# Patient Record
Sex: FEMALE | Race: BLACK OR AFRICAN AMERICAN | NOT HISPANIC OR LATINO | Employment: PART TIME | URBAN - METROPOLITAN AREA
[De-identification: names, ages, dates, MRNs, and addresses within clinical notes are randomized per-mention and may not be internally consistent; named-entity substitution may affect disease eponyms.]

---

## 2018-10-27 PROBLEM — Z01.419 WELL WOMAN EXAM: Status: ACTIVE | Noted: 2018-10-27

## 2018-10-27 PROBLEM — Z34.00 SUPERVISION OF NORMAL FIRST PREGNANCY: Status: ACTIVE | Noted: 2018-10-27

## 2018-10-31 ENCOUNTER — APPOINTMENT (OUTPATIENT)
Dept: LAB | Facility: HOSPITAL | Age: 23
End: 2018-10-31

## 2018-10-31 ENCOUNTER — INITIAL PRENATAL (OUTPATIENT)
Dept: OBSTETRICS AND GYNECOLOGY | Facility: CLINIC | Age: 23
End: 2018-10-31

## 2018-10-31 VITALS
WEIGHT: 270 LBS | HEIGHT: 67 IN | BODY MASS INDEX: 42.38 KG/M2 | DIASTOLIC BLOOD PRESSURE: 72 MMHG | SYSTOLIC BLOOD PRESSURE: 112 MMHG

## 2018-10-31 DIAGNOSIS — O09.91 HIGH-RISK PREGNANCY IN FIRST TRIMESTER: Primary | ICD-10-CM

## 2018-10-31 PROBLEM — O34.219 PREVIOUS CESAREAN DELIVERY AFFECTING PREGNANCY: Status: ACTIVE | Noted: 2018-10-31

## 2018-10-31 PROBLEM — O09.90 HIGH-RISK PREGNANCY: Status: ACTIVE | Noted: 2018-10-27

## 2018-10-31 PROBLEM — O99.210 OBESITY AFFECTING PREGNANCY: Status: ACTIVE | Noted: 2018-10-31

## 2018-10-31 LAB
ABO GROUP BLD: NORMAL
AMPHET+METHAMPHET UR QL: NEGATIVE
AMPHETAMINES UR QL: NEGATIVE
BARBITURATES UR QL SCN: NEGATIVE
BASOPHILS # BLD AUTO: 0.04 10*3/MM3 (ref 0–0.2)
BASOPHILS NFR BLD AUTO: 0.4 % (ref 0–1)
BENZODIAZ UR QL SCN: NEGATIVE
BLD GP AB SCN SERPL QL: NEGATIVE
BUPRENORPHINE SERPL-MCNC: NEGATIVE NG/ML
CANNABINOIDS SERPL QL: NEGATIVE
COCAINE UR QL: NEGATIVE
DEPRECATED RDW RBC AUTO: 44.5 FL (ref 37–54)
EOSINOPHIL # BLD AUTO: 0.11 10*3/MM3 (ref 0–0.3)
EOSINOPHIL NFR BLD AUTO: 1.2 % (ref 0–3)
ERYTHROCYTE [DISTWIDTH] IN BLOOD BY AUTOMATED COUNT: 14.2 % (ref 11.3–14.5)
HBV SURFACE AG SERPL QL IA: NORMAL
HCT VFR BLD AUTO: 39.2 % (ref 34.5–44)
HCV AB SER DONR QL: NORMAL
HGB BLD-MCNC: 12.4 G/DL (ref 11.5–15.5)
HIV1+2 AB SER QL: NORMAL
IMM GRANULOCYTES # BLD: 0.02 10*3/MM3 (ref 0–0.03)
IMM GRANULOCYTES NFR BLD: 0.2 % (ref 0–0.6)
LYMPHOCYTES # BLD AUTO: 2.26 10*3/MM3 (ref 0.6–4.8)
LYMPHOCYTES NFR BLD AUTO: 23.9 % (ref 24–44)
MCH RBC QN AUTO: 27.4 PG (ref 27–31)
MCHC RBC AUTO-ENTMCNC: 31.6 G/DL (ref 32–36)
MCV RBC AUTO: 86.5 FL (ref 80–99)
METHADONE UR QL SCN: NEGATIVE
MONOCYTES # BLD AUTO: 0.59 10*3/MM3 (ref 0–1)
MONOCYTES NFR BLD AUTO: 6.2 % (ref 0–12)
NEUTROPHILS # BLD AUTO: 6.43 10*3/MM3 (ref 1.5–8.3)
NEUTROPHILS NFR BLD AUTO: 68.1 % (ref 41–71)
OPIATES UR QL: NEGATIVE
OXYCODONE UR QL SCN: NEGATIVE
PCP UR QL SCN: NEGATIVE
PLATELET # BLD AUTO: 271 10*3/MM3 (ref 150–450)
PMV BLD AUTO: 10.5 FL (ref 6–12)
PROPOXYPH UR QL: NEGATIVE
RBC # BLD AUTO: 4.53 10*6/MM3 (ref 3.89–5.14)
RH BLD: POSITIVE
RUBV IGG SER QL: NORMAL
RUBV IGG SER-ACNC: 34.5 IU/ML
TRICYCLICS UR QL SCN: NEGATIVE
WBC NRBC COR # BLD: 9.45 10*3/MM3 (ref 3.5–10.8)

## 2018-10-31 PROCEDURE — 86762 RUBELLA ANTIBODY: CPT | Performed by: OBSTETRICS & GYNECOLOGY

## 2018-10-31 PROCEDURE — 87186 SC STD MICRODIL/AGAR DIL: CPT | Performed by: OBSTETRICS & GYNECOLOGY

## 2018-10-31 PROCEDURE — 99204 OFFICE O/P NEW MOD 45 MIN: CPT | Performed by: OBSTETRICS & GYNECOLOGY

## 2018-10-31 PROCEDURE — 86901 BLOOD TYPING SEROLOGIC RH(D): CPT | Performed by: OBSTETRICS & GYNECOLOGY

## 2018-10-31 PROCEDURE — 36415 COLL VENOUS BLD VENIPUNCTURE: CPT | Performed by: OBSTETRICS & GYNECOLOGY

## 2018-10-31 PROCEDURE — 87340 HEPATITIS B SURFACE AG IA: CPT | Performed by: OBSTETRICS & GYNECOLOGY

## 2018-10-31 PROCEDURE — 80306 DRUG TEST PRSMV INSTRMNT: CPT | Performed by: OBSTETRICS & GYNECOLOGY

## 2018-10-31 PROCEDURE — 85025 COMPLETE CBC W/AUTO DIFF WBC: CPT | Performed by: OBSTETRICS & GYNECOLOGY

## 2018-10-31 PROCEDURE — 87077 CULTURE AEROBIC IDENTIFY: CPT | Performed by: OBSTETRICS & GYNECOLOGY

## 2018-10-31 PROCEDURE — G0432 EIA HIV-1/HIV-2 SCREEN: HCPCS | Performed by: OBSTETRICS & GYNECOLOGY

## 2018-10-31 PROCEDURE — 86803 HEPATITIS C AB TEST: CPT | Performed by: OBSTETRICS & GYNECOLOGY

## 2018-10-31 PROCEDURE — 87086 URINE CULTURE/COLONY COUNT: CPT | Performed by: OBSTETRICS & GYNECOLOGY

## 2018-10-31 PROCEDURE — 86900 BLOOD TYPING SEROLOGIC ABO: CPT | Performed by: OBSTETRICS & GYNECOLOGY

## 2018-10-31 PROCEDURE — 80081 OBSTETRIC PANEL INC HIV TSTG: CPT | Performed by: OBSTETRICS & GYNECOLOGY

## 2018-10-31 PROCEDURE — 86850 RBC ANTIBODY SCREEN: CPT | Performed by: OBSTETRICS & GYNECOLOGY

## 2018-10-31 RX ORDER — PRENATAL VIT NO.126/IRON/FOLIC 28MG-0.8MG
1 TABLET ORAL DAILY
COMMUNITY
Start: 2018-10-26 | End: 2019-07-26

## 2018-10-31 NOTE — PROGRESS NOTES
Subjective   Chief Complaint   Patient presents with   • Initial Prenatal Visit       Lady Diamond is a 23 y.o. year old .  Patient's last menstrual period was 2018.  She presents to be seen to initiate prenatal care.     Smoking status: Former Smoker                                                              Packs/day: 0.00      Years: 0.00         Types: Cigarettes     Start date:      Quit date: 2018     Smokeless tobacco: Not on file                         The following portions of the patient's history were reviewed and updated as appropriate:vital signs, allergies, current medications, past medical history, past social history, past surgical history and problem list.    Objective   Lab Review   No data reviewed    Imaging   No data reviewed    Assessment/Plan   ASSESSMENT  1. 23 y.o. year old  at 8w0d  2. Supervision of high risk pregnancy  3. Previous C/S with route of delivery to be determined    PLAN  1. The problem list for pregnancy was initiated today  2. Tests ordered today:  Orders Placed This Encounter   Procedures   • Urine Culture - Urine, Urine, Clean Catch   • HIV-1 / O / 2 Ag / Antibody 4th Generation   • Obstetric Panel   • Urine Drug Screen - Urine, Clean Catch     Please confirm all positive UDS     3. Testing for GC / Chlamydia / trichomonas will need to be done at her next prenatal visit  4. Genetic testing reviewed: MSAFP-4  5. Information reviewed: exercise in pregnancy, nutrition in pregnancy, weight gain in pregnancy, work and travel restrictions during pregnancy, list of OTC medications acceptable in pregnancy and call coverage groups   6. The following data needs to be obtained to update her medical records: operative report from her C/S.    Total time spent today with Lady  was 50 minutes (level 4).  Off this time, 90% was spent face-to-face time coordinating care, answering her questions and counseling regarding pathophysiology of her presenting problem  along with plans for any diagnositc work-up and treatment.    Follow up: 1 month(s)       This note was electronically signed.    Camden Thomas MD  October 31, 2018

## 2018-11-02 PROBLEM — O23.41 UTI (URINARY TRACT INFECTION) DURING PREGNANCY, FIRST TRIMESTER: Status: ACTIVE | Noted: 2018-11-02

## 2018-11-02 LAB
BACTERIA SPEC AEROBE CULT: ABNORMAL
RPR SER QL: NORMAL

## 2018-11-29 ENCOUNTER — ROUTINE PRENATAL (OUTPATIENT)
Dept: OBSTETRICS AND GYNECOLOGY | Facility: CLINIC | Age: 23
End: 2018-11-29

## 2018-11-29 VITALS — DIASTOLIC BLOOD PRESSURE: 70 MMHG | SYSTOLIC BLOOD PRESSURE: 108 MMHG | BODY MASS INDEX: 42.29 KG/M2 | WEIGHT: 270 LBS

## 2018-11-29 DIAGNOSIS — O09.91 HIGH-RISK PREGNANCY IN FIRST TRIMESTER: Primary | ICD-10-CM

## 2018-11-29 DIAGNOSIS — O23.41 UTI (URINARY TRACT INFECTION) DURING PREGNANCY, FIRST TRIMESTER: ICD-10-CM

## 2018-11-29 DIAGNOSIS — O34.219 PREVIOUS CESAREAN DELIVERY AFFECTING PREGNANCY: ICD-10-CM

## 2018-11-29 LAB
2ND TRIMESTER 4 SCREEN SERPL-IMP: NORMAL
C TRACH RRNA SPEC DONR QL NAA+PROBE: NEGATIVE
N GONORRHOEA DNA SPEC QL NAA+PROBE: NEGATIVE

## 2018-11-29 PROCEDURE — 99213 OFFICE O/P EST LOW 20 MIN: CPT | Performed by: OBSTETRICS & GYNECOLOGY

## 2018-11-29 RX ORDER — NITROFURANTOIN 25; 75 MG/1; MG/1
100 CAPSULE ORAL 2 TIMES DAILY
Qty: 14 CAPSULE | Refills: 0 | Status: SHIPPED | OUTPATIENT
Start: 2018-11-29 | End: 2019-03-01 | Stop reason: SDUPTHER

## 2018-11-29 NOTE — PROGRESS NOTES
Chief Complaint   Patient presents with   • Routine Prenatal Visit       HPI: Lady is a  currently at 12w1d who today reports the following:  Nausea - No; Vaginal bleeding -  No; Heartburn - No.    ROS:  GI: Constipation - No; Diarrhea - No    Neuro: Headache - No; Visual change - No      EXAM:  Vitals: See prenatal flowsheet   Abdomen: See prenatal flowsheet   Urine glucose/protein: See prenatal flowsheet   Pelvic: See prenatal flowsheet     Prenatal Labs  Lab Results   Component Value Date    HGB 12.4 10/31/2018    RUBELLAABIGG 34.5 10/31/2018    RUBELLAABIGG Immune 10/31/2018    HEPBSAG Non-Reactive 10/31/2018    ABSCRN Negative 10/31/2018    TNP5BAQ2 Non-Reactive 10/31/2018    HEPCVIRUSABY Non-Reactive 10/31/2018    URINECX >100,000 CFU/mL Escherichia coli (A) 10/31/2018       MDM:  Impression: 1. Supervision of high risk pregnancy  2. Previous C/S with route of delivery to be determined  3. ASx UTI   Tests done today: 1. PAP  2. GC/Chlamydia testing   Topics discussed: 1. Continue with PNV's  2. Prenatal labs reviewed  3. Today we discussed genetic testing.  She is aware that the MSAFP-4 is a screening test.  A screening test is not a diagnostic test.  This means that a negative test does not guarantee an unaffected fetus and a positive test does not mean the fetus has the condition for which the test is being performed.  If the test returns positive, a diagnostic test should be consider to determine if the fetus in fact has the condition.  After considering the options previously presented, she is not interested in having genetic testing performed.   Tests scheduled today for her next visit:   none

## 2018-12-03 ENCOUNTER — DOCUMENTATION (OUTPATIENT)
Dept: OBSTETRICS AND GYNECOLOGY | Facility: CLINIC | Age: 23
End: 2018-12-03

## 2018-12-27 ENCOUNTER — ROUTINE PRENATAL (OUTPATIENT)
Dept: OBSTETRICS AND GYNECOLOGY | Facility: CLINIC | Age: 23
End: 2018-12-27

## 2018-12-27 VITALS — SYSTOLIC BLOOD PRESSURE: 110 MMHG | BODY MASS INDEX: 42.13 KG/M2 | DIASTOLIC BLOOD PRESSURE: 68 MMHG | WEIGHT: 269 LBS

## 2018-12-27 DIAGNOSIS — O34.219 PREVIOUS CESAREAN DELIVERY AFFECTING PREGNANCY: ICD-10-CM

## 2018-12-27 DIAGNOSIS — O09.92 HIGH-RISK PREGNANCY IN SECOND TRIMESTER: Primary | ICD-10-CM

## 2018-12-27 PROCEDURE — 99213 OFFICE O/P EST LOW 20 MIN: CPT | Performed by: OBSTETRICS & GYNECOLOGY

## 2018-12-27 NOTE — PROGRESS NOTES
Chief Complaint   Patient presents with   • Routine Prenatal Visit       HPI: Lady is a  currently at 16w1d who today reports the following:  Contractions - No; Leaking - No; Vaginal bleeding -  No; Heartburn - No.    ROS:  GI: Nausea - No ; Constipation - No; Diarrhea - No    Neuro: Headache - No ; Visual change - No      EXAM:  Vitals: See prenatal flowsheet   Abdomen: See prenatal flowsheet   Urine glucose/protein: See prenatal flowsheet   Pelvic: See prenatal flowsheet     Lab Results   Component Value Date    ABO AB 10/31/2018    RH Positive 10/31/2018    ABSCRN Negative 10/31/2018       MDM:  Impression: 1. Supervision of high risk pregnancy  2. Previous C/S with route of delivery to be determined   3. Obesity in pregnancy   Tests done today: 1. none   Topics discussed: 1. Continue with PNV's  2. Prenatal labs reviewed  3. Still need C/S op note for review   Tests scheduled today for her next visit:   U/S for anatomic screening @ [facility]

## 2019-01-28 ENCOUNTER — OFFICE VISIT (OUTPATIENT)
Dept: OBSTETRICS AND GYNECOLOGY | Facility: HOSPITAL | Age: 24
End: 2019-01-28

## 2019-01-28 ENCOUNTER — ROUTINE PRENATAL (OUTPATIENT)
Dept: OBSTETRICS AND GYNECOLOGY | Facility: CLINIC | Age: 24
End: 2019-01-28

## 2019-01-28 ENCOUNTER — HOSPITAL ENCOUNTER (OUTPATIENT)
Dept: WOMENS IMAGING | Facility: HOSPITAL | Age: 24
Discharge: HOME | End: 2019-01-28
Attending: OBSTETRICS & GYNECOLOGY | Admitting: OBSTETRICS & GYNECOLOGY

## 2019-01-28 VITALS — DIASTOLIC BLOOD PRESSURE: 87 MMHG | SYSTOLIC BLOOD PRESSURE: 137 MMHG | BODY MASS INDEX: 42.91 KG/M2 | WEIGHT: 274 LBS

## 2019-01-28 DIAGNOSIS — O34.219 PREVIOUS CESAREAN DELIVERY AFFECTING PREGNANCY: ICD-10-CM

## 2019-01-28 DIAGNOSIS — O99.210 MATERNAL MORBID OBESITY, ANTEPARTUM (HCC): Primary | ICD-10-CM

## 2019-01-28 DIAGNOSIS — E66.01 MATERNAL MORBID OBESITY, ANTEPARTUM (HCC): Primary | ICD-10-CM

## 2019-01-28 DIAGNOSIS — O23.41 UTI (URINARY TRACT INFECTION) DURING PREGNANCY, FIRST TRIMESTER: ICD-10-CM

## 2019-01-28 DIAGNOSIS — O09.92 HIGH-RISK PREGNANCY IN SECOND TRIMESTER: ICD-10-CM

## 2019-01-28 DIAGNOSIS — O09.92 HIGH-RISK PREGNANCY IN SECOND TRIMESTER: Primary | ICD-10-CM

## 2019-01-28 PROCEDURE — 76811 OB US DETAILED SNGL FETUS: CPT | Performed by: OBSTETRICS & GYNECOLOGY

## 2019-01-28 PROCEDURE — 76811 OB US DETAILED SNGL FETUS: CPT

## 2019-01-28 PROCEDURE — 99213 OFFICE O/P EST LOW 20 MIN: CPT | Performed by: OBSTETRICS & GYNECOLOGY

## 2019-01-28 NOTE — PROGRESS NOTES
Chief Complaint   Patient presents with   • Routine Prenatal Visit       HPI: Lady is a  currently at 20w5d who today reports the following:  Contractions - No; Leaking - No; Vaginal bleeding -  No; Heartburn - No.  Question of paternity    ROS:  GI: Nausea - No ; Constipation - No; Diarrhea - No    Neuro: Headache - No ; Visual change - No      EXAM:  Vitals: See prenatal flowsheet   Abdomen: See prenatal flowsheet   Urine glucose/protein: See prenatal flowsheet   Pelvic: See prenatal flowsheet     Lab Results   Component Value Date    ABO AB 10/31/2018    RH Positive 10/31/2018    ABSCRN Negative 10/31/2018       MDM:  Impression: 1. Supervision of high risk pregnancy  2. Previous C/S with route of delivery to be determined  3. Abnormal placenta - report n/a   Tests done today: 1. U/S for anatomic screening - U/S report is not available for review at this time   Topics discussed: 1. Continue with PNV's  2. Prenatal labs reviewed  3. Paternity testing explained   Tests scheduled today for her next visit:   none

## 2019-01-28 NOTE — PROGRESS NOTES
Documentation of the ultasound findings, images, and interpretations with be available in the patient's Viewpoint report located in the Chart Review Imaging tab in [employer].

## 2019-02-28 ENCOUNTER — ROUTINE PRENATAL (OUTPATIENT)
Dept: OBSTETRICS AND GYNECOLOGY | Facility: CLINIC | Age: 24
End: 2019-02-28

## 2019-02-28 ENCOUNTER — APPOINTMENT (OUTPATIENT)
Dept: LAB | Facility: HOSPITAL | Age: 24
End: 2019-02-28

## 2019-02-28 VITALS — WEIGHT: 280 LBS | DIASTOLIC BLOOD PRESSURE: 74 MMHG | SYSTOLIC BLOOD PRESSURE: 120 MMHG | BODY MASS INDEX: 43.85 KG/M2

## 2019-02-28 DIAGNOSIS — O09.92 HIGH-RISK PREGNANCY IN SECOND TRIMESTER: Primary | ICD-10-CM

## 2019-02-28 DIAGNOSIS — O34.219 PREVIOUS CESAREAN DELIVERY AFFECTING PREGNANCY: ICD-10-CM

## 2019-02-28 DIAGNOSIS — O23.41 UTI (URINARY TRACT INFECTION) DURING PREGNANCY, FIRST TRIMESTER: ICD-10-CM

## 2019-02-28 PROCEDURE — 87077 CULTURE AEROBIC IDENTIFY: CPT | Performed by: OBSTETRICS & GYNECOLOGY

## 2019-02-28 PROCEDURE — 87086 URINE CULTURE/COLONY COUNT: CPT | Performed by: OBSTETRICS & GYNECOLOGY

## 2019-02-28 PROCEDURE — 87186 SC STD MICRODIL/AGAR DIL: CPT | Performed by: OBSTETRICS & GYNECOLOGY

## 2019-02-28 PROCEDURE — 99213 OFFICE O/P EST LOW 20 MIN: CPT | Performed by: OBSTETRICS & GYNECOLOGY

## 2019-02-28 NOTE — PROGRESS NOTES
Chief Complaint   Patient presents with   • Routine Prenatal Visit       HPI: Lady is a  currently at 25w1d who today reports the following:  Contractions - No; Leaking - No; Vaginal bleeding -  No; Heartburn - No.    ROS:  GI: Nausea - No ; Constipation - No; Diarrhea - No    Neuro: Headache - No ; Visual change - No      EXAM:  Vitals: See prenatal flowsheet   Abdomen: See prenatal flowsheet   Urine glucose/protein: See prenatal flowsheet   Pelvic: See prenatal flowsheet     Lab Results   Component Value Date    ABO AB 10/31/2018    RH Positive 10/31/2018    ABSCRN Negative 10/31/2018       MDM:  Impression: 1. Supervision of high risk pregnancy  2. Previous C/S with route of delivery to be determined  3. ASx UTI   Tests done today: 1. UCx for WILLIAM   Topics discussed: 1. Continue with PNV's  2. Prenatal labs reviewed  3. breast feeding   Tests scheduled today for her next visit:   GCT  HgB

## 2019-03-01 ENCOUNTER — TELEPHONE (OUTPATIENT)
Dept: OBSTETRICS AND GYNECOLOGY | Facility: CLINIC | Age: 24
End: 2019-03-01

## 2019-03-01 RX ORDER — NITROFURANTOIN 25; 75 MG/1; MG/1
100 CAPSULE ORAL 2 TIMES DAILY
Qty: 14 CAPSULE | Refills: 0 | Status: SHIPPED | OUTPATIENT
Start: 2019-03-01 | End: 2019-03-08

## 2019-03-01 RX ORDER — NITROFURANTOIN MACROCRYSTALS 50 MG/1
50 CAPSULE ORAL NIGHTLY
Qty: 30 CAPSULE | Refills: 4 | Status: SHIPPED | OUTPATIENT
Start: 2019-03-01 | End: 2019-06-03

## 2019-03-01 NOTE — TELEPHONE ENCOUNTER
Call her with results of culture.  Looks like the asymptomatic UTI has not cleared.  We will treat her twice daily for 7 days followed by nightly suppression thereafter.    New Medications Ordered This Visit   Medications   • nitrofurantoin, macrocrystal-monohydrate, (MACROBID) 100 MG capsule     Sig: Take 1 capsule by mouth 2 (Two) Times a Day for 7 days.     Dispense:  14 capsule     Refill:  0   • nitrofurantoin (MACRODANTIN) 50 MG capsule     Sig: Take 1 capsule by mouth Every Night.     Dispense:  30 capsule     Refill:  4

## 2019-03-02 LAB — BACTERIA SPEC AEROBE CULT: ABNORMAL

## 2019-03-21 ENCOUNTER — LAB (OUTPATIENT)
Dept: LAB | Facility: HOSPITAL | Age: 24
End: 2019-03-21

## 2019-03-21 ENCOUNTER — ROUTINE PRENATAL (OUTPATIENT)
Dept: OBSTETRICS AND GYNECOLOGY | Facility: CLINIC | Age: 24
End: 2019-03-21

## 2019-03-21 VITALS — SYSTOLIC BLOOD PRESSURE: 124 MMHG | BODY MASS INDEX: 44.48 KG/M2 | DIASTOLIC BLOOD PRESSURE: 76 MMHG | WEIGHT: 284 LBS

## 2019-03-21 DIAGNOSIS — O23.41 UTI (URINARY TRACT INFECTION) DURING PREGNANCY, FIRST TRIMESTER: ICD-10-CM

## 2019-03-21 DIAGNOSIS — O34.219 PREVIOUS CESAREAN DELIVERY AFFECTING PREGNANCY: ICD-10-CM

## 2019-03-21 DIAGNOSIS — O09.92 HIGH-RISK PREGNANCY IN SECOND TRIMESTER: Primary | ICD-10-CM

## 2019-03-21 DIAGNOSIS — O09.92 HIGH-RISK PREGNANCY IN SECOND TRIMESTER: ICD-10-CM

## 2019-03-21 DIAGNOSIS — Z30.2 REQUEST FOR STERILIZATION: ICD-10-CM

## 2019-03-21 PROBLEM — O99.013 ANEMIA IN PREGNANCY, THIRD TRIMESTER: Status: ACTIVE | Noted: 2019-03-21

## 2019-03-21 LAB
GLUCOSE 1H P 100 G GLC PO SERPL-MCNC: 100 MG/DL (ref 65–140)
HCT VFR BLD AUTO: 33.4 % (ref 34.5–44)
HGB BLD-MCNC: 10.4 G/DL (ref 11.5–15.5)

## 2019-03-21 PROCEDURE — 36415 COLL VENOUS BLD VENIPUNCTURE: CPT

## 2019-03-21 PROCEDURE — 85014 HEMATOCRIT: CPT

## 2019-03-21 PROCEDURE — 99213 OFFICE O/P EST LOW 20 MIN: CPT | Performed by: OBSTETRICS & GYNECOLOGY

## 2019-03-21 PROCEDURE — 82950 GLUCOSE TEST: CPT

## 2019-03-21 PROCEDURE — 85018 HEMOGLOBIN: CPT

## 2019-03-21 RX ORDER — OXYTOCIN-SODIUM CHLORIDE 0.9% IV SOLN 30 UNIT/500ML 30-0.9/5 UT/ML-%
85 SOLUTION INTRAVENOUS ONCE
Status: CANCELLED | OUTPATIENT
Start: 2019-03-21

## 2019-03-21 RX ORDER — PROMETHAZINE HYDROCHLORIDE 25 MG/1
12.5 TABLET ORAL EVERY 6 HOURS PRN
Status: CANCELLED | OUTPATIENT
Start: 2019-03-21 | End: 2019-03-23

## 2019-03-21 RX ORDER — FERROUS SULFATE 325(65) MG
325 TABLET ORAL
Qty: 60 TABLET | Refills: 10 | Status: SHIPPED | OUTPATIENT
Start: 2019-03-21 | End: 2019-07-26

## 2019-03-21 RX ORDER — PROMETHAZINE HYDROCHLORIDE 25 MG/ML
12.5 INJECTION, SOLUTION INTRAMUSCULAR; INTRAVENOUS EVERY 6 HOURS PRN
Status: CANCELLED | OUTPATIENT
Start: 2019-03-21 | End: 2019-03-23

## 2019-03-21 RX ORDER — SODIUM CHLORIDE 0.9 % (FLUSH) 0.9 %
3 SYRINGE (ML) INJECTION EVERY 12 HOURS SCHEDULED
Status: CANCELLED | OUTPATIENT
Start: 2019-03-21

## 2019-03-21 RX ORDER — CARBOPROST TROMETHAMINE 250 UG/ML
250 INJECTION, SOLUTION INTRAMUSCULAR AS NEEDED
Status: CANCELLED | OUTPATIENT
Start: 2019-03-21

## 2019-03-21 RX ORDER — OXYTOCIN-SODIUM CHLORIDE 0.9% IV SOLN 30 UNIT/500ML 30-0.9/5 UT/ML-%
650 SOLUTION INTRAVENOUS ONCE
Status: CANCELLED | OUTPATIENT
Start: 2019-03-21

## 2019-03-21 RX ORDER — MISOPROSTOL 100 UG/1
800 TABLET ORAL AS NEEDED
Status: CANCELLED | OUTPATIENT
Start: 2019-03-21

## 2019-03-21 RX ORDER — TRISODIUM CITRATE DIHYDRATE AND CITRIC ACID MONOHYDRATE 500; 334 MG/5ML; MG/5ML
30 SOLUTION ORAL ONCE
Status: CANCELLED | OUTPATIENT
Start: 2019-03-21 | End: 2019-03-21

## 2019-03-21 RX ORDER — METHYLERGONOVINE MALEATE 0.2 MG/ML
200 INJECTION INTRAVENOUS ONCE AS NEEDED
Status: CANCELLED | OUTPATIENT
Start: 2019-03-21 | End: 2019-03-22

## 2019-03-21 RX ORDER — LIDOCAINE HYDROCHLORIDE 10 MG/ML
5 INJECTION, SOLUTION EPIDURAL; INFILTRATION; INTRACAUDAL; PERINEURAL AS NEEDED
Status: CANCELLED | OUTPATIENT
Start: 2019-03-21

## 2019-03-21 RX ORDER — PROMETHAZINE HYDROCHLORIDE 25 MG/1
12.5 SUPPOSITORY RECTAL EVERY 6 HOURS PRN
Status: CANCELLED | OUTPATIENT
Start: 2019-03-21 | End: 2019-03-23

## 2019-03-21 RX ORDER — SODIUM CHLORIDE, SODIUM LACTATE, POTASSIUM CHLORIDE, CALCIUM CHLORIDE 600; 310; 30; 20 MG/100ML; MG/100ML; MG/100ML; MG/100ML
125 INJECTION, SOLUTION INTRAVENOUS CONTINUOUS
Status: CANCELLED | OUTPATIENT
Start: 2019-03-21

## 2019-03-21 RX ORDER — SODIUM CHLORIDE 0.9 % (FLUSH) 0.9 %
1-10 SYRINGE (ML) INJECTION AS NEEDED
Status: CANCELLED | OUTPATIENT
Start: 2019-03-21

## 2019-03-21 NOTE — PROGRESS NOTES
Chief Complaint   Patient presents with   • Routine Prenatal Visit       HPI: Lady is a  currently at 28w1d who today reports the following:  Contractions - YES - but less than 4/hour AND no associated change in vaginal discharge; Leaking - No; Vaginal bleeding -  No; Heartburn - No.    ROS:  GI: Nausea - No ; Constipation - No; Diarrhea - No    Neuro: Headache - No ; Visual change - No      EXAM:  Vitals: See prenatal flowsheet   Abdomen: See prenatal flowsheet   Urine glucose/protein: See prenatal flowsheet   Pelvic: See prenatal flowsheet     Lab Results   Component Value Date    ABO AB 10/31/2018    RH Positive 10/31/2018    ABSCRN Negative 10/31/2018       MDM:  Impression: 1. Supervision of high risk pregnancy  2. Previous C/S with route of delivery to be determined  3. Possible succenturiate lobe   Tests done today: 1. GCT  2. HgB   Topics discussed: 1. Continue with PNV's  2. Prenatal labs reviewed  3. Vaginal birth () was discussed today with Lady.  Success for  is dependant upon the reason for the first .  If the first  was done for a non-repeating cause (breech, fetal intolerance to labor, etc) the success rate is ~ 80%. If the first  was done for a repeating cause (failure to progress in labor), the risk of success is much lower (~ 50%).   carries risks that cannot be eliminated.  The greatest risk is for uterine rupture.  With a prior low low transverse uterine incision, this likelihood of rupture is ~ 1/100.  Should a rupture occur, risk to both mother and fetus exist.  The greatest risks are those of the fetus.  Should rupture occur with fetal expulsion outside of the uterus, unless delivery can be accomplished in < 10 minutes, the risk of fetal death and anoxic brain injury occur.  This can result in long term  injury including cerebral palsy.  Maternal risk include uterine rupture with possible need for hysterectomy, and extension of the rupture  into the bladder or ureter.  Additionally, the risk of infection and need for blood transfusion may be increased with a failed .  The risk of rupture may be higher with factors such as short interval between deliveries, prior one layer closure and induced labor.  As compared to repeat , the benefits of a successful  include lower rates of infection, less blood loss and quicker return to function.  Additionally, with successful , often the  transitions more quickly to extra-uterine life.  I explained to Lady that  is elective choice.  Should she choose , she can change her mind and opt for a repeat  at any point.  Should she choose an elective repeat , it can be scheduled no sooner than 39 weeks gestation.  4. The methods of female sterilization were discussed.  I discussed partial salpingectomy done at  and total salpingectomy done at  with Lady.  I explained that the historical failure rate for tubal ligation has been quoted as ~ 1/300.  Data from the CREST study suggests that for certain techniques, the failure rates could be as high as ~ 4%.  Both Essure and salpingectomy may reduce the failure rates historically seen with segmental salpingectomy.   Furthermore, total salpingectomy may be associated with a reduction in the lifetime incidence of ovarian cancer.  As compared to partial salpingectomy, total salpingectomy and Essure are permanent method of female sterilization that cannot be reversed.  I explained to Lady that with failures, there is an increased risk of ectopic pregnancy.  Ruptured ectopic gestations can be life threatening if not treated.  Procedural risks include but not limited to the risk of bleeding, infection, and damage to internal organs were discussed.   Additionally I discussed with Lady regret rate and post-tubal ligation syndrome.  I discussed with Lady that for all practical purposes the procedure should be  considered permanent and non-reversable.  If there is not complete certainty regarding sterilization, long acting reversible contraception (LARC's) should be considered.  Other forms of LARC's that were reviewed include IUD - Mirena.  Additionally, non-contraceptive benefits of these methods should be considered in making her final decision.    After considering these options she wishes to have repeat C/S   Tests scheduled today for her next visit:   U/S for f/u of placental location

## 2019-04-04 ENCOUNTER — ROUTINE PRENATAL (OUTPATIENT)
Dept: OBSTETRICS AND GYNECOLOGY | Facility: CLINIC | Age: 24
End: 2019-04-04

## 2019-04-04 ENCOUNTER — HOSPITAL ENCOUNTER (OUTPATIENT)
Dept: WOMENS IMAGING | Facility: HOSPITAL | Age: 24
Discharge: HOME | End: 2019-04-04
Attending: OBSTETRICS & GYNECOLOGY | Admitting: OBSTETRICS & GYNECOLOGY

## 2019-04-04 ENCOUNTER — OFFICE VISIT (OUTPATIENT)
Dept: OBSTETRICS AND GYNECOLOGY | Facility: HOSPITAL | Age: 24
End: 2019-04-04

## 2019-04-04 VITALS — SYSTOLIC BLOOD PRESSURE: 130 MMHG | BODY MASS INDEX: 44.32 KG/M2 | WEIGHT: 283 LBS | DIASTOLIC BLOOD PRESSURE: 82 MMHG

## 2019-04-04 DIAGNOSIS — Z30.2 REQUEST FOR STERILIZATION: ICD-10-CM

## 2019-04-04 DIAGNOSIS — O99.013 ANEMIA IN PREGNANCY, THIRD TRIMESTER: ICD-10-CM

## 2019-04-04 DIAGNOSIS — O34.219 PREVIOUS CESAREAN DELIVERY AFFECTING PREGNANCY: ICD-10-CM

## 2019-04-04 DIAGNOSIS — Z82.79 FAMILY HISTORY OF CONGENITAL ANOMALY: ICD-10-CM

## 2019-04-04 DIAGNOSIS — O23.41 UTI (URINARY TRACT INFECTION) DURING PREGNANCY, FIRST TRIMESTER: ICD-10-CM

## 2019-04-04 DIAGNOSIS — O99.210 MATERNAL MORBID OBESITY, ANTEPARTUM (HCC): ICD-10-CM

## 2019-04-04 DIAGNOSIS — O09.93 HIGH-RISK PREGNANCY IN THIRD TRIMESTER: Primary | ICD-10-CM

## 2019-04-04 DIAGNOSIS — E66.01 MORBID OBESITY WITH BMI OF 40.0-44.9, ADULT (HCC): Primary | ICD-10-CM

## 2019-04-04 DIAGNOSIS — E66.01 MATERNAL MORBID OBESITY, ANTEPARTUM (HCC): ICD-10-CM

## 2019-04-04 PROBLEM — O09.90 HIGH-RISK PREGNANCY: Status: RESOLVED | Noted: 2018-10-27 | Resolved: 2019-04-04

## 2019-04-04 PROBLEM — Z01.419 WELL WOMAN EXAM: Status: RESOLVED | Noted: 2018-10-27 | Resolved: 2019-04-04

## 2019-04-04 PROBLEM — O09.92 HIGH-RISK PREGNANCY IN SECOND TRIMESTER: Status: RESOLVED | Noted: 2019-03-21 | Resolved: 2019-04-04

## 2019-04-04 PROCEDURE — 76816 OB US FOLLOW-UP PER FETUS: CPT

## 2019-04-04 PROCEDURE — 76816 OB US FOLLOW-UP PER FETUS: CPT | Performed by: OBSTETRICS & GYNECOLOGY

## 2019-04-04 PROCEDURE — 99213 OFFICE O/P EST LOW 20 MIN: CPT | Performed by: OBSTETRICS & GYNECOLOGY

## 2019-04-04 RX ORDER — ACETAMINOPHEN 500 MG
500 TABLET ORAL EVERY 6 HOURS PRN
COMMUNITY
End: 2019-07-26

## 2019-04-04 NOTE — PROGRESS NOTES
Pt states her orthodontist told her that she has an enlarged pituitary gland found on xray before pregnancy. Questions if this could be cause of headaches. Pt doesn't think she has told her OB yet. Pt denies lof, vag bleeding. Reports occasional contractions. Declined genetic testing.

## 2019-04-04 NOTE — PROGRESS NOTES
Chief Complaint   Patient presents with   • Routine Prenatal Visit       HPI: Lady is a  currently at 30w1d who today reports the following:  Contractions - No; Leaking - No; Vaginal bleeding -  No; Swelling of extremities - No.    ROS:  GI: Nausea - No; Constipation - No; Diarrhea - No    Neuro: Headache - No; Visual change - No      EXAM:  Vitals: See prenatal flowsheet   Abdomen: See prenatal flowsheet   Urine glucose/protein: See prenatal flowsheet   Pelvic: See prenatal flowsheet   MDM:   Impression: 1. Supervision of high risk pregnancy  2. Previous C/S with planned repeat C/S   Tests done today: 1. U/S for f/u at [facility]   Topics discussed: 1. Continue with PNV's  2. Prenatal labs reviewed  3. none - she had no major complaints,questions or concerns   Tests scheduled today for her next visit:   none

## 2019-04-18 ENCOUNTER — ROUTINE PRENATAL (OUTPATIENT)
Dept: OBSTETRICS AND GYNECOLOGY | Facility: CLINIC | Age: 24
End: 2019-04-18

## 2019-04-18 VITALS — SYSTOLIC BLOOD PRESSURE: 128 MMHG | DIASTOLIC BLOOD PRESSURE: 80 MMHG | BODY MASS INDEX: 44.32 KG/M2 | WEIGHT: 283 LBS

## 2019-04-18 DIAGNOSIS — Z30.2 REQUEST FOR STERILIZATION: ICD-10-CM

## 2019-04-18 DIAGNOSIS — O34.219 PREVIOUS CESAREAN DELIVERY AFFECTING PREGNANCY: ICD-10-CM

## 2019-04-18 DIAGNOSIS — O09.93 HIGH-RISK PREGNANCY IN THIRD TRIMESTER: ICD-10-CM

## 2019-04-18 DIAGNOSIS — O99.013 ANEMIA IN PREGNANCY, THIRD TRIMESTER: ICD-10-CM

## 2019-04-18 DIAGNOSIS — O23.41 UTI (URINARY TRACT INFECTION) DURING PREGNANCY, FIRST TRIMESTER: ICD-10-CM

## 2019-04-18 PROCEDURE — 99213 OFFICE O/P EST LOW 20 MIN: CPT | Performed by: OBSTETRICS & GYNECOLOGY

## 2019-04-18 NOTE — PROGRESS NOTES
Chief Complaint   Patient presents with   • Routine Prenatal Visit       HPI: Lady is a  currently at 32w1d who today reports the following:  Contractions - No; Leaking - No; Vaginal bleeding -  No; Swelling of extremities - No.  Is currently on clindamycin orally after having been [facility] with [facility].  Still having some external vaginal irritation    ROS:  GI: Nausea - No; Constipation - No; Diarrhea - No    Neuro: Headache - No; Visual change - No      EXAM:  Vitals: See prenatal flowsheet   Abdomen: See prenatal flowsheet   Urine glucose/protein: See prenatal flowsheet   Pelvic: See prenatal flowsheet   MDM:   Impression: 1. Supervision of high risk pregnancy  2. Previous C/S with planned repeat C/S  3. Vulvar irritation with current diagnosis of BV  4. Anemia in pregnancy   Tests done today: 1. none   Topics discussed: 1. Continue with PNV's  2. Prenatal labs reviewed  3. If bacterial vaginosis symptoms do not get better after completion of therapy she will let me know and will examine her and try to figure out what is going on   Tests scheduled today for her next visit:   none

## 2019-04-22 ENCOUNTER — APPOINTMENT (OUTPATIENT)
Dept: WOMENS IMAGING | Facility: HOSPITAL | Age: 24
End: 2019-04-22
Attending: OBSTETRICS & GYNECOLOGY

## 2019-04-30 ENCOUNTER — ROUTINE PRENATAL (OUTPATIENT)
Dept: OBSTETRICS AND GYNECOLOGY | Facility: CLINIC | Age: 24
End: 2019-04-30

## 2019-04-30 VITALS — DIASTOLIC BLOOD PRESSURE: 76 MMHG | SYSTOLIC BLOOD PRESSURE: 122 MMHG | WEIGHT: 284 LBS | BODY MASS INDEX: 44.48 KG/M2

## 2019-04-30 DIAGNOSIS — O23.41 UTI (URINARY TRACT INFECTION) DURING PREGNANCY, FIRST TRIMESTER: ICD-10-CM

## 2019-04-30 DIAGNOSIS — O99.013 ANEMIA IN PREGNANCY, THIRD TRIMESTER: ICD-10-CM

## 2019-04-30 DIAGNOSIS — O34.219 PREVIOUS CESAREAN DELIVERY AFFECTING PREGNANCY: ICD-10-CM

## 2019-04-30 DIAGNOSIS — O09.93 HIGH-RISK PREGNANCY IN THIRD TRIMESTER: ICD-10-CM

## 2019-04-30 DIAGNOSIS — Z30.2 REQUEST FOR STERILIZATION: ICD-10-CM

## 2019-04-30 PROCEDURE — 99213 OFFICE O/P EST LOW 20 MIN: CPT | Performed by: OBSTETRICS & GYNECOLOGY

## 2019-04-30 NOTE — PROGRESS NOTES
Chief Complaint   Patient presents with   • Routine Prenatal Visit       HPI: Lady is a  currently at 33w6d who today reports the following:  Contractions - YES - but less than 4/hour AND no associated change in vaginal discharge; Leaking - No; Vaginal bleeding -  No; Swelling of extremities - No.    ROS:  GI: Nausea - No; Constipation - No; Diarrhea - No    Neuro: Headache - No; Visual change - No      EXAM:  Vitals: See prenatal flowsheet   Abdomen: See prenatal flowsheet   Urine glucose/protein: See prenatal flowsheet   Pelvic: See prenatal flowsheet   MDM:   Impression: 1. Supervision of high risk pregnancy  2. Previous C/S with planned repeat C/S  3. S>D but recent U/S showing AGA  4. Anemia in pregnancy   Tests done today: 1. none   Topics discussed: 1. Continue with PNV's  2. Prenatal labs reviewed  3. TDAP vaccination   Tests scheduled today for her next visit:   none

## 2019-05-13 ENCOUNTER — ROUTINE PRENATAL (OUTPATIENT)
Dept: OBSTETRICS AND GYNECOLOGY | Facility: CLINIC | Age: 24
End: 2019-05-13

## 2019-05-13 ENCOUNTER — APPOINTMENT (OUTPATIENT)
Dept: LAB | Facility: HOSPITAL | Age: 24
End: 2019-05-13

## 2019-05-13 VITALS — SYSTOLIC BLOOD PRESSURE: 118 MMHG | WEIGHT: 283 LBS | BODY MASS INDEX: 44.32 KG/M2 | DIASTOLIC BLOOD PRESSURE: 74 MMHG

## 2019-05-13 DIAGNOSIS — Z30.2 REQUEST FOR STERILIZATION: ICD-10-CM

## 2019-05-13 DIAGNOSIS — O34.219 PREVIOUS CESAREAN DELIVERY AFFECTING PREGNANCY: ICD-10-CM

## 2019-05-13 DIAGNOSIS — O23.41 UTI (URINARY TRACT INFECTION) DURING PREGNANCY, FIRST TRIMESTER: ICD-10-CM

## 2019-05-13 DIAGNOSIS — O99.013 ANEMIA IN PREGNANCY, THIRD TRIMESTER: ICD-10-CM

## 2019-05-13 DIAGNOSIS — O09.93 HIGH-RISK PREGNANCY IN THIRD TRIMESTER: Primary | ICD-10-CM

## 2019-05-13 LAB
GP B STREP RRNA SPEC QL PROBE: NEGATIVE
HGB BLD-MCNC: 10.8 G/DL (ref 12–15.9)

## 2019-05-13 PROCEDURE — 99213 OFFICE O/P EST LOW 20 MIN: CPT | Performed by: OBSTETRICS & GYNECOLOGY

## 2019-05-13 PROCEDURE — 36415 COLL VENOUS BLD VENIPUNCTURE: CPT | Performed by: OBSTETRICS & GYNECOLOGY

## 2019-05-13 PROCEDURE — 85018 HEMOGLOBIN: CPT | Performed by: OBSTETRICS & GYNECOLOGY

## 2019-05-13 NOTE — PROGRESS NOTES
Chief Complaint   Patient presents with   • Routine Prenatal Visit       HPI: Lady is a  currently at 35w5d who today reports the following:  Contractions - No; Leaking - No; Vaginal bleeding -  No; Swelling of extremities - No.    ROS:  GI: Nausea - No; Constipation - No; Diarrhea - No    Neuro: Headache - No; Visual change - No      EXAM:  Vitals: See prenatal flowsheet   Abdomen: See prenatal flowsheet   Urine glucose/protein: See prenatal flowsheet   Pelvic: See prenatal flowsheet   MDM:   Impression: 1. Supervision of high risk pregnancy  2. Previous C/S with planned repeat C/S  3. Anemia in pregnancy   Tests done today: 1. GBS testing  2. HgB   Topics discussed: 1. Continue with PNV's  2. Prenatal labs reviewed  3. no shaving in advanced of scheduled    Tests scheduled today for her next visit:   none

## 2019-05-21 PROBLEM — O09.92 HIGH-RISK PREGNANCY IN SECOND TRIMESTER: Status: ACTIVE | Noted: 2019-03-21

## 2019-05-28 ENCOUNTER — ROUTINE PRENATAL (OUTPATIENT)
Dept: OBSTETRICS AND GYNECOLOGY | Facility: CLINIC | Age: 24
End: 2019-05-28

## 2019-05-28 VITALS — DIASTOLIC BLOOD PRESSURE: 74 MMHG | BODY MASS INDEX: 44.17 KG/M2 | WEIGHT: 282 LBS | SYSTOLIC BLOOD PRESSURE: 122 MMHG

## 2019-05-28 DIAGNOSIS — O99.013 ANEMIA IN PREGNANCY, THIRD TRIMESTER: ICD-10-CM

## 2019-05-28 DIAGNOSIS — Z30.2 REQUEST FOR STERILIZATION: ICD-10-CM

## 2019-05-28 DIAGNOSIS — O34.219 PREVIOUS CESAREAN DELIVERY AFFECTING PREGNANCY: ICD-10-CM

## 2019-05-28 DIAGNOSIS — O23.41 UTI (URINARY TRACT INFECTION) DURING PREGNANCY, FIRST TRIMESTER: ICD-10-CM

## 2019-05-28 DIAGNOSIS — O09.93 HIGH-RISK PREGNANCY IN THIRD TRIMESTER: ICD-10-CM

## 2019-05-28 PROCEDURE — 99213 OFFICE O/P EST LOW 20 MIN: CPT | Performed by: OBSTETRICS & GYNECOLOGY

## 2019-05-31 ENCOUNTER — PREP FOR SURGERY (OUTPATIENT)
Dept: OTHER | Facility: HOSPITAL | Age: 24
End: 2019-05-31

## 2019-05-31 NOTE — H&P
Lady Diamond  : 1995  MRN: [id number]  [facility]: [id number]    History and Physical    Subjective   Lady Diamond is a 24 y.o. year old  with an Estimated Date of Delivery: 19 scheduled for  delivery due to previous C/S - declines .  Her placental location is posterior left lateral.  She is planning for sterilization at the time of the .  Consents are signed available.    Prenatal care has been with Dr. Thomas.  It has been noted for recurrent asymptomatic UTIs for which she is on suppressive therapy.  Also she is anemic but to be iron related.    Obstetric History       T2      L2     SAB0   TAB0   Ectopic0   Molar0   Multiple0   Live Births2       # Outcome Date GA Lbr Rush/2nd Weight Sex Delivery Anes PTL Lv   3 Current            2 Term 11/15/14 38w0d  3685 g (8 lb 2 oz) F CS-LTranv   SHELBY      Name: Sara      Complications: Failure to Progress in Second Stage,Umbilical cord around body   1 Term 08/16/10 40w0d  3572 g (7 lb 14 oz) M Vag-Spont   SHELBY      Name: Emerson      Obstetric Comments    - Emerson    - Sara     Past Medical History:   Diagnosis Date   • Anxiety     and depression, no meds   • H/O gonorrhea    • H/O chlamydia infection    • H/O trichomoniasis      Past Surgical History:   Procedure Laterality Date   •  SECTION  11/15/2014    LTCS (1 layer closure)   • LAPAROSCOPIC CHOLECYSTECTOMY  2015       Current Outpatient Medications:   •  acetaminophen (TYLENOL) 500 MG tablet, Take 500 mg by mouth Every 6 (Six) Hours As Needed for Mild Pain ., Disp: , Rfl:   •  ferrous sulfate 325 (65 FE) MG tablet, Take 1 tablet by mouth 2 (Two) Times a Day Before Meals., Disp: 60 tablet, Rfl: 10  •  nitrofurantoin (MACRODANTIN) 50 MG capsule, Take 1 capsule by mouth Every Night., Disp: 30 capsule, Rfl: 4  •  Prenatal Vit-Fe Fumarate-FA (PRENATAL, CLASSIC, VITAMIN) 28-0.8 MG tablet tablet, , Disp: , Rfl:     No Known  Allergies    Social History    Tobacco Use      Smoking status: Current Every Day Smoker        Types: Cigarettes        Start date:       Smokeless tobacco: Never Used      Tobacco comment: smokes 1-2 cigs/day    Review of Systems      Objective   LMP 2018   General: well developed; well nourished  no acute distress   Heart: Not performed.   Lungs: breathing is unlabored   Abdomen: soft, non-tender; no masses  no umbilical or inguinal hernias are present  no hepato-splenomegaly     Prenatal Labs  Lab Results   Component Value Date    HGB 10.8 (L) 2019    RUBELLAABIGG 34.5 10/31/2018    RUBELLAABIGG Immune 10/31/2018    HEPBSAG Non-Reactive 10/31/2018    ABSCRN Negative 10/31/2018    QOX4OOQ5 Non-Reactive 10/31/2018    HEPCVIRUSABY Non-Reactive 10/31/2018     2019    STREPGPB negative 2019    URINECX >100,000 CFU/mL Escherichia coli (A) 2019    CHLAMNAA negative 2018    NGONORRHON negative 2018       Recent Labs  Lab Results   Component Value Date    HGB 10.8 (L) 2019    HCT 33.4 (L) 2019    WBC 9.45 10/31/2018     10/31/2018           Assessment   1. IUP with an Estimated Date of Delivery: 19  2. Planned  section for previous C/S - declines    3. Desires sterilization  4. History of anemia on iron therapy     Plan   1. Repeat  with sterilization  2. ABx and DVT prophylaxis    Camden Thomas MD  2019

## 2019-06-03 ENCOUNTER — APPOINTMENT (OUTPATIENT)
Dept: PREADMISSION TESTING | Facility: HOSPITAL | Age: 24
End: 2019-06-03

## 2019-06-03 ENCOUNTER — ROUTINE PRENATAL (OUTPATIENT)
Dept: OBSTETRICS AND GYNECOLOGY | Facility: CLINIC | Age: 24
End: 2019-06-03

## 2019-06-03 VITALS — SYSTOLIC BLOOD PRESSURE: 118 MMHG | WEIGHT: 283 LBS | BODY MASS INDEX: 44.32 KG/M2 | DIASTOLIC BLOOD PRESSURE: 74 MMHG

## 2019-06-03 VITALS — WEIGHT: 280.2 LBS | BODY MASS INDEX: 43.98 KG/M2 | HEIGHT: 67 IN

## 2019-06-03 DIAGNOSIS — O34.219 PREVIOUS CESAREAN DELIVERY AFFECTING PREGNANCY: ICD-10-CM

## 2019-06-03 DIAGNOSIS — Z30.2 REQUEST FOR STERILIZATION: ICD-10-CM

## 2019-06-03 DIAGNOSIS — O99.013 ANEMIA IN PREGNANCY, THIRD TRIMESTER: ICD-10-CM

## 2019-06-03 DIAGNOSIS — O09.92 HIGH-RISK PREGNANCY IN SECOND TRIMESTER: ICD-10-CM

## 2019-06-03 DIAGNOSIS — O09.93 HIGH-RISK PREGNANCY IN THIRD TRIMESTER: ICD-10-CM

## 2019-06-03 DIAGNOSIS — O23.41 UTI (URINARY TRACT INFECTION) DURING PREGNANCY, FIRST TRIMESTER: ICD-10-CM

## 2019-06-03 LAB
ABO GROUP BLD: NORMAL
BLD GP AB SCN SERPL QL: NEGATIVE
DEPRECATED RDW RBC AUTO: 44.2 FL (ref 37–54)
ERYTHROCYTE [DISTWIDTH] IN BLOOD BY AUTOMATED COUNT: 14.7 % (ref 12.3–15.4)
HCT VFR BLD AUTO: 37.2 % (ref 34–46.6)
HGB BLD-MCNC: 11.5 G/DL (ref 12–15.9)
MCH RBC QN AUTO: 25.6 PG (ref 26.6–33)
MCHC RBC AUTO-ENTMCNC: 30.9 G/DL (ref 31.5–35.7)
MCV RBC AUTO: 82.9 FL (ref 79–97)
PLATELET # BLD AUTO: 313 10*3/MM3 (ref 140–450)
PMV BLD AUTO: 10.2 FL (ref 6–12)
RBC # BLD AUTO: 4.49 10*6/MM3 (ref 3.77–5.28)
RH BLD: POSITIVE
T&S EXPIRATION DATE: NORMAL
WBC NRBC COR # BLD: 10.15 10*3/MM3 (ref 3.4–10.8)

## 2019-06-03 PROCEDURE — 86901 BLOOD TYPING SEROLOGIC RH(D): CPT | Performed by: OBSTETRICS & GYNECOLOGY

## 2019-06-03 PROCEDURE — 86850 RBC ANTIBODY SCREEN: CPT | Performed by: OBSTETRICS & GYNECOLOGY

## 2019-06-03 PROCEDURE — 86900 BLOOD TYPING SEROLOGIC ABO: CPT | Performed by: OBSTETRICS & GYNECOLOGY

## 2019-06-03 PROCEDURE — 99213 OFFICE O/P EST LOW 20 MIN: CPT | Performed by: OBSTETRICS & GYNECOLOGY

## 2019-06-03 PROCEDURE — 36415 COLL VENOUS BLD VENIPUNCTURE: CPT

## 2019-06-03 PROCEDURE — 85027 COMPLETE CBC AUTOMATED: CPT | Performed by: OBSTETRICS & GYNECOLOGY

## 2019-06-03 NOTE — PROGRESS NOTES
Chief Complaint   Patient presents with   • Routine Prenatal Visit       HPI: Lady is a  currently at 38w5d who today reports the following:  Contractions - No; Leaking - No; Vaginal bleeding -  No; Swelling of extremities - No.    ROS:  GI: Nausea - No; Constipation - No; Diarrhea - No    Neuro: Headache - No; Visual change - No      EXAM:  Vitals: See prenatal flowsheet   Abdomen: See prenatal flowsheet   Urine glucose/protein: See prenatal flowsheet   Pelvic: See prenatal flowsheet   MDM:   Impression: 1. Supervision of high risk pregnancy  2. Previous C/S with planned repeat C/S  3. Anemia in pregnancy   Tests done today: 1. PAT   Topics discussed: 1. Continue with PNV's  2. Prenatal labs reviewed  3. none - she had no major complaints,questions or concerns   Tests scheduled today for her next visit:   none

## 2019-06-03 NOTE — DISCHARGE INSTRUCTIONS
What to know before your arrive:     -Do not eat, drink or chew gum after midnight the day before your procedure.    This also includes mints.   -Do not shave any part of your body including abdomen or pelvic are for two    days before your procedure.   -If you are taking a scheduled medication (insulin, blood pressure medicine,   antibiotics) please consult with your physician whether to take on the day of   surgery.   -Remove all jewelry including rings, wedding bands, and piercing before coming   to the hospital.   -Leave important valuables at home.   -Do not wear dark fingernail polish.   -Bring the following with you to the hospital:    -Picture ID and insurance, Medicare or Medicaid cards    -Co-pay/deductible required by insurance (Cash, Check, Credit Card)    -Copy of living will or power  document (if applicable)    -CPAP mask and tubing, not machine (if applicable)    -Skin prep instructions sheet    What to know the day of procedure:     -Park in the [facility], take elevator for first floor, exit to the right and  proceed through the doors to outside, follow the covered sidewalk to the  entrance of the [facility], follow the hallway and signs to the [facility],  enter the [facility] to your right BEFORE entering the [address] lobby.  Take the  elevators to the 3rd floor ([address] [facility]).   -Leave unnecessary items in your vehicle, including your suitcase.  Your support  person or a family member can get it for you after your procedure.   -Check in at the reception desk in the lobby of the 3rd floor ([address] [facility]).   -One person may accompany you to the pre-op/recovery area.  Please have  other family members wait in the waiting room.   -An anesthesiologist will meet with your prior to your procedure.   -After anesthesia has been initiated, one person may accompany you in the  operating room.   -No video cameras are permitted in the operating room; only still cameras,  Please.      What to  expect while you are in recovery:     -One person may stay with you while you are in recovery.   -If the baby is stable, he/she may visit to initiate breastfeeding & [facility].    THE FOLLOWING INFORMATION WAS PROVIDED TO PATIENT:     SECTION BOOKLET BY JOHN  PAIN MANAGEMENT   RESPIREX    CHLORHEXIDINE GLUCONATE WIPES AND INSTRUCTIONS GIVEN TO PATIENT

## 2019-06-04 ENCOUNTER — APPOINTMENT (OUTPATIENT)
Dept: PREADMISSION TESTING | Facility: HOSPITAL | Age: 24
End: 2019-06-04

## 2019-06-05 ENCOUNTER — HOSPITAL ENCOUNTER (INPATIENT)
Facility: HOSPITAL | Age: 24
LOS: 2 days | Discharge: HOME | End: 2019-06-07
Attending: OBSTETRICS & GYNECOLOGY | Admitting: OBSTETRICS & GYNECOLOGY

## 2019-06-05 ENCOUNTER — ANESTHESIA EVENT (OUTPATIENT)
Dept: LABOR AND DELIVERY | Facility: HOSPITAL | Age: 24
End: 2019-06-05

## 2019-06-05 ENCOUNTER — ANESTHESIA (OUTPATIENT)
Dept: LABOR AND DELIVERY | Facility: HOSPITAL | Age: 24
End: 2019-06-05

## 2019-06-05 DIAGNOSIS — O34.219 PREVIOUS CESAREAN DELIVERY AFFECTING PREGNANCY: ICD-10-CM

## 2019-06-05 DIAGNOSIS — Z30.2 REQUEST FOR STERILIZATION: ICD-10-CM

## 2019-06-05 DIAGNOSIS — O09.92 HIGH-RISK PREGNANCY IN SECOND TRIMESTER: ICD-10-CM

## 2019-06-05 PROBLEM — O99.013 ANEMIA IN PREGNANCY, THIRD TRIMESTER: Status: RESOLVED | Noted: 2019-03-21 | Resolved: 2019-06-05

## 2019-06-05 PROBLEM — O23.41 UTI (URINARY TRACT INFECTION) DURING PREGNANCY, FIRST TRIMESTER: Status: RESOLVED | Noted: 2018-11-02 | Resolved: 2019-06-05

## 2019-06-05 PROBLEM — O09.93 HIGH-RISK PREGNANCY IN THIRD TRIMESTER: Status: RESOLVED | Noted: 2018-10-27 | Resolved: 2019-06-05

## 2019-06-05 PROCEDURE — 88305 TISSUE EXAM BY PATHOLOGIST: CPT | Performed by: OBSTETRICS & GYNECOLOGY

## 2019-06-05 PROCEDURE — 0UB70ZZ EXCISION OF BILATERAL FALLOPIAN TUBES, OPEN APPROACH: ICD-10-PCS | Performed by: OBSTETRICS & GYNECOLOGY

## 2019-06-05 PROCEDURE — 25010000002 KETOROLAC TROMETHAMINE PER 15 MG: Performed by: OBSTETRICS & GYNECOLOGY

## 2019-06-05 PROCEDURE — 25010000002 FENTANYL CITRATE (PF) 100 MCG/2ML SOLUTION: Performed by: ANESTHESIOLOGY

## 2019-06-05 PROCEDURE — 0DBU0ZX EXCISION OF OMENTUM, OPEN APPROACH, DIAGNOSTIC: ICD-10-PCS | Performed by: OBSTETRICS & GYNECOLOGY

## 2019-06-05 PROCEDURE — 59514 CESAREAN DELIVERY ONLY: CPT | Performed by: OBSTETRICS & GYNECOLOGY

## 2019-06-05 PROCEDURE — 88302 TISSUE EXAM BY PATHOLOGIST: CPT | Performed by: OBSTETRICS & GYNECOLOGY

## 2019-06-05 PROCEDURE — 59025 FETAL NON-STRESS TEST: CPT

## 2019-06-05 PROCEDURE — 25010000002 METOCLOPRAMIDE PER 10 MG: Performed by: ANESTHESIOLOGY

## 2019-06-05 PROCEDURE — 59515 CESAREAN DELIVERY: CPT | Performed by: OBSTETRICS & GYNECOLOGY

## 2019-06-05 PROCEDURE — 25010000003 MORPHINE PER 10 MG: Performed by: ANESTHESIOLOGY

## 2019-06-05 PROCEDURE — 25010000002 TERBUTALINE PER 1 MG: Performed by: OBSTETRICS & GYNECOLOGY

## 2019-06-05 PROCEDURE — 58611 LIGATE OVIDUCT(S) ADD-ON: CPT | Performed by: OBSTETRICS & GYNECOLOGY

## 2019-06-05 PROCEDURE — 25010000002 PROMETHAZINE PER 50 MG: Performed by: OBSTETRICS & GYNECOLOGY

## 2019-06-05 PROCEDURE — 25010000002 CEFAZOLIN PER 500 MG: Performed by: OBSTETRICS & GYNECOLOGY

## 2019-06-05 PROCEDURE — 63710000001 DIPHENHYDRAMINE PER 50 MG: Performed by: OBSTETRICS & GYNECOLOGY

## 2019-06-05 PROCEDURE — 25010000002 ONDANSETRON PER 1 MG: Performed by: ANESTHESIOLOGY

## 2019-06-05 RX ORDER — PROMETHAZINE HYDROCHLORIDE 25 MG/ML
12.5 INJECTION, SOLUTION INTRAMUSCULAR; INTRAVENOUS EVERY 6 HOURS PRN
Status: DISCONTINUED | OUTPATIENT
Start: 2019-06-05 | End: 2019-06-05

## 2019-06-05 RX ORDER — CARBOPROST TROMETHAMINE 250 UG/ML
250 INJECTION, SOLUTION INTRAMUSCULAR AS NEEDED
Status: DISCONTINUED | OUTPATIENT
Start: 2019-06-05 | End: 2019-06-05

## 2019-06-05 RX ORDER — MORPHINE SULFATE 0.5 MG/ML
INJECTION, SOLUTION EPIDURAL; INTRATHECAL; INTRAVENOUS
Status: COMPLETED | OUTPATIENT
Start: 2019-06-05 | End: 2019-06-05

## 2019-06-05 RX ORDER — SODIUM CHLORIDE 0.9 % (FLUSH) 0.9 %
3 SYRINGE (ML) INJECTION EVERY 12 HOURS SCHEDULED
Status: DISCONTINUED | OUTPATIENT
Start: 2019-06-05 | End: 2019-06-05

## 2019-06-05 RX ORDER — KETOROLAC TROMETHAMINE 30 MG/ML
30 INJECTION, SOLUTION INTRAMUSCULAR; INTRAVENOUS EVERY 6 HOURS
Status: DISCONTINUED | OUTPATIENT
Start: 2019-06-05 | End: 2019-06-06

## 2019-06-05 RX ORDER — ONDANSETRON 2 MG/ML
INJECTION INTRAMUSCULAR; INTRAVENOUS AS NEEDED
Status: DISCONTINUED | OUTPATIENT
Start: 2019-06-05 | End: 2019-06-05 | Stop reason: SURG

## 2019-06-05 RX ORDER — METOCLOPRAMIDE HYDROCHLORIDE 5 MG/ML
10 INJECTION INTRAMUSCULAR; INTRAVENOUS ONCE
Status: COMPLETED | OUTPATIENT
Start: 2019-06-05 | End: 2019-06-05

## 2019-06-05 RX ORDER — SODIUM CHLORIDE 0.9 % (FLUSH) 0.9 %
1-10 SYRINGE (ML) INJECTION AS NEEDED
Status: DISCONTINUED | OUTPATIENT
Start: 2019-06-05 | End: 2019-06-05

## 2019-06-05 RX ORDER — METHYLERGONOVINE MALEATE 0.2 MG/ML
200 INJECTION INTRAVENOUS ONCE AS NEEDED
Status: DISCONTINUED | OUTPATIENT
Start: 2019-06-05 | End: 2019-06-05

## 2019-06-05 RX ORDER — LIDOCAINE HYDROCHLORIDE 10 MG/ML
5 INJECTION, SOLUTION EPIDURAL; INFILTRATION; INTRACAUDAL; PERINEURAL AS NEEDED
Status: DISCONTINUED | OUTPATIENT
Start: 2019-06-05 | End: 2019-06-05

## 2019-06-05 RX ORDER — OXYTOCIN 10 [USP'U]/ML
INJECTION, SOLUTION INTRAMUSCULAR; INTRAVENOUS AS NEEDED
Status: DISCONTINUED | OUTPATIENT
Start: 2019-06-05 | End: 2019-06-05 | Stop reason: SURG

## 2019-06-05 RX ORDER — ACETAMINOPHEN 325 MG/1
650 TABLET ORAL ONCE AS NEEDED
Status: DISCONTINUED | OUTPATIENT
Start: 2019-06-05 | End: 2019-06-05

## 2019-06-05 RX ORDER — HYDROMORPHONE HYDROCHLORIDE 1 MG/ML
0.5 INJECTION, SOLUTION INTRAMUSCULAR; INTRAVENOUS; SUBCUTANEOUS
Status: DISCONTINUED | OUTPATIENT
Start: 2019-06-05 | End: 2019-06-05

## 2019-06-05 RX ORDER — FAMOTIDINE 10 MG/ML
20 INJECTION, SOLUTION INTRAVENOUS ONCE
Status: COMPLETED | OUTPATIENT
Start: 2019-06-05 | End: 2019-06-05

## 2019-06-05 RX ORDER — DOCUSATE SODIUM 100 MG/1
100 CAPSULE, LIQUID FILLED ORAL 2 TIMES DAILY PRN
Status: DISCONTINUED | OUTPATIENT
Start: 2019-06-05 | End: 2019-06-07 | Stop reason: HOSPADM

## 2019-06-05 RX ORDER — PROMETHAZINE HYDROCHLORIDE 12.5 MG/1
12.5 SUPPOSITORY RECTAL EVERY 6 HOURS PRN
Status: DISCONTINUED | OUTPATIENT
Start: 2019-06-05 | End: 2019-06-05

## 2019-06-05 RX ORDER — IBUPROFEN 600 MG/1
600 TABLET ORAL ONCE AS NEEDED
Status: COMPLETED | OUTPATIENT
Start: 2019-06-05 | End: 2019-06-05

## 2019-06-05 RX ORDER — MISOPROSTOL 200 UG/1
800 TABLET ORAL AS NEEDED
Status: DISCONTINUED | OUTPATIENT
Start: 2019-06-05 | End: 2019-06-05

## 2019-06-05 RX ORDER — LANOLIN 100 %
OINTMENT (GRAM) TOPICAL
Status: DISCONTINUED | OUTPATIENT
Start: 2019-06-05 | End: 2019-06-07 | Stop reason: HOSPADM

## 2019-06-05 RX ORDER — IBUPROFEN 600 MG/1
600 TABLET ORAL EVERY 6 HOURS PRN
Status: DISCONTINUED | OUTPATIENT
Start: 2019-06-05 | End: 2019-06-07 | Stop reason: HOSPADM

## 2019-06-05 RX ORDER — METOCLOPRAMIDE HYDROCHLORIDE 5 MG/ML
10 INJECTION INTRAMUSCULAR; INTRAVENOUS ONCE AS NEEDED
Status: DISCONTINUED | OUTPATIENT
Start: 2019-06-05 | End: 2019-06-05

## 2019-06-05 RX ORDER — SODIUM CHLORIDE, SODIUM LACTATE, POTASSIUM CHLORIDE, CALCIUM CHLORIDE 600; 310; 30; 20 MG/100ML; MG/100ML; MG/100ML; MG/100ML
125 INJECTION, SOLUTION INTRAVENOUS CONTINUOUS
Status: DISCONTINUED | OUTPATIENT
Start: 2019-06-05 | End: 2019-06-06

## 2019-06-05 RX ORDER — OXYCODONE HYDROCHLORIDE AND ACETAMINOPHEN 5; 325 MG/1; MG/1
1 TABLET ORAL EVERY 4 HOURS PRN
Status: DISCONTINUED | OUTPATIENT
Start: 2019-06-05 | End: 2019-06-07 | Stop reason: HOSPADM

## 2019-06-05 RX ORDER — DIPHENHYDRAMINE HCL 25 MG
25 CAPSULE ORAL EVERY 6 HOURS PRN
Status: DISCONTINUED | OUTPATIENT
Start: 2019-06-05 | End: 2019-06-07 | Stop reason: HOSPADM

## 2019-06-05 RX ORDER — NALOXONE HCL 0.4 MG/ML
0.4 VIAL (ML) INJECTION
Status: DISCONTINUED | OUTPATIENT
Start: 2019-06-05 | End: 2019-06-06

## 2019-06-05 RX ORDER — OXYTOCIN-SODIUM CHLORIDE 0.9% IV SOLN 30 UNIT/500ML 30-0.9/5 UT/ML-%
85 SOLUTION INTRAVENOUS ONCE
Status: DISCONTINUED | OUTPATIENT
Start: 2019-06-05 | End: 2019-06-05

## 2019-06-05 RX ORDER — PROMETHAZINE HYDROCHLORIDE 12.5 MG/1
12.5 TABLET ORAL EVERY 6 HOURS PRN
Status: DISCONTINUED | OUTPATIENT
Start: 2019-06-05 | End: 2019-06-05

## 2019-06-05 RX ORDER — CEFAZOLIN SODIUM IN 0.9 % NACL 3 G/100 ML
3 INTRAVENOUS SOLUTION, PIGGYBACK (ML) INTRAVENOUS ONCE
Status: DISCONTINUED | OUTPATIENT
Start: 2019-06-05 | End: 2019-06-05 | Stop reason: SDUPTHER

## 2019-06-05 RX ORDER — TERBUTALINE SULFATE 1 MG/ML
0.25 INJECTION, SOLUTION SUBCUTANEOUS ONCE
Status: COMPLETED | OUTPATIENT
Start: 2019-06-05 | End: 2019-06-05

## 2019-06-05 RX ORDER — TRISODIUM CITRATE DIHYDRATE AND CITRIC ACID MONOHYDRATE 500; 334 MG/5ML; MG/5ML
30 SOLUTION ORAL ONCE
Status: DISCONTINUED | OUTPATIENT
Start: 2019-06-05 | End: 2019-06-05

## 2019-06-05 RX ORDER — ONDANSETRON 4 MG/1
4 TABLET, FILM COATED ORAL EVERY 8 HOURS PRN
Status: DISCONTINUED | OUTPATIENT
Start: 2019-06-05 | End: 2019-06-07 | Stop reason: HOSPADM

## 2019-06-05 RX ORDER — SODIUM CHLORIDE, SODIUM LACTATE, POTASSIUM CHLORIDE, CALCIUM CHLORIDE 600; 310; 30; 20 MG/100ML; MG/100ML; MG/100ML; MG/100ML
125 INJECTION, SOLUTION INTRAVENOUS CONTINUOUS
Status: DISCONTINUED | OUTPATIENT
Start: 2019-06-05 | End: 2019-06-05

## 2019-06-05 RX ORDER — BUPIVACAINE HYDROCHLORIDE 7.5 MG/ML
INJECTION, SOLUTION INTRASPINAL
Status: COMPLETED | OUTPATIENT
Start: 2019-06-05 | End: 2019-06-05

## 2019-06-05 RX ORDER — ONDANSETRON 2 MG/ML
4 INJECTION INTRAMUSCULAR; INTRAVENOUS ONCE
Status: DISCONTINUED | OUTPATIENT
Start: 2019-06-05 | End: 2019-06-05

## 2019-06-05 RX ORDER — CEFAZOLIN SODIUM IN 0.9 % NACL 3 G/100 ML
3 INTRAVENOUS SOLUTION, PIGGYBACK (ML) INTRAVENOUS ONCE
Status: COMPLETED | OUTPATIENT
Start: 2019-06-05 | End: 2019-06-05

## 2019-06-05 RX ORDER — FENTANYL CITRATE 50 UG/ML
INJECTION, SOLUTION INTRAMUSCULAR; INTRAVENOUS
Status: COMPLETED | OUTPATIENT
Start: 2019-06-05 | End: 2019-06-05

## 2019-06-05 RX ORDER — OXYTOCIN-SODIUM CHLORIDE 0.9% IV SOLN 30 UNIT/500ML 30-0.9/5 UT/ML-%
650 SOLUTION INTRAVENOUS ONCE
Status: DISCONTINUED | OUTPATIENT
Start: 2019-06-05 | End: 2019-06-05

## 2019-06-05 RX ADMIN — BUPIVACAINE HYDROCHLORIDE IN DEXTROSE 1.6 ML: 7.5 INJECTION, SOLUTION SUBARACHNOID at 04:26

## 2019-06-05 RX ADMIN — ONDANSETRON 4 MG: 2 INJECTION INTRAMUSCULAR; INTRAVENOUS at 04:32

## 2019-06-05 RX ADMIN — SODIUM CHLORIDE, POTASSIUM CHLORIDE, SODIUM LACTATE AND CALCIUM CHLORIDE 125 ML/HR: 600; 310; 30; 20 INJECTION, SOLUTION INTRAVENOUS at 22:02

## 2019-06-05 RX ADMIN — FENTANYL CITRATE 25 MCG: 50 INJECTION, SOLUTION INTRAMUSCULAR; INTRAVENOUS at 04:26

## 2019-06-05 RX ADMIN — MORPHINE SULFATE 0.3 MG: 0.5 INJECTION, SOLUTION EPIDURAL; INTRATHECAL; INTRAVENOUS at 04:26

## 2019-06-05 RX ADMIN — OXYTOCIN 20 UNITS: 10 INJECTION, SOLUTION INTRAMUSCULAR; INTRAVENOUS at 04:45

## 2019-06-05 RX ADMIN — DIPHENHYDRAMINE HYDROCHLORIDE 25 MG: 25 CAPSULE ORAL at 18:10

## 2019-06-05 RX ADMIN — SODIUM CHLORIDE, POTASSIUM CHLORIDE, SODIUM LACTATE AND CALCIUM CHLORIDE 125 ML/HR: 600; 310; 30; 20 INJECTION, SOLUTION INTRAVENOUS at 03:23

## 2019-06-05 RX ADMIN — SODIUM CHLORIDE 3 G: 9 INJECTION, SOLUTION INTRAVENOUS at 04:00

## 2019-06-05 RX ADMIN — FAMOTIDINE 20 MG: 10 INJECTION, SOLUTION INTRAVENOUS at 03:31

## 2019-06-05 RX ADMIN — IBUPROFEN 600 MG: 600 TABLET, FILM COATED ORAL at 05:48

## 2019-06-05 RX ADMIN — KETOROLAC TROMETHAMINE 30 MG: 30 INJECTION, SOLUTION INTRAMUSCULAR; INTRAVENOUS at 20:54

## 2019-06-05 RX ADMIN — METOCLOPRAMIDE 10 MG: 5 INJECTION, SOLUTION INTRAMUSCULAR; INTRAVENOUS at 03:31

## 2019-06-05 RX ADMIN — TERBUTALINE SULFATE 0.25 MG: 1 INJECTION SUBCUTANEOUS at 02:53

## 2019-06-05 RX ADMIN — SODIUM CHLORIDE, POTASSIUM CHLORIDE, SODIUM LACTATE AND CALCIUM CHLORIDE 125 ML/HR: 600; 310; 30; 20 INJECTION, SOLUTION INTRAVENOUS at 07:30

## 2019-06-05 RX ADMIN — OXYTOCIN 20 UNITS: 10 INJECTION, SOLUTION INTRAMUSCULAR; INTRAVENOUS at 04:50

## 2019-06-05 RX ADMIN — PROMETHAZINE HYDROCHLORIDE 12.5 MG: 25 INJECTION INTRAMUSCULAR; INTRAVENOUS at 06:54

## 2019-06-05 RX ADMIN — SODIUM CHLORIDE, POTASSIUM CHLORIDE, SODIUM LACTATE AND CALCIUM CHLORIDE: 600; 310; 30; 20 INJECTION, SOLUTION INTRAVENOUS at 04:30

## 2019-06-05 RX ADMIN — SODIUM CHLORIDE, POTASSIUM CHLORIDE, SODIUM LACTATE AND CALCIUM CHLORIDE 125 ML/HR: 600; 310; 30; 20 INJECTION, SOLUTION INTRAVENOUS at 04:00

## 2019-06-05 NOTE — OP NOTE
Nadja Diamond  : 1995  MRN: [id number]  CSN: [id number]     Section Operative Note    Pre-Operative Dx:   1. Intrauterine pregnancy at 39w0d weeks   2. Previous  section - declines    3. Desires sterilization       Postoperative dx:    1. Intrauterine pregnancy at 39w0d weeks 2.   Previous  section - declines    3. Desires sterilization         Procedure: Repeat  (LTCS)  - 1 layer closure with bilateral partial salpingectomy and lysis of omental adhesions with small omental resection        Surgeon: Regine José MD   Assistant: Kota Gibbs MD       Anesthesia: Spinal        EBL: 700 mls.   IV Fluids: 3000 mls.   UOP: 200 mls.     Antibiotics: cefazolin 3 gms     Infant:      Name:  Rossi      Gender: female  infant    Weight: 3600 g (7 lb 15 oz)     Apgars: 8   @ 1 minute / 9   @ 5 minutes     Pathology specimens: bilateral fallopian tubal segments, small omental resection     Procedure Details:   After the patient was adequately anesthetized, she was sterilely prepped and draped in the dorsal supine left lateral tilt position. A Pfannenstiel incision was created sharply with the knife. It was carried down to the fascia with the Bovie.  The fascia was cut transversely with the knife and extended in a curvilinear fashion with scissors. The fascia was freed from its midline insertion superiorly and inferiorly. Rectus muscles were  in the midline. The peritoneum was sharply entered and a bladder flap was not sharply created. There were omental adhesion noted which were taken down with the bovi.  The lower uterine segment was scored transversely with the knife. Clear amniotic fluid was seen. The infant's was in vertex presentation.  The head was delivered atraumatically. The mouth and nose were bulb suctioned.  The cord was clamped and then cut after milking x4 and the infant was handed to the delivery team which was in attendance. The  placenta was spontaneously extracted. The uterus was exteriorized and wiped free of debris and clot. The uterine incision was closed with #1 chromic in a continuous running locking fashion. The bladder flap was not reapproximated. Additional interrupted sutures were placed on the right aspect of the hysterotomy to achieve hemostasis.     At this time attention was turned to the fallopian tubes. The right tube was grasped and elevated and a clear plane was developed in the mesosalpinx. 2-0 plain gut ties were then then used to ligate on both sides and a parkland tubal ligation was performed and the tube was transected in between the sutures and sent to pathology. The same was done for the left side.     There were noted to be small ~1-3mm omental green appearing implants on an aspect of the omentum. This area of the omentum was elevated and hoa clamp and 0 vicryl tie was used to send this to pathology.     The uterus was returned to the abdomen. The paracolic gutters were cleared of debris and clot. Peritoneum was closed with 3-0 Vicryl.  The fascia was closed with 0 PDS in a running unlocked fashion. The subcutaneous tissue was copiously irrigated. Jacqueline's fascia was closed with 3-0 monocryl and the skin was closed with 4-0 monocryl subcuticularly.  Steri-Strips without Mastisol were applied.  All counts were correct.         Complications:   None      Disposition:   Mother to Mother Baby/Postpartum  in stable condition currently.   Baby to NBN  in stable condition currently.       Regine José MD  6/5/2019  5:37 AM

## 2019-06-05 NOTE — ANESTHESIA POSTPROCEDURE EVALUATION
Patient: Lady Diamond    Procedure Summary     Date:  19 Room / Location:   [facility] /  [facility]    Anesthesia Start:  418 Anesthesia Stop:  535    Procedure:   SECTION REPEAT WITH TUBAL (Bilateral Abdomen) Diagnosis:      Surgeon:  Regine José MD Provider:  Conrad Hammer MD    Anesthesia Type:  spinal ASA Status:  2 - Emergent          Anesthesia Type: spinal  Last vitals  BP   119/64 (19)   Temp   97.8 °F (36.6 °C) (19)   Pulse   76 (19)   Resp   16 (19)     SpO2   99 % (19)     Post Anesthesia Care and Evaluation    Patient location during evaluation: bedside  Patient participation: complete - patient participated  Level of consciousness: awake and alert  Pain score: 0  Pain management: adequate  Airway patency: patent  Anesthetic complications: No anesthetic complications    Cardiovascular status: acceptable  Respiratory status: acceptable  Hydration status: acceptable

## 2019-06-05 NOTE — ANESTHESIA PROCEDURE NOTES
Spinal Block      Patient location during procedure: OR  Start Time: 6/5/2019 4:25 AM  Stop Time: 6/5/2019 4:26 AM  Indication:at surgeon's request  Performed By  Anesthesiologist: Conrad Hammer MD  Preanesthetic Checklist  Completed: patient identified, site marked, surgical consent, pre-op evaluation, timeout performed, IV checked, risks and benefits discussed and monitors and equipment checked  Spinal Block Prep:  Patient Position:sitting  Sterile Tech:cap, gloves, sterile barriers and mask  Prep:Betadine  Patient Monitoring:EKG, continuous pulse oximetry and blood pressure monitoring  Spinal Block Procedure  Approach:midline  Guidance:palpation technique  Location:L2-L3  Needle Type:Simona  Needle Gauge:25 G  Placement of Spinal needle event:cerebrospinal fluid aspirated  Paresthesia: no  Fluid Appearance:clear  Medications: bupivacaine in dextrose (MARCAINE SPINAL / Hyberbaric) 0.75-8.25 % injection, 1.6 mL  morphine PF (ASTRAMORPH) injection, 0.3 mg  fentaNYL citrate (PF) (SUBLIMAZE) injection, 25 mcg  Med Administered at 6/5/2019 4:26 AM   Post Assessment  Patient Tolerance:patient tolerated the procedure well with no apparent complications  Complications no

## 2019-06-05 NOTE — ANESTHESIA PREPROCEDURE EVALUATION
Anesthesia Evaluation     Patient summary reviewed and Nursing notes reviewed                Airway   Mallampati: I  TM distance: >3 FB  Neck ROM: full  No difficulty expected  Dental - normal exam     Pulmonary - negative pulmonary ROS and normal exam   Cardiovascular - negative cardio ROS and normal exam        Neuro/Psych- negative ROS  GI/Hepatic/Renal/Endo    (+) obesity, morbid obesity,      Musculoskeletal (-) negative ROS    Abdominal  - normal exam    Bowel sounds: normal.   Substance History - negative use     OB/GYN    (+) Pregnant,         Other                        Anesthesia Plan    ASA 2 - emergent     spinal     Anesthetic plan, all risks, benefits, and alternatives have been provided, discussed and informed consent has been obtained with: patient.  Use of blood products discussed with patient .   Plan discussed with attending.

## 2019-06-05 NOTE — H&P
Nadja  Lady Diamond  : 1995  MRN: [id number]  CSN: [id number]    History and Physical    Subjective   Lady Diamond is a 24 y.o. year old  with an Estimated Date of Delivery: 19 currently at 39w0d presenting with regular contractions and leaking fluid.  She also reports fetal movement is present. .    Prenatal care has been with Dr. Thomas.  It has been complicated by asymptomatic UTIs on suppressive therapy and anemia. .    Obstetric History       T2      L2     SAB0   TAB0   Ectopic0   Molar0   Multiple0   Live Births2       # Outcome Date GA Lbr Rush/2nd Weight Sex Delivery Anes PTL Lv   3 Current            2 Term 11/15/14 38w0d  3685 g (8 lb 2 oz) F CS-LTranv   SHELBY      Name: Sara      Complications: Failure to Progress in Second Stage,Umbilical cord around body   1 Term 08/16/10 40w0d  3572 g (7 lb 14 oz) M Vag-Spont   SHELBY      Name: Emerson      Obstetric Comments    - Emerson    - Sara     Past Medical History:   Diagnosis Date   • Anemia    • Anxiety     and depression, no meds   • Chlamydia     prev pregnancy   • Depression    • H/O chlamydia infection    • H/O gonorrhea    • H/O trichomoniasis    • Urinary tract infection      Past Surgical History:   Procedure Laterality Date   •  SECTION  11/15/2014    LTCS (1 layer closure)   • LAPAROSCOPIC CHOLECYSTECTOMY  2015   • WISDOM TOOTH EXTRACTION         No Known Allergies  Social History    Tobacco Use      Smoking status: Former Smoker        Types: Cigarettes        Start date:         Quit date: 3/1/2019        Years since quittin.2      Smokeless tobacco: Never Used      Tobacco comment: smokes 1-2 cigs/day    Review of Systems      Objective   /70 (BP Location: Right arm, Patient Position: Lying)   Pulse 86   Temp 98 °F (36.7 °C) (Oral)   Resp 16   LMP 2018   Breastfeeding? Yes   General: well developed; well nourished  no acute distress    Heart: Not performed.   Lungs: breathing is unlabored   Abdomen: soft, non-tender; no masses  no umbilical or inguinal hernias are present  no hepato-splenomegaly   FHT's: reassuring and category 1   Cervix: was checked (by me): 4 cm / 90 % / 0   Presentation: cephalic   Contractions: regular every 3 minutes     Prenatal Labs  Lab Results   Component Value Date    HGB 11.5 (L) 2019    RUBELLAABIGG 34.5 10/31/2018    RUBELLAABIGG Immune 10/31/2018    HEPBSAG Non-Reactive 10/31/2018    ABSCRN Negative 2019    MUR2WTC6 Non-Reactive 10/31/2018    HEPCVIRUSABY Non-Reactive 10/31/2018     2019    STREPGPB negative 2019    URINECX >100,000 CFU/mL Escherichia coli (A) 2019    CHLAMNAA negative 2018    NGONORRHON negative 2018       Current Labs Reviewed   No data reviewed       Assessment   1. IUP at 39w0d  2. Spontaneous rupture of membranes with term labor   3. Group B strep status: negative  4. History of previous  section - declines   5. Desires sterilization   6. History of anemia on iron therapy      Plan   1. Repeat  section with bilateral tubal ligation    2. Abx and DVT ppx.     Regine José MD  2019  4:01 AM

## 2019-06-06 LAB
BASOPHILS # BLD AUTO: 0.03 10*3/MM3 (ref 0–0.2)
BASOPHILS NFR BLD AUTO: 0.3 % (ref 0–1.5)
CYTO UR: NORMAL
CYTO UR: NORMAL
DEPRECATED RDW RBC AUTO: 43.9 FL (ref 37–54)
EOSINOPHIL # BLD AUTO: 0.11 10*3/MM3 (ref 0–0.4)
EOSINOPHIL NFR BLD AUTO: 0.9 % (ref 0.3–6.2)
ERYTHROCYTE [DISTWIDTH] IN BLOOD BY AUTOMATED COUNT: 14.7 % (ref 12.3–15.4)
HCT VFR BLD AUTO: 30.8 % (ref 34–46.6)
HGB BLD-MCNC: 9.9 G/DL (ref 12–15.9)
IMM GRANULOCYTES # BLD AUTO: 0.04 10*3/MM3 (ref 0–0.05)
IMM GRANULOCYTES NFR BLD AUTO: 0.3 % (ref 0–0.5)
LAB AP CASE REPORT: NORMAL
LAB AP CASE REPORT: NORMAL
LAB AP CLINICAL INFORMATION: NORMAL
LAB AP CLINICAL INFORMATION: NORMAL
LYMPHOCYTES # BLD AUTO: 3.15 10*3/MM3 (ref 0.7–3.1)
LYMPHOCYTES NFR BLD AUTO: 26.4 % (ref 19.6–45.3)
MCH RBC QN AUTO: 26.3 PG (ref 26.6–33)
MCHC RBC AUTO-ENTMCNC: 32.1 G/DL (ref 31.5–35.7)
MCV RBC AUTO: 81.7 FL (ref 79–97)
MONOCYTES # BLD AUTO: 0.76 10*3/MM3 (ref 0.1–0.9)
MONOCYTES NFR BLD AUTO: 6.4 % (ref 5–12)
NEUTROPHILS # BLD AUTO: 7.82 10*3/MM3 (ref 1.7–7)
NEUTROPHILS NFR BLD AUTO: 65.7 % (ref 42.7–76)
PATH REPORT.FINAL DX SPEC: NORMAL
PATH REPORT.FINAL DX SPEC: NORMAL
PATH REPORT.GROSS SPEC: NORMAL
PATH REPORT.GROSS SPEC: NORMAL
PLATELET # BLD AUTO: 256 10*3/MM3 (ref 140–450)
PMV BLD AUTO: 9.9 FL (ref 6–12)
RBC # BLD AUTO: 3.77 10*6/MM3 (ref 3.77–5.28)
WBC NRBC COR # BLD: 11.91 10*3/MM3 (ref 3.4–10.8)

## 2019-06-06 PROCEDURE — 25010000002 KETOROLAC TROMETHAMINE PER 15 MG: Performed by: OBSTETRICS & GYNECOLOGY

## 2019-06-06 PROCEDURE — 85025 COMPLETE CBC W/AUTO DIFF WBC: CPT | Performed by: OBSTETRICS & GYNECOLOGY

## 2019-06-06 RX ADMIN — OXYCODONE HYDROCHLORIDE AND ACETAMINOPHEN 1 TABLET: 5; 325 TABLET ORAL at 19:41

## 2019-06-06 RX ADMIN — IBUPROFEN 600 MG: 600 TABLET, FILM COATED ORAL at 14:39

## 2019-06-06 RX ADMIN — DOCUSATE SODIUM 100 MG: 100 CAPSULE, LIQUID FILLED ORAL at 08:53

## 2019-06-06 RX ADMIN — DOCUSATE SODIUM 100 MG: 100 CAPSULE, LIQUID FILLED ORAL at 21:40

## 2019-06-06 RX ADMIN — OXYCODONE HYDROCHLORIDE AND ACETAMINOPHEN 1 TABLET: 5; 325 TABLET ORAL at 23:42

## 2019-06-06 RX ADMIN — IBUPROFEN 600 MG: 600 TABLET, FILM COATED ORAL at 23:42

## 2019-06-06 RX ADMIN — ONDANSETRON HYDROCHLORIDE 4 MG: 4 TABLET, FILM COATED ORAL at 21:40

## 2019-06-06 RX ADMIN — IBUPROFEN 600 MG: 600 TABLET, FILM COATED ORAL at 08:53

## 2019-06-06 RX ADMIN — KETOROLAC TROMETHAMINE 30 MG: 30 INJECTION, SOLUTION INTRAMUSCULAR; INTRAVENOUS at 02:32

## 2019-06-06 NOTE — PROGRESS NOTES
MAUREEN [place]  Lady Diamond  : 1995  MRN: [id number]  CSN: [id number]    Hospital Day: 2    Post-operative Day #1  Subjective   Her pain is well controlled. Vaginal bleeding is normal in amount. She is ambulating without difficulty. Her baby is doing well.     Objective     Min/max vitals past 24 hours:   Temp  Min: 98.3 °F (36.8 °C)  Max: 98.8 °F (37.1 °C)  BP  Min: 117/72  Max: 137/67  Pulse  Min: 63  Max: 77  Resp  Min: 16  Max: 18        General: well developed; well nourished  no acute distress   Abdomen: soft, non-tender; no masses  no umbilical or inguinal hernias are present  no hepato-splenomegaly  incision is clean, dry, intact and without drainage   Pelvic: Not performed   Ext: Calves NT     Last 3 values   Results from last 7 days   Lab Units /  1209   WBC 10*3/mm3 10.15   HEMOGLOBIN g/dL 11.5*   HEMATOCRIT % 37.2   PLATELETS 10*3/mm3 313     Lab Results   Component Value Date    RH Positive 2019    HEPBSAG Non-Reactive 10/31/2018          Assessment   1. POD #1 S/P Repeat  (LTCS) with BTL  2. Doing well     Plan   1. Continue routine post-operative care    Camden Thomas MD  2019  7:22 AM

## 2019-06-06 NOTE — PLAN OF CARE
Problem: Patient Care Overview  Goal: Plan of Care Review  Outcome: Ongoing (interventions implemented as appropriate)   19   Coping/Psychosocial   Plan of Care Reviewed With patient   Plan of Care Review   Progress improving   OTHER   Outcome Summary vss,fundus and lochia wdl, dressing intact with small dry drainage,breastfeeding ok      Goal: Individualization and Mutuality  Outcome: Ongoing (interventions implemented as appropriate)   19   Individualization   Patient Specific Preferences breastfeeding   Patient Specific Goals (Include Timeframe) none verbalized        Problem: Postpartum ( Delivery) (Adult,Obstetrics,Pediatric)  Goal: Signs and Symptoms of Listed Potential Problems Will be Absent, Minimized or Managed (Postpartum)  Outcome: Ongoing (interventions implemented as appropriate)   19   Goal/Outcome Evaluation   Problems Assessed (Postpartum ) all   Problems Present (Postpartum ) none       Problem: Breastfeeding (Pediatric,[place],NICU)  Goal: Identify Related Risk Factors and Signs and Symptoms  Outcome: Ongoing (interventions implemented as appropriate)   19   Breastfeeding (Pediatric,[place],NICU)   Related Risk Factors (Breastfeeding) desire for optimal nutrition   Signs and Symptoms (Breastfeeding) nutrition received via breastfeeding     Goal: Effective Breastfeeding  Outcome: Ongoing (interventions implemented as appropriate)   19   Breastfeeding (Pediatric,[place],NICU)   Effective Breastfeeding making progress toward outcome

## 2019-06-06 NOTE — ANESTHESIA POSTPROCEDURE EVALUATION
Patient: Lady Diamond    Procedure Summary     Date:  19 Room / Location:  [facility] 2 /  [facility]    Anesthesia Start:  418 Anesthesia Stop:  535    Procedure:  Repeat  (LTCS)  - 1 layer closure with bilateral partial salpingectomy and lysis of omental adhesions with small omental resection (Bilateral Abdomen) Diagnosis:      Surgeon:  Regine José MD Provider:  Conrad Hammer MD    Anesthesia Type:  spinal ASA Status:  2 - Emergent          Anesthesia Type: spinal  Last vitals  BP   137/67 (19)   Temp   98.3 °F (36.8 °C) (19)   Pulse   71 (19)   Resp   18 (19)     SpO2   100 % (19 0730)     Post Anesthesia Care and Evaluation    Patient location during evaluation: bedside  Patient participation: complete - patient participated  Level of consciousness: awake and alert  Pain management: adequate  Airway patency: patent  Anesthetic complications: No anesthetic complications    Cardiovascular status: acceptable  Respiratory status: acceptable  Hydration status: acceptable  Post Neuraxial Block status: Motor and sensory function returned to baseline and No signs or symptoms of PDPH

## 2019-06-07 ENCOUNTER — TELEPHONE (OUTPATIENT)
Dept: OBSTETRICS AND GYNECOLOGY | Facility: CLINIC | Age: 24
End: 2019-06-07

## 2019-06-07 VITALS
DIASTOLIC BLOOD PRESSURE: 70 MMHG | SYSTOLIC BLOOD PRESSURE: 133 MMHG | BODY MASS INDEX: 43.94 KG/M2 | OXYGEN SATURATION: 100 % | WEIGHT: 279.98 LBS | TEMPERATURE: 98.7 F | RESPIRATION RATE: 18 BRPM | HEIGHT: 67 IN | HEART RATE: 84 BPM

## 2019-06-07 RX ORDER — IBUPROFEN 600 MG/1
600 TABLET ORAL EVERY 6 HOURS PRN
Qty: 30 TABLET | Refills: 0 | Status: SHIPPED | OUTPATIENT
Start: 2019-06-07 | End: 2019-07-26

## 2019-06-07 RX ORDER — OXYCODONE HYDROCHLORIDE AND ACETAMINOPHEN 5; 325 MG/1; MG/1
1-2 TABLET ORAL EVERY 4 HOURS PRN
Qty: 18 TABLET | Refills: 0 | Status: SHIPPED | OUTPATIENT
Start: 2019-06-07 | End: 2019-06-15

## 2019-06-07 RX ADMIN — OXYCODONE HYDROCHLORIDE AND ACETAMINOPHEN 1 TABLET: 5; 325 TABLET ORAL at 13:05

## 2019-06-07 RX ADMIN — IBUPROFEN 600 MG: 600 TABLET, FILM COATED ORAL at 13:05

## 2019-06-07 RX ADMIN — OXYCODONE HYDROCHLORIDE AND ACETAMINOPHEN 1 TABLET: 5; 325 TABLET ORAL at 06:56

## 2019-06-07 RX ADMIN — DOCUSATE SODIUM 100 MG: 100 CAPSULE, LIQUID FILLED ORAL at 09:14

## 2019-06-07 RX ADMIN — IBUPROFEN 600 MG: 600 TABLET, FILM COATED ORAL at 06:56

## 2019-06-07 NOTE — LACTATION NOTE
Mother reports nursing poor. Continues to offer bottles and pacifier. Little skin to skin. Encourage skin to skin and to call for assistance each feeding if truly wanting to breastfeed. VU

## 2019-06-07 NOTE — DISCHARGE SUMMARY
Discharge Summary     Nadja Diamond  : 1995  MRN: [id number]  CSN: [id number]    Date of Admission: 2019   Date of Discharge:  2019   Delivering Physician: Regine José MD       Admission Diagnosis: 1. Pregnancy at 39w0d  2. Previous  section - not a  candidate   3. Labor  4. Desires sterilization     Discharge Diagnosis: 1. Same as above plus  2. Pregnancy at 39w0d - delivered  3. Pelvic adhesions  4. Postpartum anemia       Procedures: 1. Repeat  (LTCS) with BTL and DONNA with omental biopsies     Hospital Course  See the completed operative report for details regarding her delivery.    Her post-operative course was unremarkable.  On POD # 2 she felt like she was ready for D/C.  She was evaluated by Dr. Thomas who agreed she was able to be discharged to home.  She had no febrile morbidity. She had normal bowel and bladder function and was hemodynamically stable.  Her wound was healing well without obvious signs of infections.    Pathology from the omental biopsies did come back benign.    Infant  female  fetus weighing 3600 g (7 lb 15 oz)   Apgars -  8  @ 1 minute /  9  @ 5 minutes.    Discharge labs  Lab Results   Component Value Date    WBC 11.91 (H) 2019    HGB 9.9 (L) 2019    HCT 30.8 (L) 2019     2019       Discharge Medications     Discharge Medications      New Medications      Instructions Start Date   ibuprofen 600 MG tablet  Commonly known as:  ADVIL,MOTRIN   600 mg, Oral, Every 6 Hours PRN      oxyCODONE-acetaminophen 5-325 MG per tablet  Commonly known as:  PERCOCET   1-2 tablets, Oral, Every 4 Hours PRN         Continue These Medications      Instructions Start Date   acetaminophen 500 MG tablet  Commonly known as:  TYLENOL   500 mg, Oral, Every 6 Hours PRN      ferrous sulfate 325 (65 FE) MG tablet   325 mg, Oral, 2 Times Daily Before Meals      prenatal (CLASSIC) vitamin 28-0.8 MG tablet tablet   1 tablet,  Daily             Discharge Disposition Home or Self Care   Condition on Discharge: good   Follow-up: 2 weeks with Dr. Martha Thomas MD  6/7/2019

## 2019-06-07 NOTE — LACTATION NOTE
This note was copied from a baby's chart.     06/06/19 1500   Feeding Information   Feeding Type Breast milk   Breastfeeding Time, Left (min) 10   Feeding Route Breast   LATCH Score   Latch 1-->repeated attempts, holds nipple in mouth, stimulate to suck   Audible Swallowing 2-->spontaneous and intermittent (24 hrs old)   Type of Nipple 2-->everted (after stimulation)   Comfort (Breast/Nipple) 1-->filling, red/small blisters/bruises, mild/mod discomfort   Hold (Positioning) 1-->minimal assist, teach one side, mother does other, staff holds   Latch Score 7   Assisted with latch without shield per mothers request. Assisted with positioning and l/o without shield. Achieved after few attempts. Mother encouraged to nurse without shield if able. Deeper latch obtained.

## 2019-06-07 NOTE — PLAN OF CARE
Problem: Patient Care Overview  Goal: Plan of Care Review  Outcome: Ongoing (interventions implemented as appropriate)   19   Coping/Psychosocial   Plan of Care Reviewed With patient   Plan of Care Review   Progress improving   OTHER   Outcome Summary VSS. Fundus, lochia, and incision WDL. Pain controlled with motrin and percocet together. Breastfeeding well with shield     Goal: Individualization and Mutuality  Outcome: Ongoing (interventions implemented as appropriate)    Goal: Discharge Needs Assessment  Outcome: Ongoing (interventions implemented as appropriate)   19   Discharge Needs Assessment   Concerns to be Addressed no discharge needs identified       Problem: Postpartum ( Delivery) (Adult,Obstetrics,Pediatric)  Goal: Signs and Symptoms of Listed Potential Problems Will be Absent, Minimized or Managed (Postpartum)  Outcome: Ongoing (interventions implemented as appropriate)   19   Goal/Outcome Evaluation   Problems Assessed (Postpartum ) all   Problems Present (Postpartum ) pain     Goal: Anesthesia/Sedation Recovery  Outcome: Ongoing (interventions implemented as appropriate)   19   Goal/Outcome Evaluation   Anesthesia/Sedation Recovery progressing toward baseline       Problem: Breastfeeding (Pediatric,[place],NICU)  Goal: Identify Related Risk Factors and Signs and Symptoms  Outcome: Ongoing (interventions implemented as appropriate)   19   Breastfeeding (Pediatric,[place],NICU)   Related Risk Factors (Breastfeeding) desire for optimal nutrition   Signs and Symptoms (Breastfeeding) nutrition received via breastfeeding     Goal: Effective Breastfeeding  Outcome: Ongoing (interventions implemented as appropriate)   19   Breastfeeding (Pediatric,[place],NICU)   Effective Breastfeeding making progress toward outcome

## 2019-06-07 NOTE — LACTATION NOTE
Mothers 3rd child, did not nurse other children. Infant l/o with 24Mm shield. Mother has been offering bottles. Bottles and pacifiers discouraged. Skin to skin and nursing on cue recommended. Instructed importance of BF frequency. Instructed in importance of skin to skin. Encouraged and instructed importance of waking infant and attempting to nurse every 2-3hrs. Instructed waking techniques. Instructed in ss adq latch and suck including ss complications to report. Instructed in ss adq infant intake. To call if need or concern. VU

## 2019-06-07 NOTE — PROGRESS NOTES
MAUREEN Nadja Diamond  : 1995  MRN: [id number]  CSN: [id number]    Hospital Day: 3    Post-operative Day #2  Subjective   Her pain is well controlled. Vaginal bleeding is normal in amount. She is ambulating without difficulty. She is passing gas. She is voiding without difficulty. Her baby is doing well. She wants to go home today.     Objective     Min/max vitals past 24 hours:   Temp  Min: 98.5 °F (36.9 °C)  Max: 98.9 °F (37.2 °C)  BP  Min: 128/65  Max: 141/68  Pulse  Min: 72  Max: 84  Resp  Min: 16  Max: 18        General: well developed; well nourished  no acute distress   Abdomen: soft, non-tender; no masses  no umbilical or inguinal hernias are present  no hepato-splenomegaly  incision is clean, dry, intact and without drainage   Pelvic: Not performed   Ext: Calves NT     Results from last 7 days   Lab Units 19  0807 19  1209   WBC 10*3/mm3 11.91* 10.15   HEMOGLOBIN g/dL 9.9* 11.5*   HEMATOCRIT % 30.8* 37.2   PLATELETS 10*3/mm3 256 313     Lab Results   Component Value Date    RH Positive 2019    HEPBSAG Non-Reactive 10/31/2018        Assessment   1. POD #2 S/P Repeat  (LTCS) and BTL with DONNA and partial omentectomy - pathology benign  2. Postpartum anemia     Plan   1. Continue routine post-operative care  2. Discharge to home  3. Advised no tampons or intercourse for 6 weeks.  4. D/C questions all answered  5. Follow-up appointment in 2 week(s)  6. Explained need for oral iron for 3 months   7. Will need Fe at D/C    Camden Thomas MD  2019  12:55 PM

## 2019-06-26 ENCOUNTER — OFFICE VISIT (OUTPATIENT)
Dept: OBSTETRICS AND GYNECOLOGY | Facility: CLINIC | Age: 24
End: 2019-06-26

## 2019-06-26 VITALS
RESPIRATION RATE: 14 BRPM | WEIGHT: 252 LBS | BODY MASS INDEX: 39.47 KG/M2 | DIASTOLIC BLOOD PRESSURE: 72 MMHG | SYSTOLIC BLOOD PRESSURE: 116 MMHG

## 2019-06-26 DIAGNOSIS — Z98.890 POST-OPERATIVE STATE: Primary | ICD-10-CM

## 2019-06-26 PROCEDURE — 0503F POSTPARTUM CARE VISIT: CPT | Performed by: OBSTETRICS & GYNECOLOGY

## 2019-06-26 NOTE — PROGRESS NOTES
Subjective   Chief Complaint   Patient presents with   • Post-op     Lady Diamond is a 24 y.o. year old  presenting to be seen for her post-operative visit.  Currently she reports no problems with eating, bowel movements, voiding, or wound drainage and pain is well controlled.    OTHER THINGS SHE WANTS TO DISCUSS TODAY:  Nothing else    The following portions of the patient's history were reviewed and updated as appropriate:current medications and allergies       Objective   /72   Resp 14   Wt 114 kg (252 lb)   LMP 2018   Breastfeeding? Yes   BMI 39.47 kg/m²     General:  well developed; well nourished  no acute distress   Abdomen: soft, non-tender; no masses  no umbilical or inguinal hernias are present  no hepato-splenomegaly  incision is clean, dry, intact and without drainage   Pelvis: Not performed.          Assessment   1. S/P  and BTL     Plan   1. OK to drive  2. OK to lift  3. May return to full activity with no restrictions  4. The importance of keeping all planned follow-up and taking all medications as prescribed was emphasized.  5. Follow up for postpartum visit 1 month.           This note was electronically signed.    Camden Thomas M.D.  2019    Note: Speech recognition transcription software may have been used to create portions of this document.  An attempt at proofreading has been made but errors in transcription could still be present.

## 2019-07-26 ENCOUNTER — POSTPARTUM VISIT (OUTPATIENT)
Dept: OBSTETRICS AND GYNECOLOGY | Facility: CLINIC | Age: 24
End: 2019-07-26

## 2019-07-26 VITALS
RESPIRATION RATE: 14 BRPM | BODY MASS INDEX: 39 KG/M2 | DIASTOLIC BLOOD PRESSURE: 62 MMHG | WEIGHT: 249 LBS | SYSTOLIC BLOOD PRESSURE: 110 MMHG

## 2019-07-26 PROCEDURE — 99024 POSTOP FOLLOW-UP VISIT: CPT | Performed by: OBSTETRICS & GYNECOLOGY

## 2019-07-26 NOTE — PROGRESS NOTES
Subjective   Chief Complaint   Patient presents with   • Postpartum Care     Lady Diamond is a 24 y.o. year old  presenting to be seen for her postpartum visit.  She had a Repeat  (LTCS) with BTL.  Her daughter is doing well.    Since delivery she has been sexually active.  She does have concerns about post-partum blues/depression.   Floyd Score = 8  She is breast feeding and plans to continues for 6 month(s).    The following portions of the patient's history were reviewed and updated as appropriate:current medications and allergies    Social History    Tobacco Use      Smoking status: Former Smoker        Types: Cigarettes        Start date:         Quit date: 3/1/2019        Years since quittin.4      Smokeless tobacco: Never Used      Tobacco comment: smokes 1-2 cigs/day      Review of Systems  Constitutional POS: nothing reported    NEG: anorexia or night sweats   Genitourinary POS: nothing reported    NEG: dysuria or hematuria      Gastointestinal POS: nothing reported    NEG: bloating, change in bowel habits, melena or reflux symptoms   Breast POS: nothing reported    NEG: persistent breast lump, skin dimpling or nipple discharge        Objective   /62   Resp 14   Wt 113 kg (249 lb)   LMP 2018   Breastfeeding? Yes   BMI 39.00 kg/m²     General:  well developed; well nourished  no acute distress   Abdomen: soft, non-tender; no masses  no umbilical or inguinal hernias are present  no hepato-splenomegaly  incision is healed   Pelvis: Clinical staff was present for exam  External genitalia:  normal appearance of the external genitalia including Bartholin's and [place]'s glands.  :  urethral meatus normal;  Vaginal:  normal pink mucosa without prolapse or lesions.  Cervix:  normal appearance.  Uterus:  normal size, shape and consistency. fully involuted  Adnexa:  normal bimanual exam of the adnexa.          Assessment   1. Normal 6 week postpartum exam S/P   (LTCS) with BTL     Plan   1. The importance of keeping all planned follow-up and taking all medications as prescribed was emphasized.  2. Follow up for annual exam 2019          This note was electronically signed.    Camden Thomas M.D.  2019    Note: Speech recognition transcription software may have been used to create portions of this document.  An attempt at proofreading has been made but errors in transcription could still be present.

## 2019-12-16 PROCEDURE — 99283 EMERGENCY DEPT VISIT LOW MDM: CPT

## 2019-12-16 PROCEDURE — 87880 STREP A ASSAY W/OPTIC: CPT | Performed by: EMERGENCY MEDICINE

## 2019-12-16 PROCEDURE — 87804 INFLUENZA ASSAY W/OPTIC: CPT | Performed by: EMERGENCY MEDICINE

## 2019-12-17 ENCOUNTER — HOSPITAL ENCOUNTER (EMERGENCY)
Facility: HOSPITAL | Age: 24
Discharge: HOME | End: 2019-12-17
Attending: EMERGENCY MEDICINE | Admitting: EMERGENCY MEDICINE

## 2019-12-17 VITALS
BODY MASS INDEX: 36.41 KG/M2 | SYSTOLIC BLOOD PRESSURE: 122 MMHG | WEIGHT: 232 LBS | RESPIRATION RATE: 18 BRPM | OXYGEN SATURATION: 94 % | TEMPERATURE: 102.8 F | DIASTOLIC BLOOD PRESSURE: 64 MMHG | HEIGHT: 67 IN | HEART RATE: 112 BPM

## 2019-12-17 DIAGNOSIS — J02.0 STREP THROAT: Primary | ICD-10-CM

## 2019-12-17 LAB
FLUAV AG NPH QL: NEGATIVE
FLUBV AG NPH QL IA: NEGATIVE
S PYO AG THROAT QL: POSITIVE

## 2019-12-17 PROCEDURE — 96372 THER/PROPH/DIAG INJ SC/IM: CPT

## 2019-12-17 PROCEDURE — 25010000002 PENICILLIN G BENZATHINE PER 1200000 UNITS: Performed by: EMERGENCY MEDICINE

## 2019-12-17 RX ORDER — ACETAMINOPHEN 500 MG
1000 TABLET ORAL ONCE
Status: COMPLETED | OUTPATIENT
Start: 2019-12-17 | End: 2019-12-17

## 2019-12-17 RX ADMIN — ACETAMINOPHEN 1000 MG: 500 TABLET, FILM COATED ORAL at 01:28

## 2019-12-17 RX ADMIN — PENICILLIN G BENZATHINE 2.4 MILLION UNITS: 1200000 INJECTION, SUSPENSION INTRAMUSCULAR at 01:31

## 2019-12-17 NOTE — ED PROVIDER NOTES
Subjective   Ms. Diamond is a 25 y/o female who developed fever and bad sore throat about 2 in the afternoon.  She has been having off and on minor sore throat for the last couple months.  She has diffuse body aches and pains.  Little in the way of headache, sinus congestion, runny nose, or cough.  Her son has been treated twice for strep throat and had his last diagnosis about one week ago.  No exposure to influenza.  PMH reviewed.          Review of Systems   Constitutional: Positive for chills and fever.   HENT: Positive for sore throat. Negative for congestion and rhinorrhea.    Respiratory: Positive for cough (minimal).    Cardiovascular: Negative.  Negative for chest pain.   Gastrointestinal: Negative.  Negative for diarrhea, nausea and vomiting.   Neurological: Negative.    Psychiatric/Behavioral: Negative.    All other systems reviewed and are negative.      Past Medical History:   Diagnosis Date   • Anemia    • Anxiety     and depression, no meds   • Chlamydia     prev pregnancy   • Depression    • H/O chlamydia infection    • H/O gonorrhea    • H/O trichomoniasis    • Urinary tract infection        No Known Allergies    Past Surgical History:   Procedure Laterality Date   •  SECTION  11/15/2014    LTCS (1 layer closure)   •  SECTION N/A 2019    Procedure:  SECTION REPEAT WITH SALPINGECTOMY;  Surgeon: Regine José MD;  Location: [facility] LABOR DELIVERY;  Service: Obstetrics   • LAPAROSCOPIC CHOLECYSTECTOMY  2015   • WISDOM TOOTH EXTRACTION         No family history on file.    Social History     Socioeconomic History   • Marital status: Single     Spouse name: Not on file   • Number of children: Not on file   • Years of education: Not on file   • Highest education level: Not on file   Tobacco Use   • Smoking status: Former Smoker     Types: Cigarettes     Start date:      Last attempt to quit: 3/1/2019     Years since quittin.7   • Smokeless tobacco:  Never Used   • Tobacco comment: smokes 1-2 cigs/day   Substance and Sexual Activity   • Alcohol use: No   • Drug use: No   • Sexual activity: Defer           Objective   Physical Exam   Constitutional: She is oriented to person, place, and time. She appears well-developed and well-nourished. No distress.   HENT:   Head: Atraumatic.   Swollen, red tonsils with early exudate present.   Eyes: Conjunctivae are normal.   Neck: Phonation normal. Neck supple.   Cardiovascular: Regular rhythm and normal heart sounds.   Tachycardic     Pulmonary/Chest: Effort normal and breath sounds normal. No respiratory distress.   Abdominal: Soft. There is no tenderness.   Lymphadenopathy:     She has no cervical adenopathy.   Neurological: She is alert and oriented to person, place, and time.   Skin: Skin is warm and dry.   Psychiatric: She has a normal mood and affect. Her behavior is normal.   Nursing note and vitals reviewed.      Procedures           ED Course                      No data recorded                        MDM    Final diagnoses:   Strep throat              Kirill Lucas MD  12/17/19 2925

## 2019-12-17 NOTE — DISCHARGE INSTRUCTIONS
You are contagious for the next 24 hours but it will take a few days for your throat to feel normal.  If you don't have a primary care provider and continue to be sick, make an appointment with one of the [facility] practices below:    Follow up with one of the [facility] Primary Care Providers below to setup primary care. If you need assistance coordinating a primary care appointment with a [facility] Primary Care Provider, please contact the Primary Care Coordinators at [phone] for appointment scheduling.    [facility], Primary Care   [address], [address]   [place], [place] [id number] [phone]    [facility] Internal Medicine & Endocrinology  [address], [address]  [place] [address] [phone]    [facility] Family Medicine  [address], [address]   [place] [address] [phone]    [facility] Primary Care  [address], [address]  [place] [address]  [phone]    [facility], Primary Care,   [address], [address]   [place], [place] [address] [phone]    [facility] Primary Care  [address], [address]  [place], [place] [id number] [phone]    [facility], Primary Care  [address], [address]  [place], [place] [address] [phone]    [facility] Internal Medicine & Pediatrics  [address], [address]   [place] [address] [phone]    [facility], Primary Care  [address] [[email or web address] [place] [address] [phone]      [facility] Primary Care  [address], [address]   [place] [address] [phone]    [facility] Family Medicine  [address] [place] [address] [address]. [place] [id number] [phone]

## 2020-02-03 ENCOUNTER — TELEPHONE (OUTPATIENT)
Dept: OBSTETRICS AND GYNECOLOGY | Facility: CLINIC | Age: 25
End: 2020-02-03

## 2020-03-19 ENCOUNTER — HOSPITAL ENCOUNTER (EMERGENCY)
Facility: HOSPITAL | Age: 25
Discharge: HOME | End: 2020-03-19
Attending: EMERGENCY MEDICINE | Admitting: EMERGENCY MEDICINE

## 2020-03-19 VITALS
SYSTOLIC BLOOD PRESSURE: 134 MMHG | TEMPERATURE: 98.8 F | BODY MASS INDEX: 36.41 KG/M2 | HEART RATE: 104 BPM | HEIGHT: 67 IN | OXYGEN SATURATION: 96 % | DIASTOLIC BLOOD PRESSURE: 78 MMHG | WEIGHT: 232 LBS | RESPIRATION RATE: 18 BRPM

## 2020-03-19 DIAGNOSIS — R50.9 FEVER IN ADULT: ICD-10-CM

## 2020-03-19 DIAGNOSIS — J02.0 STREP PHARYNGITIS: Primary | ICD-10-CM

## 2020-03-19 LAB
FLUAV AG NPH QL: NEGATIVE
FLUBV AG NPH QL IA: NEGATIVE
S PYO AG THROAT QL: POSITIVE

## 2020-03-19 PROCEDURE — 87880 STREP A ASSAY W/OPTIC: CPT | Performed by: NURSE PRACTITIONER

## 2020-03-19 PROCEDURE — 99283 EMERGENCY DEPT VISIT LOW MDM: CPT

## 2020-03-19 PROCEDURE — 87804 INFLUENZA ASSAY W/OPTIC: CPT | Performed by: NURSE PRACTITIONER

## 2020-03-19 RX ORDER — IBUPROFEN 600 MG/1
600 TABLET ORAL 3 TIMES DAILY
Qty: 20 TABLET | Refills: 0 | Status: SHIPPED | OUTPATIENT
Start: 2020-03-19 | End: 2020-07-09

## 2020-03-19 RX ORDER — AMOXICILLIN 500 MG/1
1000 CAPSULE ORAL DAILY
Qty: 20 CAPSULE | Refills: 0 | Status: SHIPPED | OUTPATIENT
Start: 2020-03-19 | End: 2020-07-09

## 2020-03-19 RX ORDER — ACETAMINOPHEN 500 MG
1000 TABLET ORAL ONCE
Status: COMPLETED | OUTPATIENT
Start: 2020-03-19 | End: 2020-03-19

## 2020-03-19 RX ADMIN — ACETAMINOPHEN 1000 MG: 500 TABLET, FILM COATED ORAL at 12:38

## 2020-07-09 ENCOUNTER — OFFICE VISIT (OUTPATIENT)
Dept: OBSTETRICS AND GYNECOLOGY | Facility: CLINIC | Age: 25
End: 2020-07-09

## 2020-07-09 VITALS
SYSTOLIC BLOOD PRESSURE: 126 MMHG | WEIGHT: 247 LBS | DIASTOLIC BLOOD PRESSURE: 80 MMHG | BODY MASS INDEX: 38.69 KG/M2 | RESPIRATION RATE: 14 BRPM

## 2020-07-09 DIAGNOSIS — F17.200 SMOKER: ICD-10-CM

## 2020-07-09 DIAGNOSIS — Z01.419 WELL WOMAN EXAM: Primary | ICD-10-CM

## 2020-07-09 PROCEDURE — 99395 PREV VISIT EST AGE 18-39: CPT | Performed by: OBSTETRICS & GYNECOLOGY

## 2020-07-09 RX ORDER — TRANEXAMIC ACID 650 MG/1
TABLET ORAL
Qty: 30 TABLET | Refills: 12 | Status: SHIPPED | OUTPATIENT
Start: 2020-07-09 | End: 2021-07-12

## 2020-07-09 NOTE — PROGRESS NOTES
Subjective   Chief Complaint   Patient presents with   • Gynecologic Exam     Lady Diamond is a 25 y.o. year old  presenting to be seen for her annual exam.     SEXUAL Hx:  She is currently sexually active.  In the past year there there has been ONE new sexual partner.    Condoms are never used.  She would not like to be screened for STD's at today's exam.  Current birth control method: tubal ligation.  She is happy with her current method of contraception and does not want to discuss alternative methods of contraception.  MENSTRUAL Hx:  Patient's last menstrual period was 2020 (approximate).  In the past 6 months her cycles have been regular, predictable and occur monthly.  Her menstrual flow is typically normal.   Each month on average there are roughly 1 day(s) of very heavy flow.  Intermenstrual bleeding is absent.    Post-coital bleeding is absent.  Dysmenorrhea: moderate and is not affecting her activities of daily living  PMS: is not affecting her activities of daily living  Her cycles are not a source of concern for her that she wishes to discuss today.  HEALTH Hx:  She exercises regularly: no (and has no plans to become more active).  She wears her seat belt: yes.  She has concerns about domestic violence: no.  OTHER THINGS SHE WANTS TO DISCUSS TODAY:  Nothing else    The following portions of the patient's history were reviewed and updated as appropriate:problem list, current medications, allergies, past family history, past medical history, past social history and past surgical history.    Social History    Tobacco Use      Smoking status: Current Every Day Smoker        Packs/day: 0.20        Types: Cigarettes        Start date:         Quit date: 3/1/2019        Years since quittin.3      Smokeless tobacco: Never Used    Review of Systems  Constitutional POS: nothing reported    NEG: anorexia or night sweats   Genitourinary POS: nothing reported    NEG: dysuria or hematuria       Gastointestinal POS: constipation (chronic)    NEG: bloating, change in bowel habits, melena or reflux symptoms   Integument POS: nothing reported    NEG: moles that are changing in size, shape, color or rashes   Breast POS: nothing reported    NEG: persistent breast lump, skin dimpling or nipple discharge        Objective   /80   Resp 14   Wt 112 kg (247 lb)   LMP 07/02/2020 (Approximate)   Breastfeeding No   BMI 38.69 kg/m²     General:  well developed; well nourished  no acute distress   Skin:  No suspicious lesions seen   Thyroid: normal to inspection and palpation   Breasts:  Examined in supine position  Symmetric without masses or skin dimpling  Nipples normal without inversion, lesions or discharge  There are no palpable axillary nodes   Abdomen: soft, non-tender; no masses  no umbilical or inguinal hernias are present  no hepato-splenomegaly   Pelvis: Clinical staff was present for exam  External genitalia:  normal appearance of the external genitalia including Bartholin's and [place]'s glands.  :  urethral meatus normal;  Vaginal:  normal pink mucosa without prolapse or lesions.  Cervix:  normal appearance.  Uterus:  normal size, shape and consistency.  Adnexa:  normal bimanual exam of the adnexa.  Rectal:  digital rectal exam not performed; anus visually normal appearing.        Assessment   1. Normal GYN exam  2. Menorrhagia - affecting her activities of daily living.  3. Smoker     Plan   1. Pap was not done today.  I explained to Lady that the recommendations for Pap smear interval in a low risk patient has lengthened to 3 years time.  I told Lady she still needs to be seen in our office yearly for a full physical including breast and pelvic exam.  2. As relates to her heavy flow treatment options discussed today include Mirena and Lysteda.  After considering these options, she would like to initially start with Lysteda  3. Smoking cessation encouraged  4. The importance of keeping all  planned follow-up and taking all medications as prescribed was emphasized.  5. Follow up for annual exam 1 year    New Medications Ordered This Visit   Medications   • Tranexamic Acid (Lysteda) 650 MG tablet     Si tablets by mouth 3 times daily for 5 days     Dispense:  30 tablet     Refill:  12          This note was electronically signed.    Camden Thomas M.D.  2020    Note: Speech recognition transcription software may have been used to create portions of this document.  An attempt at proofreading has been made but errors in transcription could still be present.

## 2020-10-28 ENCOUNTER — TELEMEDICINE (OUTPATIENT)
Dept: INTERNAL MEDICINE | Facility: CLINIC | Age: 25
End: 2020-10-28

## 2020-10-28 DIAGNOSIS — R22.1 THROAT SWELLING: Primary | ICD-10-CM

## 2020-10-28 PROCEDURE — 99203 OFFICE O/P NEW LOW 30 MIN: CPT | Performed by: INTERNAL MEDICINE

## 2020-10-28 RX ORDER — METHYLPREDNISOLONE 4 MG/1
TABLET ORAL
Qty: 21 TABLET | Refills: 0 | Status: SHIPPED | OUTPATIENT
Start: 2020-10-28 | End: 2021-07-12

## 2020-10-28 NOTE — PROGRESS NOTES
Video visit Note      Date: 10/28/2020  Patient Name: Lady Diamond  MRN: [id number]  : 1995    CC: throat swelling    History of Present Illness: Lady Diamond is a 25 y.o. female who presents for throat swelling. States she gets recurrent strep throat. She had one course of abx 1 week ago but still has a swollen throat and mucous in throat. She was prescribed amoxicillin BID x 10 days. No exudates. No fever. Has some cough from trying to clear her throat. No rhinorrhea. Does not have any throat pain.       Subjective      Review of Systems:   Pertinent review of systems per HPI.    Review of Systems   Constitutional: Negative for activity change, appetite change, chills, diaphoresis, fatigue, fever and unexpected weight change.   HENT: Negative for congestion, dental problem, drooling, ear discharge, ear pain, facial swelling, hearing loss, mouth sores and trouble swallowing.    Eyes: Negative for pain, discharge and itching.   Respiratory: Negative for apnea, cough, choking, chest tightness and shortness of breath.    Cardiovascular: Negative for chest pain, palpitations and leg swelling.   Gastrointestinal: Negative for abdominal distention, abdominal pain, blood in stool, constipation and diarrhea.   Endocrine: Negative for cold intolerance, heat intolerance, polydipsia and polyuria.   Genitourinary: Negative for difficulty urinating, dysuria, frequency and hematuria.   Skin: Negative for color change, pallor, rash and wound.   Allergic/Immunologic: Negative for environmental allergies, food allergies and immunocompromised state.   Neurological: Negative for dizziness, weakness and light-headedness.   Psychiatric/Behavioral: Negative for agitation, behavioral problems, confusion, decreased concentration and self-injury. The patient is not nervous/anxious.    All other systems reviewed and are negative.    No Known Allergies    Objective     Physical Exam:  Vital Signs: There were no  vitals filed for this visit.   There is no height or weight on file to calculate BMI.    Physical Exam  No PE done  Assessment / Plan      Assessment & Plan:    1. Throat swelling  rx for medrol dose pack. If swelling worsens to the point she cannot control secretions then she need to go to ED or call clinic. Advised salt water gargles daily.     You have chosen to receive care through a telehealth visit.  Do you consent to use a video/audio connection for your medical care today? Yes      Steph Mclean MD  10/28/2020     Please note that portions of this note may have been completed with a voice recognition program. Efforts were made to edit the dictations, but occasionally words are mistranscribed.

## 2020-11-06 ENCOUNTER — TELEPHONE (OUTPATIENT)
Dept: OBSTETRICS AND GYNECOLOGY | Facility: CLINIC | Age: 25
End: 2020-11-06

## 2020-11-06 NOTE — TELEPHONE ENCOUNTER
Please send in a 3-day prescription of Terazol.  If that does not make her better she probably needs to be seen.    New Medications Ordered This Visit   Medications   • terconazole (TERAZOL 3) 0.8 % vaginal cream     Sig: Insert 1 applicator into the vagina Every Night for 3 days.     Dispense:  3 each     Refill:  0

## 2020-11-06 NOTE — TELEPHONE ENCOUNTER
Patient calls today with complaints of vaginal and vulvar itching.  No discharge.  S/P treatment for strep throat with antibiotics.  Desires treatment.  Finished her period 2-3 days ago/normal period.    [facility] she would like to use if a prescription is sent in.

## 2020-11-06 NOTE — TELEPHONE ENCOUNTER
Patient has been informed that a prescription has been sent to her pharmacy, and was advised that if this does not resolve her symptoms she will need to be seen in the office for evaluation.  Patient voiced understanding of these instructions.

## 2021-01-19 ENCOUNTER — TELEPHONE (OUTPATIENT)
Dept: OBSTETRICS AND GYNECOLOGY | Facility: CLINIC | Age: 26
End: 2021-01-19

## 2021-01-19 NOTE — TELEPHONE ENCOUNTER
Spoke w/pt and advised for her to try OTC 7 day monistat since she has not tried any OTC meds; pt verbalized understanding

## 2021-01-19 NOTE — TELEPHONE ENCOUNTER
S[facility]      PT NEEDS YEAST INF MEDS CALLED IN. LOTS OF IRRITATION WITH AN ITCH      PHARM:  [facility]

## 2021-06-17 NOTE — PLAN OF CARE
KIA Ortez, at ext [phone], we can accept pt to our [facility].   Will need S&H orders, and negative Covid test. Problem:  Delivery (Adult,Obstetrics,Pediatric)  Goal: Signs and Symptoms of Listed Potential Problems Will be Absent, Minimized or Managed ( Delivery)  Outcome: Outcome(s) achieved Date Met: 19

## 2021-07-12 ENCOUNTER — LAB (OUTPATIENT)
Dept: LAB | Facility: HOSPITAL | Age: 26
End: 2021-07-12

## 2021-07-12 ENCOUNTER — OFFICE VISIT (OUTPATIENT)
Dept: OBSTETRICS AND GYNECOLOGY | Facility: CLINIC | Age: 26
End: 2021-07-12

## 2021-07-12 VITALS — DIASTOLIC BLOOD PRESSURE: 76 MMHG | SYSTOLIC BLOOD PRESSURE: 112 MMHG | WEIGHT: 264 LBS | BODY MASS INDEX: 41.35 KG/M2

## 2021-07-12 DIAGNOSIS — N92.0 MENORRHAGIA WITH REGULAR CYCLE: ICD-10-CM

## 2021-07-12 DIAGNOSIS — N94.6 DYSMENORRHEA: ICD-10-CM

## 2021-07-12 DIAGNOSIS — Z01.419 WELL WOMAN EXAM: Primary | ICD-10-CM

## 2021-07-12 DIAGNOSIS — Z11.8 SCREENING FOR CHLAMYDIAL DISEASE: ICD-10-CM

## 2021-07-12 PROBLEM — K59.09 CHRONIC CONSTIPATION: Status: ACTIVE | Noted: 2018-01-01

## 2021-07-12 LAB — HGB BLD-MCNC: 12.2 G/DL (ref 12–15.9)

## 2021-07-12 PROCEDURE — 85018 HEMOGLOBIN: CPT

## 2021-07-12 PROCEDURE — 99395 PREV VISIT EST AGE 18-39: CPT | Performed by: OBSTETRICS & GYNECOLOGY

## 2021-07-12 NOTE — PATIENT INSTRUCTIONS
Tdap Vaccine (Tetanus, Diphtheria and Pertussis): What You Need to Know      1. Why get vaccinated?  Tetanus, diphtheria and pertussis are very serious diseases. Tdap vaccine can protect us from these diseases. And, Tdap vaccine given to pregnant women can protect  babies against pertussis..  TETANUS (Lockjaw) is rare in the [place] today. It causes painful muscle tightening and stiffness, usually all over the body.  · It can lead to tightening of muscles in the head and neck so you can't open your mouth, swallow, or sometimes even breathe. Tetanus kills about 1 out of 10 people who are infected even after receiving the best medical care.  DIPHTHERIA is also rare in the [place] today. It can cause a thick coating to form in the back of the throat.  · It can lead to breathing problems, heart failure, paralysis, and death.  PERTUSSIS (Whooping Cough) causes severe coughing spells, which can cause difficulty breathing, vomiting and disturbed sleep.  · It can also lead to weight loss, incontinence, and rib fractures. Up to 2 in 100 adolescents and 5 in 100 adults with pertussis are hospitalized or have complications, which could include pneumonia or death.    These diseases are caused by bacteria. Diphtheria and pertussis are spread from person to person through secretions from coughing or sneezing. Tetanus enters the body through cuts, scratches, or wounds.  Before vaccines, as many as 200,000 cases of diphtheria, 200,000 cases of pertussis, and hundreds of cases of tetanus, were reported in the [place] each year. Since vaccination began, reports of cases for tetanus and diphtheria have dropped by about 99% and for pertussis by about 80%.    2. Tdap vaccine  • Tdap vaccine can protect adolescents and adults from tetanus, diphtheria, and pertussis. One dose of Tdap is routinely given at age 11 or 12. People who did not get Tdap at that age should get it as soon as possible.  • Tdap is especially  important for healthcare professionals and anyone having close contact with a baby younger than 12 months.  • Pregnant women should get a dose of Tdap during every pregnancy, to protect the  from pertussis. Infants are most at risk for severe, life-threatening complications from pertussis.  • Another vaccine, called Td, protects against tetanus and diphtheria, but not pertussis. A Td booster should be given every 10 years. Tdap may be given as one of these boosters if you have never gotten Tdap before. Tdap may also be given after a severe cut or burn to prevent tetanus infection.  • Your doctor or the person giving you the vaccine can give you more information.  • Tdap may safely be given at the same time as other vaccines.    3. Some people should not get this vaccine  · A person who has ever had a life-threatening allergic reaction after a previous dose of any diphtheria, tetanus or pertussis containing vaccine, OR has a severe allergy to any part of this vaccine, should not get Tdap vaccine. Tell the person giving the vaccine about any severe allergies.  · Anyone who had coma or long repeated seizures within 7 days after a childhood dose of DTP or DTaP, or a previous dose of Tdap, should not get Tdap, unless a cause other than the vaccine was found. They can still get Td.  · Talk to your doctor if you:  ? have seizures or another nervous system problem,  ? had severe pain or swelling after any vaccine containing diphtheria, tetanus or pertussis,  ? ever had a condition called Guillain-Barré Syndrome (GBS),  ? aren't feeling well on the day the shot is scheduled.    4. Risks  With any medicine, including vaccines, there is a chance of side effects. These are usually mild and go away on their own. Serious reactions are also possible but are rare.  Most people who get Tdap vaccine do not have any problems with it.  Mild problems following Tdap  (Did not interfere with activities)  · Pain where the shot was  given (about 3 in 4 adolescents or 2 in 3 adults)  · Redness or swelling where the shot was given (about 1 person in 5)  · Mild fever of at least 100.4°F (up to about 1 in 25 adolescents or 1 in 100 adults)  · Headache (about 3 or 4 people in 10)  · Tiredness (about 1 person in 3 or 4)  · Nausea, vomiting, diarrhea, stomach ache (up to 1 in 4 adolescents or 1 in 10 adults)  · Chills, sore joints (about 1 person in 10)  · Body aches (about 1 person in 3 or 4)  · Rash, swollen glands (uncommon)  Moderate problems following Tdap  (Interfered with activities, but did not require medical attention)  · Pain where the shot was given (up to 1 in 5 or 6)  · Redness or swelling where the shot was given (up to about 1 in 16 adolescents or 1 in 12 adults)  · Fever over 102°F (about 1 in 100 adolescents or 1 in 250 adults)  · Headache (about 1 in 7 adolescents or 1 in 10 adults)  · Nausea, vomiting, diarrhea, stomach ache (up to 1 or 3 people in 100)  · Swelling of the entire arm where the shot was given (up to about 1 in 500).  Severe problems following Tdap  (Unable to perform usual activities; required medical attention)  · Swelling, severe pain, bleeding and redness in the arm where the shot was given (rare).  Problems that could happen after any vaccine:  · People sometimes faint after a medical procedure, including vaccination. Sitting or lying down for about 15 minutes can help prevent fainting, and injuries caused by a fall. Tell your doctor if you feel dizzy, or have vision changes or ringing in the ears.  · Some people get severe pain in the shoulder and have difficulty moving the arm where a shot was given. This happens very rarely.  · Any medication can cause a severe allergic reaction. Such reactions from a vaccine are very rare, estimated at fewer than 1 in a million doses, and would happen within a few minutes to a few hours after the vaccination.  · As with any medicine, there is a very remote chance of a vaccine  causing a serious injury or death.  · The safety of vaccines is always being monitored. For more information, visit: www.cdc.gov/vaccinesafety/    5. What if there is a serious problem?  What should I look for?  · Look for anything that concerns you, such as signs of a severe allergic reaction, very high fever, or unusual behavior.  · Signs of a severe allergic reaction can include hives, swelling of the face and throat, difficulty breathing, a fast heartbeat, dizziness, and weakness. These would usually start a few minutes to a few hours after the vaccination.  What should I do?  · If you think it is a severe allergic reaction or other emergency that can't wait, call 9-1-1 or get the person to the nearest hospital. Otherwise, call your doctor.  · Afterward, the reaction should be reported to the Vaccine Adverse Event Reporting System (VAERS). Your doctor might file this report, or you can do it yourself through the VAERS web site at www.vaers.[facility].gov, or by calling 1-324.476.5173.  · VAERS does not give medical advice.    6. The National Vaccine Injury Compensation Program  The National Vaccine Injury Compensation Program (VICP) is a federal program that was created to compensate people who may have been injured by certain vaccines.  Persons who believe they may have been injured by a vaccine can learn about the program and about filing a claim by calling 1-897.147.9418 or visiting the VICP website at www.hrsa.gov/vaccinecompensation. There is a time limit to file a claim for compensation.    7. How can I learn more?  · Ask your doctor. He or she can give you the vaccine package insert or suggest other sources of information.  · Call your local or state health department.  · Contact the Centers for Disease Control and Prevention (CDC):  ? Call 1-494.252.4192 (4-907-NWD-INFO) or  ? Visit CDC's website at www.cdc.gov/vaccines      Vaccine Information Statement Tdap Vaccine (2/24/2015)  This information is not  intended to replace advice given to you by your health care provider. Make sure you discuss any questions you have with your health care provider.  Document Released: 06/18/2013 Document Revised: 08/05/2019 Document Reviewed: 08/05/2019  Elsevier Interactive Patient Education © 2019 [employer].

## 2021-07-12 NOTE — PROGRESS NOTES
Subjective   Chief Complaint   Patient presents with   • Gynecologic Exam     Lady Diamond is a 26 y.o. year old  presenting to be seen for her annual exam and follow-up on her heavy menses.  When she was here last Lysteda was prescribed.  IUDs were discussed.  She never took it because of cost.    SEXUAL Hx:  She is currently sexually active.  In the past year there there has been NO new sexual partners.    Condoms are never used.  She would like to be screened for STD's at today's exam.  Current birth control method: tubal ligation.  She is happy with her current method of contraception and does not want to discuss alternative methods of contraception.  MENSTRUAL Hx:  Patient's last menstrual period was 2021 (approximate).  In the past 6 months her cycles have been regular, predictable and occur monthly.  Her menstrual flow is typically moderately heavy.   Each month on average there are roughly 0 day(s) of very heavy flow.  Intermenstrual bleeding is absent.    Post-coital bleeding is absent.  Dysmenorrhea: moderate and affecting her activities of daily living  PMS: is not affecting her activities of daily living  Her cycles ARE a source of concern for her that she wishes to discuss today.  HEALTH Hx:  She exercises regularly: no (and has no plans to become more active).  She wears her seat belt: yes.  She has concerns about domestic violence: no.  OTHER THINGS SHE WANTS TO DISCUSS TODAY:  Nothing else    The following portions of the patient's history were reviewed and updated as appropriate:problem list, current medications, allergies, past family history, past medical history, past social history and past surgical history.    Social History    Tobacco Use      Smoking status: Current Every Day Smoker        Packs/day: 0.20        Types: Cigarettes        Start date:         Quit date: 3/1/2019        Years since quittin.3      Smokeless tobacco: Never Used    Review of  Systems  Constitutional POS: nothing reported    NEG: anorexia or night sweats   Genitourinary POS: nothing reported    NEG: dysuria or hematuria      Gastointestinal POS: constipation (chronic)    NEG: bloating, change in bowel habits, melena or reflux symptoms   Integument POS: nothing reported    NEG: moles that are changing in size, shape, color or rashes   Breast POS: nothing reported    NEG: persistent breast lump, skin dimpling or nipple discharge        Objective   /76   Wt 120 kg (264 lb)   LMP 2021 (Approximate)   Breastfeeding No   BMI 41.35 kg/m²     General:  well developed; well nourished  no acute distress   Skin:  No suspicious lesions seen   Thyroid: normal to inspection and palpation   Breasts:  Examined in supine position  Symmetric without masses or skin dimpling  Nipples normal without inversion, lesions or discharge  There are no palpable axillary nodes   Abdomen: soft, non-tender; no masses  no umbilical or inguinal hernias are present  no hepato-splenomegaly   Pelvis: Clinical staff was present for exam  External genitalia:  normal appearance of the external genitalia including Bartholin's and [place]'s glands.  :  urethral meatus normal;  Vaginal:  normal pink mucosa without prolapse or lesions.  Cervix:  normal appearance.  Uterus:  normal size, shape and consistency.  Adnexa:  non palpable bilaterally.  Rectal:  digital rectal exam not performed; anus visually normal appearing.        Assessment   1. Normal GYN exam limted by weight  2. Smoker  3. Menorrhagia with dysmenorrhea.  Lysteda not options because of cost.  Not a good IUD candidate due to long history of repetitive sexually transmitted diseases.  Endometrial ablation may not be a good option given prior  section x 2     Plan   1. Pap and STD testing was done today.  If she does not receive the results of the Pap within 2 weeks  time, she was instructed to call to find out the results.  I explained to Lady  that the recommendations for Pap smear interval in a low risk patient's has lengthened to 3 years time.  I encouraged her to be seen yearly for a full physical exam including breast and pelvic exam even during the off years when PAP's will not be performed.  2. The following tests were ordered today: HgB.  It was explained to Lady that all lab test should be back within the one week after they are performed. She will be notified about the results, regardless of the findings. If she has not been contacted by the office within 2 weeks after the test has been performed, it is her responsibility to contact us to learn about her results.  3. Ultrasound needs to be done after her next menses.   4. I discussed with Lady that she may be behind on needed vaccinations for TDAP.  She may be able to obtain these vaccinations at her local pharmacy OR speak about obtaining them with her primary care.  If she does obtain her vaccines, I have asked Lady to let us know the date each vaccine was obtained so that her medical record could be updated in our system.  5. The importance of keeping all planned follow-up and taking all medications as prescribed was emphasized.  6. Follow up after ultrasound          This note was electronically signed.    Camden Thomas M.D.  July 12, 2021    Note: Speech recognition transcription software may have been used to create portions of this document.  An attempt at proofreading has been made but errors in transcription could still be present.

## 2021-07-14 RX ORDER — METRONIDAZOLE 500 MG/1
2000 TABLET ORAL DAILY
Qty: 4 TABLET | Refills: 0 | Status: SHIPPED | OUTPATIENT
Start: 2021-07-14 | End: 2022-04-11

## 2022-04-11 ENCOUNTER — OFFICE VISIT (OUTPATIENT)
Dept: OBSTETRICS AND GYNECOLOGY | Facility: CLINIC | Age: 27
End: 2022-04-11

## 2022-04-11 VITALS
WEIGHT: 268 LBS | DIASTOLIC BLOOD PRESSURE: 74 MMHG | SYSTOLIC BLOOD PRESSURE: 116 MMHG | BODY MASS INDEX: 41.97 KG/M2 | RESPIRATION RATE: 14 BRPM

## 2022-04-11 DIAGNOSIS — Z11.8 SCREENING FOR CHLAMYDIAL DISEASE: Primary | ICD-10-CM

## 2022-04-11 PROCEDURE — 99212 OFFICE O/P EST SF 10 MIN: CPT | Performed by: OBSTETRICS & GYNECOLOGY

## 2022-04-11 NOTE — PROGRESS NOTES
Subjective   Chief Complaint   Patient presents with   • Exposure to STD     Lady Fischernadeem Diamond is a 27 y.o. year old .   She recently had unprotected intercourse about 2 weeks prior and comes just to get checked.  She is having no lesions, discharge or any other problem.  Comes just to make sure she is okay.    OTHER THINGS SHE WANTS TO DISCUSS TODAY:  Nothing else    The following portions of the patient's history were reviewed and updated as appropriate:no additional history reviewed    Social History    Tobacco Use      Smoking status: Current Every Day Smoker        Packs/day: 0.20        Types: Cigarettes        Start date:         Quit date: 3/1/2019        Years since quitting: 3.1      Smokeless tobacco: Never Used    Review of Systems  Constitutional POS: nothing reported    NEG: anorexia or night sweats   Genitourinary POS: nothing reported    NEG: dysuria or hematuria   Gastointestinal POS: nothing reported    NEG: bloating, change in bowel habits, melena or reflux symptoms   Integument POS: nothing reported    NEG: moles that are changing in size, shape, color or rashes   Breast POS: nothing reported    NEG: persistent breast lump, skin dimpling or nipple discharge         Objective   /74   Resp 14   Wt 122 kg (268 lb)   Breastfeeding No   BMI 41.97 kg/m²     General:  well developed; well nourished  no acute distress   Pelvis: Clinical staff was present for exam  External genitalia:  normal appearance of the external genitalia including Bartholin's and [place]'s glands.  :  urethral meatus normal;  Vaginal:  normal pink mucosa without prolapse or lesions.  Cervix:  normal appearance.     Lab Review   No data reviewed    Imaging   No data reviewed        Assessment   1. STI testing     Plan   1. The following tests were ordered today: STD swabs for GC, chlamydia and trichimoniasis.  It was explained to Lady that all lab test should be back within the one week after they are  performed. She will be notified about the results, regardless of the findings. If she has not been contacted by the office within 2 weeks after the test has been performed, it is her responsibility to contact us to learn about her results.  2. The importance of keeping all planned follow-up and taking all medications as prescribed was emphasized.  3. Follow up PRN           This note was electronically signed.    Camden Thomas M.D.  April 11, 2022    Part of this note may be an electronic transcription/translation of spoken language to printed text using the Dragon Dictation System.

## 2022-07-27 ENCOUNTER — LAB (OUTPATIENT)
Dept: LAB | Facility: HOSPITAL | Age: 27
End: 2022-07-27

## 2022-07-27 DIAGNOSIS — R39.9 UTI SYMPTOMS: Primary | ICD-10-CM

## 2022-07-27 DIAGNOSIS — R39.9 UTI SYMPTOMS: ICD-10-CM

## 2022-07-27 LAB
BACTERIA UR QL AUTO: ABNORMAL /HPF
BILIRUB UR QL STRIP: NEGATIVE
CLARITY UR: ABNORMAL
COLOR UR: YELLOW
GLUCOSE UR STRIP-MCNC: NEGATIVE MG/DL
HGB UR QL STRIP.AUTO: ABNORMAL
HYALINE CASTS UR QL AUTO: ABNORMAL /LPF
KETONES UR QL STRIP: NEGATIVE
LEUKOCYTE ESTERASE UR QL STRIP.AUTO: ABNORMAL
NITRITE UR QL STRIP: POSITIVE
PH UR STRIP.AUTO: 6 [PH] (ref 5–8)
PROT UR QL STRIP: ABNORMAL
RBC # UR STRIP: ABNORMAL /HPF
REF LAB TEST METHOD: ABNORMAL
SP GR UR STRIP: 1.02 (ref 1–1.03)
SQUAMOUS #/AREA URNS HPF: ABNORMAL /HPF
UROBILINOGEN UR QL STRIP: ABNORMAL
WBC # UR STRIP: ABNORMAL /HPF

## 2022-07-27 PROCEDURE — 87186 SC STD MICRODIL/AGAR DIL: CPT

## 2022-07-27 PROCEDURE — 87088 URINE BACTERIA CULTURE: CPT

## 2022-07-27 PROCEDURE — 87086 URINE CULTURE/COLONY COUNT: CPT

## 2022-07-27 PROCEDURE — 81001 URINALYSIS AUTO W/SCOPE: CPT

## 2022-07-27 RX ORDER — NITROFURANTOIN 25; 75 MG/1; MG/1
100 CAPSULE ORAL 2 TIMES DAILY
Qty: 10 CAPSULE | Refills: 0 | Status: SHIPPED | OUTPATIENT
Start: 2022-07-27 | End: 2022-08-01

## 2022-07-31 LAB — BACTERIA SPEC AEROBE CULT: ABNORMAL

## 2022-08-01 ENCOUNTER — OFFICE VISIT (OUTPATIENT)
Dept: INTERNAL MEDICINE | Facility: CLINIC | Age: 27
End: 2022-08-01

## 2022-08-01 ENCOUNTER — PATIENT ROUNDING (BHMG ONLY) (OUTPATIENT)
Dept: INTERNAL MEDICINE | Facility: CLINIC | Age: 27
End: 2022-08-01

## 2022-08-01 VITALS
BODY MASS INDEX: 41.3 KG/M2 | SYSTOLIC BLOOD PRESSURE: 130 MMHG | DIASTOLIC BLOOD PRESSURE: 74 MMHG | TEMPERATURE: 97.1 F | WEIGHT: 257 LBS | HEIGHT: 66 IN

## 2022-08-01 DIAGNOSIS — F17.200 SMOKER: ICD-10-CM

## 2022-08-01 DIAGNOSIS — N30.00 ACUTE CYSTITIS WITHOUT HEMATURIA: ICD-10-CM

## 2022-08-01 DIAGNOSIS — K59.09 CHRONIC CONSTIPATION: Primary | ICD-10-CM

## 2022-08-01 PROCEDURE — 99213 OFFICE O/P EST LOW 20 MIN: CPT | Performed by: INTERNAL MEDICINE

## 2022-08-01 NOTE — PROGRESS NOTES
Subjective   Lady Diamond is a 27 y.o. female here to establish care for chronic constipation, acute cystitis, smoker.  Patient is currently on Macrobid for UTI.  She has history of STD in the past.  She presents with her mom today.  Simply setting up care.  She says she struggles with constipation from time to time, for now takes over-the-counter medications.  She is a smoker, asymptomatic.  Since she presents with her mom, who has several psychiatric diagnoses, inquired about patient's mental health but she seems to not want to talk about that today, will address in the future if appropriate.    Review of Systems   Constitutional: Negative.    HENT: Negative.    Eyes: Negative.    Respiratory: Negative.    Cardiovascular: Negative.    Gastrointestinal: Negative.    Endocrine: Negative.    Genitourinary: Negative.    Musculoskeletal: Negative.    Skin: Negative.    Allergic/Immunologic: Negative.    Neurological: Negative.    Hematological: Negative.    Psychiatric/Behavioral: Negative.        Past Medical History:   Diagnosis Date   • Anxiety with depression     no meds   • Chronic constipation    • H/O chlamydia infection    • H/O gonorrhea    • H/O gonorrhea    • H/O gonorrhea    • H/O gonorrhea    • H/O trichomoniasis    • H/O trichomoniasis 2021     No family history on file.  Past Surgical History:   Procedure Laterality Date   •  SECTION  11/15/2014    LTCS (1 layer closure)   •  SECTION N/A 2019    Procedure:  SECTION REPEAT WITH SALPINGECTOMY;  Surgeon: Regine José MD;  Location: [facility] LABOR DELIVERY;  Service: Obstetrics;  Laterality: N/A;   • LAPAROSCOPIC CHOLECYSTECTOMY  2015   • WISDOM TOOTH EXTRACTION       Social History     Socioeconomic History   • Marital status: Single   Tobacco Use   • Smoking status: Current Every Day Smoker     Packs/day: 0.20     Years: 11.00     Pack years: 2.20     Types: Cigarettes      "Start date: 2010     Last attempt to quit: 3/1/2019     Years since quitting: 3.4   • Smokeless tobacco: Never Used   Substance and Sexual Activity   • Alcohol use: Yes   • Drug use: No   • Sexual activity: Defer         Current Outpatient Medications:   •  nitrofurantoin, macrocrystal-monohydrate, (Macrobid) 100 MG capsule, Take 1 capsule by mouth 2 (Two) Times a Day for 5 days., Disp: 10 capsule, Rfl: 0    Objective   /74   Temp 97.1 °F (36.2 °C) (Temporal)   Ht 167.6 cm (66\")   Wt 117 kg (257 lb)   BMI 41.48 kg/m²   Physical Exam  Vitals reviewed.   Constitutional:       Appearance: She is well-developed.   Pulmonary:      Effort: Pulmonary effort is normal.   Skin:     General: Skin is warm and dry.   Neurological:      Mental Status: She is alert and oriented to person, place, and time.   Psychiatric:         Behavior: Behavior normal.         Thought Content: Thought content normal.         Judgment: Judgment normal.         Assessment & Plan   Diagnoses and all orders for this visit:    1. Chronic constipation (Primary)  -Continue OTC medications as needed    2. Smoker  -No interest in quitting currently, will readdress in the future    3. Acute cystitis without hematuria  -Currently on Macrobid         "

## 2022-08-01 NOTE — PROGRESS NOTES
A  my chart message has been sent to the patient for patient rounding with [facility].

## 2022-08-15 RX ORDER — SULFAMETHOXAZOLE AND TRIMETHOPRIM 400; 80 MG/1; MG/1
1 TABLET ORAL 2 TIMES DAILY
Qty: 10 TABLET | Refills: 0 | Status: SHIPPED | OUTPATIENT
Start: 2022-08-15 | End: 2022-08-20

## 2022-09-08 NOTE — PROGRESS NOTES
Chief Complaint   Patient presents with   • Routine Prenatal Visit       HPI: Lady is a  currently at 37w6d who today reports the following:  Contractions - No; Leaking - No; Vaginal bleeding -  No; Swelling of extremities - No.    ROS:  GI: Nausea - No; Constipation - No; Diarrhea - No    Neuro: Headache - No; Visual change - No      EXAM:  Vitals: See prenatal flowsheet   Abdomen: See prenatal flowsheet   Urine glucose/protein: See prenatal flowsheet   Pelvic: See prenatal flowsheet   MDM:   Impression: 1. Supervision of high risk pregnancy  2. Previous C/S with planned repeat C/S  3. Anemia in pregnancy   Tests done today: 1. none   Topics discussed: 1. Continue with PNV's  2. Prenatal labs reviewed  3. none - she had no major complaints,questions or concerns   Tests scheduled today for her next visit:   WILBUR        Pt was seen and examined  Plan of care DW NP.   Denies any cp or sob. Leg edema is improved.   Medications were reviewed.   NST is also reviewed with patient. Possible small inferior wall ischemia. Medically managed  Advised pt that his disease can progress and to call 911 and go to nearest ED with any cp or sob.  Pt to fu with me in 2 weeks in the office pls see above

## 2022-09-23 ENCOUNTER — TELEPHONE (OUTPATIENT)
Dept: OBSTETRICS AND GYNECOLOGY | Facility: CLINIC | Age: 27
End: 2022-09-23

## 2022-09-23 NOTE — TELEPHONE ENCOUNTER
DR. FRANCO    The patient is on the second day of her period. She has extreme cramps, nausea, cold, clammy skin, and she’s off balance. Patient is concerned she is losing too much blood. Please contact the patient.

## 2022-09-23 NOTE — TELEPHONE ENCOUNTER
Spoke with pt and she stated that she is really nauseous, off balance, very fatigued, cramps are very bad, bleeding is a lot heavier.  She states that she is filling up her menstrual cups within an hour or so of putting it in when they should be lasting 12 hours and wants to know what can be done about the bleeding.

## 2022-09-24 NOTE — TELEPHONE ENCOUNTER
Really the most effective options are going to involve some form of hormonal contraception.  Without seeing her and running some testing is hard to know exactly what the next best step would be.  She really needs an appointment to be seen

## 2022-09-28 DIAGNOSIS — R56.9 NEW ONSET SEIZURE: Primary | ICD-10-CM

## 2022-10-05 NOTE — PROGRESS NOTES
"   Neuro Office Visit      Encounter Date: 10/06/2022   Patient Name: Lady Diamond  : 1995   MRN: [id number]   PCP:  Flores Carrion MD     Chief Complaint:    Chief Complaint   Patient presents with   • Seizures       History of Present Illness: Lady Diamond is a 27 y.o. female who is here today in Neurology for Seizures.     Seizure  She was sleeping when significant other noted her left leg shaking, arms were brought up tightly to her chest with teeth clenched tightly, biting her tongue. Lasted around 5 minutes and was confused post-ictally. She was seen at [facility] and CT of the head was done. She had muscle aches following the seizure, She reports previous times of waking up and feeling sore and achy all over but has never had a witnessed seizure before. Feels like short term memory issues have started since seizure and feels \"stuck on words\".     H/A  Daily H/A lasting for several hours. Pressure on left side, occasional lightheaded and off balance, phonophobia, nausea.     Hx of migraine headaches but never treated.         Subjective          Past Medical History:   Past Medical History:   Diagnosis Date   • Anxiety with depression 2006    no meds   • Chronic constipation 2018   • Cluster headache    • H/O chlamydia infection    • H/O gonorrhea    • H/O gonorrhea    • H/O gonorrhea    • H/O gonorrhea    • H/O trichomoniasis    • H/O trichomoniasis 2021   • Headache, tension-type    • Memory loss    • Migraine    • Seizures (HCC) 22       Past Surgical History:   Past Surgical History:   Procedure Laterality Date   •  SECTION  11/15/2014    LTCS (1 layer closure)   •  SECTION N/A 2019    Procedure:  SECTION REPEAT WITH SALPINGECTOMY;  Surgeon: Regine José MD;  Location: [facility] LABOR DELIVERY;  Service: Obstetrics;  Laterality: N/A;   • LAPAROSCOPIC CHOLECYSTECTOMY  2015   • WISDOM TOOTH " EXTRACTION  2016       Family History:   Family History   Problem Relation Age of Onset   • Multiple sclerosis Mother        Social History:   Social History     Socioeconomic History   • Marital status: Single   Tobacco Use   • Smoking status: Current Every Day Smoker     Packs/day: 0.25     Years: 15.00     Pack years: 3.75     Types: Cigarettes, Electronic Cigarette     Start date: 1/1/2010     Last attempt to quit: 3/1/2019     Years since quitting: 3.6   • Smokeless tobacco: Never Used   Substance and Sexual Activity   • Alcohol use: Yes     Alcohol/week: 6.0 standard drinks     Types: 6 Shots of liquor per week   • Drug use: No   • Sexual activity: Yes     Partners: Male     Birth control/protection: Surgical, Bilateral salpingectomy        Medications:     Current Outpatient Medications:   •  topiramate (Topamax) 50 MG tablet, Take 1 tablet by mouth 2 (Two) Times a Day. Take 25 mg twice a day for one week then increase to 50 mg twice daily., Disp: 60 tablet, Rfl: 2    Allergies:   No Known Allergies    PHQ-9 Total Score:     STEADI Fall Risk Assessment has not been completed.    Objective     Physical Exam:   Physical Exam  Vitals reviewed.   Eyes:      Extraocular Movements: EOM normal.      Pupils: Pupils are equal, round, and reactive to light.   Neurological:      Mental Status: She is oriented to person, place, and time.      Gait: Gait is intact.   Psychiatric:         Speech: Speech normal.         Neurologic Exam     Mental Status   Oriented to person, place, and time.   Attention: normal. Concentration: normal.   Speech: speech is normal   Level of consciousness: alert    Cranial Nerves     CN II   Visual fields full to confrontation.     CN III, IV, VI   Pupils are equal, round, and reactive to light.  Extraocular motions are normal.   Right pupil: Size: 4 mm. Shape: regular. Reactivity: brisk.   Left pupil: Size: 4 mm. Shape: regular. Reactivity: brisk.   CN III: no CN III palsy  CN VI: no CN VI  "palsy  Nystagmus: none     CN V   Facial sensation intact.     CN VII   Facial expression full, symmetric.     CN VIII   CN VIII normal.     CN IX, X   CN IX normal.   CN X normal.     CN XI   CN XI normal.     CN XII   CN XII normal.     Motor Exam     Strength   Right neck flexion: 5/5  Left neck flexion: 5/5  Right neck extension: 5/5  Left neck extension: 5/5  Right deltoid: 5/5  Left deltoid: 5/5  Right biceps: 5/5  Left biceps: 5/5  Right triceps: 5/5  Left triceps: 5/5  Right wrist flexion: 5/5  Left wrist flexion: 5/5  Right wrist extension: 5/5  Left wrist extension: 5/5  Right interossei: 5/5  Left interossei: 5/5  Right abdominals: 5/5  Left abdominals: 5/5  Right iliopsoas: 5/5  Left iliopsoas: 5/5  Right quadriceps: 5/5  Left quadriceps: 5/5  Right hamstrin/5  Left hamstrin/5  Right glutei: 5/5  Left glutei: 5/5  Right anterior tibial: 5/5  Left anterior tibial: 5/5  Right posterior tibial: 5/5  Left posterior tibial: 5/5  Right peroneal: 5/5  Left peroneal: 5/5  Right gastroc: 5/5  Left gastroc: 5/5    Sensory Exam   Light touch normal.     Gait, Coordination, and Reflexes     Gait  Gait: normal       Vital Signs:   Vitals:    10/06/22 0854   BP: 112/84   Pulse: 92   Temp: 97.3 °F (36.3 °C)   SpO2: 99%   Weight: 118 kg (260 lb 12.8 oz)   Height: 167.6 cm (65.98\")     Body mass index is 42.11 kg/m².     Results:   Imaging:   No Images in the past 120 days found..     Labs:    No results found for: CMP, PROTEIN, ANTIMOGAB, [id number], JCVRESULT, QUANTTBGOLD, CBCDIF, IGGALBSER     Assessment / Plan      Assessment/Plan:   Diagnoses and all orders for this visit:    1. Seizure (HCC) (Primary)  -     MRI Brain With & Without Contrast; Future    2. Chronic migraine without aura without status migrainosus, not intractable    Other orders  -     topiramate (Topamax) 50 MG tablet; Take 1 tablet by mouth 2 (Two) Times a Day. Take 25 mg twice a day for one week then increase to 50 mg twice daily.  " Dispense: 60 tablet; Refill: 2           Patient Education:   I have instructed and given the patient education on the importance of not driving and operating heavy machinery. No solo bathing or tub baths for 3 months from onset of the most recent seizure.   Minimize stress as much as possible. I have recommended 7-8 hours of sleep each night. Abstain from alcohol intake. Educated on Antiepileptic medications with possible side effects and signs and symptoms to report if prescribed during visit. Instructed to take seizure medication daily if prescribed. Reviewed potential seizure risk factors.   I have instructed the patient to call 911 or our office if another seizure does occur. Patient verbalizes and understands.   Reviewed medications, potential side effects and signs and symptoms to report. Discussed risk versus benefits of treatment plan with patient and/or family-including medications, labs and radiology that may be ordered. Addressed questions and concerns during visit. Patient and/or family verbalized understanding and agree with plan. Instructed to call the office with any questions and report to ER with any life-threatening symptoms.     Follow Up:   Return in about 3 months (around 1/6/2023).    I spent 30  minutes face to face with the patient and family. I personally spent 50 percent of this time counseling and discussing diagnosis, diagnostic testing, driving, treatment options and management .       During this visit the following were done:  Labs Reviewed []    Labs Ordered []    Radiology Reports Reviewed []    Radiology Ordered [x]    PCP Records Reviewed []    Referring Provider Records Reviewed []    ER Records Reviewed []    Hospital Records Reviewed []    History Obtained From Family []    Radiology Images Reviewed []    Other Reviewed [x]    Records Requested []      LULU Thompson  [facility] NEUROLOGY  [address] [place] [address]  [address]  [facility] [id number] [phone]

## 2022-10-06 ENCOUNTER — OFFICE VISIT (OUTPATIENT)
Dept: NEUROLOGY | Facility: CLINIC | Age: 27
End: 2022-10-06

## 2022-10-06 VITALS
HEART RATE: 92 BPM | TEMPERATURE: 97.3 F | SYSTOLIC BLOOD PRESSURE: 112 MMHG | OXYGEN SATURATION: 99 % | BODY MASS INDEX: 41.91 KG/M2 | DIASTOLIC BLOOD PRESSURE: 84 MMHG | WEIGHT: 260.8 LBS | HEIGHT: 66 IN

## 2022-10-06 DIAGNOSIS — R56.9 SEIZURE: Primary | ICD-10-CM

## 2022-10-06 DIAGNOSIS — G43.709 CHRONIC MIGRAINE WITHOUT AURA WITHOUT STATUS MIGRAINOSUS, NOT INTRACTABLE: ICD-10-CM

## 2022-10-06 PROBLEM — J03.00 STREPTOCOCCAL TONSILLITIS: Status: ACTIVE | Noted: 2022-10-06

## 2022-10-06 PROCEDURE — 99204 OFFICE O/P NEW MOD 45 MIN: CPT | Performed by: NURSE PRACTITIONER

## 2022-10-06 RX ORDER — TOPIRAMATE 50 MG/1
50 TABLET, FILM COATED ORAL 2 TIMES DAILY
Qty: 60 TABLET | Refills: 2 | Status: SHIPPED | OUTPATIENT
Start: 2022-10-06 | End: 2023-01-06

## 2022-10-10 DIAGNOSIS — R56.9 SEIZURE: Primary | ICD-10-CM

## 2022-10-17 ENCOUNTER — TELEPHONE (OUTPATIENT)
Dept: NEUROLOGY | Facility: CLINIC | Age: 27
End: 2022-10-17

## 2022-10-17 NOTE — TELEPHONE ENCOUNTER
MEDICATION CONCERNS    Caller: Lady Diamond    Relationship: Self    Best call back number: [phone]    Preferred pharmacy: [id number] [address] [place] [address] [facility] [address] - [phone] [facility] [phone] FX    What medications are you currently taking:   Current Outpatient Medications on File Prior to Visit   Medication Sig Dispense Refill   • topiramate (Topamax) 50 MG tablet Take 1 tablet by mouth 2 (Two) Times a Day. Take 25 mg twice a day for one week then increase to 50 mg twice daily. 60 tablet 2     No current facility-administered medications on file prior to visit.     Which medication are you concerned about: topiramate (Topamax) 50 MG tablet    Who prescribed you this medication: LULU ZELAYA    When did you start taking these medications: 10/6/2022    What are your concerns: PT VERIFIES SHE DID TITRATE UP TO 1 50MG TABLET TWICE DAILY, BEFORE DISCONTINUING THE MEDICATION ON Saturday, 10/15/22. PT D/C'D THE MEDICATION DUE TO SIDE EFFECTS INCLUDING SLURRED SPEECH, DIZZINESS/VERTIGO, SEVERE FATIGUE, CONFUSION/MEMORY CHANGES, AND IMBALANCE. PT REPORTS THESE SIDE EFFECTS HAS CEASED SINCE SHE STOPPED THE MEDICATION ON Saturday.    Call warm-transferred to: LOS FOR FURTHER TRIAGING.

## 2022-10-17 NOTE — TELEPHONE ENCOUNTER
Patient called in complaining of numbness in extremities, headache, metallic taste in mouth and confusion.  Patient stated she has discontinued the Topamax.  Patient was told to go to the E/D.  Patient was understanding.

## 2022-11-11 ENCOUNTER — HOSPITAL ENCOUNTER (OUTPATIENT)
Dept: MRI IMAGING | Facility: HOSPITAL | Age: 27
Discharge: HOME | End: 2022-11-11
Admitting: NURSE PRACTITIONER

## 2022-11-11 ENCOUNTER — APPOINTMENT (OUTPATIENT)
Dept: MRI IMAGING | Facility: HOSPITAL | Age: 27
End: 2022-11-11

## 2022-11-11 DIAGNOSIS — R56.9 SEIZURE: ICD-10-CM

## 2022-11-11 PROCEDURE — 0 GADOBENATE DIMEGLUMINE 529 MG/ML SOLUTION: Performed by: NURSE PRACTITIONER

## 2022-11-11 PROCEDURE — 70553 MRI BRAIN STEM W/O & W/DYE: CPT

## 2022-11-11 PROCEDURE — A9577 INJ MULTIHANCE: HCPCS | Performed by: NURSE PRACTITIONER

## 2022-11-11 RX ADMIN — GADOBENATE DIMEGLUMINE 20 ML: 529 INJECTION, SOLUTION INTRAVENOUS at 10:46

## 2022-11-14 ENCOUNTER — HOSPITAL ENCOUNTER (OUTPATIENT)
Dept: NEUROLOGY | Facility: HOSPITAL | Age: 27
Discharge: HOME | End: 2022-11-14
Admitting: NURSE PRACTITIONER

## 2022-11-14 DIAGNOSIS — R56.9 SEIZURE: ICD-10-CM

## 2022-11-14 PROCEDURE — 95819 EEG AWAKE AND ASLEEP: CPT

## 2022-11-15 ENCOUNTER — TELEPHONE (OUTPATIENT)
Dept: NEUROLOGY | Facility: CLINIC | Age: 27
End: 2022-11-15

## 2022-11-15 NOTE — TELEPHONE ENCOUNTER
Called patient and left vm with results.    ----- Message from LULU Thompson sent at 11/14/2022  5:09 PM EST -----  EEG was normal.   ----- Message -----  From: Maury Jaramillo MD  Sent: 11/14/2022  10:58 AM EST  To: LULU Thompson

## 2022-12-09 DIAGNOSIS — R45.86 MOOD CHANGE: Primary | ICD-10-CM

## 2023-01-05 NOTE — PROGRESS NOTES
Neuro Office Visit      Encounter Date: 2023   Patient Name: Lady Diamond  : 1995   MRN: [id number]   PCP:  Flores Carrion MD     Chief Complaint:  No chief complaint on file.    You have chosen to receive care through a telehealth visit.  Do you consent to use a video/audio connection for your medical care today? Yes    History of Present Illness: Lady Diamond is a 27 y.o. female who is here today in Neurology for seizures.    Last visit with me on 10/6/2022, started on Topamax 50 mg twice daily, EEG and MRI of the brain ordered.    Seizures:  Stopped taking Topamax as the side effects were too bothersome.  She felt sleepy all the time and felt like the Topamax made her feel \"disconnected\".    No seizure activity that she knows of, however she has awakened feeling achy all over.  She has not bitten her tongue or her jaw.    EEG on 2022 was normal.    MRI Brain With & Without Contrast (2022 10:43)  EEG (Hospital Performed) (2022 09:50)    Migraine:  Continues to have a daily headache.    Lasts all day.    Pounding in nature and associated with phonophobia, dizziness, but she denies photophobia or nausea/vomiting.    She takes Excedrin which sometimes helps but other times will not.    She did note improvement of the migraines while she was on Topamax.    Subjective        Past Medical History:   Past Medical History:   Diagnosis Date   • Anxiety with depression 2006    no meds   • Chronic constipation    • Cluster headache    • H/O chlamydia infection    • H/O gonorrhea 2016   • H/O gonorrhea    • H/O gonorrhea    • H/O gonorrhea 2018   • H/O trichomoniasis 2018   • H/O trichomoniasis 2021   • Headache, tension-type    • Memory loss    • Migraine    • Seizures (HCC) 22       Past Surgical History:   Past Surgical History:   Procedure Laterality Date   •  SECTION  11/15/2014    LTCS (1 layer closure)   •   SECTION N/A 2019    Procedure:  SECTION REPEAT WITH SALPINGECTOMY;  Surgeon: Regine José MD;  [facility];  Service: Obstetrics;  Laterality: N/A;   • LAPAROSCOPIC CHOLECYSTECTOMY  2015   • WISDOM TOOTH EXTRACTION         Family History:   Family History   Problem Relation Age of Onset   • Multiple sclerosis Mother        Social History:   Social History     Socioeconomic History   • Marital status: Single   Tobacco Use   • Smoking status: Every Day     Packs/day: 0.25     Years: 15.00     Pack years: 3.75     Types: Cigarettes, Electronic Cigarette     Start date: 2010     Last attempt to quit: 3/1/2019     Years since quitting: 3.8   • Smokeless tobacco: Never   Substance and Sexual Activity   • Alcohol use: Yes     Alcohol/week: 6.0 standard drinks     Types: 6 Shots of liquor per week   • Drug use: No   • Sexual activity: Yes     Partners: Male     Birth control/protection: Surgical, Bilateral salpingectomy        Medications:     Current Outpatient Medications:   •  amitriptyline (ELAVIL) 10 MG tablet, Take 1-2 tablets by mouth Every Night., Disp: 60 tablet, Rfl: 3  •  levETIRAcetam (KEPPRA) 250 MG tablet, Take 1 tablet by mouth 2 (Two) Times a Day., Disp: 60 tablet, Rfl: 3  •  SUMAtriptan (IMITREX) 100 MG tablet, Take 1 tablet by mouth 1 (One) Time As Needed for Migraine. Take one tablet at onset of headache. May repeat dose one time in 2 hours if headache not relieved., Disp: 30 tablet, Rfl: 2  •  topiramate (Topamax) 50 MG tablet, Take 1 tablet by mouth 2 (Two) Times a Day. Take 25 mg twice a day for one week then increase to 50 mg twice daily., Disp: 60 tablet, Rfl: 2    Allergies:   No Known Allergies    PHQ-9 Total Score:     STEADI Fall Risk Assessment has not been completed.    Objective     Physical Exam:   Physical Exam  Neurological:      Mental Status: She is oriented to person, place, and time.   Psychiatric:         Speech: Speech  normal.         Neurologic Exam     Mental Status   Oriented to person, place, and time.   Attention: normal. Concentration: normal.   Speech: speech is normal   Level of consciousness: alert  Knowledge: good.        Vital Signs: There were no vitals filed for this visit.  There is no height or weight on file to calculate BMI.     Results:   Imaging:   EEG (Hospital Performed)    Result Date: 11/14/2022  Normal study This report is transcribed using the Dragon dictation system.      MRI Brain With & Without Contrast    Result Date: 11/11/2022  Combination of findings including expanded and partially empty sella as well as bilateral transverse sinus narrowing. While nonspecific, these findings can be seen with idiopathic intracranial hypertension.  Otherwise normal contrast-enhanced MRI of the brain, including dedicated evaluation of the pituitary and sella.  This report was finalized on 11/11/2022 11:35 AM by Conrad Veronica.         Labs:    No results found for: CMP, PROTEIN, ANTIMOGAB, [id number], JCVRESULT, QUANTTBGOLD, CBCDIF, IGGALBSER     Assessment / Plan      Assessment/Plan:   Diagnoses and all orders for this visit:    1. Seizure (HCC) (Primary)  Comments:  Start Keppra 250 mg twice daily    2. Chronic migraine without aura without status migrainosus, not intractable  Comments:  Start Elavil 10 to 20 mg nightly  Imitrex 100 mg as needed headache    Other orders  -     levETIRAcetam (KEPPRA) 250 MG tablet; Take 1 tablet by mouth 2 (Two) Times a Day.  Dispense: 60 tablet; Refill: 3  -     amitriptyline (ELAVIL) 10 MG tablet; Take 1-2 tablets by mouth Every Night.  Dispense: 60 tablet; Refill: 3  -     SUMAtriptan (IMITREX) 100 MG tablet; Take 1 tablet by mouth 1 (One) Time As Needed for Migraine. Take one tablet at onset of headache. May repeat dose one time in 2 hours if headache not relieved.  Dispense: 30 tablet; Refill: 2           Patient Education:   Luan discussed EEG and MRI results and  questions were answered.  Since the Topamax made her feel so poorly and she stopped taking it, we will try Keppra 250 mg twice daily.  We discussed the importance of being on an AED as all of her seizures have occurred during the night.    For her headaches we will start Elavil 10 to 20 mg nightly and sumatriptan 100 mg as needed.    At this time she is having transportation issues and prefers to do video visit so we will have another visit in 8 weeks to see how she is doing on the medications.    Reviewed medications, potential side effects and signs and symptoms to report. Discussed risk versus benefits of treatment plan with patient and/or family-including medications, labs and radiology that may be ordered. Addressed questions and concerns during visit. Patient and/or family verbalized understanding and agree with plan. Instructed to call the office with any questions and report to ER with any life-threatening symptoms.     Follow Up:   Return in about 8 weeks (around 3/3/2023).    I spent 30  minutes face to face with the patient. I personally spent 50 percent of this time counseling and discussing diagnosis, diagnostic testing, driving, treatment options and management .       During this visit the following were done:  Labs Reviewed []    Labs Ordered []    Radiology Reports Reviewed [x]    Radiology Ordered []    PCP Records Reviewed []    Referring Provider Records Reviewed []    ER Records Reviewed []    Hospital Records Reviewed []    History Obtained From Family []    Radiology Images Reviewed [x]    Other Reviewed []    Records Requested []      LULU Thompson  E NEURO [facility] NEUROLOGY [facility]  [address] [facility] [address]  [facility] [address] [phone]
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
## 2023-01-06 ENCOUNTER — TELEMEDICINE (OUTPATIENT)
Dept: NEUROLOGY | Facility: CLINIC | Age: 28
End: 2023-01-06
Payer: COMMERCIAL

## 2023-01-06 DIAGNOSIS — G43.709 CHRONIC MIGRAINE WITHOUT AURA WITHOUT STATUS MIGRAINOSUS, NOT INTRACTABLE: ICD-10-CM

## 2023-01-06 DIAGNOSIS — R56.9 SEIZURE: Primary | ICD-10-CM

## 2023-01-06 PROBLEM — G43.009 MIGRAINE WITHOUT AURA AND WITHOUT STATUS MIGRAINOSUS, NOT INTRACTABLE: Status: ACTIVE | Noted: 2023-01-06

## 2023-01-06 PROCEDURE — 99213 OFFICE O/P EST LOW 20 MIN: CPT | Performed by: NURSE PRACTITIONER

## 2023-01-06 RX ORDER — SUMATRIPTAN 100 MG/1
100 TABLET, FILM COATED ORAL ONCE AS NEEDED
Qty: 30 TABLET | Refills: 2 | Status: SHIPPED | OUTPATIENT
Start: 2023-01-06 | End: 2024-01-06

## 2023-01-06 RX ORDER — AMITRIPTYLINE HYDROCHLORIDE 10 MG/1
10-20 TABLET, FILM COATED ORAL NIGHTLY
Qty: 60 TABLET | Refills: 3 | Status: SHIPPED | OUTPATIENT
Start: 2023-01-06 | End: 2023-02-13 | Stop reason: SINTOL

## 2023-01-06 RX ORDER — LEVETIRACETAM 250 MG/1
250 TABLET ORAL 2 TIMES DAILY
Qty: 60 TABLET | Refills: 3 | Status: SHIPPED | OUTPATIENT
Start: 2023-01-06

## 2023-01-11 ENCOUNTER — TELEMEDICINE (OUTPATIENT)
Dept: PSYCHIATRY | Facility: CLINIC | Age: 28
End: 2023-01-11
Payer: COMMERCIAL

## 2023-01-11 DIAGNOSIS — Z79.899 MEDICATION MANAGEMENT: ICD-10-CM

## 2023-01-11 DIAGNOSIS — F31.60 BIPOLAR AFFECTIVE DISORDER, MIXED: Primary | ICD-10-CM

## 2023-01-11 DIAGNOSIS — G47.9 SLEEP DISTURBANCE: ICD-10-CM

## 2023-01-11 PROCEDURE — 90792 PSYCH DIAG EVAL W/MED SRVCS: CPT

## 2023-01-11 RX ORDER — ARIPIPRAZOLE 2 MG/1
2 TABLET ORAL DAILY
Qty: 7 TABLET | Refills: 0 | Status: SHIPPED | OUTPATIENT
Start: 2023-01-11 | End: 2023-01-18 | Stop reason: SDUPTHER
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
## 2023-01-18 ENCOUNTER — TELEMEDICINE (OUTPATIENT)
Dept: PSYCHIATRY | Facility: CLINIC | Age: 28
End: 2023-01-18
Payer: COMMERCIAL

## 2023-01-18 ENCOUNTER — TELEPHONE (OUTPATIENT)
Dept: PSYCHIATRY | Facility: CLINIC | Age: 28
End: 2023-01-18

## 2023-01-18 DIAGNOSIS — Z79.899 MEDICATION MANAGEMENT: ICD-10-CM

## 2023-01-18 DIAGNOSIS — T50.905A ACUTE MEDICATION-INDUCED AKATHISIA: ICD-10-CM

## 2023-01-18 DIAGNOSIS — G47.9 SLEEP DISTURBANCE: ICD-10-CM

## 2023-01-18 DIAGNOSIS — G25.71 ACUTE MEDICATION-INDUCED AKATHISIA: ICD-10-CM

## 2023-01-18 DIAGNOSIS — F31.60 BIPOLAR AFFECTIVE DISORDER, MIXED: Primary | ICD-10-CM

## 2023-01-18 PROCEDURE — 99214 OFFICE O/P EST MOD 30 MIN: CPT

## 2023-01-18 RX ORDER — PROPRANOLOL HYDROCHLORIDE 10 MG/1
TABLET ORAL
Qty: 60 TABLET | Refills: 0 | Status: SHIPPED | OUTPATIENT
Start: 2023-01-18 | End: 2023-03-13

## 2023-01-18 RX ORDER — ARIPIPRAZOLE 2 MG/1
2 TABLET ORAL DAILY
Qty: 30 TABLET | Refills: 0 | Status: SHIPPED | OUTPATIENT
Start: 2023-01-18 | End: 2023-02-13 | Stop reason: DRUGHIGH

## 2023-01-18 RX ORDER — PROPRANOLOL HYDROCHLORIDE 10 MG/1
TABLET ORAL
Qty: 60 TABLET | Refills: 0 | Status: SHIPPED | OUTPATIENT
Start: 2023-01-18 | End: 2023-01-18
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
## 2023-01-24 ENCOUNTER — TELEPHONE (OUTPATIENT)
Dept: OBSTETRICS AND GYNECOLOGY | Facility: CLINIC | Age: 28
End: 2023-01-24
Payer: COMMERCIAL

## 2023-01-24 NOTE — TELEPHONE ENCOUNTER
Called and got patient scheduled for a f/u appt to assess the sore she is referencing.  Patient also appears to be overdue for annual but scheduled /fu problem visit first so this could get addressed.

## 2023-01-24 NOTE — TELEPHONE ENCOUNTER
SALVADOR    Pt called stating that she has a open sore on the inside of her left labia. Please advise pt what she needs to do.

## 2023-02-06 ENCOUNTER — PATIENT MESSAGE (OUTPATIENT)
Dept: NEUROLOGY | Facility: CLINIC | Age: 28
End: 2023-02-06
Payer: COMMERCIAL

## 2023-02-06 NOTE — TELEPHONE ENCOUNTER
From: Lady Diamond  To: Elba Mendoza  Sent: 2/6/2023 11:15 AM EST  Subject: Sumatriptan     Hey I was wondering if there is anyway I can get a full 30day supply of sumatriptan. It really helps with my headaches but they only give me 9 at a time.

## 2023-02-07 DIAGNOSIS — F31.60 BIPOLAR AFFECTIVE DISORDER, MIXED: ICD-10-CM

## 2023-02-07 DIAGNOSIS — G47.9 SLEEP DISTURBANCE: ICD-10-CM

## 2023-02-07 RX ORDER — ARIPIPRAZOLE 2 MG/1
TABLET ORAL
Qty: 30 TABLET | Refills: 0 | OUTPATIENT
Start: 2023-02-07
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
## 2023-02-09 ENCOUNTER — OFFICE VISIT (OUTPATIENT)
Dept: OBSTETRICS AND GYNECOLOGY | Facility: CLINIC | Age: 28
End: 2023-02-09
Payer: COMMERCIAL

## 2023-02-09 ENCOUNTER — SPECIALTY PHARMACY (OUTPATIENT)
Dept: ONCOLOGY | Facility: HOSPITAL | Age: 28
End: 2023-02-09
Payer: COMMERCIAL

## 2023-02-09 ENCOUNTER — LAB (OUTPATIENT)
Dept: LAB | Facility: HOSPITAL | Age: 28
End: 2023-02-09
Payer: COMMERCIAL

## 2023-02-09 VITALS
RESPIRATION RATE: 14 BRPM | DIASTOLIC BLOOD PRESSURE: 80 MMHG | SYSTOLIC BLOOD PRESSURE: 118 MMHG | BODY MASS INDEX: 39.56 KG/M2 | WEIGHT: 245 LBS

## 2023-02-09 DIAGNOSIS — Z11.3 SCREENING FOR VENEREAL DISEASE: ICD-10-CM

## 2023-02-09 DIAGNOSIS — Z01.419 WELL WOMAN EXAM: Primary | ICD-10-CM

## 2023-02-09 DIAGNOSIS — Z71.85 VACCINE COUNSELING: ICD-10-CM

## 2023-02-09 PROBLEM — F31.9 BIPOLAR DISORDER: Status: ACTIVE | Noted: 2022-01-01

## 2023-02-09 PROBLEM — J03.00 STREPTOCOCCAL TONSILLITIS: Status: RESOLVED | Noted: 2022-10-06 | Resolved: 2023-02-09

## 2023-02-09 LAB
HBV SURFACE AG SERPL QL IA: NORMAL
HCV AB SER DONR QL: NORMAL
HIV1+2 AB SER QL: NORMAL

## 2023-02-09 PROCEDURE — 87340 HEPATITIS B SURFACE AG IA: CPT

## 2023-02-09 PROCEDURE — 86592 SYPHILIS TEST NON-TREP QUAL: CPT

## 2023-02-09 PROCEDURE — 2014F MENTAL STATUS ASSESS: CPT | Performed by: OBSTETRICS & GYNECOLOGY

## 2023-02-09 PROCEDURE — 36415 COLL VENOUS BLD VENIPUNCTURE: CPT

## 2023-02-09 PROCEDURE — 3008F BODY MASS INDEX DOCD: CPT | Performed by: OBSTETRICS & GYNECOLOGY

## 2023-02-09 PROCEDURE — 86803 HEPATITIS C AB TEST: CPT

## 2023-02-09 PROCEDURE — G0432 EIA HIV-1/HIV-2 SCREEN: HCPCS

## 2023-02-09 PROCEDURE — 99395 PREV VISIT EST AGE 18-39: CPT | Performed by: OBSTETRICS & GYNECOLOGY

## 2023-02-09 RX ORDER — RIMEGEPANT SULFATE 75 MG/75MG
75 TABLET, ORALLY DISINTEGRATING ORAL DAILY PRN
Qty: 16 TABLET | Refills: 11 | Status: SHIPPED | OUTPATIENT
Start: 2023-02-09

## 2023-02-09 NOTE — PROGRESS NOTES
Subjective   Chief Complaint   Patient presents with   • Gynecologic Exam     Lady Diamond is a 27 y.o. year old  presenting to be seen for her annual exam.     SEXUAL Hx:  She is currently sexually active.  In the past year there there has been ONE new sexual partner.    Condoms are always used.  She would like to be screened for STD's at today's exam.  Current birth control method: tubal ligation.  She is happy with her current method of contraception and does not want to discuss alternative methods of contraception.  MENSTRUAL Hx:  Patient's last menstrual period was 2023.  In the past 6 months her cycles have been regular, predictable and occur monthly.  Her menstrual flow is typically moderately heavy.   Each month on average there are roughly 1 day(s) of very heavy flow.  Intermenstrual bleeding is absent.    Post-coital bleeding is absent.  Dysmenorrhea: moderate and is not affecting her activities of daily living  PMS: is not affecting her activities of daily living  Her cycles are not a source of concern for her that she wishes to discuss today.  HEALTH Hx:  She exercises regularly: no (and has no plans to become more active).  She wears her seat belt: yes.  She has concerns about domestic violence: no.  OTHER THINGS SHE WANTS TO DISCUSS TODAY:  Nothing else    The following portions of the patient's history were reviewed and updated as appropriate:problem list, current medications, allergies, past family history, past medical history, past social history and past surgical history.    Social History    Tobacco Use      Smoking status: Every Day        Packs/day: 0.25        Years: 15.00        Pack years: 3.75        Types: Cigarettes        Start date: 2010        Last attempt to quit: 3/1/2019        Years since quitting: 3.9      Smokeless tobacco: Never    Review of Systems  Constitutional POS: nothing reported    NEG: anorexia or night sweats   Genitourinary POS: nothing  reported    NEG: dysuria or hematuria      Gastointestinal POS: constipation (chronic)    NEG: bloating, change in bowel habits, melena or reflux symptoms   Integument POS: nothing reported    NEG: moles that are changing in size, shape, color or rashes   Breast POS: nothing reported    NEG: persistent breast lump, skin dimpling or nipple discharge        Objective   /80   Resp 14   Wt 111 kg (245 lb)   LMP 01/23/2023   BMI 39.56 kg/m²     General:  well developed; well nourished  no acute distress   Skin:  No suspicious lesions seen   Thyroid: normal to inspection and palpation   Breasts:  Examined in supine position  Symmetric without masses or skin dimpling  Nipples normal without inversion, lesions or discharge  There are no palpable axillary nodes   Abdomen: soft, non-tender; no masses  no umbilical or inguinal hernias are present  no hepato-splenomegaly   Pelvis: Clinical staff was present for exam  External genitalia:  piercing(s) are present involving the clitoris;  :  urethral meatus normal;  Vaginal:  normal pink mucosa without prolapse or lesions.  Cervix:  normal appearance.  Uterus:  normal size, shape and consistency.  Adnexa:  non palpable bilaterally.  Rectal:  digital rectal exam not performed; anus visually normal appearing.        Assessment   1. Normal GYN exam  2. STD screening  3. She is up to date on all relevant gynecologic and colorectal screenings     Plan   1. Pap was not done today.  I explained to Lady that the recommendations for Pap smear interval in a low risk patient has lengthened to 3 years time.  I told Lady she still needs to be seen in our office yearly for a full physical including breast and pelvic exam.  2. The following tests were ordered today: HepBSAg, Hep C Ab, HIV, RPR and STD swabs for GC, chlamydia and trichimoniasis.  It was explained to Lady that all lab test should be back within the one week after they are performed. She will be notified about the  results, regardless of the findings. If she has not been contacted by the office within 2 weeks after the test has been performed, it is her responsibility to contact us to learn about her results.  3. Her vaccine record was reviewed and updated.  4. I discussed with Lady that she may be behind on needed vaccinations for Influenza and COVID booster.  She may be able to obtain these vaccinations at her local pharmacy OR speak about obtaining them with her primary care.  If she does obtain her vaccines, I have asked Lady to let us know the date each vaccine was obtained so that her medical record could be updated in our system.  5. The importance of keeping all planned follow-up and taking all medications as prescribed was emphasized.  6. Follow up for annual exam 1 year         This note was electronically signed.    Camden Thomas M.D.  February 9, 2023    Part of this note may be an electronic transcription/translation of spoken language to printed text using the Dragon Dictation System.

## 2023-02-10 LAB — RPR SER QL: NORMAL

## 2023-02-10 NOTE — PROGRESS NOTES
Specialty Pharmacy Patient Management Program  Neurology Initial Assessment     Lady Diamond is a 27 y.o. female with migraines seen by a Neurology provider and enrolled in the Neurology Patient Management program offered by [facility].  An initial outreach was conducted, including assessment of therapy appropriateness and specialty [facility]. The patient was introduced to services offered by [facility], including: regular assessments, refill coordination, curbside pick-up or mail order delivery options, prior authorization maintenance, and financial assistance programs as applicable. The patient was also provided with contact information for the pharmacy team.     Insurance Coverage & Financial Support  [place] Medicaid    Relevant Past Medical History and Comorbidities  Relevant medical history and concomitant health conditions were discussed with the patient. The patient's chart has been reviewed for relevant past medical history and comorbid health conditions and updated as necessary.   Past Medical History:   Diagnosis Date   • Anxiety with depression 2006    no meds   • Bipolar disorder ([facility]) 2022   • Chronic constipation 2018   • Cluster headache 2013   • H/O chlamydia infection 2016   • H/O gonorrhea 2016   • H/O gonorrhea 2011   • H/O gonorrhea 2012   • H/O gonorrhea 2018   • H/O trichomoniasis 2018   • H/O trichomoniasis 07/2021   • Seizures ([facility]) 09/2022    May have had those in her sleep     Social History     Socioeconomic History   • Marital status: Single   • Number of children: 3   Tobacco Use   • Smoking status: Former     Packs/day: 0.25     Years: 15.00     Pack years: 3.75     Types: Cigarettes     Start date: 1/1/2010     Quit date: 3/1/2019     Years since quitting: 3.9   • Smokeless tobacco: Never   Substance and Sexual Activity   • Alcohol use: Yes     Alcohol/week: 6.0 standard drinks     Types: 6 Shots of liquor  per week     Comment: meri   • Drug use: No   • Sexual activity: Yes     Partners: Male     Birth control/protection: Surgical, Bilateral salpingectomy        Problem list reviewed by Davina Sahu PharmD on 2/10/2023 at  2:13 PM    Allergies  Known allergies and reactions were discussed with the patient. The patient's chart has been reviewed for  allergy information and updated as necessary.   No Known Allergies      Allergies reviewed by Davina Sahu PharmD on 2/10/2023 at  2:13 PM    Current Medication List  This medication list has been reviewed with the patient and evaluated for any interactions or necessary modifications/recommendations, and updated to include all prescription medications, OTC medications, and supplements the patient is currently taking.  This list reflects what is contained in the patient's profile, which has also been marked as reviewed to communicate to other providers it is the most up to date version of the patient's current medication therapy.     Current Outpatient Medications:   •  amitriptyline (ELAVIL) 10 MG tablet, Take 1-2 tablets by mouth Every Night., Disp: 60 tablet, Rfl: 3  •  ARIPiprazole (Abilify) 2 MG tablet, Take 1 tablet by mouth Daily for 30 days., Disp: 30 tablet, Rfl: 0  •  levETIRAcetam (KEPPRA) 250 MG tablet, Take 1 tablet by mouth 2 (Two) Times a Day., Disp: 60 tablet, Rfl: 3  •  propranolol (INDERAL) 10 MG tablet, Take one tab by mouth twice daily, Disp: 60 tablet, Rfl: 0  •  Rimegepant Sulfate (Nurtec) 75 MG tablet dispersible tablet, Take 1 tablet by mouth Daily As Needed (migraines). Take 1 tablet at the onset of headache, Max of 75 mg/24 hours, Max of 18 tabs/30 days., Disp: 16 tablet, Rfl: 11  •  SUMAtriptan (IMITREX) 100 MG tablet, Take 1 tablet by mouth 1 (One) Time As Needed for Migraine. Take one tablet at onset of headache. May repeat dose one time in 2 hours if headache not relieved., Disp: 30 tablet, Rfl: 2    Medicines reviewed by Luis Alberto  Davina JOE, PharmD on 2/10/2023 at  2:13 PM    Drug Interactions  none     Recommended Medications Assessment    None      Relevant Laboratory Values        Initial Education Provided for Specialty Medication  The patient has been provided with the following education and any applicable administration techniques (i.e. self-injection) have been demonstrated for the therapies indicated. All questions and concerns have been addressed prior to the patient receiving the medication, and the patient has verbalized understanding of the education and any materials provided.  Additional patient education shall be provided and documented upon request by the patient, provider or payer.      Nurtec (rimegepant)  Medication Expectations   Why am I taking this medication? You are taking this medication for migraine prophylaxis or to treat an acute migraine.   What should I expect while on this medication? You should expect to see a decrease in the frequency and severity of your migraines.   How does the medication work? Nurtec is a monoclonal antibody that binds to calcitonin gene-related peptide (CGRP) and blocks its binding to the receptor decreasing the severity of migraines.   How long will I be on this medication for? The amount of time you will be on this medication will be determined by your doctor and your response to the medication.    How do I take this medication? Take as directed on your prescription label.   What are some possible side effects? Potential side effects including, but not limited to nausea. Pt verbalized understanding.   What happens if I miss a dose? Take the missed dose as soon as possible, and resume the every other day timed from the last dose..     Medication Safety   What are things I should warn my doctor immediately about? Hypersensitivity reactions - trouble breathing or swallowing.   What are things that I should be cautious of? Hypersensitivity reactions (eg, dyspnea, rash), including delayed  serious reactions, have occurred; discontinue use if suspected    What are some medications that can interact with this one? Avoid concomitant administration of Nurtec ODT with strong inhibitors of CY, strong or moderate inducers of CYP3A or inhibitors of P-gp or BCRP. Avoid another dose of Nurtec ODT within 48 hours when it is administered with moderate inhibitors of CY.  Ask your pharmacist or health care provider before starting new medications     Medication Storage/Handling   How should I handle this medication? Keep this medication out of reach of pets/children in original container. Ensure hands are dry before opening blister pack.   How does this medication need to be stored? Store at room temperature away from heat/cold, sunlight or moisture   How should I dispose of this medication? There should not be a need to dispose of this medication unless your provider decides to change the dose or therapy. If that is the case, take to your local police station for proper disposal. Some pharmacies also have take-back bins for medication drop-off.      Resources/Support   How can I remind myself to take this medication? You can download reminder apps to help you manage your refills. You may also set an alarm on your phone to remind you. The pharmacy carries pill boxes that you can place next to an area you pass everyday (such as where you place your car keys or where you charge your phone)   Is financial support available?  Yes, [employer] can provide co-pay cards if you have commercial insurance or patient assistance if you have Medicare or no insurance.    Which vaccines are recommended for me? Talk to your doctor about these vaccines: Flu, Coronavirus (COVID-19), Pneumococcal (pneumonia), Tdap, Hepatitis B, Zoster (shingles)           Adherence and Self-Administration  • Barriers to Patient Adherence and/or Self-Administration: none    • Methods for Supporting Patient Adherence and/or  Self-Administration: none     Goals of Therapy   Goals    None          Reassessment Plan & Follow-Up  1. Medication Therapy Changes: Start Nurtec 75 mg po once daily as needed for migraines - Take 1 tablet at the onset of headache, Max of 75 mg/24 hours, Max of 18 tabs/30 days.  2. Additional Plans, Therapy Recommendations, or Therapy Problems to Be Addressed: none  3. Pharmacist to perform regular reassessments no more than (6) months from the previous assessment.  4. Welcome information and patient satisfaction survey to be sent by retail team with patient's initial fill.  5. Care Coordinator to set up future refill outreaches, coordinate prescription delivery, and escalate clinical questions to pharmacist.     Attestation  I attest that the initiated specialty medication(s) are appropriate for the patient based on my assessment.  If the prescribed therapy is at any point deemed not appropriate based on the current or future assessments, a consultation will be initiated with the patient's specialty care provider to determine the best course of action. The revised plan of therapy will be documented along with any additional patient education provided.     Davina Sahu, PharmD  2/10/2023  14:15 EST

## 2023-02-10 NOTE — PROGRESS NOTES
Specialty Pharmacy Refill Coordination Note     Lady is a 27 y.o. female contacted today regarding initial fill of Nurtec specialty medication(s).    Reviewed and verified with patient:  Allergies  Meds  Problems       Specialty medication(s) and dose(s) confirmed: yes        Delivery Questions    Flowsheet Row Most Recent Value   Delivery method [employer]Ex   Delivery address correct? Yes   Preferred delivery time? Anytime   Number of medications in delivery 1   Medication being filled and delivered Nurte   Doses left of specialty medications new start   Is there any medication that is due not being filled? No   Supplies needed? No supplies needed   Cooler needed? No   Do any medications need mixed or dated? No   Copay form of payment Payment plan already set up   Questions or concerns for the pharmacist? No   Are any medications first time fills? Yes                 Follow-up: 28 day(s)     Davina Sahu, PharmD  2/10/2023

## 2023-02-13 ENCOUNTER — TELEMEDICINE (OUTPATIENT)
Dept: PSYCHIATRY | Facility: CLINIC | Age: 28
End: 2023-02-13
Payer: COMMERCIAL

## 2023-02-13 DIAGNOSIS — Z79.899 MEDICATION MANAGEMENT: ICD-10-CM

## 2023-02-13 DIAGNOSIS — F31.60 BIPOLAR AFFECTIVE DISORDER, MIXED: Primary | ICD-10-CM

## 2023-02-13 DIAGNOSIS — G47.9 SLEEP DISTURBANCE: ICD-10-CM

## 2023-02-13 PROCEDURE — 99214 OFFICE O/P EST MOD 30 MIN: CPT

## 2023-02-13 RX ORDER — ARIPIPRAZOLE 5 MG/1
5 TABLET ORAL DAILY
Qty: 30 TABLET | Refills: 0 | Status: SHIPPED | OUTPATIENT
Start: 2023-02-13 | End: 2023-03-13

## 2023-03-06 ENCOUNTER — SPECIALTY PHARMACY (OUTPATIENT)
Dept: ONCOLOGY | Facility: HOSPITAL | Age: 28
End: 2023-03-06
Payer: COMMERCIAL

## 2023-03-06 NOTE — PROGRESS NOTES
Specialty Pharmacy Refill Coordination Note     Lady is a 27 y.o. female contacted today regarding refills of  [facility] specialty medication(s).    Reviewed and verified with patient:       Specialty medication(s) and dose(s) confirmed: yes    Refill Questions    Flowsheet Row Most Recent Value   Changes to allergies? No   Changes to medications? No   New conditions since last clinic visit No   Unplanned office visit, urgent care, ED, or hospital admission in the last 4 weeks  No   How does patient/caregiver feel medication is working? Excellent   Financial problems or insurance changes  No   Since the previous refill, were any specialty medication doses or scheduled injections missed or delayed?  No  [prn medication]   Does this patient require a clinical escalation to a pharmacist? No          Delivery Questions    Flowsheet Row Most Recent Value   Delivery method FedEx   Delivery address correct? Yes   Preferred delivery time? Anytime   Number of medications in delivery 1   Medication being filled and delivered Nurtec   Is there any medication that is due not being filled? No   Supplies needed? No supplies needed   Cooler needed? No   Do any medications need mixed or dated? No   Copay form of payment Payment plan already set up   Questions or concerns for the pharmacist? No   Are any medications first time fills? No                 Follow-up: 1 month(s)     Freda Guajardo, Pharmacy Technician  Specialty Pharmacy Technician

## 2023-03-12 DIAGNOSIS — F31.60 BIPOLAR AFFECTIVE DISORDER, MIXED: ICD-10-CM

## 2023-03-12 DIAGNOSIS — G25.71 ACUTE MEDICATION-INDUCED AKATHISIA: ICD-10-CM

## 2023-03-12 DIAGNOSIS — T50.905A ACUTE MEDICATION-INDUCED AKATHISIA: ICD-10-CM

## 2023-03-13 RX ORDER — PROPRANOLOL HYDROCHLORIDE 10 MG/1
TABLET ORAL
Qty: 60 TABLET | Refills: 0 | Status: SHIPPED | OUTPATIENT
Start: 2023-03-13 | End: 2023-03-14 | Stop reason: SINTOL

## 2023-03-13 RX ORDER — ARIPIPRAZOLE 5 MG/1
TABLET ORAL
Qty: 30 TABLET | Refills: 0 | Status: SHIPPED | OUTPATIENT
Start: 2023-03-13 | End: 2023-03-14 | Stop reason: SDUPTHER

## 2023-03-14 ENCOUNTER — TELEMEDICINE (OUTPATIENT)
Dept: PSYCHIATRY | Facility: CLINIC | Age: 28
End: 2023-03-14
Payer: COMMERCIAL

## 2023-03-14 DIAGNOSIS — G47.9 SLEEP DISTURBANCE: ICD-10-CM

## 2023-03-14 DIAGNOSIS — F31.60 BIPOLAR AFFECTIVE DISORDER, MIXED: Primary | ICD-10-CM

## 2023-03-14 DIAGNOSIS — Z79.899 MEDICATION MANAGEMENT: ICD-10-CM

## 2023-03-14 PROCEDURE — 99214 OFFICE O/P EST MOD 30 MIN: CPT

## 2023-03-14 PROCEDURE — 1159F MED LIST DOCD IN RCRD: CPT

## 2023-03-14 PROCEDURE — 1160F RVW MEDS BY RX/DR IN RCRD: CPT

## 2023-03-14 RX ORDER — ARIPIPRAZOLE 5 MG/1
5 TABLET ORAL DAILY
Qty: 30 TABLET | Refills: 0 | Status: SHIPPED | OUTPATIENT
Start: 2023-03-14

## 2023-03-14 RX ORDER — LAMOTRIGINE 25 MG/1
TABLET ORAL
Qty: 42 TABLET | Refills: 0 | Status: SHIPPED | OUTPATIENT
Start: 2023-03-14
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
## 2023-03-31 ENCOUNTER — SPECIALTY PHARMACY (OUTPATIENT)
Dept: ONCOLOGY | Facility: HOSPITAL | Age: 28
End: 2023-03-31
Payer: COMMERCIAL

## 2023-03-31 NOTE — PROGRESS NOTES
Refill outreach escalated to pharmacist:  Patient started Lamictal 25mg BID.  Patient's med profile updated and dur ran - [facility]  Davina Sahu, PharmD  3/31/2023

## 2023-03-31 NOTE — PROGRESS NOTES
Specialty Pharmacy Refill Coordination Note     Lady is a 28 y.o. female contacted today [facility]    Reviewed and verified with patient:       Specialty medication(s) and dose(s) confirmed: yes    Refill Questions    Flowsheet Row Most Recent Value   Changes to allergies? No   Changes to medications? Yes  [Patient has started taking Lamictal 25mg Bid]   New conditions since last clinic visit No   Unplanned office visit, urgent care, ED, or hospital admission in the last 4 weeks  No   How does patient/caregiver feel medication is working? Excellent   Financial problems or insurance changes  No   Since the previous refill, were any specialty medication doses or scheduled injections missed or delayed?  No  [prn medication]   Does this patient require a clinical escalation to a pharmacist? Yes          Delivery Questions    Flowsheet Row Most Recent Value   Delivery method FedEx   Delivery address correct? Yes   Preferred delivery time? Anytime   Number of medications in delivery 1   Medication being filled and delivered Nurtec   Is there any medication that is due not being filled? No   Supplies needed? No supplies needed   Cooler needed? No   Do any medications need mixed or dated? No   Copay form of payment Payment plan already set up   Questions or concerns for the pharmacist? No   Are any medications first time fills? No                 Follow-up: 1 month(s)     Freda Guajardo, Pharmacy Technician  Specialty Pharmacy Technician

## 2023-04-03 ENCOUNTER — TELEPHONE (OUTPATIENT)
Dept: PSYCHIATRY | Facility: CLINIC | Age: 28
End: 2023-04-03
Payer: COMMERCIAL

## 2023-04-03 NOTE — TELEPHONE ENCOUNTER
Pt. States that the Lamictal caused her to gain wait, and was not helping her. She states she was depressed, made her revert back to how she was previously.    Pt states she stopped taking the medication all together, and she is now having the shakes, and withdrawal like symptoms.     Please advise.   Provider out of office.

## 2023-04-03 NOTE — TELEPHONE ENCOUNTER
The weight gain is most likely from the Abilify; it is not likely being caused by the Lamictal.   Lamictal 25 mg is the lowest dose and does not typically require a taper. However, since pt is experiencing shakiness, I would recommend she start Lamictal 12.5 mg PO Daily x5 days, (using her current supply by breaking the 25 mg tablets in half), then discontinue. If the problem persists, please have pt go to urgent care to be evaluated. Also, have pt schedule a sooner appointment with regular provider. If pt was to experience any SI/HI, advise her to go to the ED.

## 2023-04-12 ENCOUNTER — TELEMEDICINE (OUTPATIENT)
Dept: PSYCHIATRY | Facility: CLINIC | Age: 28
End: 2023-04-12
Payer: COMMERCIAL

## 2023-04-12 DIAGNOSIS — R44.1 HALLUCINATIONS, VISUAL: ICD-10-CM

## 2023-04-12 DIAGNOSIS — Z79.899 MEDICATION MANAGEMENT: ICD-10-CM

## 2023-04-12 DIAGNOSIS — G47.9 SLEEP DISTURBANCE: ICD-10-CM

## 2023-04-12 DIAGNOSIS — F31.60 BIPOLAR AFFECTIVE DISORDER, MIXED: Primary | ICD-10-CM

## 2023-04-12 PROCEDURE — 1159F MED LIST DOCD IN RCRD: CPT

## 2023-04-12 PROCEDURE — 99214 OFFICE O/P EST MOD 30 MIN: CPT

## 2023-04-12 PROCEDURE — 1160F RVW MEDS BY RX/DR IN RCRD: CPT
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
## 2023-04-13 ENCOUNTER — PRIOR AUTHORIZATION (OUTPATIENT)
Dept: PSYCHIATRY | Facility: CLINIC | Age: 28
End: 2023-04-13
Payer: COMMERCIAL

## 2023-04-14 ENCOUNTER — OFFICE VISIT (OUTPATIENT)
Dept: NEUROLOGY | Facility: CLINIC | Age: 28
End: 2023-04-14
Payer: COMMERCIAL

## 2023-04-14 VITALS
DIASTOLIC BLOOD PRESSURE: 88 MMHG | BODY MASS INDEX: 42.62 KG/M2 | OXYGEN SATURATION: 98 % | HEART RATE: 75 BPM | HEIGHT: 66 IN | SYSTOLIC BLOOD PRESSURE: 128 MMHG | WEIGHT: 265.2 LBS

## 2023-04-14 DIAGNOSIS — G43.709 CHRONIC MIGRAINE WITHOUT AURA WITHOUT STATUS MIGRAINOSUS, NOT INTRACTABLE: Primary | ICD-10-CM

## 2023-04-14 DIAGNOSIS — R56.9 SEIZURE: ICD-10-CM

## 2023-04-14 PROCEDURE — 1159F MED LIST DOCD IN RCRD: CPT | Performed by: NURSE PRACTITIONER

## 2023-04-14 PROCEDURE — 1160F RVW MEDS BY RX/DR IN RCRD: CPT | Performed by: NURSE PRACTITIONER

## 2023-04-14 PROCEDURE — 99214 OFFICE O/P EST MOD 30 MIN: CPT | Performed by: NURSE PRACTITIONER

## 2023-04-14 RX ORDER — LEVETIRACETAM 250 MG/1
250 TABLET ORAL 2 TIMES DAILY
Qty: 180 TABLET | Refills: 3 | Status: SHIPPED | OUTPATIENT
Start: 2023-04-14 | End: 2024-04-13

## 2023-04-14 NOTE — PROGRESS NOTES
Neuro Office Visit      Encounter Date: 2023   Patient Name: Lady Diamond  : 1995   MRN: [id number]   PCP:  Flores Carrion MD     Chief Complaint:    Chief Complaint   Patient presents with   • Seizures       History of Present Illness: Lady Diamond is a 28 y.o. female who is here today in Neurology for headache/seizures.    Last visit with me via telemedicine on 2023 started on Keppra, Elavil, Imitrex.    Headaches:  Did not take Elavil as it made her too sleepy to function.     Continues to have pounding headaches which last all da associated with phonophobia, dizziness, but she denies photophobia or nausea/vomiting.    Sumatriptan and Nurtec help occasionally.     Seizures:   Doing well on Keppra. No seizures or side effects from Keppra.     Subjective      Past Medical History:   Past Medical History:   Diagnosis Date   • Anxiety with depression     no meds   • Bipolar disorder    • Chronic constipation    • Cluster headache    • H/O chlamydia infection    • H/O gonorrhea    • H/O gonorrhea    • H/O gonorrhea    • H/O gonorrhea    • H/O trichomoniasis    • H/O trichomoniasis 2021   • Seizures 2022    May have had those in her sleep       Past Surgical History:   Past Surgical History:   Procedure Laterality Date   •  SECTION  11/15/2014    LTCS (1 layer closure)   •  SECTION N/A 2019    Procedure:  SECTION REPEAT WITH SALPINGECTOMY;  Surgeon: Regine José MD;  Location: [facility] LABOR DELIVERY;  Service: Obstetrics;  Laterality: N/A;   • LAPAROSCOPIC CHOLECYSTECTOMY  2015   • WISDOM TOOTH EXTRACTION         Family History:   Family History   Problem Relation Age of Onset   • Bipolar disorder Mother    • Anxiety disorder Mother    • Depression Mother    • Multiple sclerosis Mother    • Depression Father    • Bipolar disorder Sister    • Depression Sister    • Anxiety disorder  Sister    • Suicide Attempts Maternal Grandmother        Social History:   Social History     Socioeconomic History   • Marital status: Single   • Number of children: 3   Tobacco Use   • Smoking status: Former     Packs/day: 0.25     Years: 15.00     Pack years: 3.75     Types: Cigarettes     Start date: 2010     Quit date: 3/1/2019     Years since quittin.1   • Smokeless tobacco: Never   Substance and Sexual Activity   • Alcohol use: Yes     Alcohol/week: 6.0 standard drinks     Types: 6 Shots of liquor per week     Comment: tequila   • Drug use: No   • Sexual activity: Yes     Partners: Male     Birth control/protection: Surgical, Bilateral salpingectomy        Medications:     Current Outpatient Medications:   •  levETIRAcetam (KEPPRA) 250 MG tablet, Take 1 tablet by mouth 2 (Two) Times a Day., Disp: 180 tablet, Rfl: 3  •  Rimegepant Sulfate (Nurtec) 75 MG tablet dispersible tablet, Take 1 tablet by mouth Daily As Needed (migraines). Take 1 tablet at the onset of headache, Max of 75 mg/24 hours, Max of 18 tabs/30 days., Disp: 16 tablet, Rfl: 11  •  SUMAtriptan (IMITREX) 100 MG tablet, Take 1 tablet by mouth 1 (One) Time As Needed for Migraine. Take one tablet at onset of headache. May repeat dose one time in 2 hours if headache not relieved., Disp: 30 tablet, Rfl: 2  •  Cariprazine HCl (Vraylar) 1.5 MG capsule capsule, Take 1 capsule by mouth Daily. (Patient not taking: Reported on 2023), Disp: 30 capsule, Rfl: 0    Allergies:   No Known Allergies    PHQ-9 Total Score:     STEADI Fall Risk Assessment has not been completed.    Objective     Physical Exam:   Physical Exam  Vitals reviewed.   Eyes:      Extraocular Movements: EOM normal.      Pupils: Pupils are equal, round, and reactive to light.   Neurological:      Mental Status: She is oriented to person, place, and time.      Gait: Gait is intact.      Deep Tendon Reflexes:      Reflex Scores:       Tricep reflexes are 2+ on the right side and 2+  on the left side.       Bicep reflexes are 2+ on the right side and 2+ on the left side.       Brachioradialis reflexes are 2+ on the right side and 2+ on the left side.       Patellar reflexes are 2+ on the right side and 2+ on the left side.       Achilles reflexes are 2+ on the right side and 2+ on the left side.  Psychiatric:         Speech: Speech normal.         Neurologic Exam     Mental Status   Oriented to person, place, and time.   Attention: normal. Concentration: normal.   Speech: speech is normal   Level of consciousness: alert  Knowledge: good.     Cranial Nerves     CN II   Visual fields full to confrontation.     CN III, IV, VI   Pupils are equal, round, and reactive to light.  Extraocular motions are normal.   CN III: no CN III palsy  CN VI: no CN VI palsy  Nystagmus: none   Diplopia: none  Ophthalmoparesis: none    CN V   Facial sensation intact.   Right facial sensation deficit: none  Left facial sensation deficit: none    CN VII   Facial expression full, symmetric.   Right facial weakness: none  Left facial weakness: none    CN IX, X   CN IX normal.   CN X normal.     CN XI   CN XI normal.   Right sternocleidomastoid strength: normal  Left sternocleidomastoid strength: normal  Right trapezius strength: normal  Left trapezius strength: normal    CN XII   CN XII normal.   Tongue: not atrophic  Fasciculations: absent  Tongue deviation: none    Motor Exam     Strength   Right neck flexion: 5/5  Left neck flexion: 5/5  Right neck extension: 5/5  Left neck extension: 5/5  Right deltoid: 5/5  Left deltoid: 5/5  Right biceps: 5/5  Left biceps: 5/5  Right triceps: 5/5  Left triceps: 5/5  Right wrist flexion: 5/5  Left wrist flexion: 5/5  Right wrist extension: 5/5  Left wrist extension: 5/5  Right interossei: 5/5  Left interossei: 5/5  Right abdominals: 5/5  Left abdominals: 5/5  Right iliopsoas: 5/5  Left iliopsoas: 5/5  Right quadriceps: 5/5  Left quadriceps: 5/5  Right hamstrin/5  Left hamstring:  "5/5  Right glutei: 5/5  Left glutei: 5/5  Right anterior tibial: 5/5  Left anterior tibial: 5/5  Right posterior tibial: 5/5  Left posterior tibial: 5/5  Right peroneal: 5/5  Left peroneal: 5/5  Right gastroc: 5/5  Left gastroc: 5/5    Sensory Exam   Light touch normal.     Gait, Coordination, and Reflexes     Gait  Gait: normal    Reflexes   Right brachioradialis: 2+  Left brachioradialis: 2+  Right biceps: 2+  Left biceps: 2+  Right triceps: 2+  Left triceps: 2+  Right patellar: 2+  Left patellar: 2+  Right achilles: 2+  Left achilles: 2+  Right : 2+  Left : 2+       Vital Signs:   Vitals:    04/14/23 1249   BP: 128/88   Pulse: 75   SpO2: 98%   Weight: 120 kg (265 lb 3.2 oz)   Height: 167.6 cm (66\")     Body mass index is 42.8 kg/m².     Results:   Imaging:   No Images in the past 120 days found..     Labs:    No results found for: CMP, PROTEIN, ANTIMOGAB, DBYQGH2NBLT, JCVRESULT, QUANTTBGOLD, CBCDIF, IGGALBSER     Assessment / Plan      Assessment/Plan:   Diagnoses and all orders for this visit:    1. Chronic migraine without aura without status migrainosus, not intractable (Primary)  Comments:  Start Emgality  Continue sumatriptan and Nurtec    2. Seizure  Comments:  Continue Keppra    Other orders  -     levETIRAcetam (KEPPRA) 250 MG tablet; Take 1 tablet by mouth 2 (Two) Times a Day.  Dispense: 180 tablet; Refill: 3           Patient Education:   Seizures are controlled on Keppra. Headaches are still an issue so we will start Emgality. She received a loading dose in the office today.     Reviewed medications, potential side effects and signs and symptoms to report. Discussed risk versus benefits of treatment plan with patient and/or family-including medications, labs and radiology that may be ordered. Addressed questions and concerns during visit. Patient and/or family verbalized understanding and agree with plan. Instructed to call the office with any questions and report to ER with any " life-threatening symptoms.     Follow Up:   Return in about 3 months (around 7/14/2023).    I spent 30  minutes face to face with the patient. I personally spent 50 percent of this time counseling and discussing diagnosis, diagnostic testing, driving, treatment options and management .       During this visit the following were done:  Labs Reviewed []    Labs Ordered []    Radiology Reports Reviewed []    Radiology Ordered []    PCP Records Reviewed []    Referring Provider Records Reviewed []    ER Records Reviewed []    Hospital Records Reviewed []    History Obtained From Family []    Radiology Images Reviewed []    Other Reviewed []    Records Requested []      LULU Thompson  [facility] NEUR[facility] NEUROLOGY [facility]  [address]  [facility] [address] [phone]

## 2023-04-24 ENCOUNTER — SPECIALTY PHARMACY (OUTPATIENT)
Dept: ONCOLOGY | Facility: HOSPITAL | Age: 28
End: 2023-04-24
Payer: COMMERCIAL

## 2023-04-24 ENCOUNTER — DOCUMENTATION (OUTPATIENT)
Dept: ONCOLOGY | Facility: HOSPITAL | Age: 28
End: 2023-04-24
Payer: COMMERCIAL

## 2023-04-24 NOTE — PROGRESS NOTES
Specialty Pharmacy Patient Management Program  Neurology Initial and reassessment     Lady Diamond is a 28 y.o. female with migraines seen by a Neurology provider and enrolled in the Neurology Patient Management program offered by [facility].  An initial outreach was conducted, including assessment of therapy appropriateness and specialty medication education for Emgality and reassessment of Nurtec.        Insurance Coverage & Financial Support  [place] Medicaid    Relevant Past Medical History and Comorbidities  Relevant medical history and concomitant health conditions were discussed with the patient. The patient's chart has been reviewed for relevant past medical history and comorbid health conditions and updated as necessary.   Past Medical History:   Diagnosis Date   • Anxiety with depression     no meds   • Bipolar disorder    • Chronic constipation    • Cluster headache    • H/O chlamydia infection    • H/O gonorrhea    • H/O gonorrhea    • H/O gonorrhea    • H/O gonorrhea    • H/O trichomoniasis    • H/O trichomoniasis 2021   • Seizures 2022    May have had those in her sleep     Social History     Socioeconomic History   • Marital status: Single   • Number of children: 3   Tobacco Use   • Smoking status: Former     Packs/day: 0.25     Years: 15.00     Pack years: 3.75     Types: Cigarettes     Start date: 2010     Quit date: 3/1/2019     Years since quittin.1   • Smokeless tobacco: Never   Substance and Sexual Activity   • Alcohol use: Yes     Alcohol/week: 6.0 standard drinks     Types: 6 Shots of liquor per week     Comment: tequila   • Drug use: No   • Sexual activity: Yes     Partners: Male     Birth control/protection: Surgical, Bilateral salpingectomy        Problem list reviewed by Davina Sahu, PharmD on 2023 at 11:41 AM    Allergies  Known allergies and reactions were discussed with the patient. The  patient's chart has been reviewed for  allergy information and updated as necessary.   No Known Allergies      Allergies reviewed by Davina Sahu, PharmD on 4/24/2023 at 11:41 AM    Current Medication List  This medication list has been reviewed with the patient and evaluated for any interactions or necessary modifications/recommendations, and updated to include all prescription medications, OTC medications, and supplements the patient is currently taking.  This list reflects what is contained in the patient's profile, which has also been marked as reviewed to communicate to other providers it is the most up to date version of the patient's current medication therapy.     Current Outpatient Medications:   •  Cariprazine HCl (Vraylar) 1.5 MG capsule capsule, Take 1 capsule by mouth Daily. (Patient not taking: Reported on 4/14/2023), Disp: 30 capsule, Rfl: 0  •  galcanezumab-gnlm (EMGALITY) 120 MG/ML auto-injector pen, Inject 1 mL under the skin into the appropriate area as directed Every 28 (Twenty-Eight) Days., Disp: 1 mL, Rfl: 11  •  levETIRAcetam (KEPPRA) 250 MG tablet, Take 1 tablet by mouth 2 (Two) Times a Day., Disp: 180 tablet, Rfl: 3  •  Rimegepant Sulfate (Nurtec) 75 MG tablet dispersible tablet, Take 1 tablet by mouth Daily As Needed (migraines). Take 1 tablet at the onset of headache, Max of 75 mg/24 hours, Max of 18 tabs/30 days., Disp: 16 tablet, Rfl: 11  •  SUMAtriptan (IMITREX) 100 MG tablet, Take 1 tablet by mouth 1 (One) Time As Needed for Migraine. Take one tablet at onset of headache. May repeat dose one time in 2 hours if headache not relieved., Disp: 30 tablet, Rfl: 2    Medicines reviewed by Davina Sahu, PharmD on 4/24/2023 at 11:41 AM    Drug Interactions  none     Recommended Medications Assessment    None      Relevant Laboratory Values        Initial Education Provided for Specialty Medication  The patient has been provided with the following education and any applicable  administration techniques (i.e. self-injection) have been demonstrated for the therapies indicated. All questions and concerns have been addressed prior to the patient receiving the medication, and the patient has verbalized understanding of the education and any materials provided.  Additional patient education shall be provided and documented upon request by the patient, provider or payer.                   Emgality (Galcanezumab-gnlm)           Medication Expectations   Why am I taking this medication? You are taking this medication for migraine prophylaxis.   What should I expect while on this medication? You should expect to a decrease in the frequency and severity of your migraines.   How does the medication work? Emgality is a monoclonal antibody that binds to calcitonin gene-related peptide (CGRP) and blocks its binding to the receptor decreasing the severity of migraines.   How long will I be on this medication for? The amount of time you will be on this medication will be determined by your doctor and your response to the medication.    How do I take this medication? Take as directed on your prescription label. This medication is a self-injection given every 28 days.    What are some possible side effects? Injection site reactions and hypersensitivity reactions.   What happens if I miss a dose? If you miss a dose, take it as soon as you remember, and time next dose 28 days from last dose.                  Medication Safety   What are things I should warn my doctor immediately about? Hypersensitivity reactions.   What are things that I should be cautious of? Injection site reaction.   What are some medications that can interact with this one? No drug interactions identified.            Medication Storage/Handling   How should I handle this medication? Keep this medication our of reach of pets/children in original container.  On the day your Emgality is due let it set at room temperature for 30 minutes prior  to injection. (do NOT warm using a heat source such as hot water or a microwave).  Administer in the abdomen, thigh, back of the upper arm, or buttocks.  Do not inject where the skin is tender, bruised, red or hard.  Rotate injection sites.   How does this medication need to be stored? Store in refrigerator and keep dry.   How should I dispose of this medication? You can dispose of the empty syringe in a sharps container, and if this is not available you may use an empty hard plastic container such as a milk jug or tide container.            Resources/Support   How can I remind myself to take this medication? You can download a reminder joseph on your phone or use a calandar  to help with your monthly injection.   Is financial support available?  Yes, [employer] can provide co-pay cards if you have commercial insurance or patient assistance if you have Medicare or no insurance.    Which vaccines are recommended for me? Talk to your doctor about these vaccines: Flu, Coronavirus (COVID-19), Pneumococcal (pneumonia), Tdap, Hepatitis B, Zoster (shingles)                  Adherence and Self-Administration  • Barriers to Patient Adherence and/or Self-Administration: none    • Methods for Supporting Patient Adherence and/or Self-Administration: Emgality self-injection education in office on 4/14/23.           Adverse Drug Reactions  • Adverse Reactions Experienced: none  • Plan for ADR Management: not required     Hospitalizations and Urgent Care Since Last Assessment  • Hospitalizations or Admissions: none   • ED Visits: none   • Urgent Office Visits: none       Quality of Life Assessment   Quality of Life related to the patient's specialty therapy was discussed with the patient. The QOL segment of this outreach has been reviewed and updated.     Quality of Life Assessment  Quality of Life Improvement Scale: Somewhat better    Goals of Therapy   Goals     • Specialty Pharmacy General Goal      Decrease severity and frequency of  migraines. Takes Nurtec at onset of breakthrough migraine             Reassessment Plan & Follow-Up  1. Medication Therapy Changes: Start Emgality 120 mg SubQ monthly - next dose due on 5/12/23  2. Additional Plans, Therapy Recommendations, or Therapy Problems to Be Addressed: none   3. Pharmacist to perform regular reassessments no more than (6) months from the previous assessment.  4. Welcome information and patient satisfaction survey to be sent by retail team with patient's initial fill.  5. Care Coordinator to set up future refill outreaches, coordinate prescription delivery, and escalate clinical questions to pharmacist.     Attestation  I attest that the initiated specialty medication(s) are appropriate for the patient based on my assessment.  If the prescribed therapy is at any point deemed not appropriate based on the current or future assessments, a consultation will be initiated with the patient's specialty care provider to determine the best course of action. The revised plan of therapy will be documented along with any additional patient education provided.     Davina Sahu, PharmD  4/24/2023  11:47 EDT

## 2023-04-24 NOTE — PROGRESS NOTES
Specialty Pharmacy Refill Coordination Note     Lady is a 28 y.o. female contacted today regarding refills of  Emgality and Nurtec specialty medication(s). Patient is due for injection on 5/15. She did not need the nurtec refilled at this time.       Reviewed and verified with patient:  Allergies  Meds  Problems       Specialty medication(s) and dose(s) confirmed: yes    Refill Questions    Flowsheet Row Most Recent Value   Changes to allergies? No   Changes to medications? Yes  [Patient is starting Emgality]   New conditions since last clinic visit No   Unplanned office visit, urgent care, ED, or hospital admission in the last 4 weeks  No   How does patient/caregiver feel medication is working? Very good   Financial problems or insurance changes  No   Since the previous refill, were any specialty medication doses or scheduled injections missed or delayed?  No  [prn medication]   Does this patient require a clinical escalation to a pharmacist? No          Delivery Questions    Flowsheet Row Most Recent Value   Delivery method [employer]   Delivery address correct? Yes   Preferred delivery time? Anytime   Number of medications in delivery 1   Medication being filled and delivered Emgality   Doses left of specialty medications [facility]   Is there any medication that is due not being filled? Yes   Medication that does not need to be filled at this time Nurtec   Why are other medications not being filled? Patient had some left from a previous fill-prn medication   Supplies needed? No supplies needed   Cooler needed? Yes   Do any medications need mixed or dated? No   Copay form of payment Payment plan already set up   Questions or concerns for the pharmacist? No   Are any medications first time fills? Yes                 Follow-up: 28 day(s)     Freda Guajardo, Pharmacy Technician  Specialty Pharmacy Technician

## 2023-05-03 ENCOUNTER — TELEPHONE (OUTPATIENT)
Dept: PSYCHIATRY | Facility: CLINIC | Age: 28
End: 2023-05-03
Payer: COMMERCIAL

## 2023-05-03 DIAGNOSIS — Z79.899 MEDICATION MANAGEMENT: ICD-10-CM

## 2023-05-03 DIAGNOSIS — R44.1 HALLUCINATIONS, VISUAL: ICD-10-CM

## 2023-05-03 DIAGNOSIS — F31.60 BIPOLAR AFFECTIVE DISORDER, MIXED: Primary | ICD-10-CM

## 2023-05-03 RX ORDER — ARIPIPRAZOLE 2 MG/1
2 TABLET ORAL DAILY
Qty: 30 TABLET | Refills: 0 | Status: SHIPPED | OUTPATIENT
Start: 2023-05-03

## 2023-05-03 NOTE — TELEPHONE ENCOUNTER
Medication that the patient is currently taking has increased depression, pt has had suicidal thoughts but isn't currently having thoughts. She describes the current medication as making her feel numb.   Pt. Stated she would like to go back to the [facility] again.   Advised patient if her thoughts worsen to go to the local ER.   Please advise.

## 2023-05-03 NOTE — PROGRESS NOTES
Patient called to report side effects with use of Vraylar and requested to go back to Abilify. Vraylar discontinued, patient to start Abilify 2mg daily starting tomorrow.

## 2023-05-08 RX ORDER — AMITRIPTYLINE HYDROCHLORIDE 10 MG/1
TABLET, FILM COATED ORAL
Qty: 60 TABLET | Refills: 3 | OUTPATIENT
Start: 2023-05-08

## 2023-05-08 RX ORDER — LEVETIRACETAM 250 MG/1
TABLET ORAL
Qty: 60 TABLET | OUTPATIENT
Start: 2023-05-08

## 2023-05-08 NOTE — TELEPHONE ENCOUNTER
Rx Refill Note  Requested Prescriptions     Pending Prescriptions Disp Refills   • amitriptyline (ELAVIL) 10 MG tablet [Pharmacy Med Name: AMITRIPTYLINE 10MG TABLETS] 60 tablet 3     Sig: TAKE 1 TO 2 TABLETS BY MOUTH EVERY NIGHT      Last filled:   Last office visit with prescribing clinician: 4/14/2023      Next office visit with prescribing clinician: 7/19/2023     Refill not appropriate.    Randi Fernandez MA  05/08/23, 08:11 EDT

## 2023-05-08 NOTE — TELEPHONE ENCOUNTER
Rx Refill Note  Requested Prescriptions     Pending Prescriptions Disp Refills   • levETIRAcetam (KEPPRA) 250 MG tablet [Pharmacy Med Name: LEVETIRACETAM 250MG TABLETS] 60 tablet      Sig: TAKE 1 TABLET BY MOUTH TWICE DAILY      Last filled: 4/13/23 180 with 3 refills.  Last office visit with prescribing clinician: 4/14/2023      Next office visit with prescribing clinician: 7/19/2023     Too soon to refill.    Randi Fernandez MA  05/08/23, 14:53 EDT

## 2023-05-11 DIAGNOSIS — F31.60 BIPOLAR AFFECTIVE DISORDER, MIXED: ICD-10-CM

## 2023-05-11 DIAGNOSIS — Z79.899 MEDICATION MANAGEMENT: ICD-10-CM

## 2023-05-11 RX ORDER — CARIPRAZINE 1.5 MG/1
CAPSULE, GELATIN COATED ORAL
Qty: 30 CAPSULE | Refills: 0 | OUTPATIENT
Start: 2023-05-11

## 2023-05-15 ENCOUNTER — TELEMEDICINE (OUTPATIENT)
Dept: PSYCHIATRY | Facility: CLINIC | Age: 28
End: 2023-05-15
Payer: COMMERCIAL

## 2023-05-15 DIAGNOSIS — R44.1 HALLUCINATIONS, VISUAL: ICD-10-CM

## 2023-05-15 DIAGNOSIS — Z79.899 MEDICATION MANAGEMENT: ICD-10-CM

## 2023-05-15 DIAGNOSIS — F41.0 PANIC ATTACKS: Primary | ICD-10-CM

## 2023-05-15 DIAGNOSIS — F31.60 BIPOLAR AFFECTIVE DISORDER, MIXED: ICD-10-CM

## 2023-05-15 RX ORDER — OMEPRAZOLE 40 MG/1
CAPSULE, DELAYED RELEASE ORAL
COMMUNITY
Start: 2023-04-26

## 2023-05-15 RX ORDER — ARIPIPRAZOLE 5 MG/1
5 TABLET ORAL DAILY
Qty: 30 TABLET | Refills: 0 | Status: SHIPPED | OUTPATIENT
Start: 2023-05-15

## 2023-05-15 RX ORDER — HYDROXYZINE HYDROCHLORIDE 10 MG/1
10 TABLET, FILM COATED ORAL 3 TIMES DAILY PRN
Qty: 90 TABLET | Refills: 0 | Status: SHIPPED | OUTPATIENT
Start: 2023-05-15
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
## 2023-05-19 ENCOUNTER — TELEPHONE (OUTPATIENT)
Dept: NEUROLOGY | Facility: CLINIC | Age: 28
End: 2023-05-19
Payer: COMMERCIAL

## 2023-05-19 DIAGNOSIS — R55 SYNCOPE AND COLLAPSE: Primary | ICD-10-CM

## 2023-05-19 RX ORDER — MECLIZINE HYDROCHLORIDE 25 MG/1
25 TABLET ORAL 3 TIMES DAILY PRN
Qty: 90 TABLET | Refills: 1 | Status: SHIPPED | OUTPATIENT
Start: 2023-05-19 | End: 2024-05-18

## 2023-05-19 NOTE — TELEPHONE ENCOUNTER
Returned Lady's call in regard to dizziness and lightheadedness.  For the past couple of weeks she has experienced presyncope at least 2-3 times per week.  She had one episode of syncope in which she passed out in front of her mom.  She does have a history of seizures but her mom did not notice any evidence of seizure activity.    She feels like her head is cloudy and that her brain is heavy.  During the episodes of presyncope she has felt nausea and then started to feel flushed.  She does note that she has felt her heart racing during those times.  She does not have a blood pressure cuff and does not know what her blood pressure is doing during these episodes.  She has a history of anxiety and does feel like anxiety can make it feel worse.    She has felt off balance and dizzy on a daily basis for approximately last 2 weeks.  She is eating regularly and drinking a lot of water.    Headaches have been better since starting on Emgality and her last dose was on 5/12/2023.    Since she has had the syncopal episode I am going to refer her for a Holter monitor and to the syncope clinic for evaluation.  I will also start her on meclizine as needed for dizziness.    She is agreeable to the plan.

## 2023-06-01 ENCOUNTER — SPECIALTY PHARMACY (OUTPATIENT)
Dept: ONCOLOGY | Facility: HOSPITAL | Age: 28
End: 2023-06-01

## 2023-06-01 NOTE — PROGRESS NOTES
Specialty Pharmacy Refill Coordination Note     Lady is a 28 y.o. female contacted today regarding refills of  Emgality and Nurtec specialty medication(s). Patient is due for injection on 6/9.      Reviewed and verified with patient:       Specialty medication(s) and dose(s) confirmed: yes    Refill Questions    Flowsheet Row Most Recent Value   Changes to allergies? No   Changes to medications? No   New conditions since last clinic visit No   Unplanned office visit, urgent care, ED, or hospital admission in the last 4 weeks  No   How does patient/caregiver feel medication is working? Very good   Financial problems or insurance changes  No   Since the previous refill, were any specialty medication doses or scheduled injections missed or delayed?  No   Does this patient require a clinical escalation to a pharmacist? No          Delivery Questions    Flowsheet Row Most Recent Value   Delivery method FedEx   Delivery address correct? Yes   Preferred delivery time? Anytime   Number of medications in delivery 2   Medication being filled and delivered Emgality and Nurtec   Doses left of specialty medications Emgality=0   Is there any medication that is due not being filled? No   Supplies needed? No supplies needed   Cooler needed? Yes   Do any medications need mixed or dated? No   Copay form of payment Payment plan already set up   Questions or concerns for the pharmacist? No   Are any medications first time fills? No                 Follow-up: 28 day(s)     Freda Guajardo, Pharmacy Technician  Specialty Pharmacy Technician

## 2023-06-04 DIAGNOSIS — Z79.899 MEDICATION MANAGEMENT: ICD-10-CM

## 2023-06-04 DIAGNOSIS — R44.1 HALLUCINATIONS, VISUAL: ICD-10-CM

## 2023-06-04 DIAGNOSIS — F31.60 BIPOLAR AFFECTIVE DISORDER, MIXED: ICD-10-CM

## 2023-06-05 RX ORDER — ARIPIPRAZOLE 2 MG/1
2 TABLET ORAL DAILY
Qty: 30 TABLET | Refills: 0 | OUTPATIENT
Start: 2023-06-05

## 2023-06-08 ENCOUNTER — TELEMEDICINE (OUTPATIENT)
Dept: PSYCHIATRY | Facility: CLINIC | Age: 28
End: 2023-06-08
Payer: COMMERCIAL

## 2023-06-08 ENCOUNTER — TELEPHONE (OUTPATIENT)
Dept: NEUROLOGY | Facility: CLINIC | Age: 28
End: 2023-06-08
Payer: COMMERCIAL

## 2023-06-08 DIAGNOSIS — Z79.899 MEDICATION MANAGEMENT: ICD-10-CM

## 2023-06-08 DIAGNOSIS — R44.1 HALLUCINATIONS, VISUAL: ICD-10-CM

## 2023-06-08 DIAGNOSIS — F41.0 PANIC ATTACKS: ICD-10-CM

## 2023-06-08 DIAGNOSIS — F31.60 BIPOLAR AFFECTIVE DISORDER, MIXED: Primary | ICD-10-CM

## 2023-06-08 RX ORDER — ARIPIPRAZOLE 5 MG/1
5 TABLET ORAL DAILY
Qty: 30 TABLET | Refills: 0 | Status: SHIPPED | OUTPATIENT
Start: 2023-06-08

## 2023-06-08 RX ORDER — BUSPIRONE HYDROCHLORIDE 15 MG/1
15 TABLET ORAL 2 TIMES DAILY
Qty: 60 TABLET | Refills: 0 | Status: SHIPPED | OUTPATIENT
Start: 2023-06-08

## 2023-06-08 RX ORDER — HYDROXYZINE HYDROCHLORIDE 10 MG/1
10 TABLET, FILM COATED ORAL 3 TIMES DAILY PRN
Qty: 90 TABLET | Refills: 0 | Status: SHIPPED | OUTPATIENT
Start: 2023-06-08
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
## 2023-06-08 NOTE — TELEPHONE ENCOUNTER
Called patient and gave results.  She was understanding and appreciative.    ----- Message from LULU Thompson sent at 6/7/2023  5:37 PM EDT -----  Please let Lady know that the results of her heart monitor were fairly normal.  Her heart was in normal rhythm with an average heart rate of 98 bpm.  She had 1 episode with tachycardia and a heart rate of 160.  Thanks, BJ

## 2023-06-11 DIAGNOSIS — F31.60 BIPOLAR AFFECTIVE DISORDER, MIXED: ICD-10-CM

## 2023-06-11 DIAGNOSIS — Z79.899 MEDICATION MANAGEMENT: ICD-10-CM

## 2023-06-11 DIAGNOSIS — R44.1 HALLUCINATIONS, VISUAL: ICD-10-CM

## 2023-06-12 RX ORDER — ARIPIPRAZOLE 5 MG/1
5 TABLET ORAL DAILY
Qty: 30 TABLET | Refills: 0 | OUTPATIENT
Start: 2023-06-12

## 2023-07-28 ENCOUNTER — SPECIALTY PHARMACY (OUTPATIENT)
Dept: ONCOLOGY | Facility: HOSPITAL | Age: 28
End: 2023-07-28
Payer: COMMERCIAL

## 2023-07-28 NOTE — PROGRESS NOTES
Specialty Pharmacy Refill Coordination Note     Lady is a 28 y.o. female contacted today regarding refills of  Emgality and Nurtec specialty medication(s). Patient is due for injection on 8/4.      Reviewed and verified with patient:       Specialty medication(s) and dose(s) confirmed: yes    Refill Questions      Flowsheet Row Most Recent Value   Changes to allergies? No   Changes to medications? No   New conditions since last clinic visit No   Unplanned office visit, urgent care, ED, or hospital admission in the last 4 weeks  No   How does patient/caregiver feel medication is working? Very good   Financial problems or insurance changes  No   Since the previous refill, were any specialty medication doses or scheduled injections missed or delayed?  No   Does this patient require a clinical escalation to a pharmacist? No            Delivery Questions      Flowsheet Row Most Recent Value   Delivery method FedEx   Delivery address correct? Yes   Preferred delivery time? Anytime   Number of medications in delivery 2   Medication being filled and delivered Emgality and Nurtec   Doses left of specialty medications Emgality=0   Is there any medication that is due not being filled? No   Supplies needed? No supplies needed   Cooler needed? No   Do any medications need mixed or dated? No   Copay form of payment Payment plan already set up   Additional comments Emgality/Nurtec=$0   Questions or concerns for the pharmacist? No   Are any medications first time fills? No                   Follow-up: 28 day(s)     Freda Guajardo, Pharmacy Technician  Specialty Pharmacy Technician

## 2023-08-15 DIAGNOSIS — Z79.899 MEDICATION MANAGEMENT: ICD-10-CM

## 2023-08-15 DIAGNOSIS — F41.0 PANIC ATTACKS: ICD-10-CM

## 2023-08-15 RX ORDER — HYDROXYZINE HYDROCHLORIDE 10 MG/1
TABLET, FILM COATED ORAL
Qty: 90 TABLET | Refills: 0 | Status: SHIPPED | OUTPATIENT
Start: 2023-08-15 | End: 2023-08-16 | Stop reason: SDUPTHER

## 2023-08-16 ENCOUNTER — TELEMEDICINE (OUTPATIENT)
Dept: PSYCHIATRY | Facility: CLINIC | Age: 28
End: 2023-08-16
Payer: COMMERCIAL

## 2023-08-16 DIAGNOSIS — F41.0 PANIC ATTACKS: ICD-10-CM

## 2023-08-16 DIAGNOSIS — Z79.899 MEDICATION MANAGEMENT: ICD-10-CM

## 2023-08-16 DIAGNOSIS — F31.60 BIPOLAR AFFECTIVE DISORDER, MIXED: Primary | ICD-10-CM

## 2023-08-16 RX ORDER — LAMOTRIGINE 50 MG/1
50 TABLET, EXTENDED RELEASE ORAL DAILY
Qty: 30 TABLET | Refills: 1 | Status: SHIPPED | OUTPATIENT
Start: 2023-08-16

## 2023-08-16 RX ORDER — HYDROXYZINE HYDROCHLORIDE 10 MG/1
10 TABLET, FILM COATED ORAL 3 TIMES DAILY PRN
Qty: 90 TABLET | Refills: 1 | Status: SHIPPED | OUTPATIENT
Start: 2023-08-16
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
## 2023-08-25 ENCOUNTER — SPECIALTY PHARMACY (OUTPATIENT)
Dept: ONCOLOGY | Facility: HOSPITAL | Age: 28
End: 2023-08-25
Payer: COMMERCIAL

## 2023-08-25 NOTE — PROGRESS NOTES
Specialty Pharmacy Refill Coordination Note     Lady is a 28 y.o. female contacted today regarding refills of  Emgality and Nurtec specialty medication(s). Patient is due for injection on 9/1.      Reviewed and verified with patient:       Specialty medication(s) and dose(s) confirmed: yes    Refill Questions      Flowsheet Row Most Recent Value   Changes to allergies? No   Changes to medications? No   New conditions since last clinic visit No   Unplanned office visit, urgent care, ED, or hospital admission in the last 4 weeks  No   How does patient/caregiver feel medication is working? Very good   Financial problems or insurance changes  No   Since the previous refill, were any specialty medication doses or scheduled injections missed or delayed?  No   Does this patient require a clinical escalation to a pharmacist? No            Delivery Questions      Flowsheet Row Most Recent Value   Delivery method FedEx   Delivery address correct? Yes   Preferred delivery time? Anytime   Number of medications in delivery 2   Medication being filled and delivered Emgality and Nurtec   Doses left of specialty medications Emgality=0   Is there any medication that is due not being filled? No   Supplies needed? No supplies needed   Cooler needed? Yes   Do any medications need mixed or dated? No   Copay form of payment Payment plan already set up   Additional comments Emgality/Nurtec=$0   Questions or concerns for the pharmacist? No   Are any medications first time fills? No                   Follow-up: 28 day(s)     Freda Guajardo, Pharmacy Technician  Specialty Pharmacy Technician

## 2023-09-19 ENCOUNTER — SPECIALTY PHARMACY (OUTPATIENT)
Dept: ONCOLOGY | Facility: HOSPITAL | Age: 28
End: 2023-09-19
Payer: COMMERCIAL

## 2023-09-19 NOTE — PROGRESS NOTES
Specialty Pharmacy Refill Coordination Note     Lady is a 28 y.o. female contacted today regarding refills of Emgality and Nurtec specialty medication(s).. patient Injection is due on 10/1    Reviewed and verified with patient:       Specialty medication(s) and dose(s) confirmed: yes    Refill Questions      Flowsheet Row Most Recent Value   Changes to allergies? No   Changes to medications? Yes  [9/19 patient stating  have prescribed Motrin 500mg ER prn and Levetiracetam 250mg twice a day.]   New conditions since last clinic visit No   Unplanned office visit, urgent care, ED, or hospital admission in the last 4 weeks  No   How does patient/caregiver feel medication is working? Very good   Financial problems or insurance changes  No   Since the previous refill, were any specialty medication doses or scheduled injections missed or delayed?  No   Does this patient require a clinical escalation to a pharmacist? Yes  [9/19 patient stating DrJoaquina have prescribed Motrin 500mg ER prn and Levetiracetam 250mg twice a day.]            Delivery Questions      Flowsheet Row Most Recent Value   Delivery method FedEx   Delivery address correct? Yes   Delivery phone number mobile phone   Preferred delivery time? Anytime   Number of medications in delivery 2   Medication being filled and delivered Emgality and Nurtec =$0   Doses left of specialty medications N/A   Is there any medication that is due not being filled? No   Supplies needed? No supplies needed   Cooler needed? Yes   Do any medications need mixed or dated? No   Copay form of payment Payment plan already set up   Additional comments N/A   Questions or concerns for the pharmacist? Yes   Explain any questions or concerns for the pharmacist 9/19 patient stating  have prescribed Motrin 500mg ER prn and Levetiracetam 250mg twice a day.   Are any medications first time fills? No   Tracking number for delivery N/A                   Follow-up: 30 day(s)     Nick ROBERTSON  Keith  Specialty Pharmacy Technician

## 2023-10-12 ENCOUNTER — TELEMEDICINE (OUTPATIENT)
Dept: PSYCHIATRY | Facility: CLINIC | Age: 28
End: 2023-10-12
Payer: COMMERCIAL

## 2023-10-12 ENCOUNTER — SPECIALTY PHARMACY (OUTPATIENT)
Dept: PHARMACY | Facility: TELEHEALTH | Age: 28
End: 2023-10-12
Payer: COMMERCIAL

## 2023-10-12 ENCOUNTER — PRIOR AUTHORIZATION (OUTPATIENT)
Dept: PSYCHIATRY | Facility: CLINIC | Age: 28
End: 2023-10-12

## 2023-10-12 DIAGNOSIS — F41.0 PANIC ATTACKS: ICD-10-CM

## 2023-10-12 DIAGNOSIS — F31.60 BIPOLAR AFFECTIVE DISORDER, MIXED: Primary | ICD-10-CM

## 2023-10-12 DIAGNOSIS — Z79.899 MEDICATION MANAGEMENT: ICD-10-CM

## 2023-10-12 RX ORDER — LAMOTRIGINE 50 MG/1
50 TABLET, EXTENDED RELEASE ORAL DAILY
Qty: 30 TABLET | Refills: 0 | Status: SHIPPED | OUTPATIENT
Start: 2023-10-12

## 2023-10-12 RX ORDER — VILAZODONE HYDROCHLORIDE 10 MG/1
10 TABLET ORAL DAILY
Qty: 30 TABLET | Refills: 0 | Status: SHIPPED | OUTPATIENT
Start: 2023-10-12

## 2023-10-12 RX ORDER — HYDROXYZINE HYDROCHLORIDE 10 MG/1
10 TABLET, FILM COATED ORAL 3 TIMES DAILY PRN
Qty: 90 TABLET | Refills: 0 | Status: SHIPPED | OUTPATIENT
Start: 2023-10-12

## 2023-10-12 NOTE — PROGRESS NOTES
Specialty Pharmacy Patient Management Program  Call Center Refill Outreach      Lady is a 28 y.o. female contacted today regarding refills of her medication(s).    Specialty medication(s) and dose(s) confirmed: Emgality 120mg/ml  Other medications being refilled: none    Refill Questions      Flowsheet Row Most Recent Value   Changes to allergies? No   Changes to medications? No   New conditions since last clinic visit No   Unplanned office visit, urgent care, ED, or hospital admission in the last 4 weeks  No   How does patient/caregiver feel medication is working? Very good   Financial problems or insurance changes  No   Since the previous refill, were any specialty medication doses or scheduled injections missed or delayed?  No   Does this patient require a clinical escalation to a pharmacist? No                Patient did not need a refill of Nurtec moved out 30 days.       Follow-up: 21 days     Vasyl Vazquez [facility]  Specialty Pharmacist  10/12/2023  11:24 EDT

## 2023-10-12 NOTE — PROGRESS NOTES
Specialty Pharmacy Patient Management Program  Reassessment     Lady Diamond is a 28 y.o. female with chronic migraine and enrolled in the Patient Management program offered by [facility]. A follow-up outreach was conducted, including assessment of continued therapy appropriateness, medication adherence, and side effect incidence and management for Emgality and Nurtec.     Changes to Insurance Coverage or Financial Support  none    Relevant Past Medical History and Comorbidities  Relevant medical history and concomitant health conditions were discussed with the patient. The patient's chart has been reviewed for relevant past medical history and comorbid health conditions and updated as necessary.   Past Medical History:   Diagnosis Date    Anxiety with depression     no meds    Bipolar disorder     Chronic constipation     Cluster headache     H/O chlamydia infection     H/O gonorrhea     H/O gonorrhea     H/O gonorrhea     H/O gonorrhea     H/O trichomoniasis     H/O trichomoniasis 2021    Seizures 2022    May have had those in her sleep     Social History     Socioeconomic History    Marital status: Single    Number of children: 3   Tobacco Use    Smoking status: Former     Packs/day: 0.25     Years: 15.00     Additional pack years: 0.00     Total pack years: 3.75     Types: Cigarettes     Start date: 2010     Quit date: 3/1/2019     Years since quittin.6    Smokeless tobacco: Never   Substance and Sexual Activity    Alcohol use: Yes     Alcohol/week: 6.0 standard drinks of alcohol     Types: 6 Shots of liquor per week     Comment: tequila    Drug use: No    Sexual activity: Yes     Partners: Male     Birth control/protection: Surgical, Bilateral salpingectomy      Problem list reviewed by Vasyl Vazquez RPH on 10/12/2023 at 11:20 AM    Allergies  Known allergies and reactions were discussed with the patient. The patient's  "chart has been reviewed for allergy information and updated as necessary.   No Known Allergies  Allergies reviewed by Vasyl Vazquez RP on 10/12/2023 at 11:19 AM    Relevant Laboratory Values  No results found for: \"GLUCOSE\", \"CALCIUM\", \"NA\", \"K\", \"CO2\", \"CL\", \"BUN\", \"CREATININE\", \"EGFRRESULT\", \"BCR\", \"ANIONGAP\"  Lab Results   Component Value Date    WBC 11.91 (H) 06/06/2019    HGB 12.2 07/12/2021    HCT 30.8 (L) 06/06/2019    MCV 81.7 06/06/2019     06/06/2019     Lab Value Review  The above lab values have been reviewed; the following specialty medication(s) dose adjustment(s) are recommended: none.    Current Medication List  This medication list has been reviewed with the patient and evaluated for any interactions or necessary modifications/recommendations, and updated to include all prescription medications, OTC medications, and supplements the patient is currently taking. This list reflects what is contained in the patient's profile, which has also been marked as reviewed to communicate to other providers it is the most up to date version of the patient's current medication therapy.     Current Outpatient Medications:     galcanezumab-gnlm (EMGALITY) 120 MG/ML auto-injector pen, Inject 1 mL under the skin into the appropriate area as directed Every 28 (Twenty-Eight) Days., Disp: 1 mL, Rfl: 11    hydrOXYzine (ATARAX) 10 MG tablet, Take 1 tablet by mouth 3 (Three) Times a Day As Needed for Anxiety. for anxiety, Disp: 90 tablet, Rfl: 1    IBUPROFEN IB PO, Take 500 mg by mouth Every 6 (Six) Hours As Needed., Disp: , Rfl:     lamoTRIgine ER (LaMICtal XR) 50 MG tablet sustained-release 24 hour, Take 1 tablet by mouth Daily., Disp: 30 tablet, Rfl: 1    levETIRAcetam (KEPPRA) 250 MG tablet, Take 1 tablet by mouth 2 (Two) Times a Day., Disp: 180 tablet, Rfl: 3    meclizine (ANTIVERT) 25 MG tablet, Take 1 tablet by mouth 3 (Three) Times a Day As Needed for Dizziness., Disp: 90 tablet, Rfl: 1    omeprazole " (priLOSEC) 40 MG capsule, take 1 capsule by mouth every day as directed, Disp: , Rfl:     Rimegepant Sulfate (Nurtec) 75 MG tablet dispersible tablet, Take 1 tablet by mouth Daily As Needed (migraines). Take 1 tablet at the onset of headache, Max of 75 mg/24 hours, Max of 18 tabs/30 days., Disp: 16 tablet, Rfl: 11    SUMAtriptan (IMITREX) 100 MG tablet, Take 1 tablet by mouth 1 (One) Time As Needed for Migraine. Take one tablet at onset of headache. May repeat dose one time in 2 hours if headache not relieved., Disp: 30 tablet, Rfl: 2  Medicines reviewed by Vasyl Vazquez [facility] on 10/12/2023 at 11:19 AM    Drug Interactions  none     Adverse Drug Reactions  Adverse Reactions Experienced: none  Plan for ADR Management: N/A     Hospitalizations and Urgent Care Since Last Assessment  Hospitalizations or Admissions: none  ED Visits: none  Urgent Office Visits: none     Adherence and Self-Administration  Approximate Number of Doses Missed Since Last Assessment: none  Ongoing or New Barriers to Patient Adherence and/or Self-Administration: none   Methods for Supporting Patient Adherence and/or Self-Administration: N/A     Goals of Therapy  Goals related to the patient's specialty therapy were discussed with the patient. The Patient Goals segment of this outreach has been reviewed and updated.   Goals Addressed Today        Specialty Pharmacy General Goal      Decrease severity and frequency of migraines. Takes Nurtec at onset of breakthrough migraine              Progress Toward Meeting Patient-Identified Goals of Therapy: On Track  New Patient-Identified Goals, If Applicable:     Progress Toward Meeting Clinical Goals or Therapeutic Targets: On Track  New Clinical Goals or Therapeutic Targets, If Applicable:     Quality of Life Assessment   Quality of Life related to the patient's specialty therapy was discussed with the patient. The QOL segment of this outreach has been reviewed and updated.   Quality of Life  Assessment  Quality of Life Improvement Scale: Moderately better  Comments on Quality of Life: tolerating well    Reassessment Plan & Follow-Up  Medication Therapy Changes: none  Additional Plans, Therapy Recommendations, or Therapy Problems to Be Addressed: none   Pharmacist to perform regular reassessments no more than (6) months from the previous assessment.  Care Coordinator to set up future refill outreaches, coordinate prescription delivery, and escalate clinical questions to pharmacist.     Attestation  I attest the patient was actively involved in and has agreed to the above plan of care. I attest that the specialty medication(s) addressed above are appropriate for the patient based on my reassessment. If the prescribed therapy is at any point deemed not appropriate based on the current or future assessments, a consultation will be initiated with the patient's specialty care provider to determine the best course of action. The revised plan of therapy will be documented along with any required assessments and/or additional patient education provided.     Electronically signed by Vasyl Vazquez RPH, 10/12/23, 11:23 AM EDT.

## 2023-10-12 NOTE — TREATMENT PLAN
Multi-Disciplinary Problems (from Behavioral Health Treatment Plan)      Active Problems       Problem: Anxiety  Start Date: 10/12/23      Problem Details: The patient self-scales this problem as a 7 with 10 being the worst.          Goal Priority Start Date Expected End Date End Date    Patient will develop and implement behavioral and cognitive strategies to reduce anxiety and irrational fears. -- 10/12/23 -- --    Goal Details: Progress toward goal:  Not appropriate to rate progress toward goal since this is the initial treatment plan.          Goal Intervention Frequency Start Date End Date    Help patient explore past emotional issues in relation to present anxiety. PRN 10/12/23 --    Intervention Details: Duration of treatment until until discharged.          Goal Intervention Frequency Start Date End Date    Help patient develop an awareness of their cognitive and physical responses to anxiety. PRN 10/12/23 --    Intervention Details: Duration of treatment until until discharged.                  Problem: Depression  Start Date: 10/12/23      Problem Details: The patient self-scales this problem as a 9 with 10 being the worst.          Goal Priority Start Date Expected End Date End Date    Patient will demonstrate the ability to initiate new constructive life skills outside of sessions on a consistent basis. -- 10/12/23 -- --    Goal Details: Progress toward goal:  Not appropriate to rate progress toward goal since this is the initial treatment plan.          Goal Intervention Frequency Start Date End Date    Assist patient in setting attainable activities of daily living goals. PRN 10/12/23 --      Goal Intervention Frequency Start Date End Date    Provide education about depression PRN 10/12/23 --    Intervention Details: Duration of treatment until until discharged.          Goal Intervention Frequency Start Date End Date    Assist patient in developing healthy coping strategies. PRN 10/12/23 --     Intervention Details: Duration of treatment until until discharged.                  Problem: Mood Instability  Start Date: 10/12/23      Problem Details: The patient self-scales this problem as a 5 with 10 being the worst.          Goal Priority Start Date Expected End Date End Date    Patient will achieve mood stability as evidenced by controlled behavior and a more deliberate thought process -- 10/12/23 -- --    Goal Details: Progress toward goal:  Not appropriate to rate progress toward goal since this is the initial treatment plan.          Goal Intervention Frequency Start Date End Date    Provide structure and focus to patient's thoughts and actions by establishing plans and routine. PRN 10/12/23 --    Intervention Details: Duration of treatment until until discharged.          Goal Intervention Frequency Start Date End Date    Assist patient in setting responsible goals and limits in behavior. PRN 10/12/23 --    Intervention Details: Duration of treatment until until discharged.                               I have discussed and reviewed this treatment plan with the patient and/or guardian.  The patient has verbally agreed with this treatment plan (no signatures are obtained at today's visit as the patient is a telehealth patient and is unable to print and sign this document, therefore verbal agreement is obtained).
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
## 2023-10-24 ENCOUNTER — TELEPHONE (OUTPATIENT)
Dept: NEUROLOGY | Facility: CLINIC | Age: 28
End: 2023-10-24
Payer: COMMERCIAL

## 2023-10-24 NOTE — TELEPHONE ENCOUNTER
Provider: JAMEY LUTHER    Caller: PATIENT    Relationship to Patient: SELF    Pharmacy: [facility]    Phone Number: [phone]    Reason for Call: LAST WEEK, 10/18/23, PT HAD A DEFECTIVE EMGALITY SYRINGE.  PT CALLED THE OFFICE AND WAS TOLD TO CALL THE .  PT CALLED THE  AND WAS TOLD IT WOULD TAKE ABOUT 24 HOURS TO PROCESS THE CLAIM, BUT SHE HAS NOT HEARD ANYTHING BACK AND HAS NOT RECEIVED A REPLACEMENT EMGALITY SYRINGE.      PLEASE REVIEW AND ADVISE     THANK YOU

## 2023-10-25 NOTE — TELEPHONE ENCOUNTER
Dayan our referral coordinator spoke with Pt. Pt Rx arrived today, sample will no longer be needed.

## 2023-11-09 ENCOUNTER — TELEMEDICINE (OUTPATIENT)
Dept: PSYCHIATRY | Facility: CLINIC | Age: 28
End: 2023-11-09
Payer: COMMERCIAL

## 2023-11-09 DIAGNOSIS — F31.60 BIPOLAR AFFECTIVE DISORDER, MIXED: Primary | ICD-10-CM

## 2023-11-09 DIAGNOSIS — Z79.899 MEDICATION MANAGEMENT: ICD-10-CM

## 2023-11-09 DIAGNOSIS — F41.0 PANIC ATTACKS: ICD-10-CM

## 2023-11-09 RX ORDER — VILAZODONE HYDROCHLORIDE 10 MG/1
10 TABLET ORAL DAILY
Qty: 30 TABLET | Refills: 0 | Status: SHIPPED | OUTPATIENT
Start: 2023-11-09

## 2023-11-09 RX ORDER — HYDROXYZINE HYDROCHLORIDE 10 MG/1
TABLET, FILM COATED ORAL
Qty: 180 TABLET | Refills: 0 | Status: SHIPPED | OUTPATIENT
Start: 2023-11-09

## 2023-11-09 RX ORDER — LAMOTRIGINE 50 MG/1
50 TABLET, EXTENDED RELEASE ORAL DAILY
Qty: 30 TABLET | Refills: 0 | Status: SHIPPED | OUTPATIENT
Start: 2023-11-09
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
## 2023-11-14 ENCOUNTER — SPECIALTY PHARMACY (OUTPATIENT)
Dept: ONCOLOGY | Facility: HOSPITAL | Age: 28
End: 2023-11-14
Payer: COMMERCIAL

## 2023-11-14 NOTE — PROGRESS NOTES
Specialty Pharmacy Refill Coordination Note     Lady is a 28 y.o. female contacted today regarding refills of  Emgality and Nurtec specialty medication(s).. patient Injection is due 12/1    Reviewed and verified with patient:       Specialty medication(s) and dose(s) confirmed: yes    Refill Questions      Flowsheet Row Most Recent Value   Changes to allergies? No   Changes to medications? Yes  [11/14 patient stating  has prescribed Vilazodone 10mf once a day,Atarax 10mg.]   New conditions since last clinic visit No   Unplanned office visit, urgent care, ED, or hospital admission in the last 4 weeks  Yes  [11/14 patient stating  has prescribed Vilazodone 10mf once a day,Atarax 10mg.]   How does patient/caregiver feel medication is working? Very good   Financial problems or insurance changes  No   Since the previous refill, were any specialty medication doses or scheduled injections missed or delayed?  No   Does this patient require a clinical escalation to a pharmacist? Yes  [11/14 patient stating  has prescribed Vilazodone 10mf once a day,Atarax 10mg.]            Delivery Questions      Flowsheet Row Most Recent Value   Delivery method [employer]   Delivery address correct? Yes   Delivery phone number mobile phone   Preferred delivery time? Anytime   Number of medications in delivery 2   Medication(s) being filled and delivered Rimegepant Sulfate, Galcanezumab-Gnlm   Doses left of specialty medications N/A   Is there any medication that is due not being filled? No   Supplies needed? No supplies needed   Cooler needed? Yes   Do any medications need mixed or dated? No   Copay form of payment Payment plan already set up   Additional comments N/A   Questions or concerns for the pharmacist? No   Explain any questions or concerns for the pharmacist N/A   Are any medications first time fills? No   Tracking number for delivery N/A                   Follow-up: 30 day(s)     Nick Parker  Specialty Pharmacy  Technician

## 2023-12-06 ENCOUNTER — SPECIALTY PHARMACY (OUTPATIENT)
Dept: ONCOLOGY | Facility: HOSPITAL | Age: 28
End: 2023-12-06
Payer: COMMERCIAL

## 2023-12-06 ENCOUNTER — TELEMEDICINE (OUTPATIENT)
Dept: PSYCHIATRY | Facility: CLINIC | Age: 28
End: 2023-12-06
Payer: COMMERCIAL

## 2023-12-06 DIAGNOSIS — F41.0 PANIC ATTACKS: ICD-10-CM

## 2023-12-06 DIAGNOSIS — Z79.899 MEDICATION MANAGEMENT: ICD-10-CM

## 2023-12-06 DIAGNOSIS — F31.60 BIPOLAR AFFECTIVE DISORDER, MIXED: Primary | ICD-10-CM

## 2023-12-06 RX ORDER — HYDROXYZINE HYDROCHLORIDE 10 MG/1
TABLET, FILM COATED ORAL
Qty: 180 TABLET | Refills: 0 | Status: SHIPPED | OUTPATIENT
Start: 2023-12-06

## 2023-12-06 RX ORDER — LAMOTRIGINE 50 MG/1
50 TABLET, EXTENDED RELEASE ORAL DAILY
Qty: 30 TABLET | Refills: 0 | Status: SHIPPED | OUTPATIENT
Start: 2023-12-06

## 2023-12-06 RX ORDER — VILAZODONE HYDROCHLORIDE 20 MG/1
20 TABLET ORAL DAILY
Qty: 30 TABLET | Refills: 0 | Status: SHIPPED | OUTPATIENT
Start: 2023-12-06
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
## 2023-12-06 NOTE — PROGRESS NOTES
Specialty Pharmacy Refill Coordination Note     Lady is a 28 y.o. female contacted today regarding refills of  Emgality and Nurtec specialty medication(s).. patient Injection is due on 12/29    Reviewed and verified with patient:       Specialty medication(s) and dose(s) confirmed: yes    Refill Questions      Flowsheet Row Most Recent Value   Changes to allergies? No   Changes to medications? No   New conditions since last clinic visit No   Unplanned office visit, urgent care, ED, or hospital admission in the last 4 weeks  No   How does patient/caregiver feel medication is working? Very good   Financial problems or insurance changes  No   Since the previous refill, were any specialty medication doses or scheduled injections missed or delayed?  No   Does this patient require a clinical escalation to a pharmacist? No            Delivery Questions      Flowsheet Row Most Recent Value   Delivery method [employer]   Delivery address correct? Yes   Delivery phone number mobile phone   Preferred delivery time? Anytime   Number of medications in delivery 2   Medication(s) being filled and delivered Rimegepant Sulfate, Galcanezumab-Gnlm   Doses left of specialty medications N/A   Is there any medication that is due not being filled? No   Supplies needed? No supplies needed   Cooler needed? Yes   Do any medications need mixed or dated? No   Copay form of payment Payment plan already set up   Additional comments N/A   Questions or concerns for the pharmacist? No   Explain any questions or concerns for the pharmacist N/A   Are any medications first time fills? No   Tracking number for delivery N/A                   Follow-up: 30 day(s)     Nick Parker  Specialty Pharmacy Technician

## 2023-12-19 RX ORDER — NICOTINE 21 MG/24HR
1 PATCH, TRANSDERMAL 24 HOURS TRANSDERMAL EVERY 24 HOURS
Qty: 7 EACH | Refills: 2 | Status: SHIPPED | OUTPATIENT
Start: 2023-12-19

## 2024-01-04 ENCOUNTER — SPECIALTY PHARMACY (OUTPATIENT)
Dept: ONCOLOGY | Facility: HOSPITAL | Age: 29
End: 2024-01-04
Payer: COMMERCIAL

## 2024-01-04 NOTE — PROGRESS NOTES
Specialty Pharmacy Refill Coordination Note     Lady is a 28 y.o. female contacted today regarding refills of Emgality and Nurtec specialty medication(s).. patient Injection is due on 01/06    Reviewed and verified with patient:       Specialty medication(s) and dose(s) confirmed: yes    Refill Questions      Flowsheet Row Most Recent Value   Changes to allergies? No   Changes to medications? No   New conditions or infections since last clinic visit No   Unplanned office visit, urgent care, ED, or hospital admission in the last 4 weeks  No   How does patient/caregiver feel medication is working? Very good   Financial problems or insurance changes  No   Since the previous refill, were any specialty medication doses or scheduled injections missed or delayed?  No   Does this patient require a clinical escalation to a pharmacist? No            Delivery Questions      Flowsheet Row Most Recent Value   Delivery method [employer]   Delivery address verified with patient/caregiver? Yes   Delivery address Home   Number of medications in delivery 2   Medication(s) being filled and delivered Galcanezumab-Gnlm, Rimegepant Sulfate   Doses left of specialty medications patient is driving   Copay verified? Yes   Copay amount N/A   Copay form of payment No copayment ($0)                   Follow-up: 30 day(s)     Nick Parker  Specialty Pharmacy Technician

## 2024-01-08 ENCOUNTER — TELEMEDICINE (OUTPATIENT)
Dept: PSYCHIATRY | Facility: CLINIC | Age: 29
End: 2024-01-08
Payer: COMMERCIAL

## 2024-01-08 DIAGNOSIS — Z79.899 MEDICATION MANAGEMENT: ICD-10-CM

## 2024-01-08 DIAGNOSIS — F41.0 PANIC ATTACKS: ICD-10-CM

## 2024-01-08 DIAGNOSIS — F31.60 BIPOLAR AFFECTIVE DISORDER, MIXED: Primary | ICD-10-CM

## 2024-01-08 PROCEDURE — 99214 OFFICE O/P EST MOD 30 MIN: CPT

## 2024-01-08 PROCEDURE — 1160F RVW MEDS BY RX/DR IN RCRD: CPT

## 2024-01-08 PROCEDURE — 1159F MED LIST DOCD IN RCRD: CPT

## 2024-01-08 RX ORDER — LAMOTRIGINE 50 MG/1
50 TABLET, EXTENDED RELEASE ORAL DAILY
Qty: 30 TABLET | Refills: 0 | Status: SHIPPED | OUTPATIENT
Start: 2024-01-08

## 2024-01-08 RX ORDER — VILAZODONE HYDROCHLORIDE 20 MG/1
20 TABLET ORAL DAILY
Qty: 30 TABLET | Refills: 0 | Status: SHIPPED | OUTPATIENT
Start: 2024-01-08
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
## 2024-01-26 ENCOUNTER — SPECIALTY PHARMACY (OUTPATIENT)
Dept: ONCOLOGY | Facility: HOSPITAL | Age: 29
End: 2024-01-26
Payer: COMMERCIAL

## 2024-01-26 NOTE — PROGRESS NOTES
Specialty Pharmacy Refill Coordination Note     Lady is a 28 y.o. female contacted today regarding refills of Emgality and Nurtec specialty medication(s).. patient Injection ia due on 02/01    Reviewed and verified with patient:       Specialty medication(s) and dose(s) confirmed: yes    Refill Questions      Flowsheet Row Most Recent Value   Changes to allergies? No   Changes to medications? No   New conditions or infections since last clinic visit No   Unplanned office visit, urgent care, ED, or hospital admission in the last 4 weeks  No   How does patient/caregiver feel medication is working? Very good   Financial problems or insurance changes  No   Since the previous refill, were any specialty medication doses or scheduled injections missed or delayed?  No   Does this patient require a clinical escalation to a pharmacist? No            Delivery Questions      Flowsheet Row Most Recent Value   Delivery method [employer]   Delivery address verified with patient/caregiver? Yes   Delivery address Home   Number of medications in delivery 2   Medication(s) being filled and delivered Galcanezumab-Gnlm, Rimegepant Sulfate   Doses left of specialty medications Nurtec 4 tablets a sof 01/26   Copay verified? Yes   Copay amount N/A   Copay form of payment No copayment ($0)                   Follow-up: 28 day(s)     Nick Parker  Specialty Pharmacy Technician

## 2024-02-07 DIAGNOSIS — F31.60 BIPOLAR AFFECTIVE DISORDER, MIXED: ICD-10-CM

## 2024-02-07 RX ORDER — VILAZODONE HYDROCHLORIDE 20 MG/1
20 TABLET ORAL DAILY
Qty: 14 TABLET | Refills: 0 | Status: SHIPPED | OUTPATIENT
Start: 2024-02-07

## 2024-02-13 ENCOUNTER — TELEMEDICINE (OUTPATIENT)
Dept: PSYCHIATRY | Facility: CLINIC | Age: 29
End: 2024-02-13
Payer: COMMERCIAL

## 2024-02-13 DIAGNOSIS — F31.60 BIPOLAR AFFECTIVE DISORDER, MIXED: Primary | ICD-10-CM

## 2024-02-13 DIAGNOSIS — F41.0 PANIC ATTACKS: ICD-10-CM

## 2024-02-13 DIAGNOSIS — Z79.899 MEDICATION MANAGEMENT: ICD-10-CM

## 2024-02-13 PROCEDURE — 1160F RVW MEDS BY RX/DR IN RCRD: CPT

## 2024-02-13 PROCEDURE — 1159F MED LIST DOCD IN RCRD: CPT

## 2024-02-13 PROCEDURE — 99214 OFFICE O/P EST MOD 30 MIN: CPT

## 2024-02-13 RX ORDER — HYDROXYZINE PAMOATE 25 MG/1
CAPSULE ORAL
Qty: 120 CAPSULE | Refills: 0 | Status: SHIPPED | OUTPATIENT
Start: 2024-02-13

## 2024-02-13 RX ORDER — LAMOTRIGINE 25 MG/1
TABLET ORAL
Qty: 120 TABLET | Refills: 0 | Status: SHIPPED | OUTPATIENT
Start: 2024-02-13

## 2024-02-13 RX ORDER — VILAZODONE HYDROCHLORIDE 20 MG/1
20 TABLET ORAL DAILY
Qty: 30 TABLET | Refills: 0 | Status: SHIPPED | OUTPATIENT
Start: 2024-02-13

## 2024-02-21 ENCOUNTER — SPECIALTY PHARMACY (OUTPATIENT)
Dept: ONCOLOGY | Facility: HOSPITAL | Age: 29
End: 2024-02-21
Payer: COMMERCIAL

## 2024-02-21 RX ORDER — RIMEGEPANT SULFATE 75 MG/75MG
75 TABLET, ORALLY DISINTEGRATING ORAL DAILY PRN
Qty: 16 TABLET | Refills: 5 | Status: SHIPPED | OUTPATIENT
Start: 2024-02-21

## 2024-02-21 NOTE — PROGRESS NOTES
Specialty Pharmacy Refill Coordination Note     Lady is a 28 y.o. female contacted today regarding refills of Emgality and Nurtec specialty medication(s).. patient Injection is due on 03/01    Reviewed and verified with patient:       Specialty medication(s) and dose(s) confirmed: yes    Refill Questions      Flowsheet Row Most Recent Value   Changes to allergies? No   Changes to medications? No   New conditions or infections since last clinic visit No   Unplanned office visit, urgent care, ED, or hospital admission in the last 4 weeks  No   How does patient/caregiver feel medication is working? Very good   Financial problems or insurance changes  No   Since the previous refill, were any specialty medication doses or scheduled injections missed or delayed?  No   Does this patient require a clinical escalation to a pharmacist? No            Delivery Questions      Flowsheet Row Most Recent Value   Delivery method [employer]   Delivery address verified with patient/caregiver? Yes   Delivery address Home   Number of medications in delivery 2   Medication(s) being filled and delivered Galcanezumab-Gnlm, Rimegepant Sulfate   Doses left of specialty medications Nurtec 5 tablets left as of 02/21   Copay verified? Yes   Copay amount N/A   Copay form of payment No copayment ($0)                   Follow-up: 28 day(s)     Nick Parker  Specialty Pharmacy Technician

## 2024-03-06 ENCOUNTER — OFFICE VISIT (OUTPATIENT)
Dept: INTERNAL MEDICINE | Facility: CLINIC | Age: 29
End: 2024-03-06
Payer: COMMERCIAL

## 2024-03-06 VITALS
SYSTOLIC BLOOD PRESSURE: 127 MMHG | HEIGHT: 66 IN | WEIGHT: 266 LBS | OXYGEN SATURATION: 99 % | TEMPERATURE: 98 F | DIASTOLIC BLOOD PRESSURE: 78 MMHG | BODY MASS INDEX: 42.75 KG/M2 | HEART RATE: 78 BPM

## 2024-03-06 DIAGNOSIS — R35.0 FREQUENT URINATION: ICD-10-CM

## 2024-03-06 DIAGNOSIS — N30.00 ACUTE CYSTITIS WITHOUT HEMATURIA: Primary | ICD-10-CM

## 2024-03-06 LAB
BILIRUB BLD-MCNC: NEGATIVE MG/DL
CLARITY, POC: CLEAR
COLOR UR: ABNORMAL
EXPIRATION DATE: ABNORMAL
GLUCOSE UR STRIP-MCNC: NEGATIVE MG/DL
KETONES UR QL: NEGATIVE
LEUKOCYTE EST, POC: ABNORMAL
Lab: ABNORMAL
NITRITE UR-MCNC: NEGATIVE MG/ML
PH UR: 6 [PH] (ref 5–8)
PROT UR STRIP-MCNC: NEGATIVE MG/DL
RBC # UR STRIP: NEGATIVE /UL
SP GR UR: 1.01 (ref 1–1.03)
UROBILINOGEN UR QL: NORMAL

## 2024-03-06 PROCEDURE — 87086 URINE CULTURE/COLONY COUNT: CPT | Performed by: NURSE PRACTITIONER

## 2024-03-06 PROCEDURE — 87077 CULTURE AEROBIC IDENTIFY: CPT | Performed by: NURSE PRACTITIONER

## 2024-03-06 PROCEDURE — 87186 SC STD MICRODIL/AGAR DIL: CPT | Performed by: NURSE PRACTITIONER

## 2024-03-06 RX ORDER — NITROFURANTOIN 25; 75 MG/1; MG/1
100 CAPSULE ORAL 2 TIMES DAILY
Qty: 10 CAPSULE | Refills: 0 | Status: SHIPPED | OUTPATIENT
Start: 2024-03-06 | End: 2024-03-11

## 2024-03-06 NOTE — PROGRESS NOTES
"Chief Complaint   Patient presents with    Urinary Tract Infection       HPI  Lady Diamond is a 28 y.o. female presents for urinary frequency and urgency.  She states she has a history of frequent UTIs.       The following portions of the patient's history were reviewed and updated as appropriate: allergies, current medications, past family history, past medical history, past social history, past surgical history, and problem list.    Subjective  Review of Systems   Constitutional:  Negative for chills.   Gastrointestinal:  Negative for nausea and vomiting.   Genitourinary:  Positive for dysuria, frequency and urgency. Negative for flank pain and hematuria.       Objective  Visit Vitals  /78 (BP Location: Left arm, Patient Position: Sitting)   Pulse 78   Temp 98 °F (36.7 °C)   Ht 167.6 cm (66\")   Wt 121 kg (266 lb)   SpO2 99%   BMI 42.93 kg/m²        Physical Exam  Vitals and nursing note reviewed.   HENT:      Head: Normocephalic.   Eyes:      Pupils: Pupils are equal, round, and reactive to light.   Pulmonary:      Effort: Pulmonary effort is normal.   Abdominal:      General: There is no distension.      Palpations: Abdomen is soft.      Tenderness: There is no abdominal tenderness. There is no right CVA tenderness or left CVA tenderness.   Skin:     General: Skin is warm and dry.      Capillary Refill: Capillary refill takes less than 2 seconds.   Neurological:      General: No focal deficit present.      Mental Status: She is alert and oriented to person, place, and time.      Gait: Gait is intact.   Psychiatric:         Attention and Perception: Attention normal.         Mood and Affect: Mood normal.         Behavior: Behavior normal.          Procedures         Results for orders placed or performed in visit on 03/06/24   POCT urinalysis dipstick, automated    Specimen: Urine   Result Value Ref Range    Color Dark Yellow Yellow, Straw, Dark Yellow, Chanel    Clarity, UA Clear Clear    Specific " Gravity  1.010 1.005 - 1.030    pH, Urine 6.0 5.0 - 8.0    Leukocytes Large (3+) (A) Negative    Nitrite, UA Negative Negative    Protein, POC Negative Negative mg/dL    Glucose, UA Negative Negative mg/dL    Ketones, UA Negative Negative    Urobilinogen, UA Normal Normal, 0.2 E.U./dL    Bilirubin Negative Negative    Blood, UA Negative Negative    Lot Number [id number]     Expiration Date 01/14/2025           Assessment and Plan  Diagnoses and all orders for this visit:    1. Acute cystitis without hematuria (Primary)  -     nitrofurantoin, macrocrystal-monohydrate, (Macrobid) 100 MG capsule; Take 1 capsule by mouth 2 (Two) Times a Day for 5 days.  Dispense: 10 capsule; Refill: 0  -     Urine Culture - Urine, Urine, Clean Catch; Future    2. Frequent urination  -     POCT urinalysis dipstick, automated    3+ leuks in urine  Send culture  Start Macrobid  Increase water intake  Has AZO at home    Return if symptoms worsen or fail to improve.      Peter Eddy, APRN

## 2024-03-07 ENCOUNTER — TELEMEDICINE (OUTPATIENT)
Dept: PSYCHIATRY | Facility: CLINIC | Age: 29
End: 2024-03-07
Payer: COMMERCIAL

## 2024-03-07 ENCOUNTER — PRIOR AUTHORIZATION (OUTPATIENT)
Dept: PSYCHIATRY | Facility: CLINIC | Age: 29
End: 2024-03-07

## 2024-03-07 DIAGNOSIS — F33.41 RECURRENT MAJOR DEPRESSIVE DISORDER, IN PARTIAL REMISSION: ICD-10-CM

## 2024-03-07 DIAGNOSIS — Z79.899 MEDICATION MANAGEMENT: ICD-10-CM

## 2024-03-07 DIAGNOSIS — F41.0 PANIC ATTACKS: ICD-10-CM

## 2024-03-07 DIAGNOSIS — F31.60 BIPOLAR AFFECTIVE DISORDER, MIXED: Primary | ICD-10-CM

## 2024-03-07 RX ORDER — HYDROXYZINE HYDROCHLORIDE 25 MG/1
25 TABLET, FILM COATED ORAL 3 TIMES DAILY PRN
Qty: 90 TABLET | Refills: 1 | Status: SHIPPED | OUTPATIENT
Start: 2024-03-07

## 2024-03-07 RX ORDER — LAMOTRIGINE 25 MG/1
TABLET ORAL
Qty: 120 TABLET | Refills: 0 | Status: SHIPPED | OUTPATIENT
Start: 2024-03-07

## 2024-03-07 RX ORDER — HYDROXYZINE HYDROCHLORIDE 25 MG/1
25 TABLET, FILM COATED ORAL 3 TIMES DAILY PRN
Qty: 90 TABLET | Refills: 0 | Status: SHIPPED | OUTPATIENT
Start: 2024-03-07 | End: 2024-03-07

## 2024-03-07 RX ORDER — BREXPIPRAZOLE 0.5 MG/1
0.5 TABLET ORAL DAILY
Qty: 21 TABLET | Refills: 0 | Status: SHIPPED | OUTPATIENT
Start: 2024-03-07 | End: 2024-03-07

## 2024-03-07 RX ORDER — VILAZODONE HYDROCHLORIDE 20 MG/1
20 TABLET ORAL DAILY
Qty: 30 TABLET | Refills: 0 | Status: SHIPPED | OUTPATIENT
Start: 2024-03-07

## 2024-03-07 RX ORDER — BREXPIPRAZOLE 0.5 MG/1
0.5 TABLET ORAL DAILY
Qty: 21 TABLET | Refills: 1 | Status: SHIPPED | OUTPATIENT
Start: 2024-03-07
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
## 2024-03-08 LAB — BACTERIA SPEC AEROBE CULT: ABNORMAL

## 2024-03-11 ENCOUNTER — TELEPHONE (OUTPATIENT)
Dept: PSYCHIATRY | Facility: CLINIC | Age: 29
End: 2024-03-11
Payer: COMMERCIAL

## 2024-03-11 NOTE — TELEPHONE ENCOUNTER
Pt called wanting to know how long will her mood stabilizers regulate back into her system after taking her last dose of Antibiotics.Pt also stated that she's not taking the Rexulti she seen that the side effects with weight gain.Pt stated that she has only taken the Rexulti once.Please advise

## 2024-03-11 NOTE — TELEPHONE ENCOUNTER
Pt is also wanting to know how long will it take for the mood stabilizer regulate back afer taking the last dose antibiotics today.

## 2024-03-25 ENCOUNTER — HOSPITAL ENCOUNTER (OUTPATIENT)
Dept: GENERAL RADIOLOGY | Facility: HOSPITAL | Age: 29
Discharge: HOME | End: 2024-03-25
Admitting: INTERNAL MEDICINE
Payer: COMMERCIAL

## 2024-03-25 ENCOUNTER — OFFICE VISIT (OUTPATIENT)
Dept: INTERNAL MEDICINE | Facility: CLINIC | Age: 29
End: 2024-03-25
Payer: COMMERCIAL

## 2024-03-25 VITALS
SYSTOLIC BLOOD PRESSURE: 138 MMHG | BODY MASS INDEX: 42.91 KG/M2 | OXYGEN SATURATION: 99 % | HEART RATE: 86 BPM | HEIGHT: 66 IN | DIASTOLIC BLOOD PRESSURE: 80 MMHG | WEIGHT: 267 LBS

## 2024-03-25 DIAGNOSIS — H61.22 IMPACTED CERUMEN OF LEFT EAR: ICD-10-CM

## 2024-03-25 DIAGNOSIS — M25.561 CHRONIC PAIN OF RIGHT KNEE: ICD-10-CM

## 2024-03-25 DIAGNOSIS — G89.29 CHRONIC PAIN OF RIGHT KNEE: ICD-10-CM

## 2024-03-25 DIAGNOSIS — H60.543 ECZEMA OF EXTERNAL EAR, BILATERAL: Primary | ICD-10-CM

## 2024-03-25 PROCEDURE — 99214 OFFICE O/P EST MOD 30 MIN: CPT | Performed by: INTERNAL MEDICINE

## 2024-03-25 PROCEDURE — 73560 X-RAY EXAM OF KNEE 1 OR 2: CPT

## 2024-03-25 RX ORDER — TRIAMCINOLONE ACETONIDE 1 MG/G
1 OINTMENT TOPICAL 2 TIMES DAILY
Qty: 15 G | Refills: 0 | Status: SHIPPED | OUTPATIENT
Start: 2024-03-25

## 2024-03-25 NOTE — PROGRESS NOTES
"Subjective   Lady Crystal Diamond is a 29 y.o. female here for drainage from ears, chronic knee pain.  He says when she was little she was told that she had \"extra fluid\" in her right knee.  She says recently it has been bothering her and she would like to get it looked at again.  She says back then they told her that she may have to have it drained again at some point and that it was not arthritis.  She also has had clear drainage from her ears.  Has noticed some dry skin flakes.  Some pressure in the left ear but no pain in either ear.    I have reviewed the patient's relevant medical history and confirmed it is accurate.    I have personally reviewed and performed the ROS. Flores Carrion MD     Objective   /80 (BP Location: Left arm)   Pulse 86   Ht 167.6 cm (66\")   Wt 121 kg (267 lb)   SpO2 99%   BMI 43.09 kg/m²     Physical Exam  Constitutional:       Appearance: She is well-developed.   HENT:      Head:      Comments: Left cerumen impaction.     Right Ear: Tympanic membrane normal.      Ears:      Comments: Both your canals and just outside the ear canal on the external ear there is erythema and flaking skin.  No drainage noted  Pulmonary:      Effort: Pulmonary effort is normal.   Musculoskeletal:      Right knee: Crepitus present. No swelling, deformity or effusion. Normal range of motion. No tenderness.   Neurological:      General: No focal deficit present.      Mental Status: She is alert.   Psychiatric:         Behavior: Behavior normal.         Thought Content: Thought content normal.         Judgment: Judgment normal.         Current Outpatient Medications:   •  Brexpiprazole (Rexulti) 0.5 MG tablet, Take 0.5 mg by mouth Daily. If after 2 weeks, still experiencing symptoms increase to 2 tabs by mouth daily., Disp: 21 tablet, Rfl: 1  •  galcanezumab-gnlm (EMGALITY) 120 MG/ML auto-injector pen, Inject 1 mL under the skin into the appropriate area as directed Every 28 (Twenty-Eight) " Days., Disp: 1 mL, Rfl: 5  •  hydrOXYzine (ATARAX) 25 MG tablet, Take 1 tablet by mouth 3 (Three) Times a Day As Needed for Anxiety (anxiety and/or sleep)., Disp: 90 tablet, Rfl: 1  •  IBUPROFEN IB PO, Take 500 mg by mouth Every 6 (Six) Hours As Needed., Disp: , Rfl:   •  lamoTRIgine (LaMICtal) 25 MG tablet, Take 2 tabs by mouth twice daily., Disp: 120 tablet, Rfl: 0  •  meclizine (ANTIVERT) 25 MG tablet, Take 1 tablet by mouth 3 (Three) Times a Day As Needed for Dizziness., Disp: 90 tablet, Rfl: 1  •  omeprazole (priLOSEC) 40 MG capsule, take 1 capsule by mouth every day as directed, Disp: , Rfl:   •  Rimegepant Sulfate (Nurtec) 75 MG tablet dispersible tablet, Take 1 tablet by mouth Daily As Needed (migraines). Take 1 tablet at the onset of headache, Max of 75 mg/24 hours, Max of 18 tabs/30 days., Disp: 16 tablet, Rfl: 5  •  vilazodone (VIIBRYD) 20 MG tablet tablet, Take 1 tablet by mouth Daily., Disp: 30 tablet, Rfl: 0  •  SUMAtriptan (IMITREX) 100 MG tablet, Take 1 tablet by mouth 1 (One) Time As Needed for Migraine. Take one tablet at onset of headache. May repeat dose one time in 2 hours if headache not relieved., Disp: 30 tablet, Rfl: 2    Assessment & Plan   Diagnoses and all orders for this visit:    1. Eczema of external ear, bilateral (Primary)  -     triamcinolone (KENALOG) 0.1 % ointment; Apply 1 Application topically to the appropriate area as directed 2 (Two) Times a Day.  Dispense: 15 g; Refill: 0    2. Chronic pain of right knee  -     XR Knee 1 or 2 View Right; Future    3. Impacted cerumen of left ear  -Have advised her to get sweet oil or a the ear cleaning kit at the store and use that to soften up some of the wax           Flores Carrion MD

## 2024-03-27 ENCOUNTER — SPECIALTY PHARMACY (OUTPATIENT)
Dept: ONCOLOGY | Facility: HOSPITAL | Age: 29
End: 2024-03-27
Payer: COMMERCIAL

## 2024-03-27 RX ORDER — SUMATRIPTAN 100 MG/1
100 TABLET, FILM COATED ORAL ONCE AS NEEDED
Qty: 30 TABLET | Refills: 6 | Status: SHIPPED | OUTPATIENT
Start: 2024-03-27 | End: 2025-03-27

## 2024-03-27 NOTE — PROGRESS NOTES
Specialty Pharmacy Refill Coordination Note     Lady is a 29 y.o. female contacted today regarding refills of  Emgality and Nurtec specialty medication(s).    Reviewed and verified with patient:  Allergies  Meds  Problems       Specialty medication(s) and dose(s) confirmed: yes    Refill Questions      Flowsheet Row Most Recent Value   Changes to allergies? No   Changes to medications? No   New conditions or infections since last clinic visit No   Unplanned office visit, urgent care, ED, or hospital admission in the last 4 weeks  No   How does patient/caregiver feel medication is working? Very good   Financial problems or insurance changes  No   Since the previous refill, were any specialty medication doses or scheduled injections missed or delayed?  No   Does this patient require a clinical escalation to a pharmacist? No            Delivery Questions      Flowsheet Row Most Recent Value   Delivery method [employer]   Delivery address verified with patient/caregiver? Yes   Delivery address Home   Number of medications in delivery 2   Medication(s) being filled and delivered Galcanezumab-Gnlm, Rimegepant Sulfate   Doses left of specialty medications Emgality = 0   Copay verified? Yes   Copay amount Emgality and Nurtec co-pay $0   Copay form of payment No copayment ($0)                   Follow-up: 28 day(s)     Davina Sahu, ArthurD

## 2024-03-27 NOTE — PROGRESS NOTES
Specialty Pharmacy Patient Management Program  Neurology Reassessment     Lady Diamond is a 29 y.o. female with migraines seen by a Neurology provider and enrolled in the Neurology Patient Management program offered by [facility].  A follow-up outreach was conducted, including assessment of continued therapy appropriateness, medication adherence, and side effect incidence and management for Emgality and Nurtec.     Changes to Insurance Coverage or Financial Support  [place] Medicaid     Relevant Past Medical History and Comorbidities  Relevant medical history and concomitant health conditions were discussed with the patient. The patient's chart has been reviewed for relevant past medical history and comorbid health conditions and updated as necessary.   Past Medical History:   Diagnosis Date    2019    Anxiety with depression 2006    no meds    Bipolar disorder     Cholelithiasis     Chronic constipation     Cluster headache     H/O chlamydia infection     H/O gonorrhea     H/O gonorrhea     H/O gonorrhea     H/O gonorrhea     H/O trichomoniasis 2018    H/O trichomoniasis 2021    Seizures 2022    May have had those in her sleep    Urinary tract infection 2006     Social History     Socioeconomic History    Marital status: Single    Number of children: 3   Tobacco Use    Smoking status: Former     Current packs/day: 0.00     Average packs/day: 0.2 packs/day for 15.2 years (3.8 ttl pk-yrs)     Types: Cigarettes     Start date: 2010     Quit date: 3/1/2019     Years since quittin.0     Passive exposure: Past    Smokeless tobacco: Never   Vaping Use    Vaping status: Never Used   Substance and Sexual Activity    Alcohol use: Not Currently     Alcohol/week: 6.0 standard drinks of alcohol     Types: 6 Shots of liquor per week     Comment: tequila    Drug use: No    Sexual activity: Yes     Partners: Male     Birth  control/protection: Bilateral salpingectomy , Surgical     Problem list reviewed by Davina Sahu PharmD on 3/27/2024 at  2:59 PM    Hospitalizations and Urgent Care Since Last Assessment  ED Visits, Admissions, or Hospitalizations: none   Urgent Office Visits: none     Allergies  Known allergies and reactions were discussed with the patient. The patient's chart has been reviewed for allergy information and updated as necessary.   No Known Allergies  Allergies reviewed by Davina Sahu PharmD on 3/27/2024 at  2:59 PM    Relevant Laboratory Values      Lab Assessment  N/AT.     Current Medication List  This medication list has been reviewed with the patient and evaluated for any interactions or necessary modifications/recommendations, and updated to include all prescription medications, OTC medications, and supplements the patient is currently taking.  This list reflects what is contained in the patient's profile, which has also been marked as reviewed to communicate to other providers it is the most up to date version of the patient's current medication therapy.     Current Outpatient Medications:     Brexpiprazole (Rexulti) 0.5 MG tablet, Take 0.5 mg by mouth Daily. If after 2 weeks, still experiencing symptoms increase to 2 tabs by mouth daily., Disp: 21 tablet, Rfl: 1    galcanezumab-gnlm (EMGALITY) 120 MG/ML auto-injector pen, Inject 1 mL under the skin into the appropriate area as directed Every 28 (Twenty-Eight) Days., Disp: 1 mL, Rfl: 5    hydrOXYzine (ATARAX) 25 MG tablet, Take 1 tablet by mouth 3 (Three) Times a Day As Needed for Anxiety (anxiety and/or sleep)., Disp: 90 tablet, Rfl: 1    IBUPROFEN IB PO, Take 500 mg by mouth Every 6 (Six) Hours As Needed., Disp: , Rfl:     lamoTRIgine (LaMICtal) 25 MG tablet, Take 2 tabs by mouth twice daily., Disp: 120 tablet, Rfl: 0    meclizine (ANTIVERT) 25 MG tablet, Take 1 tablet by mouth 3 (Three) Times a Day As Needed for Dizziness., Disp: 90 tablet, Rfl: 1     omeprazole (priLOSEC) 40 MG capsule, take 1 capsule by mouth every day as directed, Disp: , Rfl:     Rimegepant Sulfate (Nurtec) 75 MG tablet dispersible tablet, Take 1 tablet by mouth Daily As Needed (migraines). Take 1 tablet at the onset of headache, Max of 75 mg/24 hours, Max of 18 tabs/30 days., Disp: 16 tablet, Rfl: 5    SUMAtriptan (IMITREX) 100 MG tablet, Take 1 tablet by mouth 1 (One) Time As Needed for Migraine. Take one tablet at onset of headache. May repeat dose one time in 2 hours if headache not relieved., Disp: 30 tablet, Rfl: 2    triamcinolone (KENALOG) 0.1 % ointment, Apply 1 Application topically to the appropriate area as directed 2 (Two) Times a Day., Disp: 15 g, Rfl: 0    vilazodone (VIIBRYD) 20 MG tablet tablet, Take 1 tablet by mouth Daily., Disp: 30 tablet, Rfl: 0    Medicines reviewed by Davina Sahu, PharmD on 3/27/2024 at  2:59 PM    Drug Interactions  No relevant drug/drug interactions noted with Emgality or Nurtec     Adverse Drug Reactions  Medication tolerability: Tolerating with no to minimal ADRs          Medication plan: Continue therapy with normal follow-up  Plan for ADR Management: not required      Adherence, Self-Administration, and Current Therapy Problems  Adherence related patient's specialty therapy was discussed with the patient. The Adherence segment of this outreach has been reviewed and updated.   Adherence Questions  Linked Medication(s) Assessed: Galcanezumab-Gnlm, Rimegepant Sulfate  On average, how many doses/injections does the patient miss per month?: 0  What are the identified reasons for non-adherence or missed doses? : no problems identified  What is the estimated medication adherence level?: %  Based on the patient/caregiver response and refill history, does this patient require an MTP to track adherence improvements?: no    Additional Barriers to Patient Self-Administration: none  Methods for Supporting Patient Self-Administration: pt adept with  Emgality self-injections.    Recently Close Medication Therapy Problems  No medication therapy recommendations to display  Open Medication Therapy Problems  No medication therapy recommendations to display     Goals of Therapy  Goals related to the patient's specialty therapy was discussed with the patient. The Patient Goals segment of this outreach has been reviewed and updated.    Goals Addressed Today        Specialty Pharmacy General Goal      Decrease severity and frequency of migraines. Takes Nurtec at onset of breakthrough migraine               Quality of Life Assessment   Quality of Life related to the patient's enrollment in the patient management program and services provided was discussed with the patient. The QOL segment of this outreach has been reviewed and updated.   Quality of Life Improvement Scale: 8-Moderately better    Reassessment Plan & Follow-Up  Medication Therapy Changes: Continue Emgality 120 mg SubQ monthly and Nurtec 75 mg po once daily as needed for migraines. - Take 1 tablet at the onset of headache, Max of 75 mg/24 hours, Max of 18 tabs/30 days.  Related Plans, Therapy Recommendations, or Issues to Be Addressed: Pt states that Nurtec does not work to as well at sumatriptan.   Pharmacist to perform regular reassessments no more than (6) months from the previous assessment.  Care Coordinator to set up future refill outreaches, coordinate prescription delivery, and escalate clinical questions to pharmacist.     Attestation  Therapeutic appropriateness: Appropriate  I attest the patient was actively involved in and has agreed to the above plan of care. If the prescribed therapy is at any point deemed not appropriate based on the current or future assessments, a consultation will be initiated with the patient's specialty care provider to determine the best course of action. The revised plan of therapy will be documented along with any additional patient education provided. Discussed  aforementioned material with patient via telemedicine.    Davina Sahu, PharmD, BCPS  Clinic Specialty Pharmacist, Neurology  3/27/2024  15:02 EDT

## 2024-04-09 ENCOUNTER — TELEMEDICINE (OUTPATIENT)
Dept: PSYCHIATRY | Facility: CLINIC | Age: 29
End: 2024-04-09
Payer: COMMERCIAL

## 2024-04-09 ENCOUNTER — LAB (OUTPATIENT)
Dept: LAB | Facility: HOSPITAL | Age: 29
End: 2024-04-09
Payer: COMMERCIAL

## 2024-04-09 DIAGNOSIS — Z79.899 MEDICATION MANAGEMENT: ICD-10-CM

## 2024-04-09 DIAGNOSIS — F33.41 RECURRENT MAJOR DEPRESSIVE DISORDER, IN PARTIAL REMISSION: ICD-10-CM

## 2024-04-09 DIAGNOSIS — F31.60 BIPOLAR AFFECTIVE DISORDER, MIXED: Primary | ICD-10-CM

## 2024-04-09 LAB
ALBUMIN SERPL-MCNC: 4.2 G/DL (ref 3.5–5.2)
ALBUMIN/GLOB SERPL: 1.5 G/DL
ALP SERPL-CCNC: 71 U/L (ref 39–117)
ALT SERPL W P-5'-P-CCNC: 14 U/L (ref 1–33)
ANION GAP SERPL CALCULATED.3IONS-SCNC: 11.1 MMOL/L (ref 5–15)
AST SERPL-CCNC: 15 U/L (ref 1–32)
BILIRUB SERPL-MCNC: 0.2 MG/DL (ref 0–1.2)
BUN SERPL-MCNC: 10 MG/DL (ref 6–20)
BUN/CREAT SERPL: 12.3 (ref 7–25)
CALCIUM SPEC-SCNC: 8.9 MG/DL (ref 8.6–10.5)
CHLORIDE SERPL-SCNC: 105 MMOL/L (ref 98–107)
CHOLEST SERPL-MCNC: 147 MG/DL (ref 0–200)
CO2 SERPL-SCNC: 22.9 MMOL/L (ref 22–29)
CREAT SERPL-MCNC: 0.81 MG/DL (ref 0.57–1)
EGFRCR SERPLBLD CKD-EPI 2021: 99.7 ML/MIN/1.73
GLOBULIN UR ELPH-MCNC: 2.8 GM/DL
GLUCOSE SERPL-MCNC: 87 MG/DL (ref 65–99)
HBA1C MFR BLD: 5.6 % (ref 4.8–5.6)
HDLC SERPL-MCNC: 55 MG/DL (ref 40–60)
LDLC SERPL CALC-MCNC: 81 MG/DL (ref 0–100)
LDLC/HDLC SERPL: 1.5 {RATIO}
POTASSIUM SERPL-SCNC: 4.1 MMOL/L (ref 3.5–5.2)
PROT SERPL-MCNC: 7 G/DL (ref 6–8.5)
SODIUM SERPL-SCNC: 139 MMOL/L (ref 136–145)
TRIGL SERPL-MCNC: 48 MG/DL (ref 0–150)
VLDLC SERPL-MCNC: 11 MG/DL (ref 5–40)

## 2024-04-09 PROCEDURE — 80053 COMPREHEN METABOLIC PANEL: CPT

## 2024-04-09 PROCEDURE — 83036 HEMOGLOBIN GLYCOSYLATED A1C: CPT

## 2024-04-09 PROCEDURE — 80061 LIPID PANEL: CPT

## 2024-04-09 RX ORDER — VILAZODONE HYDROCHLORIDE 20 MG/1
20 TABLET ORAL DAILY
Qty: 30 TABLET | Refills: 0 | Status: SHIPPED | OUTPATIENT
Start: 2024-04-09

## 2024-04-09 RX ORDER — LAMOTRIGINE 100 MG/1
TABLET ORAL
Qty: 60 TABLET | Refills: 0 | Status: SHIPPED | OUTPATIENT
Start: 2024-04-09
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
## 2024-04-15 ENCOUNTER — TELEMEDICINE (OUTPATIENT)
Dept: PSYCHIATRY | Facility: CLINIC | Age: 29
End: 2024-04-15
Payer: COMMERCIAL

## 2024-04-15 DIAGNOSIS — F31.60 BIPOLAR AFFECTIVE DISORDER, MIXED: Primary | ICD-10-CM

## 2024-04-15 PROCEDURE — 90791 PSYCH DIAGNOSTIC EVALUATION: CPT
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
## 2024-04-19 ENCOUNTER — SPECIALTY PHARMACY (OUTPATIENT)
Dept: ONCOLOGY | Facility: HOSPITAL | Age: 29
End: 2024-04-19
Payer: COMMERCIAL

## 2024-04-19 NOTE — PROGRESS NOTES
Specialty Pharmacy Refill Coordination Note     Lady is a 29 y.o. female contacted today regarding refills of Emgality and Nurtec specialty medication(s).Patient is due for injection on 04/29.    Reviewed and verified with patient:       Specialty medication(s) and dose(s) confirmed: yes    Refill Questions      Flowsheet Row Most Recent Value   Changes to allergies? No   Changes to medications? Yes  [04/19 patient stating  has increased her Lamotrigine 200ng tablet daily]   New conditions or infections since last clinic visit No   Unplanned office visit, urgent care, ED, or hospital admission in the last 4 weeks  Yes  [04/19 patient stating  has increased her Lamotrigine 200ng tablet daily]   How does patient/caregiver feel medication is working? Good   Financial problems or insurance changes  No   Since the previous refill, were any specialty medication doses or scheduled injections missed or delayed?  No   Does this patient require a clinical escalation to a pharmacist? Yes  [04/19 patient stating  has increased her Lamotrigine 200ng tablet daily]            Delivery Questions      Flowsheet Row Most Recent Value   Delivery method [employer]   Delivery address verified with patient/caregiver? Yes   Delivery address Home   Number of medications in delivery 2   Medication(s) being filled and delivered Rimegepant Sulfate, Galcanezumab-Gnlm   Doses left of specialty medications Nurtec 4 tablets left as of 04/19   Copay verified? Yes   Copay amount N/A   Copay form of payment No copayment ($0)                   Follow-up: 28 day(s)     Nick Parker  Specialty Pharmacy Technician

## 2024-04-22 ENCOUNTER — OFFICE VISIT (OUTPATIENT)
Dept: INTERNAL MEDICINE | Facility: CLINIC | Age: 29
End: 2024-04-22
Payer: COMMERCIAL

## 2024-04-22 VITALS
HEIGHT: 66 IN | DIASTOLIC BLOOD PRESSURE: 88 MMHG | HEART RATE: 75 BPM | SYSTOLIC BLOOD PRESSURE: 138 MMHG | OXYGEN SATURATION: 100 % | BODY MASS INDEX: 42.43 KG/M2 | WEIGHT: 264 LBS

## 2024-04-22 DIAGNOSIS — Z11.3 SCREEN FOR STD (SEXUALLY TRANSMITTED DISEASE): Primary | ICD-10-CM

## 2024-04-22 PROCEDURE — 87661 TRICHOMONAS VAGINALIS AMPLIF: CPT | Performed by: INTERNAL MEDICINE

## 2024-04-22 PROCEDURE — 1160F RVW MEDS BY RX/DR IN RCRD: CPT | Performed by: INTERNAL MEDICINE

## 2024-04-22 PROCEDURE — 1159F MED LIST DOCD IN RCRD: CPT | Performed by: INTERNAL MEDICINE

## 2024-04-22 PROCEDURE — 99213 OFFICE O/P EST LOW 20 MIN: CPT | Performed by: INTERNAL MEDICINE

## 2024-04-22 PROCEDURE — 87491 CHLMYD TRACH DNA AMP PROBE: CPT | Performed by: INTERNAL MEDICINE

## 2024-04-22 PROCEDURE — 87591 N.GONORRHOEAE DNA AMP PROB: CPT | Performed by: INTERNAL MEDICINE

## 2024-04-22 NOTE — PROGRESS NOTES
"Subjective   Lady Crystal Diamond is a 29 y.o. female here for STD screening. She notes a \"funky\" smell to her fiance's semen, and would like STD screening just in case. She has no vaginal discharge or itching. He has no known STDs.    I have reviewed the patient's relevant medical history and confirmed it is accurate.    I have personally reviewed and performed the ROS. Flores Carrion MD     Objective   /88 (BP Location: Left arm)   Pulse 75   Ht 167.6 cm (66\")   Wt 120 kg (264 lb)   SpO2 100%   BMI 42.61 kg/m²     Physical Exam  Constitutional:       Appearance: She is well-developed.   Pulmonary:      Effort: Pulmonary effort is normal.   Neurological:      General: No focal deficit present.      Mental Status: She is alert.   Psychiatric:         Behavior: Behavior normal.         Thought Content: Thought content normal.         Judgment: Judgment normal.           Current Outpatient Medications:     galcanezumab-gnlm (EMGALITY) 120 MG/ML auto-injector pen, Inject 1 mL under the skin into the appropriate area as directed Every 28 (Twenty-Eight) Days., Disp: 1 mL, Rfl: 5    hydrOXYzine (ATARAX) 25 MG tablet, Take 1 tablet by mouth 3 (Three) Times a Day As Needed for Anxiety (anxiety and/or sleep)., Disp: 90 tablet, Rfl: 1    IBUPROFEN IB PO, Take 500 mg by mouth Every 6 (Six) Hours As Needed., Disp: , Rfl:     lamoTRIgine (LaMICtal) 100 MG tablet, Take 1 tab by mouth twice daily. (Patient taking differently: 2 tablets Daily. Take 1 tab by mouth twice daily.), Disp: 60 tablet, Rfl: 0    meclizine (ANTIVERT) 25 MG tablet, Take 1 tablet by mouth 3 (Three) Times a Day As Needed for Dizziness., Disp: 90 tablet, Rfl: 1    omeprazole (priLOSEC) 40 MG capsule, take 1 capsule by mouth every day as directed, Disp: , Rfl:     Rimegepant Sulfate (Nurtec) 75 MG tablet dispersible tablet, Take 1 tablet by mouth Daily As Needed (migraines). Take 1 tablet at the onset of headache, Max of 75 mg/24 hours, Max " of 18 tabs/30 days., Disp: 16 tablet, Rfl: 5    SUMAtriptan (IMITREX) 100 MG tablet, Take 1 tablet by mouth 1 (One) Time As Needed for Migraine. Take one tablet at onset of headache. May repeat dose one time in 2 hours if headache not relieved., Disp: 30 tablet, Rfl: 6    triamcinolone (KENALOG) 0.1 % ointment, Apply 1 Application topically to the appropriate area as directed 2 (Two) Times a Day., Disp: 15 g, Rfl: 0    vilazodone (VIIBRYD) 20 MG tablet tablet, Take 1 tablet by mouth Daily., Disp: 30 tablet, Rfl: 0    Assessment & Plan   Diagnoses and all orders for this visit:    1. Screen for STD (sexually transmitted disease) (Primary)  -     Hepatitis panel, acute; Future  -     HIV-1/O/2 ANTIGEN/ANTIBODY, 4TH GENERATION; Future  -     RPR; Future  -     Chlamydia trachomatis, Neisseria gonorrhoeae, Trichomonas vaginalis, PCR - Swab, Vagina  -Nuswab today             Florse Carrion MD

## 2024-04-24 ENCOUNTER — TELEPHONE (OUTPATIENT)
Dept: INTERNAL MEDICINE | Facility: CLINIC | Age: 29
End: 2024-04-24

## 2024-04-24 LAB
C TRACH RRNA SPEC QL NAA+PROBE: NEGATIVE
N GONORRHOEA RRNA SPEC QL NAA+PROBE: NEGATIVE
T VAGINALIS RRNA SPEC QL NAA+PROBE: NEGATIVE

## 2024-04-24 NOTE — TELEPHONE ENCOUNTER
Caller: Lady Diamond    Relationship: Self    Best call back number:       [phone] (Mobile)     Caller requesting test results:     PATIENT    What test was performed:     SWAB TEST FOR STI    When was the test performed:     MONDAY, 4/22    Where was the test performed:     OFFICE    Additional notes:     PATIENT REQUESTED A CALL BACK WHEN TEST RESULTS HAVE ARRIVED AND DR FINE HAS A CHANCE TO REVIEW       [Negative] : Heme/Lymph

## 2024-04-29 ENCOUNTER — TELEMEDICINE (OUTPATIENT)
Dept: PSYCHIATRY | Facility: CLINIC | Age: 29
End: 2024-04-29
Payer: COMMERCIAL

## 2024-04-29 DIAGNOSIS — F31.60 BIPOLAR AFFECTIVE DISORDER, MIXED: Primary | ICD-10-CM

## 2024-04-29 PROCEDURE — 90837 PSYTX W PT 60 MINUTES: CPT

## 2024-04-29 NOTE — PROGRESS NOTES
Date: 2024  Time In: 09:02 EDT  Time out: 9:55 AM EST    This provider is located at [facility], [address] [place] [address], [facility], using a secure [employer] Video Visit through [employer]. Patient is being seen remotely via telehealth at their home address is located in [place]. Patient stated they are in a secure environment for this session. The patient's condition being diagnosed and treated is appropriate for telemedicine. The provider identified themself as well as their credentials. The patient, or  patient's legal guardian consent to be seen remotely, and when consent is given they understand that the consent allows for patient identifiable information to be sent to a third party as needed. They may refuse to be seen remotely at any time. The electronic data is encrypted and password protected, and the patient's or  legal guardian has been advised of the potential risks to privacy not withstanding such measures.   PT Identifiers used: Name and .    You have chosen to receive care through a telehealth visit.  Do you consent to use a video/audio connection for your medical care today? Yes    Subjective   Lady Diamond is a 29 y.o. female who presents today for follow up    Chief Complaint:   Chief Complaint   Patient presents with    Depression    Manic Behavior        Clinical Maneuvering/Intervention: validation, CBT techniques, strength based approach.      Assisted patient in processing above session content; acknowledged and normalized patient’s thoughts, feelings, and concerns.  Rationalized patient thought process regarding concerns presented at session.  Discussed triggers associated with patient's  depression  and manic symptoms  Also discussed coping skills for patient to implement such as grounding , mindfulness , self care , and walking,    Allowed patient to freely discuss issues without interruption or judgment. Provided safe, confidential environment to  facilitate the development of positive therapeutic relationship and encourage open, honest communication. Assisted patient in identifying risk factors which would indicate the need for higher level of care including thoughts to harm self or others and/or self-harming behavior and encouraged patient to contact this office, call 911, or present to the nearest emergency room should any of these events occur. Discussed crisis intervention services and means to access. Patient adamantly and convincingly denies current suicidal or homicidal ideation or perceptual disturbance.    Data and Assessment: Pt was located at home for session. Pt identified increase stress, contributing factors include school and mother struggling with [employer]. Pt discussed anger issues and irritability since last session. Pt was able to identify how thoughts, feelings effect behavior. Pt was able to identify triggers for overstimulation and irritability. Pt appears to have had unstable relationships throughout life contributing to trust issues. Pt appears to catasrophize at times as well. Pts emotions appear to fluctuate quickly through self reports.    Patient appears to maintain relative stability as compared to their baseline.  However, patient continues to struggle with   Chief Complaint   Patient presents with    Depression    Manic Behavior    which continues to cause impairment in important areas of functioning.  A result, they can be reasonably expected to continue to benefit from treatment and would likely be at increased risk for decompensation otherwise.      Mental Status Exam:   Hygiene:   good  Cooperation:  Cooperative  Eye Contact:  Good  Psychomotor Behavior:  Appropriate  Affect:  Appropriate  Mood: normal  Speech:  Normal  Thought Process:  Linear  Thought Content:  Normal  Suicidal:  None  Homicidal:  None  Hallucinations:  None  Delusion:  None  Memory:  Intact  Orientation:  Person, Place, and Time  Reliability:  fair  Insight:   Fair  Judgement:  Fair  Impulse Control:  Fair  Physical/Medical Issues:  No        Patient's Support Network Includes:  significant other and sister    Functional Status: No impairment    Progress toward goal: Not at goal    Prognosis: Fair with Ongoing Treatment         Plan: Work on mindfulness of overstimulation/irritability-implement coping skills to avoid negative behavior.    Patient will continue in individual outpatient therapy with focus on improved functioning and coping skills, maintaining stability, and avoiding decompensation and the need for higher level of care.    Patient will adhere to medication regimen as prescribed and report any side effects. Patient will contact this office, call 911 or present to the nearest emergency room should suicidal or homicidal ideations occur. Provide Cognitive Behavioral Therapy and Solution Focused Therapy to improve functioning, maintain stability, and avoid decompensation and the need for higher level of care.     Return in about 3 weeks (around 5/20/2024).      VISIT DIAGNOSIS:    Diagnosis Plan   1. Bipolar affective disorder, mixed         09:02 EDT         This document has been electronically signed by Anna Collier LCSW  April 29, 2024      Part of this note may be an electronic transcription/translation of spoken language to printed text using the Dragon Dictation System.

## 2024-05-06 ENCOUNTER — TELEMEDICINE (OUTPATIENT)
Dept: PSYCHIATRY | Facility: CLINIC | Age: 29
End: 2024-05-06
Payer: COMMERCIAL

## 2024-05-06 ENCOUNTER — TELEPHONE (OUTPATIENT)
Dept: NEUROLOGY | Facility: CLINIC | Age: 29
End: 2024-05-06
Payer: COMMERCIAL

## 2024-05-06 DIAGNOSIS — F41.0 PANIC ATTACKS: ICD-10-CM

## 2024-05-06 DIAGNOSIS — Z79.899 MEDICATION MANAGEMENT: ICD-10-CM

## 2024-05-06 DIAGNOSIS — F31.60 BIPOLAR AFFECTIVE DISORDER, MIXED: ICD-10-CM

## 2024-05-06 PROCEDURE — 1159F MED LIST DOCD IN RCRD: CPT

## 2024-05-06 PROCEDURE — 99214 OFFICE O/P EST MOD 30 MIN: CPT

## 2024-05-06 PROCEDURE — 1160F RVW MEDS BY RX/DR IN RCRD: CPT

## 2024-05-06 RX ORDER — VILAZODONE HYDROCHLORIDE 20 MG/1
20 TABLET ORAL DAILY
Qty: 30 TABLET | Refills: 0 | Status: SHIPPED | OUTPATIENT
Start: 2024-05-06

## 2024-05-06 RX ORDER — LAMOTRIGINE 200 MG/1
200 TABLET, EXTENDED RELEASE ORAL DAILY
Qty: 30 TABLET | Refills: 0 | Status: SHIPPED | OUTPATIENT
Start: 2024-05-06

## 2024-05-06 RX ORDER — METHYLPREDNISOLONE 4 MG/1
TABLET ORAL
Qty: 1 EACH | Refills: 0 | Status: SHIPPED | OUTPATIENT
Start: 2024-05-06

## 2024-05-15 ENCOUNTER — SPECIALTY PHARMACY (OUTPATIENT)
Dept: ONCOLOGY | Facility: HOSPITAL | Age: 29
End: 2024-05-15
Payer: COMMERCIAL

## 2024-05-15 NOTE — PROGRESS NOTES
Specialty Pharmacy Refill Coordination Note     Lady is a 29 y.o. female contacted today regarding refills of Emgality and Nurtec specialty medication(s).Patient is due for injection on 05/29.    Reviewed and verified with patient:       Specialty medication(s) and dose(s) confirmed: yes    Refill Questions      Flowsheet Row Most Recent Value   Changes to allergies? No   Changes to medications? No   New conditions or infections since last clinic visit No   Unplanned office visit, urgent care, ED, or hospital admission in the last 4 weeks  No   How does patient/caregiver feel medication is working? Good   Financial problems or insurance changes  No   Since the previous refill, were any specialty medication doses or scheduled injections missed or delayed?  No   Does this patient require a clinical escalation to a pharmacist? No            Delivery Questions      Flowsheet Row Most Recent Value   Delivery method [employer]   Delivery address verified with patient/caregiver? Yes   Delivery address Home   Number of medications in delivery 2   Medication(s) being filled and delivered Rimegepant Sulfate, Galcanezumab-Gnlm   Doses left of specialty medications Nurtec 4 tablets left as of 05/15   Copay verified? Yes   Copay amount N/A   Copay form of payment No copayment ($0)                   Follow-up: 28 day(s)     Nick Parker  Specialty Pharmacy Technician

## 2024-05-20 ENCOUNTER — TELEMEDICINE (OUTPATIENT)
Dept: PSYCHIATRY | Facility: CLINIC | Age: 29
End: 2024-05-20
Payer: COMMERCIAL

## 2024-05-20 DIAGNOSIS — F31.60 BIPOLAR AFFECTIVE DISORDER, MIXED: Primary | ICD-10-CM

## 2024-05-20 DIAGNOSIS — F41.0 PANIC ATTACKS: ICD-10-CM

## 2024-05-20 PROCEDURE — 90837 PSYTX W PT 60 MINUTES: CPT

## 2024-05-20 NOTE — PROGRESS NOTES
Date: May 20, 2024  Time In: 08:57 EDT  Time out: 9:54 AM EST    This provider is located at [facility] [place] [facility], [facility], [place], [place], [facility], using a secure X [employer] Video Visit through [employer]. Patient is being seen remotely via telehealth at their home address is located in [place]. Patient stated they are in a secure environment for this session. The patient's condition being diagnosed and treated is appropriate for telemedicine. The provider identified themself as well as their credentials. The patient, or  patient's legal guardian consent to be seen remotely, and when consent is given they understand that the consent allows for patient identifiable information to be sent to a third party as needed. They may refuse to be seen remotely at any time. The electronic data is encrypted and password protected, and the patient's or  legal guardian has been advised of the potential risks to privacy not withstanding such measures.   PT Identifiers used: Name and .    You have chosen to receive care through a telehealth visit.  Do you consent to use a video/audio connection for your medical care today? Yes    Subjective   Lady Diamond is a 29 y.o. female who presents today for follow up    Chief Complaint:   Chief Complaint   Patient presents with    Anxiety    Depression    Panic Attack        Data: Pt reported an okay few weeks. Pt reported increase stress due to school, children and finances. Pt reported struggling with fear of driving, panic attacks. Pt is in the process of getting her license, permit as of now. Pt reported times of disassociation, irritability, frequent mood shifts. Pt identified having a supportive partner.      Clinical Maneuvering/Intervention: CBT techniques, strength based approach, processing. Assisted patient in processing above session content; acknowledged and normalized patient’s thoughts, feelings, and concerns.  Rationalized patient thought process  regarding concerns presented at session.  Discussed triggers associated with patient's  anxiety  and depression  Also discussed coping skills for patient to implement such as grounding , mindfulness , self care , and positive self talk     Allowed patient to freely discuss issues without interruption or judgment. Provided safe, confidential environment to facilitate the development of positive therapeutic relationship and encourage open, honest communication. Assisted patient in identifying risk factors which would indicate the need for higher level of care including thoughts to harm self or others and/or self-harming behavior and encouraged patient to contact this office, call 911, or present to the nearest emergency room should any of these events occur. Discussed crisis intervention services and means to access. Patient adamantly and convincingly denies current suicidal or homicidal ideation or perceptual disturbance.    Assessment: Pt arrived early, she was alert and oriented. Pt appears to have increased stress that effects mood. Pt was able to identify how thoughts and feeling effect behaviors. Pt appears to struggle with irritability at times, progressing. Pts mood shifts often, struggling to regulate emotions. Pt appears motivated to improve MH and overall quality of life. Pt appeared receptive to techniques and encouragements discussed is session.    Patient appears to maintain relative stability as compared to their baseline.  However, patient continues to struggle with   Chief Complaint   Patient presents with    Anxiety    Depression    Panic Attack    which continues to cause impairment in important areas of functioning.  A result, they can be reasonably expected to continue to benefit from treatment and would likely be at increased risk for decompensation otherwise.        Mental Status Exam:   Hygiene:   good  Cooperation:  Cooperative  Eye Contact:  Good  Psychomotor Behavior:  Appropriate  Affect:   Appropriate  Mood: normal  Speech:  Normal  Thought Process:  Linear  Thought Content:  Normal  Suicidal:  None  Homicidal:  None  Hallucinations:  None  Delusion:  None  Memory:  Intact  Orientation:  Person, Place, and Time  Reliability:  fair  Insight:  Fair  Judgement:  Fair  Impulse Control:  Fair  Physical/Medical Issues:  No        Patient's Support Network Includes:  significant other and sister    Functional Status: No impairment    Progress toward goal: Not at goal    Prognosis: Fair with Ongoing Treatment     Plan: Start DBT techniques to aid with mood and emotion regulation.    Patient will continue in individual outpatient therapy with focus on improved functioning and coping skills, maintaining stability, and avoiding decompensation and the need for higher level of care.    Patient will adhere to medication regimen as prescribed and report any side effects. Patient will contact this office, call 911 or present to the nearest emergency room should suicidal or homicidal ideations occur. Provide Cognitive Behavioral Therapy and Solution Focused Therapy to improve functioning, maintain stability, and avoid decompensation and the need for higher level of care.     Return in about 4 weeks (around 6/17/2024).      VISIT DIAGNOSIS:    Diagnosis Plan   1. Bipolar affective disorder, mixed        2. Panic attacks         08:57 EDT         This document has been electronically signed by Anna Collier LCSW  May 20, 2024      Part of this note may be an electronic transcription/translation of spoken language to printed text using the Dragon Dictation System.

## 2024-05-20 NOTE — TREATMENT PLAN
Multi-Disciplinary Problems (from Behavioral Health Treatment Plan)      Active Problems       Problem: Anxiety  Start Date: 05/20/24      Problem Details: The patient self-scales this problem as a 5 with 10 being the worst.          Goal Priority Start Date Expected End Date End Date    Patient will develop and implement behavioral and cognitive strategies to reduce anxiety and irrational fears. -- 05/20/24 -- --    Goal Details: Progress toward goal:  Not appropriate to rate progress toward goal since this is the initial treatment plan.          Goal Intervention Frequency Start Date End Date    Help patient explore past emotional issues in relation to present anxiety. PRN 05/20/24 --    Intervention Details: Duration of treatment until until discharged.          Goal Intervention Frequency Start Date End Date    Help patient develop an awareness of their cognitive and physical responses to anxiety. PRN 05/20/24 --    Intervention Details: Duration of treatment until until discharged.                  Problem: Depression  Start Date: 05/20/24      Problem Details: The patient self-scales this problem as a 7 with 10 being the worst.          Goal Priority Start Date Expected End Date End Date    Patient will demonstrate the ability to initiate new constructive life skills outside of sessions on a consistent basis. -- 05/20/24 -- --    Goal Details: Progress toward goal:  Not appropriate to rate progress toward goal since this is the initial treatment plan.          Goal Intervention Frequency Start Date End Date    Assist patient in setting attainable activities of daily living goals. PRN 05/20/24 --      Goal Intervention Frequency Start Date End Date    Provide education about depression PRN 05/20/24 --    Intervention Details: Duration of treatment until until discharged.          Goal Intervention Frequency Start Date End Date    Assist patient in developing healthy coping strategies. PRN 05/20/24 --     Intervention Details: Duration of treatment until until discharged.                  Problem: Mood Instability  Start Date: 05/20/24      Problem Details: The patient self-scales this problem as a 7 with 10 being the worst.          Goal Priority Start Date Expected End Date End Date    Patient will achieve mood stability as evidenced by controlled behavior and a more deliberate thought process -- 05/20/24 -- --    Goal Details: Progress toward goal:  Not appropriate to rate progress toward goal since this is the initial treatment plan.          Goal Intervention Frequency Start Date End Date    Provide structure and focus to patient's thoughts and actions by establishing plans and routine. PRN 05/20/24 --    Intervention Details: Duration of treatment until until discharged.          Goal Intervention Frequency Start Date End Date    Assist patient in setting responsible goals and limits in behavior. PRN 05/20/24 --    Intervention Details: Duration of treatment until until discharged.                                 I have discussed and reviewed this treatment plan with the patient.

## 2024-05-24 ENCOUNTER — PATIENT MESSAGE (OUTPATIENT)
Dept: NEUROLOGY | Facility: CLINIC | Age: 29
End: 2024-05-24

## 2024-05-24 NOTE — TELEPHONE ENCOUNTER
From: Lady Diamond  To: Elba Mendoza  Sent: 5/24/2024 8:57 AM EDT  Subject: Injection Malfunction    Okay so I’m not sure anything can be done but when I went to inject my emgality pen I had jumped when it clicked scratched my leg and all the medicine shot out everywhere. Sorry for the inconvenience

## 2024-05-28 ENCOUNTER — SPECIALTY PHARMACY (OUTPATIENT)
Dept: ONCOLOGY | Facility: HOSPITAL | Age: 29
End: 2024-05-28
Payer: COMMERCIAL

## 2024-05-28 ENCOUNTER — TELEPHONE (OUTPATIENT)
Dept: PSYCHIATRY | Facility: CLINIC | Age: 29
End: 2024-05-28
Payer: COMMERCIAL

## 2024-05-28 NOTE — TELEPHONE ENCOUNTER
Called patient back to let her know what provider said.  No answer, went to voicemail.  Left Patient a message to call me back.   Patient called back, she stated she would just wait till she seen provider next week.

## 2024-05-28 NOTE — TELEPHONE ENCOUNTER
Patient called and wanted to let provider know that she has been way more irritable, angry, and aggressive.

## 2024-05-31 NOTE — PROGRESS NOTES
Specialty Pharmacy Patient Management Program  [facility]      Lady is a 29 y.o. female contacted today regarding early refill of her speciality medication(s).    Specialty medication(s) and dose(s) confirmed: Emgality  Other medications being refilled: none        Delivery Questions      Flowsheet Row Most Recent Value   Delivery method  at Pharmacy  [Pt spilled last dose.  Obtained insurance over-ride for replacemnt dose to be picked up at the pharmacy]   Delivery address verified with patient/caregiver? Yes   Delivery address Other (Comment)  [ in pharmacy]   Number of medications in delivery 1   Medication(s) being filled and delivered Galcanezumab-Gnlm   Copay verified? Yes   Copay amount Emgality co-pay $0   Copay form of payment No copayment ($0)                   Follow-up: 28 days     Davina Sahu, PharmD  Specialty Pharmacist  5/31/2024  14:33 EDT

## 2024-06-03 ENCOUNTER — TELEMEDICINE (OUTPATIENT)
Dept: PSYCHIATRY | Facility: CLINIC | Age: 29
End: 2024-06-03
Payer: COMMERCIAL

## 2024-06-03 DIAGNOSIS — F31.60 BIPOLAR AFFECTIVE DISORDER, MIXED: Primary | ICD-10-CM

## 2024-06-03 DIAGNOSIS — F41.0 PANIC ATTACKS: ICD-10-CM

## 2024-06-03 DIAGNOSIS — Z79.899 MEDICATION MANAGEMENT: ICD-10-CM

## 2024-06-03 RX ORDER — LAMOTRIGINE 100 MG/1
TABLET ORAL
Qty: 75 TABLET | Refills: 0 | Status: SHIPPED | OUTPATIENT
Start: 2024-06-03

## 2024-06-03 RX ORDER — VILAZODONE HYDROCHLORIDE 20 MG/1
20 TABLET ORAL DAILY
Qty: 30 TABLET | Refills: 0 | Status: SHIPPED | OUTPATIENT
Start: 2024-06-03
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
## 2024-06-07 NOTE — TELEPHONE ENCOUNTER
The weight gain is most likely from the Abilify. Is she still taking this medication? Also what dose of Lamictal was she taking when she stopped taking it?  no

## 2024-06-10 ENCOUNTER — SPECIALTY PHARMACY (OUTPATIENT)
Dept: ONCOLOGY | Facility: HOSPITAL | Age: 29
End: 2024-06-10
Payer: COMMERCIAL

## 2024-06-10 NOTE — PROGRESS NOTES
Specialty Pharmacy Refill Coordination Note     Lady is a 29 y.o. female contacted today regarding refills of Nurtec specialty medication(s).    Reviewed and verified with patient:       Specialty medication(s) and dose(s) confirmed: yes    Refill Questions      Flowsheet Row Most Recent Value   Changes to allergies? No   Changes to medications? No   New conditions or infections since last clinic visit No   Unplanned office visit, urgent care, ED, or hospital admission in the last 4 weeks  No   How does patient/caregiver feel medication is working? Good   Financial problems or insurance changes  No   Since the previous refill, were any specialty medication doses or scheduled injections missed or delayed?  No   Does this patient require a clinical escalation to a pharmacist? No            Delivery Questions      Flowsheet Row Most Recent Value   Delivery method [employer]   Delivery address verified with patient/caregiver? Yes   Delivery address Home   Number of medications in delivery 1   Medication(s) being filled and delivered Rimegepant Sulfate   Doses left of specialty medications Nurtec 3 tablets left as of 06/10   Copay verified? Yes   Copay amount N/A   Copay form of payment No copayment ($0)                   Follow-up: 30 day(s)     Nick Parker  Specialty Pharmacy Technician

## 2024-06-18 ENCOUNTER — TELEMEDICINE (OUTPATIENT)
Dept: PSYCHIATRY | Facility: CLINIC | Age: 29
End: 2024-06-18
Payer: COMMERCIAL

## 2024-06-18 DIAGNOSIS — F33.41 RECURRENT MAJOR DEPRESSIVE DISORDER, IN PARTIAL REMISSION: ICD-10-CM

## 2024-06-18 DIAGNOSIS — F31.60 BIPOLAR AFFECTIVE DISORDER, MIXED: Primary | ICD-10-CM

## 2024-06-18 DIAGNOSIS — F41.0 PANIC ATTACKS: ICD-10-CM

## 2024-06-18 PROCEDURE — 90837 PSYTX W PT 60 MINUTES: CPT

## 2024-06-18 NOTE — PROGRESS NOTES
Date: 2024  Time In: 10:04 EDT  Time out: 11:02 AM EST    This provider is located at [facility], [facility] [place], [place], [facility], using a secure [employer] Video Visit through [employer]. Patient is being seen remotely via telehealth at their home address is located in [place]. Patient stated they are in a secure environment for this session. The patient's condition being diagnosed and treated is appropriate for telemedicine. The provider identified themself as well as their credentials. The patient, or  patient's legal guardian consent to be seen remotely, and when consent is given they understand that the consent allows for patient identifiable information to be sent to a third party as needed. They may refuse to be seen remotely at any time. The electronic data is encrypted and password protected, and the patient's or  legal guardian has been advised of the potential risks to privacy not withstanding such measures.   PT Identifiers used: Name and .    You have chosen to receive care through a telehealth visit.  Do you consent to use a video/audio connection for your medical care today? Yes    Subjective   Lady Diamond is a 29 y.o. female who presents today for follow up    Chief Complaint:   Chief Complaint   Patient presents with    Anxiety    Depression    PTSD        Data: Pt reported a very stressful few weeks. Pt reported struggling with family issues including her mother, sisters and FOC 2nd child. Pt reported struggling with an unhealthy relationship with eating. Pt identified being fearful of weighing over 300 pounds. Pt reported she will binge eat one day and not eat the next. Pt reflected on abandonment issues and how they can effect relationships.       Clinical Maneuvering/Intervention: Processing, validation, CBT techniques  Assisted patient in processing above session content; acknowledged and normalized patient’s thoughts, feelings, and concerns.   Rationalized patient thought process regarding concerns presented at session.  Discussed triggers associated with patient's  anxiety , depression , and PTSD Also discussed coping skills for patient to implement such as grounding , mindfulness , self care , positive self talk , and stepping back, verbalizing emotions.    Allowed patient to freely discuss issues without interruption or judgment. Provided safe, confidential environment to facilitate the development of positive therapeutic relationship and encourage open, honest communication. Assisted patient in identifying risk factors which would indicate the need for higher level of care including thoughts to harm self or others and/or self-harming behavior and encouraged patient to contact this office, call 911, or present to the nearest emergency room should any of these events occur. Discussed crisis intervention services and means to access. Patient adamantly and convincingly denies current suicidal or homicidal ideation or perceptual disturbance.    Assessment: Pt appeared in good spirits today. Pt appears to struggle with forgiving self, pt was not open to exercises re this today. Pt appears to struggle with self doubt re being a mother, identity and romantic relationship. Pt and clinician completed Geovani Screener for BPD -scoring 10/10. Pt has symptoms consistent with BPD, continue to monitor and assess through MI. Pt appears open and honest re unhealthy eating patterns. Pt appeared receptive to shifting focus to healthy relationship with food in general, partner has been aiding with support re this. Pt appeared receptive to most encouragements and techniques discussed in session to aid with MH.    Patient appears to maintain relative stability as compared to their baseline.  However, patient continues to struggle with   Chief Complaint   Patient presents with    Anxiety    Depression    PTSD    which continues to cause impairment in important areas of  functioning.  A result, they can be reasonably expected to continue to benefit from treatment and would likely be at increased risk for decompensation otherwise.      Mental Status Exam:   Hygiene:   good  Cooperation:  Cooperative  Eye Contact:  Good  Psychomotor Behavior:  Appropriate  Affect:  Appropriate  Mood: normal  Speech:  Normal  Thought Process:  Linear  Thought Content:  Normal  Suicidal:  None  Homicidal:  None  Hallucinations:  None  Delusion:  None  Memory:  Intact  Orientation:  Person, Place, and Time  Reliability:  fair  Insight:  Fair  Judgement:  Fair  Impulse Control:  Fair  Physical/Medical Issues:  No        Patient's Support Network Includes:  significant other    Functional Status: No impairment    Progress toward goal: Not at goal    Prognosis: Fair with Ongoing Treatment     Plan: Assess PTSD for accurate dx. Continue to monitor BPD symptoms for accurate dx.    Patient will continue in individual outpatient therapy with focus on improved functioning and coping skills, maintaining stability, and avoiding decompensation and the need for higher level of care.    Patient will adhere to medication regimen as prescribed and report any side effects. Patient will contact this office, call 911 or present to the nearest emergency room should suicidal or homicidal ideations occur. Provide Cognitive Behavioral Therapy and Solution Focused Therapy to improve functioning, maintain stability, and avoid decompensation and the need for higher level of care.     Return in about 4 weeks (around 7/16/2024).      VISIT DIAGNOSIS:    Diagnosis Plan   1. Bipolar affective disorder, mixed        2. Panic attacks        3. Recurrent major depressive disorder, in partial remission         10:04 EDT         This document has been electronically signed by Anna Collier LCSW  June 18, 2024      Part of this note may be an electronic transcription/translation of spoken language to printed text using the Dragon  Dictation System.

## 2024-07-07 DIAGNOSIS — F31.60 BIPOLAR AFFECTIVE DISORDER, MIXED: ICD-10-CM

## 2024-07-07 DIAGNOSIS — F41.0 PANIC ATTACKS: ICD-10-CM

## 2024-07-08 RX ORDER — VILAZODONE HYDROCHLORIDE 20 MG/1
20 TABLET ORAL DAILY
Qty: 30 TABLET | Refills: 0 | Status: SHIPPED | OUTPATIENT
Start: 2024-07-08

## 2024-07-09 DIAGNOSIS — F31.60 BIPOLAR AFFECTIVE DISORDER, MIXED: ICD-10-CM

## 2024-07-09 DIAGNOSIS — Z79.899 MEDICATION MANAGEMENT: ICD-10-CM

## 2024-07-09 RX ORDER — LAMOTRIGINE 100 MG/1
TABLET ORAL
Qty: 75 TABLET | Refills: 0 | Status: SHIPPED | OUTPATIENT
Start: 2024-07-09

## 2024-07-15 ENCOUNTER — TELEPHONE (OUTPATIENT)
Dept: NEUROLOGY | Facility: CLINIC | Age: 29
End: 2024-07-15

## 2024-07-15 NOTE — TELEPHONE ENCOUNTER
"[facility] staff attempted to follow warm transfer process and was unsuccessful- [facility] NOT AVAILABLE AT THE TIME OF CALL.    Caller: Lady Diamond    Relationship: Self    Best call back number: [phone]    What was the call regarding: PT CALLED TO REPORT INCREASE IN LIGHTHEADEDNESS, DIZZINESS, AND BLACK OUT SPELLS. PT STATES HER MOST RECENT BLACK OUT SPELL WAS ONE WEEK AGO. SHE IS UNSURE HOW LONG SHE BLACKED OUT FOR; SHE REPORTS SHE WAS IN ONE PLACE ONE MOMENT AND ANOTHER PLACE THE NEXT. PT DESCRIBES THIS AS \"LOST TIME\".    PT CANCELLED HER F/U APPT ON 6/27/24 DUE TO HER FIANCE BEING CALLED INTO WORK.    PLEASE REVIEW AND ADVISE.  "

## 2024-07-16 ENCOUNTER — TELEMEDICINE (OUTPATIENT)
Dept: PSYCHIATRY | Facility: CLINIC | Age: 29
End: 2024-07-16
Payer: COMMERCIAL

## 2024-07-16 DIAGNOSIS — Z79.899 MEDICATION MANAGEMENT: ICD-10-CM

## 2024-07-16 DIAGNOSIS — F41.0 PANIC ATTACKS: ICD-10-CM

## 2024-07-16 DIAGNOSIS — F33.41 RECURRENT MAJOR DEPRESSIVE DISORDER, IN PARTIAL REMISSION: ICD-10-CM

## 2024-07-16 DIAGNOSIS — F31.60 BIPOLAR AFFECTIVE DISORDER, MIXED: Primary | ICD-10-CM

## 2024-07-16 PROCEDURE — 1159F MED LIST DOCD IN RCRD: CPT

## 2024-07-16 PROCEDURE — 1160F RVW MEDS BY RX/DR IN RCRD: CPT

## 2024-07-16 PROCEDURE — 99214 OFFICE O/P EST MOD 30 MIN: CPT

## 2024-07-16 RX ORDER — VILAZODONE HYDROCHLORIDE 10 MG/1
30 TABLET ORAL DAILY
Qty: 30 TABLET | Refills: 0 | Status: SHIPPED | OUTPATIENT
Start: 2024-07-16

## 2024-07-16 RX ORDER — LAMOTRIGINE 250 MG/1
250 TABLET, EXTENDED RELEASE ORAL DAILY
Qty: 30 TABLET | Refills: 0 | Status: SHIPPED | OUTPATIENT
Start: 2024-07-16 | End: 2024-07-22 | Stop reason: SDUPTHER

## 2024-07-16 NOTE — PROGRESS NOTES
"This provider is located at the [facility] (through [facility]), [address], [facility], [address] using a secure video visit through [employer]. Patient is being seen remotely via telehealth at their home address in [place], and stated they are in a secure environment for this session.The patient's condition being consulted/diagnosed/treated is appropriate for telemedicine. The provider identified herself as well as her credentials.   The patient, and/or patients guardian, consent to be seen remotely, and when consent is given they understand that the consent allows for patient identifiable information to be sent to a third party as needed. They may refuse to be seen remotely at any time. The electronic data is encrypted and password protected, and the patient and/or guardian has been advised of the potential risks to privacy not withstanding such measures.   The use of a video visit has been reviewed with the patient and verbal informed consent has been obtained.   Patient identifiers used: Name and .    Subjective   Lady Crystal Diamond is a 29 y.o. female who presents today for follow up    Chief Complaint:    Chief Complaint   Patient presents with    Anxiety    Depression    Med Management        History of Present Illness:   History of Present Illness  Patient is a 29-year-old female presenting for a one month follow-up for medication management related to irritability, sleep disturbance, anxiety, and depression.  She has tolerated increase to Lamictal well, denies side effects.  Attempts to be compliant unfortunately misses nighttime dosing approximately twice per week.  She states \"I have been in a better mood\" but also attributes this to positive environmental factors that have slowed down.  Continues to acknowledge mood lability \"being more snappy, switching a lot constantly.\"  Irritability is \"back and forth but lessened.\"  She describes circumstances of 2 " "episodes where she would be in 1 area of the home and then realized that she is in another area without realizing how she got there.  Unsure if this is seizure related, did reach out to her neurologist regarding the circumstances.  When asked about recent SI or self injury, she denies both passive and active SI currently and is convincing.  States that she has had times of \"seeing a razor blade, once a year when life gets the best of me\" with thoughts of self-harm.  Denies suicidal thoughts.  States this has not occurred in greater than a year.  When asked about hallucinations she states \"more hearing things than anything.\"  Voices compliance with Viibryd.  Has been utilizing Atarax nightly as needed for sleep, cites 8 to 9 hours of sleep on average.  Denies HI.  Denies appetite changes.  States that she is working through personality disorder in therapy.  This has been beneficial.  Denies mental status changes.    Patient lives in home with elizabeth and patient's 3 children.  She has wedding date planned for October 2025.  One of her children also suffers from a mental health disorder and has previously tolerated Abilify well.  She cites some trauma related to a pregnancy. Patient acknowledges alcohol use was problematic, although has stated that she has reduced these habits, is drinking less than 1/5 of tequila weekly.  Denies drug use.  Smokes quarter of a pack per day of cigarettes.  She states she smokes weed occasionally, not daily just socially.  Denies Scientologist preference.  She has 5 siblings, 2 she speaks with frequently.   She is also in school online part-time to begin pursuing a degree in [employer] science, 2 classes currently (As and Bs).  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  Currently in counseling.  Hallucinations    Anxiety  Symptoms include depressed mood.     Her past medical history is significant for depression.   DepressionPatient presents with the following symptoms: depressed mood.    "       Last Menstrual Period:  3 weeks ago-no tubes    The patient denies any chance of pregnancy at this time.  The patient was educated that her prescribed medications can have potential risk to a developing fetus. The patient is advised to contact this APRN/this office if she becomes pregnant or plans to become pregnant.  Pt verbalizes understanding and acknowledged agreement with this plan in her own words.      The following portions of the patient's history were reviewed and updated as appropriate: allergies, current medications, past family history, past medical history, past social history, past surgical history and problem list.    Past Psychiatric History:  Began Treatment: age 10  Diagnoses:Depression and Anxiety  Psychiatrist: previously  Therapist: previously 2016  Admission History: [facility] 2016 1 week, [facility] one week later  Medication Trials: Lexapro, elavil, lamictal, propranolol (irritability worse), vraylar (increased depression/suicidal thoughts), buspar (didn't work), abilify 5mg (didn't work-weight gain) topamax (slurred speech), atarax 30 mg BID (not effective), vistaril 25 mg (too sedating), rexulti (weight gain),  Self Harm:  cuttin, once yearly, started at age 10  Suicide Attempts: 3 total, last in 2016   Psychosis, Anxiety, Depression:  PPD possibly 7 years ago undiagnosed    Past Medical History:  Past Medical History:   Diagnosis Date    Anemia 2019    Anxiety with depression     no meds    Bipolar disorder     Cholelithiasis     Chronic constipation 2018    Cluster headache     H/O chlamydia infection 2016    H/O gonorrhea     H/O gonorrhea     H/O gonorrhea 2012    H/O gonorrhea 2018    H/O trichomoniasis 2018    H/O trichomoniasis 2021    Seizures 2022    May have had those in her sleep    Urinary tract infection 2006       Substance Abuse History:   Types:Denies all, including illicit  Withdrawal Symptoms:Denies  Longest  Period Sober:Not Applicable   AA: Not applicable     Social History:  Social History     Socioeconomic History    Marital status: Single    Number of children: 3   Tobacco Use    Smoking status: Every Day     Current packs/day: 0.00     Average packs/day: 0.6 packs/day for 26.4 years (15.0 ttl pk-yrs)     Types: Cigarettes, Electronic Cigarette     Start date: 2010     Last attempt to quit: 3/1/2019     Years since quittin.3     Passive exposure: Past    Smokeless tobacco: Never   Vaping Use    Vaping status: Never Used   Substance and Sexual Activity    Alcohol use: Not Currently     Alcohol/week: 6.0 standard drinks of alcohol     Types: 6 Shots of liquor per week     Comment: tequila    Drug use: Never    Sexual activity: Yes     Partners: Male     Birth control/protection: Bilateral salpingectomy , Surgical       Family History:  Family History   Problem Relation Age of Onset    Bipolar disorder Mother     Anxiety disorder Mother     Depression Mother         Everyone in my family    Multiple sclerosis Mother     Arthritis Mother     Drug abuse Mother     Hearing loss Mother     Hypertension Mother     Mental illness Mother         All of us    Depression Father     Alcohol abuse Father     Drug abuse Father     Bipolar disorder Sister     Depression Sister     Anxiety disorder Sister     Drug abuse Sister     Suicide Attempts Maternal Grandmother     Birth defects Sister         Her daughter my niece jessy    Miscarriages / Stillbirths Sister     Diabetes Paternal Grandmother        Past Surgical History:  Past Surgical History:   Procedure Laterality Date     SECTION  11/15/2014    LTCS (1 layer closure)     SECTION N/A 2019    Procedure:  SECTION REPEAT WITH SALPINGECTOMY;  Surgeon: Regine José MD;  Location: [facility] LABOR DELIVERY;  Service: Obstetrics;  Laterality: N/A;    LAPAROSCOPIC CHOLECYSTECTOMY  2015    TONSILLECTOMY  Dec. 14    TUBAL ABDOMINAL  LIGATION  6/5/19    WISDOM TOOTH EXTRACTION  2016       Problem List:  Patient Active Problem List   Diagnosis    Annual GYN exam w/o problems    Body mass index (BMI) 40.0-44.9, adult    Smoker    Chronic constipation    Generalized convulsive seizures    Migraine without aura and without status migrainosus, not intractable    Bipolar disorder       Allergy:   No Known Allergies     Current Medications:   Current Outpatient Medications   Medication Sig Dispense Refill    hydrOXYzine (ATARAX) 25 MG tablet Take 1 tablet by mouth 3 (Three) Times a Day As Needed for Anxiety (anxiety and/or sleep). (Patient taking differently: Take 1 tablet by mouth At Night As Needed for Anxiety (anxiety and/or sleep).) 90 tablet 1    vilazodone (VIIBRYD) 10 MG tablet tablet Take 3 tablets by mouth Daily. 30 tablet 0    galcanezumab-gnlm (EMGALITY) 120 MG/ML auto-injector pen Inject 1 mL under the skin into the appropriate area as directed Every 28 (Twenty-Eight) Days. 1 mL 5    IBUPROFEN IB PO Take 500 mg by mouth Every 6 (Six) Hours As Needed.      lamoTRIgine ER (LaMICtal XR) 250 MG tablet sustained-release 24 hour Take 1 tablet by mouth Daily. 30 tablet 0    methylPREDNISolone (MEDROL) 4 MG dose pack Take as directed on package instructions. 1 each 0    omeprazole (priLOSEC) 40 MG capsule take 1 capsule by mouth every day as directed      Rimegepant Sulfate (Nurtec) 75 MG tablet dispersible tablet Take 1 tablet by mouth Daily As Needed (migraines). Take 1 tablet at the onset of headache, Max of 75 mg/24 hours, Max of 18 tabs/30 days. 16 tablet 5    SUMAtriptan (IMITREX) 100 MG tablet Take 1 tablet by mouth 1 (One) Time As Needed for Migraine. Take one tablet at onset of headache. May repeat dose one time in 2 hours if headache not relieved. 30 tablet 6    triamcinolone (KENALOG) 0.1 % ointment Apply 1 Application topically to the appropriate area as directed 2 (Two) Times a Day. 15 g 0     No current facility-administered  medications for this visit.       Review of Systems:    Review of Systems   Constitutional: Negative.    HENT: Negative.     Eyes: Negative.         Photosensitivity   Respiratory: Negative.     Cardiovascular: Negative.    Gastrointestinal:  Positive for constipation.   Endocrine: Negative.    Genitourinary:  Positive for menstrual problem.        Severe cramping   Musculoskeletal: Negative.    Skin: Negative.         Eczema   Allergic/Immunologic: Negative.    Neurological:  Positive for seizures and headache.   Hematological: Negative.    Psychiatric/Behavioral:  Positive for dysphoric mood, depressed mood and stress.          Physical Exam:   Physical Exam  Constitutional:       Appearance: Normal appearance.   HENT:      Head: Normocephalic.      Nose: Nose normal.   Pulmonary:      Effort: Pulmonary effort is normal.   Musculoskeletal:         General: Normal range of motion.      Cervical back: Normal range of motion.   Neurological:      General: No focal deficit present.      Mental Status: She is alert and oriented to person, place, and time. Mental status is at baseline.   Psychiatric:         Attention and Perception: Attention and perception normal.         Mood and Affect: Mood normal. Mood is depressed.         Speech: Speech normal.         Behavior: Behavior normal. Behavior is cooperative.         Thought Content: Thought content normal.         Cognition and Memory: Cognition and memory normal.         Judgment: Judgment is impulsive.      Comments: Restless, distracted         Vitals:  not currently breastfeeding. There is no height or weight on file to calculate BMI.  Due to extenuating circumstances and possible current health risks associated with the patient being present in a clinical setting (with current health restrictions in place in regards to possible COVID 19 transmission/exposure), the patient was seen remotely today via a MyChart Video Visit through [employer].  Unable to obtain vital  signs due to nature of remote visit.  Height stated at 5ft6 inches.  Weight stated at 245 pounds.    Last 3 Blood Pressure Readings:  BP Readings from Last 3 Encounters:   04/22/24 138/88   03/25/24 138/80   03/06/24 127/78       PHQ-9 Score:   PHQ-9 Total Score:   PHQ-9 Depression Screening  Little interest or pleasure in doing things? (P) 1-->several days   Feeling down, depressed, or hopeless? (P) 1-->several days   Trouble falling or staying asleep, or sleeping too much? (P) 2-->more than half the days   Feeling tired or having little energy? (P) 1-->several days   Poor appetite or overeating? (P) 3-->nearly every day   Feeling bad about yourself - or that you are a failure or have let yourself or your family down? (P) 0-->not at all   Trouble concentrating on things, such as reading the newspaper or watching television? (P) 1-->several days   Moving or speaking so slowly that other people could have noticed? Or the opposite - being so fidgety or restless that you have been moving around a lot more than usual? (P) 0-->not at all   Thoughts that you would be better off dead, or of hurting yourself in some way? (P) 0-->not at all   PHQ-9 Total Score (P) 9   If you checked off any problems, how difficult have these problems made it for you to do your work, take care of things at home, or get along with other people? (P) somewhat difficult       TERESA-7 Score:   Feeling nervous, anxious or on edge: (P) More than half the days  Not being able to stop or control worrying: (P) More than half the days  Worrying too much about different things: (P) Nearly every day  Trouble Relaxing: (P) More than half the days  Being so restless that it is hard to sit still: (P) Nearly every day  Feeling afraid as if something awful might happen: (P) Several days  Becoming easily annoyed or irritable: (P) Nearly every day  TERESA 7 Total Score: (P) 16  If you checked any problems, how difficult have these problems made it for you to do your  work, take care of things at home, or get along with other people: (P) Somewhat difficult     Mental Status Exam:   Hygiene:   good  Cooperation:  Cooperative  Eye Contact:  Good  Psychomotor Behavior:  Restless  Affect:  Full range  Mood: sad  Hopelessness: Denies  Speech:  Normal  Thought Process:  Goal directed  Thought Content:  Normal  Suicidal:  None  Homicidal:  None  Hallucinations:  Not demonstrated today  Delusion:  None  Memory:  Intact  Orientation:  Person, Place, Time and Situation  Reliability:  fair  Insight:  Fair  Judgement:  Poor  Impulse Control:  Fair  Physical/Medical Issues:   seizures in sleep, unsure of when she has them        Lab Results:   Office Visit on 04/22/2024   Component Date Value Ref Range Status    Chlamydia trachomatis, BEN 04/22/2024 Negative  Negative Final    Gonococcus by BEN 04/22/2024 Negative  Negative Final    Trichomonas vaginosis 04/22/2024 Negative  Negative Final   Telemedicine on 03/07/2024   Component Date Value Ref Range Status    Total Cholesterol 04/09/2024 147  0 - 200 mg/dL Final    Triglycerides 04/09/2024 48  0 - 150 mg/dL Final    HDL Cholesterol 04/09/2024 55  40 - 60 mg/dL Final    LDL Cholesterol  04/09/2024 81  0 - 100 mg/dL Final    VLDL Cholesterol 04/09/2024 11  5 - 40 mg/dL Final    LDL/HDL Ratio 04/09/2024 1.50   Final    Glucose 04/09/2024 87  65 - 99 mg/dL Final    BUN 04/09/2024 10  6 - 20 mg/dL Final    Creatinine 04/09/2024 0.81  0.57 - 1.00 mg/dL Final    Sodium 04/09/2024 139  136 - 145 mmol/L Final    Potassium 04/09/2024 4.1  3.5 - 5.2 mmol/L Final    Chloride 04/09/2024 105  98 - 107 mmol/L Final    CO2 04/09/2024 22.9  22.0 - 29.0 mmol/L Final    Calcium 04/09/2024 8.9  8.6 - 10.5 mg/dL Final    Total Protein 04/09/2024 7.0  6.0 - 8.5 g/dL Final    Albumin 04/09/2024 4.2  3.5 - 5.2 g/dL Final    ALT (SGPT) 04/09/2024 14  1 - 33 U/L Final    AST (SGOT) 04/09/2024 15  1 - 32 U/L Final    Alkaline Phosphatase 04/09/2024 71  39 - 117 U/L  Final    Total Bilirubin 04/09/2024 0.2  0.0 - 1.2 mg/dL Final    Globulin 04/09/2024 2.8  gm/dL Final    A/G Ratio 04/09/2024 1.5  g/dL Final    BUN/Creatinine Ratio 04/09/2024 12.3  7.0 - 25.0 Final    Anion Gap 04/09/2024 11.1  5.0 - 15.0 mmol/L Final    eGFR 04/09/2024 99.7  >60.0 mL/min/1.73 Final    Hemoglobin A1C 04/09/2024 5.60  4.80 - 5.60 % Final   Office Visit on 03/06/2024   Component Date Value Ref Range Status    Color 03/06/2024 Dark Yellow  Yellow, Straw, Dark Yellow, Chanel Final    Clarity, UA 03/06/2024 Clear  Clear Final    Specific Gravity  03/06/2024 1.010  1.005 - 1.030 Final    pH, Urine 03/06/2024 6.0  5.0 - 8.0 Final    Leukocytes 03/06/2024 Large (3+) (A)  Negative Final    Nitrite, UA 03/06/2024 Negative  Negative Final    Protein, POC 03/06/2024 Negative  Negative mg/dL Final    Glucose, UA 03/06/2024 Negative  Negative mg/dL Final    Ketones, UA 03/06/2024 Negative  Negative Final    Urobilinogen, UA 03/06/2024 Normal  Normal, 0.2 E.U./dL Final    Bilirubin 03/06/2024 Negative  Negative Final    Blood, UA 03/06/2024 Negative  Negative Final    Lot Number 03/06/2024 [id number]   Final    Expiration Date 03/06/2024 01/14/2025   Final    Urine Culture 03/06/2024 >100,000 CFU/mL Escherichia coli (A)   Final         Assessment & Plan   Problems Addressed this Visit    None  Visit Diagnoses       Bipolar affective disorder, mixed    -  Primary    Relevant Medications    vilazodone (VIIBRYD) 10 MG tablet tablet    lamoTRIgine ER (LaMICtal XR) 250 MG tablet sustained-release 24 hour    Panic attacks        Relevant Medications    vilazodone (VIIBRYD) 10 MG tablet tablet    Medication management        Relevant Medications    vilazodone (VIIBRYD) 10 MG tablet tablet    lamoTRIgine ER (LaMICtal XR) 250 MG tablet sustained-release 24 hour    Recurrent major depressive disorder, in partial remission        Relevant Medications    vilazodone (VIIBRYD) 10 MG tablet tablet          Diagnoses          Codes Comments    Bipolar affective disorder, mixed    -  Primary ICD-10-CM: F31.60  ICD-9-CM: 296.60     Panic attacks     ICD-10-CM: F41.0  ICD-9-CM: 300.01     Medication management     ICD-10-CM: Z79.899  ICD-9-CM: V58.69     Recurrent major depressive disorder, in partial remission     ICD-10-CM: F33.41  ICD-9-CM: 296.35             Visit Diagnoses:    ICD-10-CM ICD-9-CM   1. Bipolar affective disorder, mixed  F31.60 296.60   2. Panic attacks  F41.0 300.01   3. Medication management  Z79.899 V58.69   4. Recurrent major depressive disorder, in partial remission  F33.41 296.35       Lamictal changed to extended release form in order to help with compliance.  Refilled at 250 mg XR.  We will increase Viibryd to 30 mg daily.  Continue Vistaril as needed nightly as needed for sleep. Previously educated upon side effects with use of these medications as well as when to present for emergency services, denies at this time.  Continue therapy.  Follow up this provider in 4 weeks or sooner if needed.    GOALS:  Short Term Goals: Patient will be compliant with medication, and patient will have no significant medication related side effects.  Patient will be engaged in psychotherapy as indicated.  Patient will report subjective improvement of symptoms.  Long term goals: To stabilize mood and treat/improve subjective symptoms, the patient will stay out of the hospital, the patient will be at an optimal level of functioning, and the patient will take all medications as prescribed.  The patient/guardian verbalized understanding and agreement with goals that were mutually set.      TREATMENT PLAN: Continue supportive psychotherapy efforts and medications as indicated.  Pharmacological and Non-Pharmacological treatment options discussed during today's visit. Patient/Guardian acknowledged and verbally consented with current treatment plan and was educated on the importance of compliance with treatment and follow-up appointments.       MEDICATION ISSUES:  Discussed medication options and treatment plan of prescribed medication as well as the risks, benefits, any black box warnings, and side effects including potential falls, possible impaired driving, and metabolic adversities among others. Patient is agreeable to call the office with any worsening of symptoms or onset of side effects, or if any concerns or questions arise.  The contact information for the office is made available to the patient. Patient is agreeable to call 911 or go to the nearest ER should they begin having any SI/HI, or if any urgent concerns arise. No medication side effects or related complaints today.     MEDS ORDERED DURING VISIT:  New Medications Ordered This Visit   Medications    vilazodone (VIIBRYD) 10 MG tablet tablet     Sig: Take 3 tablets by mouth Daily.     Dispense:  30 tablet     Refill:  0    lamoTRIgine ER (LaMICtal XR) 250 MG tablet sustained-release 24 hour     Sig: Take 1 tablet by mouth Daily.     Dispense:  30 tablet     Refill:  0       Follow Up Appointment:   Return in about 4 weeks (around 8/13/2024) for Recheck.             This document has been electronically signed by LULU Aggarwal  July 16, 2024 15:34 EDT    Some of the data in this electronic note has been brought forward from a previous encounter, any necessary changes have been made, it has been reviewed by this APRN, and it is accurate.        Total Time spent by this APRN for today's encounter:  44 total minutes were spent by this APRN on charting/documentation, review of past medical records, direct face to face patient care, coordination of care, and providing education/supportive therapy.       Dictated Utilizing Dragon Dictation: Part of this note may be an electronic transcription/translation of spoken language to printed text using the Dragon Dictation System.

## 2024-07-17 ENCOUNTER — TELEPHONE (OUTPATIENT)
Dept: NEUROLOGY | Facility: CLINIC | Age: 29
End: 2024-07-17
Payer: COMMERCIAL

## 2024-07-17 NOTE — TELEPHONE ENCOUNTER
Pt called back to let the Provider know they are still having the same issues as their previous message on 7/15/24.     They have been having lightheadedness, dizziness, and black out spells. Pt stated their last black out spell was about a week ago and is unsure how long it lasted. Pt stated they were in one place then another and describes this as lost time.     Pt was rescheduled for the appt they had to cancel from on 6/24/24.     Pt has been scheduled for their F/U on 10/4/24.

## 2024-07-18 NOTE — TELEPHONE ENCOUNTER
Spoke with the Pt to see if they are continuing to take Keppra?      Pt stated they have not been taking the keppra for awhile now and are only taking the other meds prescribed from the Provider.     SUMAtriptan (IMITREX) 100 MG tablet  galcanezumab-gnlm (EMGALITY) 120 MG/ML auto-injector pen   Rimegepant Sulfate (Nurtec) 75 MG tablet

## 2024-07-22 ENCOUNTER — SPECIALTY PHARMACY (OUTPATIENT)
Dept: ONCOLOGY | Facility: HOSPITAL | Age: 29
End: 2024-07-22
Payer: COMMERCIAL

## 2024-07-22 DIAGNOSIS — F31.60 BIPOLAR AFFECTIVE DISORDER, MIXED: ICD-10-CM

## 2024-07-22 DIAGNOSIS — Z79.899 MEDICATION MANAGEMENT: ICD-10-CM

## 2024-07-22 RX ORDER — LAMOTRIGINE 250 MG/1
250 TABLET, EXTENDED RELEASE ORAL DAILY
Qty: 30 TABLET | Refills: 6 | Status: SHIPPED | OUTPATIENT
Start: 2024-07-22

## 2024-07-22 NOTE — TELEPHONE ENCOUNTER
Spoke with the Pt to let them know the Provider would like for them restart the Keppra and see if that will control the symptoms.  If she needs a new prescription Provider will send one in.    Pt stated they will restart the Rx and will need a new script sent over.    Pt verbalized understanding.

## 2024-07-22 NOTE — PROGRESS NOTES
Specialty Pharmacy Refill Coordination Note     Lady is a 29 y.o. female contacted today regarding refills of  Emgality and Nurtec specialty medication(s). Patient is due for injection on 7/27.      Reviewed and verified with patient:       Specialty medication(s) and dose(s) confirmed: yes    Refill Questions      Flowsheet Row Most Recent Value   Changes to allergies? No   Changes to medications? Yes  [Patient has increased Lamictal to 250mg XR daily and vilazodone has increased to 30mg daily.]   New conditions or infections since last clinic visit No   Unplanned office visit, urgent care, ED, or hospital admission in the last 4 weeks  No   How does patient/caregiver feel medication is working? Good   Financial problems or insurance changes  No   Since the previous refill, were any specialty medication doses or scheduled injections missed or delayed?  No   Does this patient require a clinical escalation to a pharmacist? Yes            Delivery Questions      Flowsheet Row Most Recent Value   Delivery method [employer]   Delivery address verified with patient/caregiver? Yes   Delivery address Home   Number of medications in delivery 2   Medication(s) being filled and delivered Rimegepant Sulfate, Galcanezumab-Gnlm   Doses left of specialty medications Emgality=0 Nurtec=prn medication   Copay verified? Yes   Copay amount Emgality/Nurtec=$0   Copay form of payment No copayment ($0)   Ship Date 7/23   Delivery Date 7/24   Signature Required Yes                   Follow-up: 28 day(s)     Freda Guajardo, Pharmacy Technician  Specialty Pharmacy Technician

## 2024-07-25 ENCOUNTER — TELEMEDICINE (OUTPATIENT)
Dept: PSYCHIATRY | Facility: CLINIC | Age: 29
End: 2024-07-25
Payer: COMMERCIAL

## 2024-07-25 DIAGNOSIS — F31.60 BIPOLAR AFFECTIVE DISORDER, MIXED: Primary | ICD-10-CM

## 2024-07-25 DIAGNOSIS — F41.0 PANIC ATTACKS: ICD-10-CM

## 2024-07-25 NOTE — PROGRESS NOTES
Date: 2024  Time In: 12:28 EDT  Time out: 1:25 PM EST    This provider is located at [facility], [facility] [place], [place], [facility], using a secure App [employer] Video Visit through [employer]. Patient is being seen remotely via telehealth at their home address is located in [place]. Patient stated they are in a secure environment for this session. The patient's condition being diagnosed and treated is appropriate for telemedicine. The provider identified themself as well as their credentials. The patient, or  patient's legal guardian consent to be seen remotely, and when consent is given they understand that the consent allows for patient identifiable information to be sent to a third party as needed. They may refuse to be seen remotely at any time. The electronic data is encrypted and password protected, and the patient's or  legal guardian has been advised of the potential risks to privacy not withstanding such measures.   PT Identifiers used: Name and .    You have chosen to receive care through a telehealth visit.  Do you consent to use a video/audio connection for your medical care today? Yes    Subjective   Lady Diamond is a 29 y.o. female who presents today for follow up    Chief Complaint:   Chief Complaint   Patient presents with    Anxiety    Depression    PTSD        Data: Pt reported having a lot going on this month. Pt prepared a written outline of her 'different personalities'. Pt reported 'Giggles' is happy, more stable minded, calm. 'Crystal' is angry, full of rage, hateful, lacks empathy. 'Lady' is childlike, sad, lost, seeking security. Pt reported starting new semester has been somewhat stressful, struggling in English. Pt reported self doubt re being a mother. Pt reflected on her upbringing and patterns she exhibits learned from childhood.      Clinical Maneuvering/Intervention: MI, processing, validation. Assisted patient in processing above session  content; acknowledged and normalized patient’s thoughts, feelings, and concerns.  Rationalized patient thought process regarding concerns presented at session.  Discussed triggers associated with patient's  anxiety , depression , and PTSD Also discussed coping skills for patient to implement such as grounding , mindfulness , self care , and positive self talk     Allowed patient to freely discuss issues without interruption or judgment. Provided safe, confidential environment to facilitate the development of positive therapeutic relationship and encourage open, honest communication. Assisted patient in identifying risk factors which would indicate the need for higher level of care including thoughts to harm self or others and/or self-harming behavior and encouraged patient to contact this office, call 911, or present to the nearest emergency room should any of these events occur. Discussed crisis intervention services and means to access. Patient adamantly and convincingly denies current suicidal or homicidal ideation or perceptual disturbance.    Assessment: Pt arrived early to session. Pt appears to have 'different personalities' as responses to triggers/stressors. Pt is able to identify these emotions and ways she calms herself. Pt appears to struggle with mindfulness re physical cues of certain emotions. Pt exhibits similar behaviors of BPD, continue to monitor. Pts mood appears more manageable through medication management and self reports. Pt exhibits some age regression and ager as a defense mechanism.    Patient appears to maintain relative stability as compared to their baseline.  However, patient continues to struggle with   Chief Complaint   Patient presents with    Anxiety    Depression    PTSD    which continues to cause impairment in important areas of functioning.  A result, they can be reasonably expected to continue to benefit from treatment and would likely be at increased risk for decompensation  otherwise.        Mental Status Exam:   Hygiene:   good  Cooperation:  Cooperative  Eye Contact:  Good  Psychomotor Behavior:  Appropriate  Affect:  Appropriate  Mood: normal  Speech:  Normal  Thought Process:  Disorganized  Thought Content:  Normal  Suicidal:  None  Homicidal:  None  Hallucinations:  None  Delusion:  None  Memory:  Intact  Orientation:  Grossly intact  Reliability:  fair  Insight:  Fair  Judgement:  Fair  Impulse Control:  Fair  Physical/Medical Issues:  No        Patient's Support Network Includes:  significant other and extended family    Functional Status: No impairment    Progress toward goal: Not at goal    Prognosis: Fair with Ongoing Treatment     Plan:     Patient will continue in individual outpatient therapy with focus on improved functioning and coping skills, maintaining stability, and avoiding decompensation and the need for higher level of care.    Patient will adhere to medication regimen as prescribed and report any side effects. Patient will contact this office, call 911 or present to the nearest emergency room should suicidal or homicidal ideations occur. Provide Cognitive Behavioral Therapy and Solution Focused Therapy to improve functioning, maintain stability, and avoid decompensation and the need for higher level of care.     Return in about 6 weeks (around 9/5/2024).      VISIT DIAGNOSIS:    Diagnosis Plan   1. Bipolar affective disorder, mixed        2. Panic attacks         12:28 EDT         This document has been electronically signed by Anna Collier LCSW  July 25, 2024      Part of this note may be an electronic transcription/translation of spoken language to printed text using the Dragon Dictation System.

## 2024-07-29 ENCOUNTER — TELEPHONE (OUTPATIENT)
Dept: NEUROLOGY | Facility: CLINIC | Age: 29
End: 2024-07-29

## 2024-07-29 RX ORDER — LEVETIRACETAM 250 MG/1
250 TABLET ORAL 2 TIMES DAILY
Qty: 60 TABLET | Refills: 6 | Status: SHIPPED | OUTPATIENT
Start: 2024-07-29

## 2024-07-29 RX ORDER — MECLIZINE HYDROCHLORIDE 25 MG/1
25 TABLET ORAL 3 TIMES DAILY PRN
Qty: 90 TABLET | Refills: 2 | Status: SHIPPED | OUTPATIENT
Start: 2024-07-29

## 2024-07-29 NOTE — TELEPHONE ENCOUNTER
Caller: Yemi Diamondtamika Rojas    Relationship: Self    Best call back number: [phone]    Requested Prescriptions:   Requested Prescriptions      No prescriptions requested or ordered in this encounter   [facility] (SEE TELEPHONE ENCOUNTER ON 7-22-24)     Pharmacy where request should be sent:    [facility] [address]    Last office visit with prescribing clinician: 4/14/2023   Last telemedicine visit with prescribing clinician: Visit date not found   Next office visit with prescribing clinician: 9/13/2024     Additional details provided by patient:   PT WAS CALLED ON 7-22-24 AND TOLD A SCRIPT WOULD BE SENT FOR THIS RX BUT IT HASN'T BEEN DONE YET. PLEASE SEND MY CHART MESSAGE WHEN THIS HAS BEEN DONE.    PT HAS HAD TO INCREASE THE MECLIZINE DUE TO DIZZINESS. PT IS TAKING THIS RX EVERY OTHER DAY AND SOMETIMES DAILY.    Does the patient have less than a 3 day supply:  [] Yes  [] No    Would you like a call back once the refill request has been completed: [] Yes [] No    If the office needs to give you a call back, can they leave a voicemail: [] Yes [] No    Lizeth Jaffe Rep   07/29/24 10:58 EDT

## 2024-07-30 DIAGNOSIS — K59.00 CONSTIPATION, UNSPECIFIED CONSTIPATION TYPE: Primary | ICD-10-CM

## 2024-07-30 RX ORDER — FAMOTIDINE 20 MG/1
20 TABLET, FILM COATED ORAL 2 TIMES DAILY
Qty: 60 TABLET | Refills: 2 | Status: SHIPPED | OUTPATIENT
Start: 2024-07-30

## 2024-07-30 RX ORDER — POLYETHYLENE GLYCOL 3350 17 G/17G
17 POWDER, FOR SOLUTION ORAL DAILY
Qty: 510 G | Refills: 1 | Status: SHIPPED | OUTPATIENT
Start: 2024-07-30

## 2024-08-15 ENCOUNTER — TELEMEDICINE (OUTPATIENT)
Dept: PSYCHIATRY | Facility: CLINIC | Age: 29
End: 2024-08-15
Payer: COMMERCIAL

## 2024-08-15 DIAGNOSIS — F31.60 BIPOLAR AFFECTIVE DISORDER, MIXED: Primary | ICD-10-CM

## 2024-08-15 DIAGNOSIS — F33.41 RECURRENT MAJOR DEPRESSIVE DISORDER, IN PARTIAL REMISSION: ICD-10-CM

## 2024-08-15 DIAGNOSIS — Z79.899 MEDICATION MANAGEMENT: ICD-10-CM

## 2024-08-15 DIAGNOSIS — F41.0 PANIC ATTACKS: ICD-10-CM

## 2024-08-15 RX ORDER — HYDROXYZINE HYDROCHLORIDE 25 MG/1
25 TABLET, FILM COATED ORAL NIGHTLY PRN
Qty: 30 TABLET | Refills: 0 | Status: SHIPPED | OUTPATIENT
Start: 2024-08-15

## 2024-08-15 RX ORDER — LAMOTRIGINE 250 MG/1
250 TABLET, EXTENDED RELEASE ORAL DAILY
Qty: 30 TABLET | Refills: 6 | Status: SHIPPED | OUTPATIENT
Start: 2024-08-15

## 2024-08-15 RX ORDER — VILAZODONE HYDROCHLORIDE 10 MG/1
30 TABLET ORAL DAILY
Qty: 30 TABLET | Refills: 0 | Status: SHIPPED | OUTPATIENT
Start: 2024-08-15

## 2024-08-15 NOTE — PROGRESS NOTES
"This provider is located at the [facility] (through [facility]), [address], [place], [address] using a secure video visit through "[employer]. Patient is being seen remotely via telehealth at their home address in [place], and stated they are in a secure environment for this session.The patient's condition being consulted/diagnosed/treated is appropriate for telemedicine. The provider identified herself as well as her credentials.   The patient, and/or patients guardian, consent to be seen remotely, and when consent is given they understand that the consent allows for patient identifiable information to be sent to a third party as needed. They may refuse to be seen remotely at any time. The electronic data is encrypted and password protected, and the patient and/or guardian has been advised of the potential risks to privacy not withstanding such measures.   The use of a video visit has been reviewed with the patient and verbal informed consent has been obtained.   Patient identifiers used: Name and .    Subjective   Lady Crystal Diamond is a 29 y.o. female who presents today for follow up    Chief Complaint:    Chief Complaint   Patient presents with    Anxiety    Depression    Med Management    Irritable        History of Present Illness:   History of Present Illness  Patient is a 29-year-old female presenting for a one month follow-up for medication management related to irritability, sleep disturbance, anxiety, and depression.  Has tolerated increase to Viibryd well, voices compliance denies side effects.  Also tolerating extended release of Lamictal well \"my moods are trying to regulate.\"  Since increase to Viibryd she feels \"more chill, I can definitely tell.\"  Utilizes Atarax, mostly nightly which is beneficial in addressing sleep, cites 7 hours nightly on average.  Denies yolanda.  Denies SI, HI, SIB, or hallucinations currently and is convincing.  Acknowledges " "feeling overwhelmed and stress mostly due to family dynamics.  TERESA reduced from 16-7, indicating mild anxiety which patient rates a 10/10 currently \"considering everything that is going on it is a 10.\"  PHQ reduced from 9-5, indicating mild depression which patient rates at 3/10 on average \"it is not too bad.\"  Acknowledges some moments of panic, situationally induced.  States she also felt \"depressed for 2 weeks after the \" regarding death of her kandace's sibling.  She states \"I am doing well\" and indicates situational stressors.  Does not wish to make medication changes today.  Medically she has had communication with her neurologist who reinstated Lamictal due to having \"blackout moments.\"  She has follow-up scheduled with him in October.    Patient lives in home with elizabeth and patient's 3 children.  1 child currently residing with her sister.  She has wedding date planned for October this year. She cites some trauma related to a pregnancy. Patient acknowledges alcohol use was problematic, although has stated that she has reduced these habits, is drinking less than 1/5 of tequila weekly.  Denies drug use.  Smokes quarter of a pack per day of cigarettes.  She states she smokes weed occasionally, not daily just socially.  Denies Evangelical preference.  She has 5 siblings, 2 she speaks with frequently.  1 sister recently involved in a shootout, contributing to her stress.  She is also in school online part-time to begin pursuing a degree in [employer] science, 2 classes currently (As and Bs).  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  Currently in counseling.  Hallucinations    Anxiety  Symptoms include depressed mood.     Her past medical history is significant for depression.   DepressionPatient presents with the following symptoms: depressed mood.          Last Menstrual Period:  3 weeks ago-no tubes    The patient denies any chance of pregnancy at this time.  The patient was educated that her prescribed " medications can have potential risk to a developing fetus. The patient is advised to contact this APRN/this office if she becomes pregnant or plans to become pregnant.  Pt verbalizes understanding and acknowledged agreement with this plan in her own words.      The following portions of the patient's history were reviewed and updated as appropriate: allergies, current medications, past family history, past medical history, past social history, past surgical history and problem list.    Past Psychiatric History:  Began Treatment: age 10  Diagnoses:Depression and Anxiety  Psychiatrist: previously  Therapist: previously 2016  Admission History: [facility] 2016 1 week, [facility] one week later  Medication Trials: Lexapro, elavil, lamictal, propranolol (irritability worse), vraylar (increased depression/suicidal thoughts), buspar (didn't work), abilify 5mg (didn't work-weight gain) topamax (slurred speech), atarax 30 mg BID (not effective), vistaril 25 mg (too sedating), rexulti (weight gain),  Self Harm:  cuttin, once yearly, started at age 10  Suicide Attempts: 3 total, last in 2016   Psychosis, Anxiety, Depression:  PPD possibly 7 years ago undiagnosed    Past Medical History:  Past Medical History:   Diagnosis Date    Anemia 2019    Anxiety with depression     no meds    Bipolar disorder     Cholelithiasis     Chronic constipation 2018    Cluster headache     H/O chlamydia infection 2016    H/O gonorrhea 2016    H/O gonorrhea     H/O gonorrhea 2012    H/O gonorrhea 2018    H/O trichomoniasis 2018    H/O trichomoniasis 2021    Seizures 2022    May have had those in her sleep    Urinary tract infection 2006       Substance Abuse History:   Types:Denies all, including illicit  Withdrawal Symptoms:Denies  Longest Period Sober:Not Applicable   AA: Not applicable     Social History:  Social History     Socioeconomic History    Marital status: Single    Number of children:  3   Tobacco Use    Smoking status: Every Day     Current packs/day: 0.00     Average packs/day: 0.6 packs/day for 26.4 years (15.0 ttl pk-yrs)     Types: Cigarettes, Electronic Cigarette     Start date: 2010     Last attempt to quit: 3/1/2019     Years since quittin.4     Passive exposure: Past    Smokeless tobacco: Never   Vaping Use    Vaping status: Never Used   Substance and Sexual Activity    Alcohol use: Not Currently     Alcohol/week: 6.0 standard drinks of alcohol     Types: 6 Shots of liquor per week     Comment: tequila    Drug use: Never    Sexual activity: Yes     Partners: Male     Birth control/protection: Bilateral salpingectomy , Surgical       Family History:  Family History   Problem Relation Age of Onset    Bipolar disorder Mother     Anxiety disorder Mother     Depression Mother         Everyone in my family    Multiple sclerosis Mother     Arthritis Mother     Drug abuse Mother     Hearing loss Mother     Hypertension Mother     Mental illness Mother         All of us    Depression Father     Alcohol abuse Father     Drug abuse Father     Bipolar disorder Sister     Depression Sister     Anxiety disorder Sister     Drug abuse Sister     Suicide Attempts Maternal Grandmother     Birth defects Sister         Her daughter my niece jessy    Miscarriages / Stillbirths Sister     Diabetes Paternal Grandmother        Past Surgical History:  Past Surgical History:   Procedure Laterality Date     SECTION  11/15/2014    LTCS (1 layer closure)     SECTION N/A 2019    Procedure:  SECTION REPEAT WITH SALPINGECTOMY;  Surgeon: Regine José MD;  Location: [facility] LABOR DELIVERY;  Service: Obstetrics;  Laterality: N/A;    LAPAROSCOPIC CHOLECYSTECTOMY  2015    TONSILLECTOMY  Dec. 14    TUBAL ABDOMINAL LIGATION  19    WISDOM TOOTH EXTRACTION         Problem List:  Patient Active Problem List   Diagnosis    Annual GYN exam w/o problems    Body mass index  (BMI) 40.0-44.9, adult    Smoker    Chronic constipation    Generalized convulsive seizures    Migraine without aura and without status migrainosus, not intractable    Bipolar disorder       Allergy:   No Known Allergies     Current Medications:   Current Outpatient Medications   Medication Sig Dispense Refill    hydrOXYzine (ATARAX) 25 MG tablet Take 1 tablet by mouth At Night As Needed for Anxiety (anxiety and/or sleep). 30 tablet 0    lamoTRIgine ER (LaMICtal XR) 250 MG tablet sustained-release 24 hour Take 1 tablet by mouth Daily. 30 tablet 6    vilazodone (VIIBRYD) 10 MG tablet tablet Take 3 tablets by mouth Daily. 30 tablet 0    famotidine (Pepcid) 20 MG tablet Take 1 tablet by mouth 2 (Two) Times a Day. 60 tablet 2    galcanezumab-gnlm (EMGALITY) 120 MG/ML auto-injector pen Inject 1 mL under the skin into the appropriate area as directed Every 28 (Twenty-Eight) Days. 1 mL 11    IBUPROFEN IB PO Take 500 mg by mouth Every 6 (Six) Hours As Needed.      levETIRAcetam (KEPPRA) 250 MG tablet Take 1 tablet by mouth 2 (Two) Times a Day. 60 tablet 6    meclizine (ANTIVERT) 25 MG tablet Take 1 tablet by mouth 3 (Three) Times a Day As Needed for Dizziness. 90 tablet 2    methylPREDNISolone (MEDROL) 4 MG dose pack Take as directed on package instructions. 1 each 0    polyethylene glycol (MiraLax) 17 GM/SCOOP powder Take 17 g by mouth Daily. 510 g 1    Rimegepant Sulfate (Nurtec) 75 MG tablet dispersible tablet Take 1 tablet by mouth Daily As Needed (migraines). Take 1 tablet at the onset of headache, Max of 75 mg/24 hours, Max of 18 tabs/30 days. 16 tablet 5    SUMAtriptan (IMITREX) 100 MG tablet Take 1 tablet by mouth 1 (One) Time As Needed for Migraine. Take one tablet at onset of headache. May repeat dose one time in 2 hours if headache not relieved. 30 tablet 6    triamcinolone (KENALOG) 0.1 % ointment Apply 1 Application topically to the appropriate area as directed 2 (Two) Times a Day. 15 g 0     No current  facility-administered medications for this visit.       Review of Systems:    Review of Systems   Constitutional: Negative.    HENT: Negative.     Eyes: Negative.         Photosensitivity   Respiratory: Negative.     Cardiovascular: Negative.    Gastrointestinal:  Positive for constipation.   Endocrine: Negative.    Genitourinary:  Positive for menstrual problem.        Severe cramping   Musculoskeletal: Negative.    Skin: Negative.         Eczema   Allergic/Immunologic: Negative.    Neurological:  Positive for seizures and headache.   Hematological: Negative.    Psychiatric/Behavioral:  Positive for dysphoric mood, depressed mood and stress.          Physical Exam:   Physical Exam  Constitutional:       Appearance: Normal appearance.   HENT:      Head: Normocephalic.      Nose: Nose normal.   Pulmonary:      Effort: Pulmonary effort is normal.   Musculoskeletal:         General: Normal range of motion.      Cervical back: Normal range of motion.   Neurological:      General: No focal deficit present.      Mental Status: She is alert and oriented to person, place, and time. Mental status is at baseline.   Psychiatric:         Attention and Perception: Attention and perception normal.         Mood and Affect: Mood is anxious. Affect is labile.         Speech: Speech normal.         Behavior: Behavior normal. Behavior is cooperative.         Thought Content: Thought content normal.         Cognition and Memory: Cognition and memory normal.         Judgment: Judgment is impulsive.      Comments: Restless, distracted         Vitals:  not currently breastfeeding. There is no height or weight on file to calculate BMI.  Due to extenuating circumstances and possible current health risks associated with the patient being present in a clinical setting (with current health restrictions in place in regards to possible COVID 19 transmission/exposure), the patient was seen remotely today via a MyChart Video Visit through [facility].   Unable to obtain vital signs due to nature of remote visit.  Height stated at 5ft6 inches.  Weight stated at 245 pounds.    Last 3 Blood Pressure Readings:  BP Readings from Last 3 Encounters:   04/22/24 138/88   03/25/24 138/80   03/06/24 127/78       PHQ-9 Score:   PHQ-9 Total Score:   PHQ-9 Depression Screening  Little interest or pleasure in doing things? (P) 0-->not at all   Feeling down, depressed, or hopeless? (P) 0-->not at all   Trouble falling or staying asleep, or sleeping too much? (P) 0-->not at all   Feeling tired or having little energy? (P) 0-->not at all   Poor appetite or overeating? (P) 1-->several days   Feeling bad about yourself - or that you are a failure or have let yourself or your family down? (P) 1-->several days   Trouble concentrating on things, such as reading the newspaper or watching television? (P) 3-->nearly every day   Moving or speaking so slowly that other people could have noticed? Or the opposite - being so fidgety or restless that you have been moving around a lot more than usual? (P) 0-->not at all   Thoughts that you would be better off dead, or of hurting yourself in some way? (P) 0-->not at all   PHQ-9 Total Score (P) 5   If you checked off any problems, how difficult have these problems made it for you to do your work, take care of things at home, or get along with other people? (P) not difficult at all       TERESA-7 Score:   Feeling nervous, anxious or on edge: (P) Not at all  Not being able to stop or control worrying: (P) Not at all  Worrying too much about different things: (P) Several days  Trouble Relaxing: (P) Nearly every day  Being so restless that it is hard to sit still: (P) Nearly every day  Feeling afraid as if something awful might happen: (P) Not at all  Becoming easily annoyed or irritable: (P) Not at all  TERESA 7 Total Score: (P) 7  If you checked any problems, how difficult have these problems made it for you to do your work, take care of things at home, or  get along with other people: (P) Not difficult at all     Mental Status Exam:   Hygiene:   good  Cooperation:  Cooperative  Eye Contact:  Good  Psychomotor Behavior:  Restless  Affect:  Full range  Mood: fluctates  Hopelessness: Denies  Speech:  Rapid  Thought Process:  Goal directed  Thought Content:  Normal  Suicidal:  None  Homicidal:  None  Hallucinations:  Not demonstrated today  Delusion:  None  Memory:  Intact  Orientation:  Person, Place, Time and Situation  Reliability:  fair  Insight:  Fair  Judgement:  Poor  Impulse Control:  Fair  Physical/Medical Issues:   seizures in sleep, unsure of when she has them        Lab Results:   Office Visit on 04/22/2024   Component Date Value Ref Range Status    Chlamydia trachomatis, BEN 04/22/2024 Negative  Negative Final    Gonococcus by BEN 04/22/2024 Negative  Negative Final    Trichomonas vaginosis 04/22/2024 Negative  Negative Final   Telemedicine on 03/07/2024   Component Date Value Ref Range Status    Total Cholesterol 04/09/2024 147  0 - 200 mg/dL Final    Triglycerides 04/09/2024 48  0 - 150 mg/dL Final    HDL Cholesterol 04/09/2024 55  40 - 60 mg/dL Final    LDL Cholesterol  04/09/2024 81  0 - 100 mg/dL Final    VLDL Cholesterol 04/09/2024 11  5 - 40 mg/dL Final    LDL/HDL Ratio 04/09/2024 1.50   Final    Glucose 04/09/2024 87  65 - 99 mg/dL Final    BUN 04/09/2024 10  6 - 20 mg/dL Final    Creatinine 04/09/2024 0.81  0.57 - 1.00 mg/dL Final    Sodium 04/09/2024 139  136 - 145 mmol/L Final    Potassium 04/09/2024 4.1  3.5 - 5.2 mmol/L Final    Chloride 04/09/2024 105  98 - 107 mmol/L Final    CO2 04/09/2024 22.9  22.0 - 29.0 mmol/L Final    Calcium 04/09/2024 8.9  8.6 - 10.5 mg/dL Final    Total Protein 04/09/2024 7.0  6.0 - 8.5 g/dL Final    Albumin 04/09/2024 4.2  3.5 - 5.2 g/dL Final    ALT (SGPT) 04/09/2024 14  1 - 33 U/L Final    AST (SGOT) 04/09/2024 15  1 - 32 U/L Final    Alkaline Phosphatase 04/09/2024 71  39 - 117 U/L Final    Total Bilirubin  04/09/2024 0.2  0.0 - 1.2 mg/dL Final    Globulin 04/09/2024 2.8  gm/dL Final    A/G Ratio 04/09/2024 1.5  g/dL Final    BUN/Creatinine Ratio 04/09/2024 12.3  7.0 - 25.0 Final    Anion Gap 04/09/2024 11.1  5.0 - 15.0 mmol/L Final    eGFR 04/09/2024 99.7  >60.0 mL/min/1.73 Final    Hemoglobin A1C 04/09/2024 5.60  4.80 - 5.60 % Final   Office Visit on 03/06/2024   Component Date Value Ref Range Status    Color 03/06/2024 Dark Yellow  Yellow, Straw, Dark Yellow, Chanel Final    Clarity, UA 03/06/2024 Clear  Clear Final    Specific Gravity  03/06/2024 1.010  1.005 - 1.030 Final    pH, Urine 03/06/2024 6.0  5.0 - 8.0 Final    Leukocytes 03/06/2024 Large (3+) (A)  Negative Final    Nitrite, UA 03/06/2024 Negative  Negative Final    Protein, POC 03/06/2024 Negative  Negative mg/dL Final    Glucose, UA 03/06/2024 Negative  Negative mg/dL Final    Ketones, UA 03/06/2024 Negative  Negative Final    Urobilinogen, UA 03/06/2024 Normal  Normal, 0.2 E.U./dL Final    Bilirubin 03/06/2024 Negative  Negative Final    Blood, UA 03/06/2024 Negative  Negative Final    Lot Number 03/06/2024 [id number]   Final    Expiration Date 03/06/2024 01/14/2025   Final    Urine Culture 03/06/2024 >100,000 CFU/mL Escherichia coli (A)   Final         Assessment & Plan   Problems Addressed this Visit    None  Visit Diagnoses       Bipolar affective disorder, mixed    -  Primary    Relevant Medications    hydrOXYzine (ATARAX) 25 MG tablet    lamoTRIgine ER (LaMICtal XR) 250 MG tablet sustained-release 24 hour    vilazodone (VIIBRYD) 10 MG tablet tablet    Panic attacks        Relevant Medications    hydrOXYzine (ATARAX) 25 MG tablet    vilazodone (VIIBRYD) 10 MG tablet tablet    Recurrent major depressive disorder, in partial remission        Relevant Medications    hydrOXYzine (ATARAX) 25 MG tablet    vilazodone (VIIBRYD) 10 MG tablet tablet    Medication management        Relevant Medications    hydrOXYzine (ATARAX) 25 MG tablet    lamoTRIgine ER  (LaMICtal XR) 250 MG tablet sustained-release 24 hour    vilazodone (VIIBRYD) 10 MG tablet tablet          Diagnoses         Codes Comments    Bipolar affective disorder, mixed    -  Primary ICD-10-CM: F31.60  ICD-9-CM: 296.60     Panic attacks     ICD-10-CM: F41.0  ICD-9-CM: 300.01     Recurrent major depressive disorder, in partial remission     ICD-10-CM: F33.41  ICD-9-CM: 296.35     Medication management     ICD-10-CM: Z79.899  ICD-9-CM: V58.69             Visit Diagnoses:    ICD-10-CM ICD-9-CM   1. Bipolar affective disorder, mixed  F31.60 296.60   2. Panic attacks  F41.0 300.01   3. Recurrent major depressive disorder, in partial remission  F33.41 296.35   4. Medication management  Z79.899 V58.69       Lamictal, Viibryd, and Atarax refilled. Previously educated upon side effects with use of these medications as well as when to present for emergency services, denies at this time.  Continue therapy.  Follow up with this provider in 4 weeks or sooner if needed.    GOALS:  Short Term Goals: Patient will be compliant with medication, and patient will have no significant medication related side effects.  Patient will be engaged in psychotherapy as indicated.  Patient will report subjective improvement of symptoms.  Long term goals: To stabilize mood and treat/improve subjective symptoms, the patient will stay out of the hospital, the patient will be at an optimal level of functioning, and the patient will take all medications as prescribed.  The patient/guardian verbalized understanding and agreement with goals that were mutually set.      TREATMENT PLAN: Continue supportive psychotherapy efforts and medications as indicated.  Pharmacological and Non-Pharmacological treatment options discussed during today's visit. Patient/Guardian acknowledged and verbally consented with current treatment plan and was educated on the importance of compliance with treatment and follow-up appointments.      MEDICATION  ISSUES:  Discussed medication options and treatment plan of prescribed medication as well as the risks, benefits, any black box warnings, and side effects including potential falls, possible impaired driving, and metabolic adversities among others. Patient is agreeable to call the office with any worsening of symptoms or onset of side effects, or if any concerns or questions arise.  The contact information for the office is made available to the patient. Patient is agreeable to call 911 or go to the nearest ER should they begin having any SI/HI, or if any urgent concerns arise. No medication side effects or related complaints today.     MEDS ORDERED DURING VISIT:  New Medications Ordered This Visit   Medications    hydrOXYzine (ATARAX) 25 MG tablet     Sig: Take 1 tablet by mouth At Night As Needed for Anxiety (anxiety and/or sleep).     Dispense:  30 tablet     Refill:  0    lamoTRIgine ER (LaMICtal XR) 250 MG tablet sustained-release 24 hour     Sig: Take 1 tablet by mouth Daily.     Dispense:  30 tablet     Refill:  6    vilazodone (VIIBRYD) 10 MG tablet tablet     Sig: Take 3 tablets by mouth Daily.     Dispense:  30 tablet     Refill:  0       Follow Up Appointment:   Return in about 4 weeks (around 9/12/2024) for Recheck.             This document has been electronically signed by ULLU Aggarwal  August 15, 2024 13:29 EDT    Some of the data in this electronic note has been brought forward from a previous encounter, any necessary changes have been made, it has been reviewed by this APRN, and it is accurate.       Dictated Utilizing Dragon Dictation: Part of this note may be an electronic transcription/translation of spoken language to printed text using the Dragon Dictation System.

## 2024-08-16 ENCOUNTER — SPECIALTY PHARMACY (OUTPATIENT)
Dept: ONCOLOGY | Facility: HOSPITAL | Age: 29
End: 2024-08-16

## 2024-08-16 DIAGNOSIS — F31.60 BIPOLAR AFFECTIVE DISORDER, MIXED: ICD-10-CM

## 2024-08-16 DIAGNOSIS — F41.0 PANIC ATTACKS: ICD-10-CM

## 2024-08-16 RX ORDER — RIMEGEPANT SULFATE 75 MG/75MG
75 TABLET, ORALLY DISINTEGRATING ORAL DAILY PRN
Qty: 16 TABLET | Refills: 0 | Status: SHIPPED | OUTPATIENT
Start: 2024-08-16

## 2024-08-17 RX ORDER — VILAZODONE HYDROCHLORIDE 20 MG/1
20 TABLET ORAL DAILY
Qty: 30 TABLET | Refills: 0 | OUTPATIENT
Start: 2024-08-17

## 2024-08-23 DIAGNOSIS — F31.60 BIPOLAR AFFECTIVE DISORDER, MIXED: ICD-10-CM

## 2024-08-23 DIAGNOSIS — F41.0 PANIC ATTACKS: ICD-10-CM

## 2024-08-25 RX ORDER — VILAZODONE HYDROCHLORIDE 10 MG/1
30 TABLET ORAL DAILY
Qty: 90 TABLET | Refills: 0 | Status: SHIPPED | OUTPATIENT
Start: 2024-08-25

## 2024-09-05 ENCOUNTER — TELEMEDICINE (OUTPATIENT)
Dept: PSYCHIATRY | Facility: CLINIC | Age: 29
End: 2024-09-05
Payer: COMMERCIAL

## 2024-09-05 DIAGNOSIS — F33.41 RECURRENT MAJOR DEPRESSIVE DISORDER, IN PARTIAL REMISSION: ICD-10-CM

## 2024-09-05 DIAGNOSIS — F41.0 PANIC ATTACKS: ICD-10-CM

## 2024-09-05 DIAGNOSIS — F31.60 BIPOLAR AFFECTIVE DISORDER, MIXED: Primary | ICD-10-CM
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
## 2024-09-11 ENCOUNTER — PRIOR AUTHORIZATION (OUTPATIENT)
Dept: PSYCHIATRY | Facility: CLINIC | Age: 29
End: 2024-09-11

## 2024-09-11 ENCOUNTER — TELEMEDICINE (OUTPATIENT)
Dept: PSYCHIATRY | Facility: CLINIC | Age: 29
End: 2024-09-11
Payer: COMMERCIAL

## 2024-09-11 DIAGNOSIS — Z79.899 MEDICATION MANAGEMENT: ICD-10-CM

## 2024-09-11 DIAGNOSIS — F31.60 BIPOLAR AFFECTIVE DISORDER, MIXED: Primary | ICD-10-CM

## 2024-09-11 DIAGNOSIS — F33.41 RECURRENT MAJOR DEPRESSIVE DISORDER, IN PARTIAL REMISSION: ICD-10-CM

## 2024-09-11 DIAGNOSIS — F41.0 PANIC ATTACKS: ICD-10-CM

## 2024-09-11 PROCEDURE — 1160F RVW MEDS BY RX/DR IN RCRD: CPT

## 2024-09-11 PROCEDURE — 1159F MED LIST DOCD IN RCRD: CPT

## 2024-09-11 PROCEDURE — 99214 OFFICE O/P EST MOD 30 MIN: CPT

## 2024-09-11 RX ORDER — VILAZODONE HYDROCHLORIDE 40 MG/1
40 TABLET ORAL DAILY
Qty: 30 TABLET | Refills: 0 | Status: SHIPPED | OUTPATIENT
Start: 2024-09-11

## 2024-09-11 RX ORDER — HYDROXYZINE HYDROCHLORIDE 25 MG/1
25 TABLET, FILM COATED ORAL NIGHTLY PRN
Qty: 30 TABLET | Refills: 0 | Status: SHIPPED | OUTPATIENT
Start: 2024-09-11
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
## 2024-09-13 ENCOUNTER — SPECIALTY PHARMACY (OUTPATIENT)
Dept: ONCOLOGY | Facility: HOSPITAL | Age: 29
End: 2024-09-13
Payer: COMMERCIAL

## 2024-09-13 NOTE — PROGRESS NOTES
Specialty Pharmacy Patient Management Program  Neurology Reassessment     Lady Diamond is a 29 y.o. female with migraines seen by a Neurology provider and enrolled in the Neurology Patient Management program offered by [facility].  A follow-up outreach was conducted, including assessment of continued therapy appropriateness, medication adherence, and side effect incidence and management for emgality 120 mg/mL.     Changes to Insurance Coverage or Financial Support  No changes, [place] medicaid    Relevant Past Medical History and Comorbidities  Relevant medical history and concomitant health conditions were discussed with the patient. The patient's chart has been reviewed for relevant past medical history and comorbid health conditions and updated as necessary.   Past Medical History:   Diagnosis Date    2019    Anxiety with depression 2006    no meds    Bipolar disorder     Cholelithiasis     Chronic constipation     Cluster headache     H/O chlamydia infection     H/O gonorrhea     H/O gonorrhea     H/O gonorrhea     H/O gonorrhea     H/O trichomoniasis 2018    H/O trichomoniasis 2021    Seizures 2022    May have had those in her sleep    Urinary tract infection 2006     Social History     Socioeconomic History    Marital status: Single    Number of children: 3   Tobacco Use    Smoking status: Every Day     Current packs/day: 0.00     Average packs/day: 0.6 packs/day for 26.4 years (15.0 ttl pk-yrs)     Types: Cigarettes, Electronic Cigarette     Start date: 2010     Last attempt to quit: 3/1/2019     Years since quittin.5     Passive exposure: Past    Smokeless tobacco: Never   Vaping Use    Vaping status: Never Used   Substance and Sexual Activity    Alcohol use: Not Currently     Alcohol/week: 6.0 standard drinks of alcohol     Types: 6 Shots of liquor per week     Comment: tequila    Drug use: Never    Sexual activity: Yes      Partners: Male     Birth control/protection: Bilateral salpingectomy , Surgical     Problem list reviewed by Nickolas Taylor, PharmD on 9/13/2024 at 12:23 PM    Hospitalizations and Urgent Care Since Last Assessment  ED Visits, Admissions, or Hospitalizations: none    Urgent Office Visits: none     Allergies  Known allergies and reactions were discussed with the patient. The patient's chart has been reviewed for allergy information and updated as necessary.   No Known Allergies  Allergies reviewed by Nickolas Taylor, PharmD on 9/13/2024 at 12:21 PM    Relevant Laboratory Values  Common labs          4/9/2024    11:49   Common Labs   Glucose 87    BUN 10    Creatinine 0.81    Sodium 139    Potassium 4.1    Chloride 105    Calcium 8.9    Albumin 4.2    Total Bilirubin 0.2    Alkaline Phosphatase 71    AST (SGOT) 15    ALT (SGPT) 14    Total Cholesterol 147    Triglycerides 48    HDL Cholesterol 55    LDL Cholesterol  81    Hemoglobin A1C 5.60        Lab Assessment   The above labs have been reviewed. No dose adjustments are needed for the specialty medication(s) based on the labs.     Current Medication List  This medication list has been reviewed with the patient and evaluated for any interactions or necessary modifications/recommendations, and updated to include all prescription medications, OTC medications, and supplements the patient is currently taking.  This list reflects what is contained in the patient's profile, which has also been marked as reviewed to communicate to other providers it is the most up to date version of the patient's current medication therapy.     Current Outpatient Medications:     famotidine (Pepcid) 20 MG tablet, Take 1 tablet by mouth 2 (Two) Times a Day., Disp: 60 tablet, Rfl: 2    galcanezumab-gnlm (EMGALITY) 120 MG/ML auto-injector pen, Inject 1 mL under the skin into the appropriate area as directed Every 28 (Twenty-Eight) Days., Disp: 1 mL, Rfl: 11    hydrOXYzine  (ATARAX) 25 MG tablet, Take 1 tablet by mouth At Night As Needed for Anxiety (anxiety and/or sleep)., Disp: 30 tablet, Rfl: 0    IBUPROFEN IB PO, Take  by mouth Every 6 (Six) Hours As Needed., Disp: , Rfl:     lamoTRIgine ER (LaMICtal XR) 250 MG tablet sustained-release 24 hour, Take 1 tablet by mouth Daily., Disp: 30 tablet, Rfl: 6    levETIRAcetam (KEPPRA) 250 MG tablet, Take 1 tablet by mouth 2 (Two) Times a Day., Disp: 60 tablet, Rfl: 6    meclizine (ANTIVERT) 25 MG tablet, Take 1 tablet by mouth 3 (Three) Times a Day As Needed for Dizziness., Disp: 90 tablet, Rfl: 2    polyethylene glycol (MiraLax) 17 GM/SCOOP powder, Take 17 g by mouth Daily., Disp: 510 g, Rfl: 1    Rimegepant Sulfate (Nurtec) 75 MG tablet dispersible tablet, Take 1 tablet by mouth Daily As Needed (migraines). Take 1 tablet at the onset of headache, Max of 75 mg/24 hours, Max of 18 tabs/30 days., Disp: 16 tablet, Rfl: 0    SUMAtriptan (IMITREX) 100 MG tablet, Take 1 tablet by mouth 1 (One) Time As Needed for Migraine. Take one tablet at onset of headache. May repeat dose one time in 2 hours if headache not relieved., Disp: 30 tablet, Rfl: 6    vilazodone (VIIBRYD) 40 MG tablet tablet, Take 1 tablet by mouth Daily., Disp: 30 tablet, Rfl: 0    Medicines reviewed by Nickolas Taylor, PharmD on 9/13/2024 at 12:23 PM    Drug Interactions  No relevant drug drug interactions with specialty medication.       Adverse Drug Reactions  Medication tolerability: Tolerating with no to minimal ADRs          Medication plan: Continue therapy with normal follow-up  Plan for ADR Management: patient reports      Adherence, Self-Administration, and Current Therapy Problems  Adherence related patient's specialty therapy was discussed with the patient. The Adherence segment of this outreach has been reviewed and updated.   Adherence Questions  Linked Medication(s) Assessed: Galcanezumab-Gnlm  On average, how many doses/injections does the patient miss per  month?: 0  What are the identified reasons for non-adherence or missed doses? : no problems identified  What is the estimated medication adherence level?: %  Based on the patient/caregiver response and refill history, does this patient require an MTP to track adherence improvements?: no    Additional Barriers to Patient Self-Administration: none  Methods for Supporting Patient Self-Administration: Patient is adept with emgality self-injections.      Recently Close Medication Therapy Problems  No medication therapy recommendations to display  Open Medication Therapy Problems  No medication therapy recommendations to display     Goals of Therapy  Goals related to the patient's specialty therapy was discussed with the patient. The Patient Goals segment of this outreach has been reviewed and updated.    Goals Addressed Today        Specialty Pharmacy General Goal      Decrease severity and frequency of migraines. Takes Nurtec at onset of breakthrough migraine               Quality of Life Assessment   Quality of Life related to the patient's enrollment in the patient management program and services provided was discussed with the patient. The QOL segment of this outreach has been reviewed and updated.   Quality of Life Improvement Scale: 8-Moderately better    Reassessment Plan & Follow-Up  Medication Therapy Changes: continue nurtec prn, emgality  Related Plans, Therapy Recommendations, or Issues to Be Addressed: n/a  Pharmacist to perform regular reassessments no more than (6) months from the previous assessment.  Care Coordinator to set up future refill outreaches, coordinate prescription delivery, and escalate clinical questions to pharmacist.     Attestation  Therapeutic appropriateness: Appropriate  I attest the patient was actively involved in and has agreed to the above plan of care. If the prescribed therapy is at any point deemed not appropriate based on the current or future assessments, a consultation  will be initiated with the patient's specialty care provider to determine the best course of action. The revised plan of therapy will be documented along with any additional patient education provided. Discussed aforementioned material with patient by phone.    Nickolas Taylor, PharmD, [facility]  Clinic Specialty Pharmacist, Neurology  9/13/2024  12:32 EDT

## 2024-09-13 NOTE — PROGRESS NOTES
Specialty Pharmacy Refill Coordination Note     Lady is a 29 y.o. female contacted today regarding refills of her specialty medication(s).    Specialty medication(s) and dose(s) confirmed: emgality 120 mg/mL  Changes to medications: viibryd dose increased to 40 mg/day  Changes to insurance: no  Reviewed and verified with patient:  Allergies  Meds  Problems         Refill Questions      Flowsheet Row Most Recent Value   Changes to allergies? No   Changes to medications? No   New conditions or infections since last clinic visit No   Unplanned office visit, urgent care, ED, or hospital admission in the last 4 weeks  No   How does patient/caregiver feel medication is working? Very good   Financial problems or insurance changes  No   Since the previous refill, were any specialty medication doses or scheduled injections missed or delayed?  No   Does this patient require a clinical escalation to a pharmacist? No            Delivery Questions      Flowsheet Row Most Recent Value   Delivery method [employer]   Delivery address verified with patient/caregiver? Yes   Delivery address Home   Number of medications in delivery 1   Medication(s) being filled and delivered Galcanezumab-Gnlm   [facility]   Copay verified? Yes   Copay amount 0$   Copay form of payment No copayment ($0)   Ship Date 9/16   Delivery Date 9/17   Signature Required Yes                 Follow-up: 3.5 week(s)     Nickolas Taylor, PharmD  9/13/2024   12:27 EDT

## 2024-10-09 ENCOUNTER — TELEMEDICINE (OUTPATIENT)
Dept: PSYCHIATRY | Facility: CLINIC | Age: 29
End: 2024-10-09
Payer: COMMERCIAL

## 2024-10-09 DIAGNOSIS — F31.60 BIPOLAR AFFECTIVE DISORDER, MIXED: Primary | ICD-10-CM

## 2024-10-09 DIAGNOSIS — F33.41 RECURRENT MAJOR DEPRESSIVE DISORDER, IN PARTIAL REMISSION: ICD-10-CM

## 2024-10-09 DIAGNOSIS — F41.0 PANIC ATTACKS: ICD-10-CM

## 2024-10-09 DIAGNOSIS — Z79.899 MEDICATION MANAGEMENT: ICD-10-CM

## 2024-10-09 RX ORDER — VILAZODONE HYDROCHLORIDE 40 MG/1
40 TABLET ORAL DAILY
Qty: 30 TABLET | Refills: 1 | Status: SHIPPED | OUTPATIENT
Start: 2024-10-09

## 2024-10-09 RX ORDER — HYDROXYZINE HYDROCHLORIDE 25 MG/1
50 TABLET, FILM COATED ORAL NIGHTLY PRN
Qty: 60 TABLET | Refills: 0 | Status: SHIPPED | OUTPATIENT
Start: 2024-10-09

## 2024-10-09 RX ORDER — LAMOTRIGINE 25 MG/1
50 TABLET ORAL DAILY
Qty: 60 TABLET | Refills: 1 | Status: SHIPPED | OUTPATIENT
Start: 2024-10-09
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
## 2024-10-10 ENCOUNTER — TELEMEDICINE (OUTPATIENT)
Dept: PSYCHIATRY | Facility: CLINIC | Age: 29
End: 2024-10-10
Payer: COMMERCIAL

## 2024-10-10 DIAGNOSIS — F41.0 PANIC ATTACKS: ICD-10-CM

## 2024-10-10 DIAGNOSIS — F31.60 BIPOLAR AFFECTIVE DISORDER, MIXED: Primary | ICD-10-CM

## 2024-10-10 DIAGNOSIS — F33.41 RECURRENT MAJOR DEPRESSIVE DISORDER, IN PARTIAL REMISSION: ICD-10-CM

## 2024-10-10 NOTE — PROGRESS NOTES
Date: October 10, 2024  Time In: 13:57 EDT  Time out: 2:54 PM EST    This provider is located at [facility], [facility] [place], [place], [facility], using a secure [employer] Video Visit through [employer]. Patient is being seen remotely via telehealth at their home address is located in [place]. Patient stated they are in a secure environment for this session. The patient's condition being diagnosed and treated is appropriate for telemedicine. The provider identified themself as well as their credentials. The patient, or  patient's legal guardian consent to be seen remotely, and when consent is given they understand that the consent allows for patient identifiable information to be sent to a third party as needed. They may refuse to be seen remotely at any time. The electronic data is encrypted and password protected, and the patient's or  legal guardian has been advised of the potential risks to privacy not withstanding such measures.   PT Identifiers used: Name and .    You have chosen to receive care through a telehealth visit.  Do you consent to use a video/audio connection for your medical care today? Yes    Subjective   Lady Diamond is a 29 y.o. female who presents today for follow up    Chief Complaint:   Chief Complaint   Patient presents with    Anxiety    Depression    PTSD      [facility]    [employer] $25      Data: Pt reported she has been struggling. Pt reported she and her partner have broken up. Pt reported that her mom is back on heroine. Pt reported she 'went off' on her partners MO. Pt reported she swa 'red' and 'blacked out', pt denies getting psychically violent but very verbal. Pt reported she feels like this is her being protective of her parnter. Pt reported that she feel like her life is out of control. Pt reported she self harmed, cut her thigh. Pt denies SI and denies need for inpatient tx at this time. Pt and clinician identified purpose  and protective factors. Pt reported to have purpose in school, being a partner and mother.      Clinical Maneuvering/Intervention: Assisted patient in processing above session content; acknowledged and normalized patient’s thoughts, feelings, and concerns.  Rationalized patient thought process regarding concerns presented at session.  Discussed triggers associated with patient's  anxiety , depression , and anger  Also discussed coping skills for patient to implement such as grounding , mindfulness , self care , positive self talk , and facts vs feelings.    Allowed patient to freely discuss issues without interruption or judgment. Provided safe, confidential environment to facilitate the development of positive therapeutic relationship and encourage open, honest communication. Assisted patient in identifying risk factors which would indicate the need for higher level of care including thoughts to harm self or others and/or self-harming behavior and encouraged patient to contact this office, call 911, or present to the nearest emergency room should any of these events occur. Discussed crisis intervention services and means to access. Patient adamantly and convincingly denies current suicidal or homicidal ideation or perceptual disturbance.    Assessment: Pt was alert and oriented x3. Pt was located in a secure location for confidentiality/privacy. Pt appears to have all of nothing thinking, catastrophizing and emotional reasoning. Pt appears to dismiss trying to reframe thought patterns due to all or nothing thought pattern. Pt was agreeable to do anger management classes online. Pt appears to struggle controlling her anger.  Pt may be self sabotaging due to lack of self worth and normalizing chaos. Unalterable demographics and a history of mental health intervention indicate this patient is in a high risk category compared to the general population. Pt denies current self harm thoughts or SI.    Patient appears to  maintain relative stability as compared to their baseline.  However, patient continues to struggle with   Chief Complaint   Patient presents with    Anxiety    Depression    PTSD    which continues to cause impairment in important areas of functioning.  A result, they can be reasonably expected to continue to benefit from treatment and would likely be at increased risk for decompensation otherwise.      Mental Status Exam:   Hygiene:   good  Cooperation:  Aggressive  Eye Contact:  Good  Psychomotor Behavior:  Aggitated  Affect:  Angry  Mood: irritable  Speech:  Rapid  Thought Process:  Disorganized  Thought Content:  Mood congruent  Suicidal:  None  Homicidal:  None  Hallucinations:  None  Delusion:  None  Memory:  Intact  Orientation:  Grossly intact  Reliability:  fair  Insight:  Fair  Judgement:  Fair  Impulse Control:  Fair  Physical/Medical Issues:  No        Patient's Support Network Includes:  significant other and extended family    Functional Status: Mild impairment     Progress toward goal: Not at goal    Prognosis: Fair with Ongoing Treatment     Plan: start anger management courses.    Patient will continue in individual outpatient therapy with focus on improved functioning and coping skills, maintaining stability, and avoiding decompensation and the need for higher level of care.    Patient will adhere to medication regimen as prescribed and report any side effects. Patient will contact this office, call 911 or present to the nearest emergency room should suicidal or homicidal ideations occur. Provide Cognitive Behavioral Therapy and Solution Focused Therapy to improve functioning, maintain stability, and avoid decompensation and the need for higher level of care.     Return in about 2 weeks (around 10/24/2024).      VISIT DIAGNOSIS:    Diagnosis Plan   1. Bipolar affective disorder, mixed        2. Panic attacks        3. Recurrent major depressive disorder, in partial remission         13:57 EDT          This document has been electronically signed by Anna Collier LCSW  October 10, 2024      Part of this note may be an electronic transcription/translation of spoken language to printed text using the Dragon Dictation System.

## 2024-10-15 ENCOUNTER — SPECIALTY PHARMACY (OUTPATIENT)
Dept: ONCOLOGY | Facility: HOSPITAL | Age: 29
End: 2024-10-15
Payer: COMMERCIAL

## 2024-10-15 NOTE — PROGRESS NOTES
Specialty Pharmacy Refill Coordination Note     Lady is a 29 y.o. female contacted today regarding refills of Emgality specialty medication(s)..Patient is due for injection on 10/27.  Reviewed and verified with patient:       Specialty medication(s) and dose(s) confirmed: yes    Refill Questions      Flowsheet Row Most Recent Value   Changes to allergies? No   Changes to medications? Yes  [10/15 patient stating the  has increase my Lamotrigine 300mg daily and Hydroxyzine to 50mg prn.]   New conditions or infections since last clinic visit No   Unplanned office visit, urgent care, ED, or hospital admission in the last 4 weeks  Yes  [10/15 patient stating the DrJoaquina has increase my Lamotrigine 300mg daily and Hydroxyzine to 50mg prn.]   How does patient/caregiver feel medication is working? Good   Financial problems or insurance changes  No   Since the previous refill, were any specialty medication doses or scheduled injections missed or delayed?  Yes   If yes, please provide the amount Nurtec have plenty   Why were doses missed? Nurtec prn   Does this patient require a clinical escalation to a pharmacist? Yes  [10/15 patient stating the  has increase my Lamotrigine 300mg daily and Hydroxyzine to 50mg prn.]            Delivery Questions      Flowsheet Row Most Recent Value   Delivery method UPS   Delivery address verified with patient/caregiver? Yes   Delivery address Home   Number of medications in delivery 1   Medication(s) being filled and delivered Galcanezumab-gnlm (EMGALITY)   Doses left of specialty medications Nurtec have plenty   Copay verified? Yes   Copay amount Emgality =$0   Copay form of payment No copayment ($0)   Ship Date 10/15   Delivery Date 10/16   Signature Required Yes                   Follow-up: 28 day(s)     Nick Parker  Specialty Pharmacy Technician

## 2024-10-17 ENCOUNTER — NURSE TRIAGE (OUTPATIENT)
Dept: CALL CENTER | Facility: HOSPITAL | Age: 29
End: 2024-10-17
Payer: COMMERCIAL

## 2024-10-17 ENCOUNTER — OFFICE VISIT (OUTPATIENT)
Dept: INTERNAL MEDICINE | Facility: CLINIC | Age: 29
End: 2024-10-17
Payer: COMMERCIAL

## 2024-10-17 ENCOUNTER — LAB (OUTPATIENT)
Dept: INTERNAL MEDICINE | Facility: CLINIC | Age: 29
End: 2024-10-17
Payer: COMMERCIAL

## 2024-10-17 VITALS
SYSTOLIC BLOOD PRESSURE: 120 MMHG | WEIGHT: 264 LBS | OXYGEN SATURATION: 99 % | BODY MASS INDEX: 42.43 KG/M2 | DIASTOLIC BLOOD PRESSURE: 82 MMHG | HEIGHT: 66 IN | HEART RATE: 75 BPM

## 2024-10-17 DIAGNOSIS — Z86.2 HISTORY OF ANEMIA: ICD-10-CM

## 2024-10-17 DIAGNOSIS — R26.81 UNSTEADINESS: ICD-10-CM

## 2024-10-17 DIAGNOSIS — Z11.3 SCREEN FOR STD (SEXUALLY TRANSMITTED DISEASE): ICD-10-CM

## 2024-10-17 DIAGNOSIS — R68.89 TEMPERATURE INTOLERANCE: ICD-10-CM

## 2024-10-17 DIAGNOSIS — H53.8 BLURRED VISION, RIGHT EYE: Primary | ICD-10-CM

## 2024-10-17 DIAGNOSIS — R41.0 CONFUSION: ICD-10-CM

## 2024-10-17 LAB
DEPRECATED RDW RBC AUTO: 39.6 FL (ref 37–54)
ERYTHROCYTE [DISTWIDTH] IN BLOOD BY AUTOMATED COUNT: 14 % (ref 12.3–15.4)
HCT VFR BLD AUTO: 36.8 % (ref 34–46.6)
HGB BLD-MCNC: 11.9 G/DL (ref 12–15.9)
MCH RBC QN AUTO: 25.4 PG (ref 26.6–33)
MCHC RBC AUTO-ENTMCNC: 32.3 G/DL (ref 31.5–35.7)
MCV RBC AUTO: 78.6 FL (ref 79–97)
PLATELET # BLD AUTO: 390 10*3/MM3 (ref 140–450)
PMV BLD AUTO: 10.1 FL (ref 6–12)
RBC # BLD AUTO: 4.68 10*6/MM3 (ref 3.77–5.28)
WBC NRBC COR # BLD AUTO: 8.13 10*3/MM3 (ref 3.4–10.8)

## 2024-10-17 PROCEDURE — 84443 ASSAY THYROID STIM HORMONE: CPT | Performed by: INTERNAL MEDICINE

## 2024-10-17 PROCEDURE — 80074 ACUTE HEPATITIS PANEL: CPT | Performed by: INTERNAL MEDICINE

## 2024-10-17 PROCEDURE — 80053 COMPREHEN METABOLIC PANEL: CPT | Performed by: INTERNAL MEDICINE

## 2024-10-17 PROCEDURE — 36415 COLL VENOUS BLD VENIPUNCTURE: CPT | Performed by: INTERNAL MEDICINE

## 2024-10-17 PROCEDURE — 99214 OFFICE O/P EST MOD 30 MIN: CPT | Performed by: INTERNAL MEDICINE

## 2024-10-17 PROCEDURE — 86592 SYPHILIS TEST NON-TREP QUAL: CPT | Performed by: INTERNAL MEDICINE

## 2024-10-17 PROCEDURE — 85027 COMPLETE CBC AUTOMATED: CPT | Performed by: INTERNAL MEDICINE

## 2024-10-17 PROCEDURE — 83036 HEMOGLOBIN GLYCOSYLATED A1C: CPT | Performed by: INTERNAL MEDICINE

## 2024-10-17 PROCEDURE — G0432 EIA HIV-1/HIV-2 SCREEN: HCPCS | Performed by: INTERNAL MEDICINE

## 2024-10-17 PROCEDURE — 82728 ASSAY OF FERRITIN: CPT | Performed by: INTERNAL MEDICINE

## 2024-10-17 RX ORDER — LEVETIRACETAM 250 MG/1
250 TABLET ORAL 2 TIMES DAILY
Start: 2024-10-17

## 2024-10-17 NOTE — TELEPHONE ENCOUNTER
Reason for Disposition  • [1] Blurred vision or visual changes AND [2] gradual onset (e.g., weeks, months)    Additional Information  • Negative: [1] SEVERE weakness (i.e., unable to walk or barely able to walk, requires support) AND [2] new-onset or worsening  • Negative: [1] Weakness (i.e., paralysis, loss of muscle strength) of the face, arm / hand, or leg / foot on one side of the body AND [2] sudden onset AND [3] present now  (Exception: Bell's palsy suspected [i.e., weakness only on one side of the face, developing over hours to days, no other symptoms].)  • Negative: [1] Numbness (i.e., loss of sensation) of the face, arm / hand, or leg / foot on one side of the body AND [2] sudden onset AND [3] present now  • Negative: [1] Loss of speech or garbled speech AND [2] sudden onset AND [3] present now  • Negative: Difficult to awaken or acting confused (e.g., disoriented, slurred speech)  • Negative: Sounds like a life-threatening emergency to the triager  • Negative: Confusion, disorientation, or hallucinations is main symptom  • Negative: Neck pain is main symptom (and having weakness, numbness, or tingling in arm / hand because of neck pain)  • Negative: Back pain is main symptom (and having weakness, numbness, or tingling in leg because of back pain)  • Negative: Hand pain is main symptom (and having mild weakness, numbness, or tingling in hand related to hand pain)  • Negative: Dizziness is main symptom  • Negative: Followed a head injury within last 3 days  • Negative: Followed a neck injury within last 3 days  • Negative: [1] Tingling in both hands and/or feet AND [2] breathing faster than normal AND [3] feels similar to prior panic attack or hyperventilation episode  • Negative: Weakness in both sides of the body or weakness all over  • Negative: Headache  (and neurologic deficit)  • Negative: [1] Back pain AND [2] numbness (loss of sensation) in groin or rectal area  • Negative: [1] Unable to urinate (or  only a few drops) > 4 hours AND [2] bladder feels very full (e.g., palpable bladder or strong urge to urinate)  • Negative: [1] Loss of bladder or bowel control (urine or bowel incontinence; wetting self, leaking stool) AND [2] new-onset  • Negative: [1] Weakness (i.e., paralysis, loss of muscle strength) of the face, arm / hand, or leg / foot on one side of the body AND [2] sudden onset AND [3] brief (now gone)  • Negative: [1] Numbness (i.e., loss of sensation) of the face, arm / hand, or leg / foot on one side of the body AND [2] sudden onset AND [3] brief (now gone)  • Negative: [1] Loss of speech or garbled speech AND [2] sudden onset AND [3] brief (now gone)  • Negative: Bell's palsy suspected (i.e., weakness on only one side of the face, developing over hours to days, no other symptoms)  • Negative: Patient sounds very sick or weak to the triager  • Negative: Neck pain (and neurologic deficit)  • Negative: Back pain (and neurologic deficit)  • Negative: [1] Weakness of the face, arm / hand, or leg / foot on one side of the body AND [2] gradual onset (e.g., days to weeks) AND [3] present now  • Negative: [1] Numbness (i.e., loss of sensation) of the face, arm / hand, or leg / foot on one side of the body AND [2] gradual onset (e.g., days to weeks) AND [3] present now  • Negative: [1] Loss of speech or garbled speech AND [2] gradual onset (e.g., days to weeks) AND [3] present now  • Negative: [1] Tingling (e.g., pins and needles) of the face, arm / hand, or leg / foot on one side of the body AND [2] present now (Exceptions: Chronic or recurrent symptom lasting > 4 weeks; or tingling from known cause, such as: bumped elbow, carpal tunnel syndrome, pinched nerve, frostbite.)  • Negative: [1] Loss of speech or garbled speech AND [2] is a chronic symptom (recurrent or ongoing AND present > 4 weeks)  • Negative: [1] Weakness of arm / hand, or leg / foot AND [2] is a chronic symptom (recurrent or ongoing AND present  > 4 weeks)  • Negative: [1] Numbness or tingling in one or both hands AND [2] is a chronic symptom (recurrent or ongoing AND present > 4 weeks)  • Negative: [1] Numbness or tingling in one or both feet AND [2] is a chronic symptom (recurrent or ongoing AND present > 4 weeks)  • Negative: [1] Numbness or tingling on both sides of body AND [2] is a new symptom present > 24 hours  • Negative: [1] Tingling in hand (e.g., pins and needles) AND [2] after prolonged lying on arm AND [3]  brief (now gone)  • Negative: [1] Tingling in foot (e.g., pins and needles) AND [2] after prolonged sitting AND [3] brief (now gone)  • Negative: [1] Bumped elbow AND [2] brief (now gone) numbness (or tingling or burning) in hand and fingers  • Vision loss or change is main symptom  • Negative: Weakness of the face, arm or leg on one side of the body  • Negative: Followed getting substance in the eye  • Negative: Foreign body stuck in the eye  • Negative: Followed an eye injury  • Negative: Followed sun lamp or sun exposure (UV keratitis)  • Negative: Yellow or green discharge (pus) in the eye  • Negative: Pregnant  • Negative: Postpartum (from 0 to 6 weeks after delivery)  • Negative: Complete loss of vision in one or both eyes  • Negative: SEVERE eye pain  • Negative: SEVERE headache  • Negative: Double vision  • Negative: [1] Blurred vision or visual changes AND [2] present now AND [3] sudden onset or new (e.g., minutes, hours, days)  (Exception: Seeing floaters / black specks OR previously diagnosed migraine headaches with same symptoms.)  • Negative: Patient sounds very sick or weak to the triager  • Negative: Flashes of light  (Exception: Brief from pressing on the eyeball.)  • Negative: Many floaters in the eye  (Exception: Floater(s) are a chronic symptom and this is unchanged from patient's baseline pattern.)  • Negative: [1] Eye pain AND [2] brief (now gone) blurred vision or visual changes  • Negative: [1] Taking digoxin (e.g.,  "Lanoxin, Digitek, Cardoxin, Lanoxicaps; Toloxin in [place]) AND [2] blurred vision, yellow vision, or yellow-green halos  • Negative: [1] Jaw pain while eating AND [2] age > 50 years  • Negative: [1] Headache AND [2] age > 50 years  • Negative: Single floater (i.e., small speck seems to float across the eye)  (Exception: Floater(s) are a chronic symptom and this is unchanged from patient's baseline pattern.)  • Negative: [1] Brief (now gone) blurred vision AND [2] unexplained  • Negative: [1] Laser light exposure AND [2] blurred vision present > 15 minutes  • Negative: Floaters are a chronic symptom (recurrent or ongoing AND present > 4 weeks)  • Negative: Holds book very close to face or sits very close to TV    Answer Assessment - Initial Assessment Questions  1. SYMPTOM: \"What is the main symptom you are concerned about?\" (e.g., weakness, numbness)      Blurred vision   2. ONSET: \"When did this start?\" (minutes, hours, days; while sleeping)      Off and on for awhile   3. LAST NORMAL: \"When was the last time you (the patient) were normal (no symptoms)?\"      Not sure   4. PATTERN \"Does this come and go, or has it been constant since it started?\"  \"Is it present now?\"      Comes and goes throughout the day  5. CARDIAC SYMPTOMS: \"Have you had any of the following symptoms: chest pain, difficulty breathing, palpitations?\"      none  6. NEUROLOGIC SYMPTOMS: \"Have you had any of the following symptoms: headache, dizziness, vision loss, double vision, changes in speech, unsteady on your feet?\"      Dizziness, blurred vision, fatigue, some confusion at times--periodically throughout the day.  7. OTHER SYMPTOMS: \"Do you have any other symptoms?\"      Off balance   8. PREGNANCY: \"Is there any chance you are pregnant?\" \"When was your last menstrual period?\"      no    Answer Assessment - Initial Assessment Questions  1. DESCRIPTION: \"How has your vision changed?\" (e.g., complete vision loss, blurred vision, double vision, " "floaters, etc.)      Blurred vision comes and goes but has hx of diabetes in family as well.  2. LOCATION: \"One or both eyes?\" If one, ask: \"Which eye?\"      Both eyes   3. SEVERITY: \"Can you see anything?\" If Yes, ask: \"What can you see?\" (e.g., fine print)      Just depends   4. ONSET: \"When did this begin?\" \"Did it start suddenly or has this been gradual?\"      This has been going on for awhile off and on   5. PATTERN: \"Does this come and go, or has it been constant since it started?\"      Comes and goes not constant   6. PAIN: \"Is there any pain in your eye(s)?\"  (Scale 1-10; or mild, moderate, severe)    - NONE (0): No pain.    - MILD (1-3): Doesn't interfere with normal activities.    - MODERATE (4-7): Interferes with normal activities or awakens from sleep.     - SEVERE (8-10): Excruciating pain, unable to do any normal activities.      Mild to moderate   7. CONTACTS-GLASSES: \"Do you wear contacts or glasses?\"      no  8. CAUSE: \"What do you think is causing this visual problem?\"      Not sure wants to be checked for diabetes as well   9. OTHER SYMPTOMS: \"Do you have any other symptoms?\" (e.g., confusion, headache, arm or leg weakness, speech problems)      Confusion at times, black outs in past, blurred vision, weakness/fatigue, feels off balance  10. PREGNANCY: \"Is there any chance you are pregnant?\" \"When was your last menstrual period?\"        no    Protocols used: Neurologic Deficit-ADULT-AH, Vision Loss or Change-ADULT-AH    "

## 2024-10-17 NOTE — PROGRESS NOTES
"Subjective   Lady Diamond is a 29 y.o. female here for unsteadiness, memory loss, episodes of blurred vision. She has hx sz but none in a long time. She recently started back on keppra and on lamictal as well. The unsteadiness is also episodic, hasn't fallen just feels a bit \"off.\"  The memory loss is forgetting conversations and being repetitive sometimes.  The blurred vision seems to be more prevalent in the right eye.  She had an MRI last year.  No severe headaches.    I have reviewed the patient's relevant medical history and confirmed it is accurate.    I have personally reviewed and performed the ROS. Flores Carrion MD     Objective   /82 (BP Location: Left arm)   Pulse 75   Ht 167.6 cm (66\")   Wt 120 kg (264 lb)   SpO2 99%   BMI 42.61 kg/m²     Physical Exam  Constitutional:       Appearance: She is well-developed.   Pulmonary:      Effort: Pulmonary effort is normal.   Neurological:      General: No focal deficit present.      Mental Status: She is alert.      Cranial Nerves: No cranial nerve deficit.      Sensory: No sensory deficit.      Gait: Gait normal.   Psychiatric:         Behavior: Behavior normal.         Thought Content: Thought content normal.         Judgment: Judgment normal.         Current Outpatient Medications:     famotidine (Pepcid) 20 MG tablet, Take 1 tablet by mouth 2 (Two) Times a Day. (Patient taking differently: Take 1 tablet by mouth As Needed.), Disp: 60 tablet, Rfl: 2    galcanezumab-gnlm (EMGALITY) 120 MG/ML auto-injector pen, Inject 1 mL under the skin into the appropriate area as directed Every 28 (Twenty-Eight) Days., Disp: 1 mL, Rfl: 11    hydrOXYzine (ATARAX) 25 MG tablet, Take 2 tablets by mouth At Night As Needed for Anxiety (anxiety and/or sleep)., Disp: 60 tablet, Rfl: 0    lamoTRIgine (LaMICtal) 25 MG tablet, Take 2 tablets by mouth Daily., Disp: 60 tablet, Rfl: 1    lamoTRIgine ER (LaMICtal XR) 250 MG tablet sustained-release 24 hour, " Take 1 tablet by mouth Daily., Disp: 30 tablet, Rfl: 6    meclizine (ANTIVERT) 25 MG tablet, Take 1 tablet by mouth 3 (Three) Times a Day As Needed for Dizziness., Disp: 90 tablet, Rfl: 2    polyethylene glycol (MiraLax) 17 GM/SCOOP powder, Take 17 g by mouth Daily., Disp: 510 g, Rfl: 1    Rimegepant Sulfate (Nurtec) 75 MG tablet dispersible tablet, Take 1 tablet by mouth Daily As Needed (migraines). Take 1 tablet at the onset of headache, Max of 75 mg/24 hours, Max of 18 tabs/30 days., Disp: 16 tablet, Rfl: 0    vilazodone (VIIBRYD) 40 MG tablet tablet, Take 1 tablet by mouth Daily., Disp: 30 tablet, Rfl: 1    Assessment & Plan   Diagnoses and all orders for this visit:    1. Blurred vision, right eye (Primary)  -     MRI Brain With & Without Contrast; Future    2. Unsteadiness  -     MRI Brain With & Without Contrast; Future  -     Comprehensive Metabolic Panel  -     TSH Rfx On Abnormal To Free T4    3. Confusion  -     Hemoglobin A1c    4. Temperature intolerance    5. History of anemia  -     CBC (No Diff)  -     Ferritin             Flores Carrion MD

## 2024-10-17 NOTE — TELEPHONE ENCOUNTER
HUB--pt transferred to me from hub b/c they stated they could not get in touch with office.  Pt having multiple s/s such as blurred vision/vision changes off and on throughout a period of time, not constant.  DM runs in family and wants to be checked for this.  She has had some black outs in past but not recently; some confusion/fatigue/aches/pains, this all happens periodically throughout the day not constant, comes and goes.  Pt has a neurologist but wants to see PCP for check up.  Warm transfer to office, appt made for today.  Care advice given.  Pt to dorothy back for any other issues or to go the ED if s/s worsen and she starts having numbness/tingling on one side of the body with slurred speech or issues such as.

## 2024-10-18 LAB
ALBUMIN SERPL-MCNC: 4 G/DL (ref 3.5–5.2)
ALBUMIN/GLOB SERPL: 1.3 G/DL
ALP SERPL-CCNC: 72 U/L (ref 39–117)
ALT SERPL W P-5'-P-CCNC: 15 U/L (ref 1–33)
ANION GAP SERPL CALCULATED.3IONS-SCNC: 9.4 MMOL/L (ref 5–15)
AST SERPL-CCNC: 17 U/L (ref 1–32)
BILIRUB SERPL-MCNC: <0.2 MG/DL (ref 0–1.2)
BUN SERPL-MCNC: 11 MG/DL (ref 6–20)
BUN/CREAT SERPL: 10.1 (ref 7–25)
CALCIUM SPEC-SCNC: 9.7 MG/DL (ref 8.6–10.5)
CHLORIDE SERPL-SCNC: 101 MMOL/L (ref 98–107)
CO2 SERPL-SCNC: 25.6 MMOL/L (ref 22–29)
CREAT SERPL-MCNC: 1.09 MG/DL (ref 0.57–1)
EGFRCR SERPLBLD CKD-EPI 2021: 69.8 ML/MIN/1.73
FERRITIN SERPL-MCNC: 18.1 NG/ML (ref 13–150)
GLOBULIN UR ELPH-MCNC: 3.2 GM/DL
GLUCOSE SERPL-MCNC: 91 MG/DL (ref 65–99)
HAV IGM SERPL QL IA: NORMAL
HBA1C MFR BLD: 5.5 % (ref 4.8–5.6)
HBV CORE IGM SERPL QL IA: NORMAL
HBV SURFACE AG SERPL QL IA: NORMAL
HCV AB SER QL: NORMAL
HIV 1+2 AB+HIV1 P24 AG SERPL QL IA: NORMAL
POTASSIUM SERPL-SCNC: 4.5 MMOL/L (ref 3.5–5.2)
PROT SERPL-MCNC: 7.2 G/DL (ref 6–8.5)
RPR SER QL: NORMAL
SODIUM SERPL-SCNC: 136 MMOL/L (ref 136–145)
TSH SERPL DL<=0.05 MIU/L-ACNC: 1.32 UIU/ML (ref 0.27–4.2)

## 2024-10-21 ENCOUNTER — TELEPHONE (OUTPATIENT)
Dept: INTERNAL MEDICINE | Facility: CLINIC | Age: 29
End: 2024-10-21

## 2024-10-21 DIAGNOSIS — R79.89 ELEVATED SERUM CREATININE: Primary | ICD-10-CM

## 2024-10-21 NOTE — TELEPHONE ENCOUNTER
Caller: Lady Diamond    Relationship: Self    Best call back number: [phone]       What was the call regarding: PATIENT STATES THAT HER DECREASED KIDNEY FUNCTION CANNOT BEE FROM DEHYDRATION BECAUSE SHE DRINKS 6 WATER BOTTLES A DAY WHICH EQUALS A GALLON OF WATER    Is it okay if the provider responds through MyChart:

## 2024-10-24 ENCOUNTER — TELEMEDICINE (OUTPATIENT)
Dept: PSYCHIATRY | Facility: CLINIC | Age: 29
End: 2024-10-24
Payer: COMMERCIAL

## 2024-10-24 ENCOUNTER — OFFICE VISIT (OUTPATIENT)
Dept: INTERNAL MEDICINE | Facility: CLINIC | Age: 29
End: 2024-10-24
Payer: COMMERCIAL

## 2024-10-24 VITALS
HEIGHT: 66 IN | BODY MASS INDEX: 42.43 KG/M2 | WEIGHT: 264 LBS | HEART RATE: 76 BPM | DIASTOLIC BLOOD PRESSURE: 70 MMHG | OXYGEN SATURATION: 100 % | RESPIRATION RATE: 16 BRPM | SYSTOLIC BLOOD PRESSURE: 126 MMHG | TEMPERATURE: 97 F

## 2024-10-24 DIAGNOSIS — R91.1 NODULE OF UPPER LOBE OF RIGHT LUNG: ICD-10-CM

## 2024-10-24 DIAGNOSIS — F31.60 BIPOLAR AFFECTIVE DISORDER, MIXED: Primary | ICD-10-CM

## 2024-10-24 DIAGNOSIS — F33.41 RECURRENT MAJOR DEPRESSIVE DISORDER, IN PARTIAL REMISSION: ICD-10-CM

## 2024-10-24 DIAGNOSIS — F41.0 PANIC ATTACKS: ICD-10-CM

## 2024-10-24 DIAGNOSIS — J20.9 ACUTE BRONCHITIS, UNSPECIFIED ORGANISM: ICD-10-CM

## 2024-10-24 DIAGNOSIS — J06.9 VIRAL URI WITH COUGH: Primary | ICD-10-CM

## 2024-10-24 DIAGNOSIS — R91.1 PULMONARY NODULE: ICD-10-CM

## 2024-10-24 PROCEDURE — 99213 OFFICE O/P EST LOW 20 MIN: CPT | Performed by: INTERNAL MEDICINE

## 2024-10-24 RX ORDER — PREDNISONE 5 MG/1
1 TABLET ORAL DAILY
Qty: 21 TABLET | Refills: 0 | Status: SHIPPED | OUTPATIENT
Start: 2024-10-24

## 2024-10-24 RX ORDER — PREDNISONE 20 MG/1
20 TABLET ORAL
COMMUNITY
Start: 2024-10-22 | End: 2024-10-27

## 2024-10-24 RX ORDER — BROMPHENIRAMINE MALEATE, PSEUDOEPHEDRINE HYDROCHLORIDE, AND DEXTROMETHORPHAN HYDROBROMIDE 2; 30; 10 MG/5ML; MG/5ML; MG/5ML
5 SYRUP ORAL
COMMUNITY
Start: 2024-10-22

## 2024-10-24 NOTE — PROGRESS NOTES
Answers submitted by the patient for this visit:  Other (Submitted on 10/24/2024)  Please describe your symptoms.: Hospital follow up.  Have you had these symptoms before?: No  How long have you been having these symptoms?: 1-4 days  Please list any medications you are currently taking for this condition.: In medical records  Please describe any probable cause for these symptoms. : Unkown  Primary Reason for Visit (Submitted on 10/24/2024)  What is the primary reason for your visit?: Problem Not Listed       Follow Up Office Visit      Date: 10/24/2024   Patient Name: Lady Diamond  : 1995   MRN: [id number]     Chief Complaint:    Chief Complaint   Patient presents with    er follow up    Lung Nodule       History of Present Illness: Lady Diamond is a 29 y.o. female who is here today to follow up with ER f/u for cough, chills, sore throat, congestion, soa, wheezing.  Strep and COVID were negative.  She is now here for follow-up. Given abx, steroids, cough medicine, and inhaler.           Subjective      Past Medical History:   Diagnosis Date    Anemia 2019    Anxiety with depression 2006    no meds    Bipolar disorder     Cholelithiasis     Chronic constipation 2018    Cluster headache     H/O chlamydia infection 2016    H/O gonorrhea     H/O gonorrhea     H/O gonorrhea 2012    H/O gonorrhea 2018    H/O trichomoniasis 2018    H/O trichomoniasis 2021    Seizures 2022    May have had those in her sleep    Urinary tract infection 2006       Past Surgical History:   Procedure Laterality Date     SECTION  11/15/2014    LTCS (1 layer closure)     SECTION N/A 2019    Procedure:  SECTION REPEAT WITH SALPINGECTOMY;  Surgeon: Regine José MD;  Location: [facility] LABOR DELIVERY;  Service: Obstetrics;  Laterality: N/A;    LAPAROSCOPIC CHOLECYSTECTOMY  2015    TONSILLECTOMY  Dec. 14    TUBAL ABDOMINAL LIGATION  19    WISDOM TOOTH EXTRACTION   2016       Family History   Problem Relation Age of Onset    Bipolar disorder Mother     Anxiety disorder Mother     Depression Mother         Everyone in my family    Multiple sclerosis Mother     Arthritis Mother     Drug abuse Mother     Hearing loss Mother     Hypertension Mother     Mental illness Mother         All of us    Depression Father     Alcohol abuse Father     Drug abuse Father     Bipolar disorder Sister     Depression Sister     Anxiety disorder Sister     Drug abuse Sister     Suicide Attempts Maternal Grandmother     Birth defects Sister         Her daughter my niece jessy    Miscarriages / Stillbirths Sister     Diabetes Paternal Grandmother         Social History     Socioeconomic History    Marital status: Single    Number of children: 3   Tobacco Use    Smoking status: Every Day     Current packs/day: 0.00     Average packs/day: 0.6 packs/day for 26.4 years (15.0 ttl pk-yrs)     Types: Electronic Cigarette, Cigarettes     Start date: 2010     Last attempt to quit: 3/1/2019     Years since quittin.6     Passive exposure: Past    Smokeless tobacco: Never   Vaping Use    Vaping status: Every Day    Substances: Nicotine   Substance and Sexual Activity    Alcohol use: Not Currently     Alcohol/week: 6.0 standard drinks of alcohol     Types: 6 Shots of liquor per week     Comment: tequila    Drug use: Never    Sexual activity: Yes     Partners: Male     Birth control/protection: Bilateral salpingectomy , Surgical         I have reviewed the patients family history, social history, past medical history, past surgical history and have updated it as appropriate.     Medications:     Current Outpatient Medications:     amoxicillin-clavulanate (AUGMENTIN) 875-125 MG per tablet, Take 1 tablet by mouth., Disp: , Rfl:     brompheniramine-pseudoephedrine-DM 30-2-10 MG/5ML syrup, Take 5 mL by mouth., Disp: , Rfl:     famotidine (Pepcid) 20 MG tablet, Take 1 tablet by mouth 2 (Two) Times a Day.  "(Patient taking differently: Take 1 tablet by mouth As Needed.), Disp: 60 tablet, Rfl: 2    galcanezumab-gnlm (EMGALITY) 120 MG/ML auto-injector pen, Inject 1 mL under the skin into the appropriate area as directed Every 28 (Twenty-Eight) Days., Disp: 1 mL, Rfl: 11    hydrOXYzine (ATARAX) 25 MG tablet, Take 2 tablets by mouth At Night As Needed for Anxiety (anxiety and/or sleep)., Disp: 60 tablet, Rfl: 0    lamoTRIgine (LaMICtal) 25 MG tablet, Take 2 tablets by mouth Daily., Disp: 60 tablet, Rfl: 1    lamoTRIgine ER (LaMICtal XR) 250 MG tablet sustained-release 24 hour, Take 1 tablet by mouth Daily., Disp: 30 tablet, Rfl: 6    levETIRAcetam (KEPPRA) 250 MG tablet, Take 1 tablet by mouth 2 (Two) Times a Day., Disp: , Rfl:     meclizine (ANTIVERT) 25 MG tablet, Take 1 tablet by mouth 3 (Three) Times a Day As Needed for Dizziness., Disp: 90 tablet, Rfl: 2    polyethylene glycol (MiraLax) 17 GM/SCOOP powder, Take 17 g by mouth Daily., Disp: 510 g, Rfl: 1    predniSONE (DELTASONE) 20 MG tablet, Take 1 tablet by mouth., Disp: , Rfl:     Rimegepant Sulfate (Nurtec) 75 MG tablet dispersible tablet, Take 1 tablet by mouth Daily As Needed (migraines). Take 1 tablet at the onset of headache, Max of 75 mg/24 hours, Max of 18 tabs/30 days., Disp: 16 tablet, Rfl: 0    vilazodone (VIIBRYD) 40 MG tablet tablet, Take 1 tablet by mouth Daily., Disp: 30 tablet, Rfl: 1    predniSONE 5 MG (21) tablet therapy pack dose pack, Take 1 tablet by mouth Daily. Take as directed on package instructions., Disp: 21 tablet, Rfl: 0    Allergies:   No Known Allergies    Objective       Vital Signs:   Vitals:    10/24/24 0951   BP: 126/70   Patient Position: Sitting   Cuff Size: Large Adult   Pulse: 76   Resp: 16   Temp: 97 °F (36.1 °C)   SpO2: 100%   Weight: 120 kg (264 lb)   Height: 167.6 cm (66\")     Body mass index is 42.61 kg/m².    Physical Exam:  Physical Exam  Constitutional:       General: She is not in acute distress.     Appearance: Normal " appearance. She is not ill-appearing.   HENT:      Right Ear: Tympanic membrane normal.      Left Ear: Tympanic membrane normal.      Nose: No congestion or rhinorrhea.      Mouth/Throat:      Mouth: Mucous membranes are moist.      Pharynx: No oropharyngeal exudate.   Eyes:      Extraocular Movements: Extraocular movements intact.      Conjunctiva/sclera: Conjunctivae normal.      Pupils: Pupils are equal, round, and reactive to light.   Cardiovascular:      Rate and Rhythm: Normal rate and regular rhythm.   Pulmonary:      Effort: Pulmonary effort is normal. No respiratory distress.      Breath sounds: Wheezing present.   Abdominal:      General: Abdomen is flat. Bowel sounds are normal.      Palpations: Abdomen is soft.      Tenderness: There is no abdominal tenderness.   Musculoskeletal:         General: No swelling.      Cervical back: Neck supple.      Right lower leg: No edema.      Left lower leg: No edema.   Skin:     Coloration: Skin is not jaundiced.      Findings: No rash.   Neurological:      General: No focal deficit present.      Mental Status: She is alert and oriented to person, place, and time.      Cranial Nerves: No cranial nerve deficit.   Psychiatric:         Mood and Affect: Mood normal.         Behavior: Behavior normal.         Thought Content: Thought content normal.         Procedures    Results:       Assessment / Plan      Assessment/Plan:   Diagnoses and all orders for this visit:    1. Viral URI with cough (Primary)    2. Acute bronchitis, unspecified organism  -     predniSONE 5 MG (21) tablet therapy pack dose pack; Take 1 tablet by mouth Daily. Take as directed on package instructions.  Dispense: 21 tablet; Refill: 0    3. Pulmonary nodule  -     Ambulatory Referral to Lung Nodule Clinic [facility]    4. Nodule of upper lobe of right lung         Breathing better now, mild chest pressure. Cough still present. Wheezing, will need prolonged taper.  Ordered.   Fatigued. Continue abx  and steroids. Refer to pulm nodule clinic.                 Follow Up:   No follow-ups on file.    DO JEWELS Wade

## 2024-10-24 NOTE — PROGRESS NOTES
Date: 2024  Time In: 12:55 EDT  Time out: 1:49 PM EST    This provider is [facility], [place], [facility], using a secure [employer] Video Visit through [employer]. Patient is being seen remotely via telehealth at their home address is located in [place]. Patient stated they are in a secure environment for this session. The patient's condition being diagnosed and treated is appropriate for telemedicine. The provider identified themself as well as their credentials. The patient, or  patient's legal guardian consent to be seen remotely, and when consent is given they understand that the consent allows for patient identifiable information to be sent to a third party as needed. They may refuse to be seen remotely at any time. The electronic data is encrypted and password protected, and the patient's or  legal guardian has been advised of the potential risks to privacy not withstanding such measures.   PT Identifiers used: Name and .    You have chosen to receive care through a telehealth visit.  Do you consent to use a video/audio connection for your medical care today? Yes    Subjective   Lady Diamond is a 29 y.o. female who presents today for follow up    Chief Complaint:   Chief Complaint   Patient presents with    Anxiety    Depression        History of Present Illness: Pt has struggled with MH since childhood. Pt reported having health issues; spot on lung, low liver functioning, and MRI for confusion. Pt reported that she has been 'in and out', memory issues, dizziness. Pt reported that she has been crying a lot due to the stress of her health. Pt reported that she doesn't feel here mentally. Pt reported she is planning on quitting smoking after she finishes her current vape. Pt reported that she has had some stress with behavioral issues with her children.      Clinical Maneuvering/Intervention: Assisted patient in processing above session  content; acknowledged and normalized patient’s thoughts, feelings, and concerns.  Rationalized patient thought process regarding concerns presented at session.  Discussed triggers associated with patient's  anxiety  and depression  Also discussed coping skills for patient to implement such as grounding , mindfulness , increasing activity , self care , and positive self talk     Allowed patient to freely discuss issues without interruption or judgment. Provided safe, confidential environment to facilitate the development of positive therapeutic relationship and encourage open, honest communication. Assisted patient in identifying risk factors which would indicate the need for higher level of care including thoughts to harm self or others and/or self-harming behavior and encouraged patient to contact this office, call 911, or present to the nearest emergency room should any of these events occur. Discussed crisis intervention services and means to access. Patient adamantly and convincingly denies current suicidal or homicidal ideation or perceptual disturbance.    Assessment: Pt was alert and oriented x3. Pt was located in a secure location for confidentiality/privacy. Pt appears under increased stressed with health and kids behavioral issues. Pt appears to have black and white thinking, emotional reasoning and catastrophizing. Pt appears not receptive to changing thought pattern. Pt appears to attempt to implement distractions to aid with MH. Unalterable demographics and a history of mental health intervention indicate this patient is in a high risk category compared to the general population.    Patient appears to maintain relative stability as compared to their baseline.  However, patient continues to struggle with   Chief Complaint   Patient presents with    Anxiety    Depression    which continues to cause impairment in important areas of functioning.  A result, they can be reasonably expected to continue to  benefit from treatment and would likely be at increased risk for decompensation otherwise.        Mental Status Exam:   Hygiene:   good  Cooperation:  Cooperative  Eye Contact:  Good  Psychomotor Behavior:  Appropriate  Mood: anxious  Speech:  Normal  Thought Process:  Disorganized  Thought Content:  Mood congruent  Suicidal:  None  Homicidal:  None  Hallucinations:  None  Delusion:  None  Memory:  Deficits  Orientation:  Grossly intact  Reliability:  fair  Insight:  Fair  Judgement:  Fair  Impulse Control:  Fair  Physical/Medical Issues:  Yes nodule on lung, MRI next week,         Patient's Support Network Includes:  significant other    Functional Status: Mild impairment     Progress toward goal: Not at goal    Prognosis: Fair with Ongoing Treatment     Plan:     Patient will continue in individual outpatient therapy with focus on improved functioning and coping skills, maintaining stability, and avoiding decompensation and the need for higher level of care.    Patient will adhere to medication regimen as prescribed and report any side effects. Patient will contact this office, call 911 or present to the nearest emergency room should suicidal or homicidal ideations occur. Provide Cognitive Behavioral Therapy and Solution Focused Therapy to improve functioning, maintain stability, and avoid decompensation and the need for higher level of care.     Return in about 3 weeks (around 11/14/2024).      VISIT DIAGNOSIS:    Diagnosis Plan   1. Bipolar affective disorder, mixed        2. Panic attacks        3. Recurrent major depressive disorder, in partial remission         12:55 EDT         This document has been electronically signed by Anna Collier LCSW  October 24, 2024      Part of this note may be an electronic transcription/translation of spoken language to printed text using the Dragon Dictation System.

## 2024-10-29 ENCOUNTER — TELEPHONE (OUTPATIENT)
Dept: PSYCHIATRY | Facility: CLINIC | Age: 29
End: 2024-10-29
Payer: COMMERCIAL
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
## 2024-10-30 ENCOUNTER — TELEPHONE (OUTPATIENT)
Dept: INTERNAL MEDICINE | Facility: CLINIC | Age: 29
End: 2024-10-30
Payer: COMMERCIAL

## 2024-10-30 NOTE — TELEPHONE ENCOUNTER
Caller: Lady Diamond    Relationship: Self    Who are you requesting to speak with (clinical staff, provider,  specific staff member): DR FINE    Do you know the name of the person who called: SELF    What was the call regarding: PATIENT HAS AN APPOINTMENT FOR AN MRI TODAY HOWEVER SHE HAS BEEN ADMITTED TO [facility] AND NEEDS IT TO BE RESCHEDULED.

## 2024-10-31 ENCOUNTER — READMISSION MANAGEMENT (OUTPATIENT)
Dept: CALL CENTER | Facility: HOSPITAL | Age: 29
End: 2024-10-31
Payer: COMMERCIAL

## 2024-10-31 NOTE — OUTREACH NOTE
Prep Survey      Flowsheet Row Responses   [facility] facility patient discharged from? Non-BH   Is LACE score < 7 ? Non-BH Discharge   Eligibility Not Eligible   What are the reasons patient is not eligible? Behavioral Health   Does the patient have one of the following disease processes/diagnoses(primary or secondary)? Other   Prep survey completed? Yes            GLENROY ROBERTS - Registered Nurse

## 2024-11-01 ENCOUNTER — TELEPHONE (OUTPATIENT)
Dept: INTERNAL MEDICINE | Facility: CLINIC | Age: 29
End: 2024-11-01

## 2024-11-01 NOTE — TELEPHONE ENCOUNTER
Caller: Lady Diamond    Relationship to patient: Self    Best call back number: [phone]     Chief complaint: NA    Type of visit: IN OFFICE PROCEDURE    Requested date: ASAP     If rescheduling, when is the original appointment:      Additional notes:PATIENT NEEDS TO HAVE HER EARS CLEANED OUT.

## 2024-11-04 ENCOUNTER — OFFICE VISIT (OUTPATIENT)
Dept: INTERNAL MEDICINE | Facility: CLINIC | Age: 29
End: 2024-11-04
Payer: COMMERCIAL

## 2024-11-04 VITALS
HEART RATE: 100 BPM | DIASTOLIC BLOOD PRESSURE: 70 MMHG | TEMPERATURE: 96.9 F | SYSTOLIC BLOOD PRESSURE: 128 MMHG | OXYGEN SATURATION: 98 % | HEIGHT: 66 IN | BODY MASS INDEX: 42.27 KG/M2 | WEIGHT: 263 LBS

## 2024-11-04 DIAGNOSIS — F31.9 BIPOLAR AFFECTIVE DISORDER, REMISSION STATUS UNSPECIFIED: ICD-10-CM

## 2024-11-04 DIAGNOSIS — H61.22 IMPACTED CERUMEN OF LEFT EAR: Primary | ICD-10-CM

## 2024-11-04 DIAGNOSIS — G43.009 MIGRAINE WITHOUT AURA AND WITHOUT STATUS MIGRAINOSUS, NOT INTRACTABLE: ICD-10-CM

## 2024-11-04 PROCEDURE — 1160F RVW MEDS BY RX/DR IN RCRD: CPT | Performed by: PHYSICIAN ASSISTANT

## 2024-11-04 PROCEDURE — 1159F MED LIST DOCD IN RCRD: CPT | Performed by: PHYSICIAN ASSISTANT

## 2024-11-04 PROCEDURE — 99214 OFFICE O/P EST MOD 30 MIN: CPT | Performed by: PHYSICIAN ASSISTANT

## 2024-11-04 PROCEDURE — 69209 REMOVE IMPACTED EAR WAX UNI: CPT | Performed by: PHYSICIAN ASSISTANT

## 2024-11-04 NOTE — PROGRESS NOTES
[facility] PRIMARY CARE  [address]  [facility] [address]  Dept: [phone]  Dept Fax: [phone]  Loc: [phone]  Loc Fax: [phone]    Lady Diamond  1995    Follow Up Office Visit Note    History of Present Illness:  Patient is a 29-year-old female in today for hearing loss.  Concern for earwax buildup in the left ear.    Ear Fullness   Associated symptoms include hearing loss. Pertinent negatives include no coughing, diarrhea, headaches, rash, sore throat or vomiting.   Earache   There is pain in the left ear. This is a chronic problem. The current episode started more than 1 month ago. The problem occurs constantly. The problem has been unchanged. There has been no fever. The pain is at a severity of 3/10. Associated symptoms include hearing loss. Pertinent negatives include no coughing, diarrhea, headaches, rash, sore throat or vomiting. She has tried analgesic ear drops for the symptoms.   Additional information:Ear wax clog loss of hearing      The following portions of the patient's history were reviewed and updated as appropriate: allergies, current medications, past family history, past medical history, past social history, past surgical history, and problem list.    Medications:    Current Outpatient Medications:     amoxicillin-clavulanate (AUGMENTIN) 875-125 MG per tablet, Take 1 tablet by mouth., Disp: , Rfl:     brompheniramine-pseudoephedrine-DM 30-2-10 MG/5ML syrup, Take 5 mL by mouth., Disp: , Rfl:     famotidine (Pepcid) 20 MG tablet, Take 1 tablet by mouth 2 (Two) Times a Day. (Patient taking differently: Take 1 tablet by mouth As Needed.), Disp: 60 tablet, Rfl: 2    galcanezumab-gnlm (EMGALITY) 120 MG/ML auto-injector pen, Inject 1 mL under the skin into the appropriate area as directed Every 28 (Twenty-Eight) Days., Disp: 1 mL, Rfl: 11    hydrOXYzine (ATARAX) 25 MG tablet, Take 2 tablets by mouth At Night As Needed for  Anxiety (anxiety and/or sleep)., Disp: 60 tablet, Rfl: 0    lamoTRIgine (LaMICtal) 25 MG tablet, Take 2 tablets by mouth Daily., Disp: 60 tablet, Rfl: 1    lamoTRIgine ER (LaMICtal XR) 250 MG tablet sustained-release 24 hour, Take 1 tablet by mouth Daily., Disp: 30 tablet, Rfl: 6    levETIRAcetam (KEPPRA) 250 MG tablet, Take 1 tablet by mouth 2 (Two) Times a Day., Disp: , Rfl:     meclizine (ANTIVERT) 25 MG tablet, Take 1 tablet by mouth 3 (Three) Times a Day As Needed for Dizziness., Disp: 90 tablet, Rfl: 2    polyethylene glycol (MiraLax) 17 GM/SCOOP powder, Take 17 g by mouth Daily., Disp: 510 g, Rfl: 1    predniSONE 5 MG (21) tablet therapy pack dose pack, Take 1 tablet by mouth Daily. Take as directed on package instructions., Disp: 21 tablet, Rfl: 0    Rimegepant Sulfate (Nurtec) 75 MG tablet dispersible tablet, Take 1 tablet by mouth Daily As Needed (migraines). Take 1 tablet at the onset of headache, Max of 75 mg/24 hours, Max of 18 tabs/30 days., Disp: 16 tablet, Rfl: 0    vilazodone (VIIBRYD) 40 MG tablet tablet, Take 1 tablet by mouth Daily., Disp: 30 tablet, Rfl: 1    Subjective  No Known Allergies     Past Medical History:   Diagnosis Date    Anemia     Anxiety with depression     no meds    Bipolar disorder     Cholelithiasis     Chronic constipation     Cluster headache     H/O chlamydia infection 2016    H/O gonorrhea 2016    H/O gonorrhea     H/O gonorrhea 2012    H/O gonorrhea 2018    H/O trichomoniasis 2018    H/O trichomoniasis 2021    Seizures 2022    May have had those in her sleep    Urinary tract infection 2006       Past Surgical History:   Procedure Laterality Date     SECTION  11/15/2014    LTCS (1 layer closure)     SECTION N/A 2019    Procedure:  SECTION REPEAT WITH SALPINGECTOMY;  Surgeon: Regine José MD;  Location: [facility] LABOR DELIVERY;  Service: Obstetrics;  Laterality: N/A;    LAPAROSCOPIC CHOLECYSTECTOMY   2015    TONSILLECTOMY  Dec. 14    TUBAL ABDOMINAL LIGATION  19    WISDOM TOOTH EXTRACTION  2016       Family History   Problem Relation Age of Onset    Bipolar disorder Mother     Anxiety disorder Mother     Depression Mother         Everyone in my family    Multiple sclerosis Mother     Arthritis Mother     Drug abuse Mother     Hearing loss Mother     Hypertension Mother     Mental illness Mother         All of us    Depression Father     Alcohol abuse Father     Drug abuse Father     Bipolar disorder Sister     Depression Sister     Anxiety disorder Sister     Drug abuse Sister     Suicide Attempts Maternal Grandmother     Birth defects Sister         Her daughter my niece jessy    Miscarriages / Stillbirths Sister     Diabetes Paternal Grandmother         Social History     Socioeconomic History    Marital status: Single    Number of children: 3   Tobacco Use    Smoking status: Every Day     Current packs/day: 0.00     Average packs/day: 0.6 packs/day for 26.4 years (15.0 ttl pk-yrs)     Types: Cigarettes, Electronic Cigarette     Start date: 2010     Last attempt to quit: 3/1/2019     Years since quittin.6     Passive exposure: Past    Smokeless tobacco: Never   Vaping Use    Vaping status: Every Day    Substances: Nicotine   Substance and Sexual Activity    Alcohol use: Not Currently     Alcohol/week: 6.0 standard drinks of alcohol     Types: 6 Shots of liquor per week     Comment: tequila    Drug use: Never    Sexual activity: Yes     Partners: Male     Birth control/protection: Bilateral salpingectomy , Surgical       Review of Systems   Constitutional:  Negative for activity change, chills, fatigue, fever and unexpected weight change.   HENT:  Positive for ear pain and hearing loss. Negative for congestion, postnasal drip, sinus pressure and sore throat.    Eyes:  Negative for pain, discharge and redness.   Respiratory:  Negative for cough, shortness of breath and wheezing.   "  Cardiovascular:  Negative for chest pain, palpitations and leg swelling.   Gastrointestinal:  Negative for diarrhea, nausea and vomiting.   Endocrine: Negative for cold intolerance and heat intolerance.   Genitourinary:  Negative for decreased urine volume and dysuria.   Musculoskeletal:  Negative for arthralgias and myalgias.   Skin:  Negative for rash and wound.   Neurological:  Negative for dizziness, light-headedness and headaches.   Hematological:  Does not bruise/bleed easily.   Psychiatric/Behavioral:  Negative for confusion, dysphoric mood and sleep disturbance. The patient is not nervous/anxious.          Objective  Vitals:    11/04/24 1328   BP: 128/70   BP Location: Left arm   Patient Position: Sitting   Cuff Size: Large Adult   Pulse: 100   Temp: 96.9 °F (36.1 °C)   TempSrc: Temporal   SpO2: 98%   Weight: 119 kg (263 lb)   Height: 167.6 cm (65.98\")     Body mass index is 42.47 kg/m².     Physical Exam  Physical Exam  Vitals and nursing note reviewed.   Constitutional:       General: She is not in acute distress.     Appearance: She is not ill-appearing.   HENT:      Head: Normocephalic.      Right Ear: Tympanic membrane, ear canal and external ear normal. There is no impacted cerumen.      Left Ear: There is impacted cerumen.      Nose: No congestion or rhinorrhea.      Mouth/Throat:      Mouth: Mucous membranes are moist.      Pharynx: Oropharynx is clear. No oropharyngeal exudate or posterior oropharyngeal erythema.   Eyes:      General:         Right eye: No discharge.         Left eye: No discharge.      Extraocular Movements: Extraocular movements intact.      Conjunctiva/sclera: Conjunctivae normal.      Pupils: Pupils are equal, round, and reactive to light.   Cardiovascular:      Rate and Rhythm: Normal rate and regular rhythm.      Heart sounds: Normal heart sounds. No murmur heard.     No friction rub. No gallop.   Pulmonary:      Effort: Pulmonary effort is normal. No respiratory distress. "      Breath sounds: Normal breath sounds. No wheezing.   Abdominal:      General: Bowel sounds are normal. There is no distension.      Palpations: Abdomen is soft. There is no mass.      Tenderness: There is no abdominal tenderness.   Musculoskeletal:         General: No swelling. Normal range of motion.      Cervical back: Normal range of motion. No tenderness.      Right lower leg: No edema.      Left lower leg: No edema.   Lymphadenopathy:      Cervical: No cervical adenopathy.   Skin:     Findings: No bruising, erythema or rash.   Neurological:      Mental Status: She is oriented to person, place, and time.      Gait: Gait normal.   Psychiatric:         Mood and Affect: Mood normal.         Behavior: Behavior normal.         Thought Content: Thought content normal.         Judgment: Judgment normal.         Diagnostic Data  Ear Cerumen Removal    Date/Time: 11/4/2024 1:51 PM    Performed by: Kiel Ye PA-C  Authorized by: Kiel Ye PA-C  Consent: Verbal consent obtained.  Location details: left ear  Procedure type: irrigation         Assessment  Diagnoses and all orders for this visit:    1. Impacted cerumen of left ear (Primary)    2. Migraine without aura and without status migrainosus, not intractable    3. Bipolar affective disorder, remission status unspecified    Other orders  -     Ear Cerumen Removal        Plan      1. Impacted cerumen of left ear (Primary)- successful ear irrigation of the left ear.    2. Migraine without aura and without status migrainosus, not intractable- stable on Emgality and Nurtec.    3. Bipolar affective disorder, remission status unspecified- stable on Viibryd.      Return if symptoms worsen or fail to improve.    Kiel Ye PA-C  11/04/2024  Answers submitted by the patient for this visit:  Primary Reason for Visit (Submitted on 11/4/2024)  What is the primary reason for your visit?: Ear Pain  Ear Pain Questionnaire (Submitted on  11/4/2024)  Chief Complaint: Ear pain

## 2024-11-04 NOTE — PROGRESS NOTES
[facility] PRIMARY CARE  [address]  [facility] [address]  Dept: [phone]  Dept Fax: [phone]  Loc: [phone]  Loc Fax: [phone]    Lady Diamond  1995    Follow Up Office Visit Note    History of Present Illness:  Patient is a 29-year-old female in today for cerumen impaction.  Earwax buildup on the left ear.    Ear Fullness   Associated symptoms include hearing loss. Pertinent negatives include no coughing, diarrhea, headaches, rash, sore throat or vomiting.   Earache   There is pain in the left ear. This is a chronic problem. The current episode started more than 1 month ago. The problem occurs constantly. The problem has been unchanged. There has been no fever. The pain is at a severity of 3/10. Associated symptoms include hearing loss. Pertinent negatives include no coughing, diarrhea, headaches, rash, sore throat or vomiting. She has tried analgesic ear drops for the symptoms.   Additional information:Ear wax clog loss of hearing      The following portions of the patient's history were reviewed and updated as appropriate: allergies, current medications, past family history, past medical history, past social history, past surgical history, and problem list.    Medications:    Current Outpatient Medications:     amoxicillin-clavulanate (AUGMENTIN) 875-125 MG per tablet, Take 1 tablet by mouth., Disp: , Rfl:     brompheniramine-pseudoephedrine-DM 30-2-10 MG/5ML syrup, Take 5 mL by mouth., Disp: , Rfl:     famotidine (Pepcid) 20 MG tablet, Take 1 tablet by mouth 2 (Two) Times a Day. (Patient taking differently: Take 1 tablet by mouth As Needed.), Disp: 60 tablet, Rfl: 2    galcanezumab-gnlm (EMGALITY) 120 MG/ML auto-injector pen, Inject 1 mL under the skin into the appropriate area as directed Every 28 (Twenty-Eight) Days., Disp: 1 mL, Rfl: 11    hydrOXYzine (ATARAX) 25 MG tablet, Take 2 tablets by mouth At Night As Needed for Anxiety  (anxiety and/or sleep)., Disp: 60 tablet, Rfl: 0    lamoTRIgine (LaMICtal) 25 MG tablet, Take 2 tablets by mouth Daily., Disp: 60 tablet, Rfl: 1    lamoTRIgine ER (LaMICtal XR) 250 MG tablet sustained-release 24 hour, Take 1 tablet by mouth Daily., Disp: 30 tablet, Rfl: 6    levETIRAcetam (KEPPRA) 250 MG tablet, Take 1 tablet by mouth 2 (Two) Times a Day., Disp: , Rfl:     meclizine (ANTIVERT) 25 MG tablet, Take 1 tablet by mouth 3 (Three) Times a Day As Needed for Dizziness., Disp: 90 tablet, Rfl: 2    polyethylene glycol (MiraLax) 17 GM/SCOOP powder, Take 17 g by mouth Daily., Disp: 510 g, Rfl: 1    predniSONE 5 MG (21) tablet therapy pack dose pack, Take 1 tablet by mouth Daily. Take as directed on package instructions., Disp: 21 tablet, Rfl: 0    Rimegepant Sulfate (Nurtec) 75 MG tablet dispersible tablet, Take 1 tablet by mouth Daily As Needed (migraines). Take 1 tablet at the onset of headache, Max of 75 mg/24 hours, Max of 18 tabs/30 days., Disp: 16 tablet, Rfl: 0    vilazodone (VIIBRYD) 40 MG tablet tablet, Take 1 tablet by mouth Daily., Disp: 30 tablet, Rfl: 1    Subjective  No Known Allergies     Past Medical History:   Diagnosis Date    2019    Anxiety with depression     no meds    Bipolar disorder     Cholelithiasis     Chronic constipation     Cluster headache     H/O chlamydia infection     H/O gonorrhea     H/O gonorrhea     H/O gonorrhea 2012    H/O gonorrhea 2018    H/O trichomoniasis 2018    H/O trichomoniasis 2021    Seizures 2022    May have had those in her sleep    Urinary tract infection 2006       Past Surgical History:   Procedure Laterality Date     SECTION  11/15/2014    LTCS (1 layer closure)     SECTION N/A 2019    Procedure:  SECTION REPEAT WITH SALPINGECTOMY;  Surgeon: Regine José MD;  [facility]: [facility] LABOR DELIVERY;  Service: Obstetrics;  Laterality: N/A;    LAPAROSCOPIC CHOLECYSTECTOMY  2015     TONSILLECTOMY  Dec. 14    TUBAL ABDOMINAL LIGATION  19    WISDOM TOOTH EXTRACTION  2016       Family History   Problem Relation Age of Onset    Bipolar disorder Mother     Anxiety disorder Mother     Depression Mother         Everyone in my family    Multiple sclerosis Mother     Arthritis Mother     Drug abuse Mother     Hearing loss Mother     Hypertension Mother     Mental illness Mother         All of us    Depression Father     Alcohol abuse Father     Drug abuse Father     Bipolar disorder Sister     Depression Sister     Anxiety disorder Sister     Drug abuse Sister     Suicide Attempts Maternal Grandmother     Birth defects Sister         Her daughter my niece jessy    Miscarriages / Stillbirths Sister     Diabetes Paternal Grandmother         Social History     Socioeconomic History    Marital status: Single    Number of children: 3   Tobacco Use    Smoking status: Every Day     Current packs/day: 0.00     Average packs/day: 0.6 packs/day for 26.4 years (15.0 ttl pk-yrs)     Types: Cigarettes, Electronic Cigarette     Start date: 2010     Last attempt to quit: 3/1/2019     Years since quittin.6     Passive exposure: Past    Smokeless tobacco: Never   Vaping Use    Vaping status: Every Day    Substances: Nicotine   Substance and Sexual Activity    Alcohol use: Not Currently     Alcohol/week: 6.0 standard drinks of alcohol     Types: 6 Shots of liquor per week     Comment: tequila    Drug use: Never    Sexual activity: Yes     Partners: Male     Birth control/protection: Bilateral salpingectomy , Surgical       Review of Systems   Constitutional:  Negative for activity change, chills, fatigue, fever and unexpected weight change.   HENT:  Positive for ear pain and hearing loss. Negative for congestion, postnasal drip, sinus pressure and sore throat.    Eyes:  Negative for pain, discharge and redness.   Respiratory:  Negative for cough, shortness of breath and wheezing.    Cardiovascular:   "Negative for chest pain, palpitations and leg swelling.   Gastrointestinal:  Negative for diarrhea, nausea and vomiting.   Endocrine: Negative for cold intolerance and heat intolerance.   Genitourinary:  Negative for decreased urine volume and dysuria.   Musculoskeletal:  Negative for arthralgias and myalgias.   Skin:  Negative for rash and wound.   Neurological:  Negative for dizziness, light-headedness and headaches.   Hematological:  Does not bruise/bleed easily.   Psychiatric/Behavioral:  Negative for confusion, dysphoric mood and sleep disturbance. The patient is not nervous/anxious.          Objective  Vitals:    11/04/24 1328   BP: 128/70   BP Location: Left arm   Patient Position: Sitting   Cuff Size: Large Adult   Pulse: 100   Temp: 96.9 °F (36.1 °C)   TempSrc: Temporal   SpO2: 98%   Weight: 119 kg (263 lb)   Height: 167.6 cm (65.98\")     Body mass index is 42.47 kg/m².     Physical Exam  Physical Exam  Vitals and nursing note reviewed.   Constitutional:       General: She is not in acute distress.     Appearance: She is not ill-appearing.   HENT:      Head: Normocephalic.      Right Ear: Tympanic membrane, ear canal and external ear normal. There is no impacted cerumen.      Left Ear: There is impacted cerumen.      Nose: No congestion or rhinorrhea.      Mouth/Throat:      Mouth: Mucous membranes are moist.      Pharynx: Oropharynx is clear. No oropharyngeal exudate or posterior oropharyngeal erythema.   Eyes:      General:         Right eye: No discharge.         Left eye: No discharge.      Extraocular Movements: Extraocular movements intact.      Conjunctiva/sclera: Conjunctivae normal.      Pupils: Pupils are equal, round, and reactive to light.   Cardiovascular:      Rate and Rhythm: Normal rate and regular rhythm.      Heart sounds: Normal heart sounds. No murmur heard.     No friction rub. No gallop.   Pulmonary:      Effort: Pulmonary effort is normal. No respiratory distress.      Breath sounds: " Normal breath sounds. No wheezing.   Abdominal:      General: Bowel sounds are normal. There is no distension.      Palpations: Abdomen is soft. There is no mass.      Tenderness: There is no abdominal tenderness.   Musculoskeletal:         General: No swelling. Normal range of motion.      Cervical back: Normal range of motion. No tenderness.      Right lower leg: No edema.      Left lower leg: No edema.   Lymphadenopathy:      Cervical: No cervical adenopathy.   Skin:     Findings: No bruising, erythema or rash.   Neurological:      Mental Status: She is oriented to person, place, and time.      Gait: Gait normal.   Psychiatric:         Mood and Affect: Mood normal.         Behavior: Behavior normal.         Thought Content: Thought content normal.         Judgment: Judgment normal.         Diagnostic Data  Ear Cerumen Removal    Date/Time: 11/4/2024 1:43 PM    Performed by: Kiel Ye PA-C  Authorized by: Kiel Ye PA-C  Consent: Verbal consent obtained.  Location details: left ear  Procedure type: irrigation         Assessment  Diagnoses and all orders for this visit:    1. Impacted cerumen of left ear (Primary)    2. Migraine without aura and without status migrainosus, not intractable    3. Bipolar affective disorder, remission status unspecified    Other orders  -     Ear Cerumen Removal        Plan    1. Impacted cerumen of left ear (Primary)- successful ear irrigation of the left ear.    2. Migraine without aura and without status migrainosus, not intractable- stable on Emgality and Nurtec.    3. Bipolar affective disorder, remission status unspecified- stable on Viibryd.      Return if symptoms worsen or fail to improve.    Kiel Ye PA-C  11/04/2024  Answers submitted by the patient for this visit:  Primary Reason for Visit (Submitted on 11/4/2024)  What is the primary reason for your visit?: Ear Pain  Ear Pain Questionnaire (Submitted on 11/4/2024)  Chief Complaint: Ear  pain    Answers submitted by the patient for this visit:  Primary Reason for Visit (Submitted on 11/4/2024)  What is the primary reason for your visit?: Ear Pain  Ear Pain Questionnaire (Submitted on 11/4/2024)  Chief Complaint: Ear pain  Affected ear: left  Chronicity: chronic  Onset: more than 1 month ago  Progression since onset: unchanged  Frequency: constantly  Fever: no fever  Pain - numeric: 3/10  hearing loss: Yes  Treatments tried : analgesic ear drops  Additional Information: Ear wax clog loss of hearing

## 2024-11-11 ENCOUNTER — TELEMEDICINE (OUTPATIENT)
Dept: PSYCHIATRY | Facility: CLINIC | Age: 29
End: 2024-11-11
Payer: COMMERCIAL

## 2024-11-11 ENCOUNTER — SPECIALTY PHARMACY (OUTPATIENT)
Dept: ONCOLOGY | Facility: HOSPITAL | Age: 29
End: 2024-11-11
Payer: COMMERCIAL

## 2024-11-11 ENCOUNTER — TELEPHONE (OUTPATIENT)
Dept: PSYCHIATRY | Facility: CLINIC | Age: 29
End: 2024-11-11

## 2024-11-11 DIAGNOSIS — F41.0 PANIC ATTACKS: ICD-10-CM

## 2024-11-11 DIAGNOSIS — F31.60 BIPOLAR AFFECTIVE DISORDER, MIXED: Primary | ICD-10-CM

## 2024-11-11 DIAGNOSIS — Z79.899 MEDICATION MANAGEMENT: ICD-10-CM

## 2024-11-11 DIAGNOSIS — F33.41 RECURRENT MAJOR DEPRESSIVE DISORDER, IN PARTIAL REMISSION: ICD-10-CM

## 2024-11-11 DIAGNOSIS — F60.3 BORDERLINE PERSONALITY DISORDER: ICD-10-CM

## 2024-11-11 DIAGNOSIS — Z09 HOSPITAL DISCHARGE FOLLOW-UP: ICD-10-CM

## 2024-11-11 PROCEDURE — 99215 OFFICE O/P EST HI 40 MIN: CPT

## 2024-11-11 PROCEDURE — 96127 BRIEF EMOTIONAL/BEHAV ASSMT: CPT

## 2024-11-11 PROCEDURE — 1159F MED LIST DOCD IN RCRD: CPT

## 2024-11-11 PROCEDURE — 1160F RVW MEDS BY RX/DR IN RCRD: CPT

## 2024-11-11 PROCEDURE — 99417 PROLNG OP E/M EACH 15 MIN: CPT

## 2024-11-11 RX ORDER — VILAZODONE HYDROCHLORIDE 40 MG/1
40 TABLET ORAL DAILY
Qty: 30 TABLET | Refills: 0 | Status: SHIPPED | OUTPATIENT
Start: 2024-11-11

## 2024-11-11 RX ORDER — LAMOTRIGINE 25 MG/1
50 TABLET ORAL DAILY
Qty: 60 TABLET | Refills: 0 | Status: SHIPPED | OUTPATIENT
Start: 2024-11-11

## 2024-11-11 RX ORDER — LITHIUM CARBONATE 150 MG/1
150 CAPSULE ORAL 2 TIMES DAILY WITH MEALS
Qty: 60 CAPSULE | Refills: 0 | Status: SHIPPED | OUTPATIENT
Start: 2024-11-11

## 2024-11-11 RX ORDER — HYDROXYZINE HYDROCHLORIDE 25 MG/1
50 TABLET, FILM COATED ORAL NIGHTLY PRN
Qty: 60 TABLET | Refills: 0 | Status: CANCELLED | OUTPATIENT
Start: 2024-11-11

## 2024-11-11 RX ORDER — LAMOTRIGINE 250 MG/1
250 TABLET, EXTENDED RELEASE ORAL DAILY
Qty: 30 TABLET | Refills: 0 | Status: SHIPPED | OUTPATIENT
Start: 2024-11-11

## 2024-11-11 NOTE — PROGRESS NOTES
This provider is located at the [facility] (through [facility]), [address], [facility], [address] using a secure video visit through [employer]. Patient is being seen remotely via telehealth at their home address in [place], and stated they are in a secure environment for this session.The patient's condition being consulted/diagnosed/treated is appropriate for telemedicine. The provider identified herself as well as her credentials.   The patient, and/or patients guardian, consent to be seen remotely, and when consent is given they understand that the consent allows for patient identifiable information to be sent to a third party as needed. They may refuse to be seen remotely at any time. The electronic data is encrypted and password protected, and the patient and/or guardian has been advised of the potential risks to privacy not withstanding such measures.   The use of a video visit has been reviewed with the patient and verbal informed consent has been obtained.   Patient identifiers used: Name and .    Subjective   Lady Diamond is a 29 y.o. female who presents today for follow up    Chief Complaint:    Chief Complaint   Patient presents with    Anxiety    Depression    Med Management    Irritable    Sleeping Problem    Suicidal        History of Present Illness:   History of Present Illness  Patient is a 29-year-old female presenting for a one month follow-up for medication management related to irritability, sleep disturbance, anxiety, and depression.  Continues to struggle with multiple environmental stressors which are contributing to symptoms.  Voices compliance with medications, denies side effects.  Unfortunately experience suicidal ideations a few weeks ago, was admitted to [facility] for 1 night, then [facility] for 1 night according to patient.  States she was discharged with no medication changes.  States during that time she was  "\" from my fiancé, grabbing and begging him not to go anywhere.  I slit my wrists a few times.  I plan to take a gun and shoot my brains out.\"  Patient denies having access to weapons at home.  States also during that time, multiple medical stressors to include \"my PCP might think I have a brain tumor, I found all this out within a week.  My kidney does not function.\"  She also states she has a lung nodule being monitored by pulmonology.  Her son was suspended from school last Friday.  She states currently she has \"no urge to kill myself.\"  She denies both passive and active SI currently and is convincing.  Denies HI, SIB today, or hallucinations.  She states \"I feel like I have calmed down some.\"  Continues to acknowledge \"impulsive anger, I am working on it.\"  She states \"I am fine with mood stabilizers.\"  Later states she is feeling \"not okay\" in regards to her depression.  States she is currently focusing on her children and schoolwork, has A's and B's in school currently.  States she fell behind related to her mental health recently but was able to catch up.  PHQ increased from 14-27, indicating severe depression which patient rates at 10/10 on average.  The same for anxiety which TERESA is currently scored at 18 indicating as severe.  Has been utilizing Atarax intermittently \"a little\" which does help somewhat with sleep at night.  Denies yolanda.  Is able to contract for safety at this time, is future oriented and discussing school goes to graduate in the spring.  Nonetheless, a safety plan is devised with patient currently.  Acknowledges debilitating mental health status mostly due to situational stressors.    Patient lives in home with her 3 children.  Her sister is also currently staying with her.  Fiance moved out.  She cites some trauma related to a pregnancy. Patient acknowledges alcohol use was problematic, although has stated that she has reduced these habits, is drinking less than 1/5 of tequila " weekly.  Denies drug use.  Smokes quarter of a pack per day of cigarettes.  She states she smokes weed occasionally, not daily just socially.  Denies [employer] preference.  She has 5 siblings, 2 she speaks with frequently.  1 sister recently involved in a shootout, contributing to her stress.  Son is recently back home, some stress surrounding truancy issues.  She is also in school online part-time to begin pursuing a degree in [employer] science, she states her grades are good.  Plans to graduate next spring.  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  Currently in counseling.  Hallucinations    Anxiety  Symptoms include depressed mood and nervous/anxious behavior.     Her past medical history is significant for depression.   DepressionPatient presents with the following symptoms: depressed mood and nervousness/anxiety.          Last Menstrual Period:  yesterday-no tubes    The patient denies any chance of pregnancy at this time.  The patient was educated that her prescribed medications can have potential risk to a developing fetus. The patient is advised to contact this APRN/this office if she becomes pregnant or plans to become pregnant.  Pt verbalizes understanding and acknowledged agreement with this plan in her own words.      The following portions of the patient's history were reviewed and updated as appropriate: allergies, current medications, past family history, past medical history, past social history, past surgical history and problem list.    Past Psychiatric History:  Began Treatment: age 10  Diagnoses:Depression and Anxiety  Psychiatrist: previously  Therapist: previously 2016  Admission History: [facility] 2016 1 week, [facility] one week later. [facility] 2024, [facility] for 2 days.  Medication Trials: Lexapro, elavil, lamictal, propranolol (irritability worse), vraylar (increased depression/suicidal thoughts), buspar (didn't work), abilify 5mg (didn't work-weight gain) topamax (slurred  speech), atarax 30 mg BID (not effective), vistaril 25 mg (too sedating), rexulti (weight gain)  Self Harm:  cuttin, once yearly, started at age 10  Suicide Attempts: 3 total, last in 2016   Psychosis, Anxiety, Depression:  PPD possibly 7 years ago undiagnosed    Past Medical History:  Past Medical History:   Diagnosis Date    Anemia 2019    Anxiety with depression     no meds    Bipolar disorder     Cholelithiasis     Chronic constipation     Cluster headache     H/O chlamydia infection     H/O gonorrhea     H/O gonorrhea     H/O gonorrhea     H/O gonorrhea     H/O trichomoniasis     H/O trichomoniasis 2021    Seizures 2022    May have had those in her sleep    Urinary tract infection        Substance Abuse History:   Types:Denies all, including illicit  Withdrawal Symptoms:Denies  Longest Period Sober:Not Applicable   AA: Not applicable     Social History:  Social History     Socioeconomic History    Marital status: Single    Number of children: 3   Tobacco Use    Smoking status: Every Day     Current packs/day: 0.00     Average packs/day: 0.6 packs/day for 26.4 years (15.0 ttl pk-yrs)     Types: Cigarettes, Electronic Cigarette     Start date: 2010     Last attempt to quit: 3/1/2019     Years since quittin.7     Passive exposure: Past    Smokeless tobacco: Never   Vaping Use    Vaping status: Every Day    Substances: Nicotine   Substance and Sexual Activity    Alcohol use: Not Currently     Alcohol/week: 6.0 standard drinks of alcohol     Types: 6 Shots of liquor per week     Comment: tequila    Drug use: Never    Sexual activity: Yes     Partners: Male     Birth control/protection: Bilateral salpingectomy , Surgical       Family History:  Family History   Problem Relation Age of Onset    Bipolar disorder Mother     Anxiety disorder Mother     Depression Mother         Everyone in my family    Multiple sclerosis Mother     Arthritis Mother      Drug abuse Mother     Hearing loss Mother     Hypertension Mother     Mental illness Mother         All of us    Depression Father     Alcohol abuse Father     Drug abuse Father     Bipolar disorder Sister     Depression Sister     Anxiety disorder Sister     Drug abuse Sister     Suicide Attempts Maternal Grandmother     Birth defects Sister         Her daughter my niece jessy    Miscarriages / Stillbirths Sister     Diabetes Paternal Grandmother        Past Surgical History:  Past Surgical History:   Procedure Laterality Date     SECTION  11/15/2014    LTCS (1 layer closure)     SECTION N/A 2019    Procedure:  SECTION REPEAT WITH SALPINGECTOMY;  Surgeon: Regine José MD;  Location: [facility] LABOR DELIVERY;  Service: Obstetrics;  Laterality: N/A;    LAPAROSCOPIC CHOLECYSTECTOMY  2015    TONSILLECTOMY  Dec.     TUBAL ABDOMINAL LIGATION  19    WISDOM TOOTH EXTRACTION         Problem List:  Patient Active Problem List   Diagnosis    Annual GYN exam w/o problems    Body mass index (BMI) 40.0-44.9, adult    Smoker    Chronic constipation    Generalized convulsive seizures    Migraine without aura and without status migrainosus, not intractable    Bipolar disorder       Allergy:   No Known Allergies     Current Medications:   Current Outpatient Medications   Medication Sig Dispense Refill    lamoTRIgine (LaMICtal) 25 MG tablet Take 2 tablets by mouth Daily. 60 tablet 0    lamoTRIgine ER (LaMICtal XR) 250 MG tablet sustained-release 24 hour Take 1 tablet by mouth Daily. 30 tablet 0    vilazodone (VIIBRYD) 40 MG tablet tablet Take 1 tablet by mouth Daily. 30 tablet 0    famotidine (Pepcid) 20 MG tablet Take 1 tablet by mouth 2 (Two) Times a Day. (Patient taking differently: Take 1 tablet by mouth As Needed.) 60 tablet 2    galcanezumab-gnlm (EMGALITY) 120 MG/ML auto-injector pen Inject 1 mL under the skin into the appropriate area as directed Every 28 (Twenty-Eight) Days.  1 mL 11    hydrOXYzine (ATARAX) 25 MG tablet Take 2 tablets by mouth At Night As Needed for Anxiety (anxiety and/or sleep). 60 tablet 0    levETIRAcetam (KEPPRA) 250 MG tablet Take 1 tablet by mouth 2 (Two) Times a Day.      lithium carbonate 150 MG capsule Take 1 capsule by mouth 2 (Two) Times a Day With Meals. 60 capsule 0    meclizine (ANTIVERT) 25 MG tablet Take 1 tablet by mouth 3 (Three) Times a Day As Needed for Dizziness. 90 tablet 2    polyethylene glycol (MiraLax) 17 GM/SCOOP powder Take 17 g by mouth Daily. 510 g 1    Rimegepant Sulfate (Nurtec) 75 MG tablet dispersible tablet Take 1 tablet by mouth Daily As Needed (migraines). Take 1 tablet at the onset of headache, Max of 75 mg/24 hours, Max of 18 tabs/30 days. 16 tablet 0     No current facility-administered medications for this visit.       Review of Systems:    Review of Systems   Constitutional:  Positive for appetite change.   HENT: Negative.     Eyes: Negative.         Photosensitivity   Respiratory: Negative.     Cardiovascular: Negative.    Gastrointestinal:  Positive for constipation.   Endocrine: Negative.    Genitourinary:  Positive for menstrual problem.        Severe cramping   Musculoskeletal: Negative.    Skin: Negative.         Eczema   Allergic/Immunologic: Negative.    Neurological:  Positive for seizures and headache.   Hematological: Negative.    Psychiatric/Behavioral:  Positive for hallucinations, depressed mood and stress. The patient is nervous/anxious.          Physical Exam:   Physical Exam  Constitutional:       Appearance: Normal appearance.   HENT:      Head: Normocephalic.      Nose: Nose normal.   Pulmonary:      Effort: Pulmonary effort is normal.   Musculoskeletal:         General: Normal range of motion.      Cervical back: Normal range of motion.   Neurological:      General: No focal deficit present.      Mental Status: She is alert and oriented to person, place, and time. Mental status is at baseline.   Psychiatric:          Attention and Perception: Attention and perception normal.         Mood and Affect: Mood is anxious and depressed. Affect is labile, flat and tearful.         Speech: Speech normal.         Behavior: Behavior normal. Behavior is cooperative.         Thought Content: Thought content normal.         Cognition and Memory: Cognition and memory normal.         Judgment: Judgment is impulsive.      Comments: Restless, distracted         Vitals:  not currently breastfeeding. There is no height or weight on file to calculate BMI.  Due to extenuating circumstances and possible current health risks associated with the patient being present in a clinical setting (with current health restrictions in place in regards to possible COVID 19 transmission/exposure), the patient was seen remotely today via a [employer] Video Visit through [employer].  Unable to obtain vital signs due to nature of remote visit.  Height stated at 5ft6 inches.  Weight stated at 245 pounds.    Last 3 Blood Pressure Readings:  BP Readings from Last 3 Encounters:   11/04/24 128/70   10/24/24 126/70   10/17/24 120/82       PHQ-9 Depression Screening  Little interest or pleasure in doing things? (Patient-Rptd) Almost all   Feeling down, depressed, or hopeless? (Patient-Rptd) Almost all   PHQ-2 Total Score (Patient-Rptd) 6   Trouble falling or staying asleep, or sleeping too much? (Patient-Rptd) Almost all   Feeling tired or having little energy? (Patient-Rptd) Almost all   Poor appetite or overeating? (Patient-Rptd) Almost all   Feeling bad about yourself - or that you are a failure or have let yourself or your family down? (Patient-Rptd) Almost all   Trouble concentrating on things, such as reading the newspaper or watching television? (Patient-Rptd) Almost all   Moving or speaking so slowly that other people could have noticed? Or the opposite - being so fidgety or restless that you have been moving around a lot more than usual? (Patient-Rptd) Almost all    Thoughts that you would be better off dead, or of hurting yourself in some way? (Patient-Rptd) Almost all   PHQ-9 Total Score (Patient-Rptd) 27   If you checked off any problems, how difficult have these problems made it for you to do your work, take care of things at home, or get along with other people? (Patient-Rptd) Extremely difficult       TERESA-7 Score:   Feeling nervous, anxious or on edge: (Patient-Rptd) Nearly every day  Not being able to stop or control worrying: (Patient-Rptd) Nearly every day  Worrying too much about different things: (Patient-Rptd) Nearly every day  Trouble Relaxing: (Patient-Rptd) Nearly every day  Being so restless that it is hard to sit still: (Patient-Rptd) Nearly every day  Feeling afraid as if something awful might happen: (Patient-Rptd) Not at all  Becoming easily annoyed or irritable: (Patient-Rptd) Nearly every day  TERESA 7 Total Score: (Patient-Rptd) 18  If you checked any problems, how difficult have these problems made it for you to do your work, take care of things at home, or get along with other people: (Patient-Rptd) Very difficult     Mental Status Exam:   Hygiene:   good  Cooperation:  Cooperative  Eye Contact:  Good  Psychomotor Behavior:  Restless  Affect:   flat  Mood: fluctates  Hopelessness: 6  Speech:  Rapid  Thought Process:  Goal directed  Thought Content:  Normal  Suicidal:  None  Homicidal:  None  Hallucinations:  Not demonstrated today  Delusion:  None  Memory:  Intact  Orientation:  Person, Place, Time and Situation  Reliability:  fair  Insight:  Fair  Judgement:  Poor  Impulse Control:  Fair  Physical/Medical Issues:   seizures in sleep, unsure of when she has them        Lab Results:   Lab on 10/17/2024   Component Date Value Ref Range Status    Hepatitis B Surface Ag 10/17/2024 Non-Reactive  Non-Reactive Final    Hep A IgM 10/17/2024 Non-Reactive  Non-Reactive Final    Hep B C IgM 10/17/2024 Non-Reactive  Non-Reactive Final    Hepatitis C Ab 10/17/2024  Non-Reactive  Non-Reactive Final    HIV DUO 10/17/2024 Non-Reactive  Non-Reactive Final    RPR 10/17/2024 Non-Reactive  Non-Reactive Final   Office Visit on 10/17/2024   Component Date Value Ref Range Status    WBC 10/17/2024 8.13  3.40 - 10.80 10*3/mm3 Final    RBC 10/17/2024 4.68  3.77 - 5.28 10*6/mm3 Final    Hemoglobin 10/17/2024 11.9 (L)  12.0 - 15.9 g/dL Final    Hematocrit 10/17/2024 36.8  34.0 - 46.6 % Final    MCV 10/17/2024 78.6 (L)  79.0 - 97.0 fL Final    MCH 10/17/2024 25.4 (L)  26.6 - 33.0 pg Final    MCHC 10/17/2024 32.3  31.5 - 35.7 g/dL Final    RDW 10/17/2024 14.0  12.3 - 15.4 % Final    RDW-SD 10/17/2024 39.6  37.0 - 54.0 fl Final    MPV 10/17/2024 10.1  6.0 - 12.0 fL Final    Platelets 10/17/2024 390  140 - 450 10*3/mm3 Final    Glucose 10/17/2024 91  65 - 99 mg/dL Final    BUN 10/17/2024 11  6 - 20 mg/dL Final    Creatinine 10/17/2024 1.09 (H)  0.57 - 1.00 mg/dL Final    Sodium 10/17/2024 136  136 - 145 mmol/L Final    Potassium 10/17/2024 4.5  3.5 - 5.2 mmol/L Final    Chloride 10/17/2024 101  98 - 107 mmol/L Final    CO2 10/17/2024 25.6  22.0 - 29.0 mmol/L Final    Calcium 10/17/2024 9.7  8.6 - 10.5 mg/dL Final    Total Protein 10/17/2024 7.2  6.0 - 8.5 g/dL Final    Albumin 10/17/2024 4.0  3.5 - 5.2 g/dL Final    ALT (SGPT) 10/17/2024 15  1 - 33 U/L Final    AST (SGOT) 10/17/2024 17  1 - 32 U/L Final    Alkaline Phosphatase 10/17/2024 72  39 - 117 U/L Final    Total Bilirubin 10/17/2024 <0.2  0.0 - 1.2 mg/dL Final    Globulin 10/17/2024 3.2  gm/dL Final    A/G Ratio 10/17/2024 1.3  g/dL Final    BUN/Creatinine Ratio 10/17/2024 10.1  7.0 - 25.0 Final    Anion Gap 10/17/2024 9.4  5.0 - 15.0 mmol/L Final    eGFR 10/17/2024 69.8  >60.0 mL/min/1.73 Final    Hemoglobin A1C 10/17/2024 5.50  4.80 - 5.60 % Final    TSH 10/17/2024 1.320  0.270 - 4.200 uIU/mL Final    Ferritin 10/17/2024 18.10  13.00 - 150.00 ng/mL Final         Assessment & Plan   Problems Addressed this Visit    None  Visit Diagnoses        Bipolar affective disorder, mixed    -  Primary    Relevant Medications    vilazodone (VIIBRYD) 40 MG tablet tablet    lamoTRIgine (LaMICtal) 25 MG tablet    lamoTRIgine ER (LaMICtal XR) 250 MG tablet sustained-release 24 hour    lithium carbonate 150 MG capsule    Recurrent major depressive disorder, in partial remission        Relevant Medications    vilazodone (VIIBRYD) 40 MG tablet tablet    lamoTRIgine (LaMICtal) 25 MG tablet    lithium carbonate 150 MG capsule    Panic attacks        Relevant Medications    vilazodone (VIIBRYD) 40 MG tablet tablet    Borderline personality disorder        Relevant Medications    vilazodone (VIIBRYD) 40 MG tablet tablet    lithium carbonate 150 MG capsule    Hospital discharge follow-up        Medication management        Relevant Medications    vilazodone (VIIBRYD) 40 MG tablet tablet    lamoTRIgine (LaMICtal) 25 MG tablet    lamoTRIgine ER (LaMICtal XR) 250 MG tablet sustained-release 24 hour    lithium carbonate 150 MG capsule          Diagnoses         Codes Comments    Bipolar affective disorder, mixed    -  Primary ICD-10-CM: F31.60  ICD-9-CM: 296.60     Recurrent major depressive disorder, in partial remission     ICD-10-CM: F33.41  ICD-9-CM: 296.35     Panic attacks     ICD-10-CM: F41.0  ICD-9-CM: 300.01     Borderline personality disorder     ICD-10-CM: F60.3  ICD-9-CM: 301.83     Hospital discharge follow-up     ICD-10-CM: Z09  ICD-9-CM: V67.59     Medication management     ICD-10-CM: Z79.899  ICD-9-CM: V58.69             Visit Diagnoses:    ICD-10-CM ICD-9-CM   1. Bipolar affective disorder, mixed  F31.60 296.60   2. Recurrent major depressive disorder, in partial remission  F33.41 296.35   3. Panic attacks  F41.0 300.01   4. Borderline personality disorder  F60.3 301.83   5. Hospital discharge follow-up  Z09 V67.59   6. Medication management  Z79.899 V58.69       Continue Viibryd, Lamictal, Atarax as prescribed.  Discussed antisuicidal properties associated  with lithium, patient agreeable to start this medication.  We will start lithium 150 mg twice daily, continue to monitor kidney function.  Supervising psychiatrist Dr. Aleks Bailey in agreement with plan. Patient is educated upon risks versus benefits as well as side effects and when to seek care in an emergency setting.  Patient verbalized understanding.  Phone call to patient to explain plan as well as side effects to include lithium toxicity, will need close monitoring.  Information sent via my chart at this time as well.  Continue therapy.  Follow up with this provider in 1 week or sooner if needed. We will order labs at that time.  Safety plan developed with patient at this time.  Patient agreeable to follow.  IOP strongly recommended, communication sent to office related to this whom sent over a referral.  Patient made aware the [facility] would be reaching out to her regarding this, instructed patient to call office if not receiving a call within a week.    GOALS:  Short Term Goals: Patient will be compliant with medication, and patient will have no significant medication related side effects.  Patient will be engaged in psychotherapy as indicated.  Patient will report subjective improvement of symptoms.  Long term goals: To stabilize mood and treat/improve subjective symptoms, the patient will stay out of the hospital, the patient will be at an optimal level of functioning, and the patient will take all medications as prescribed.  The patient/guardian verbalized understanding and agreement with goals that were mutually set.      TREATMENT PLAN: Continue supportive psychotherapy efforts and medications as indicated.  Pharmacological and Non-Pharmacological treatment options discussed during today's visit. Patient/Guardian acknowledged and verbally consented with current treatment plan and was educated on the importance of compliance with treatment and follow-up appointments.      MEDICATION ISSUES:  Discussed  medication options and treatment plan of prescribed medication as well as the risks, benefits, any black box warnings, and side effects including potential falls, possible impaired driving, and metabolic adversities among others. Patient is agreeable to call the office with any worsening of symptoms or onset of side effects, or if any concerns or questions arise.  The contact information for the office is made available to the patient. Patient is agreeable to call 911 or go to the nearest ER should they begin having any SI/HI, or if any urgent concerns arise. No medication side effects or related complaints today.     MEDS ORDERED DURING VISIT:  New Medications Ordered This Visit   Medications    vilazodone (VIIBRYD) 40 MG tablet tablet     Sig: Take 1 tablet by mouth Daily.     Dispense:  30 tablet     Refill:  0    lamoTRIgine (LaMICtal) 25 MG tablet     Sig: Take 2 tablets by mouth Daily.     Dispense:  60 tablet     Refill:  0    lamoTRIgine ER (LaMICtal XR) 250 MG tablet sustained-release 24 hour     Sig: Take 1 tablet by mouth Daily.     Dispense:  30 tablet     Refill:  0    lithium carbonate 150 MG capsule     Sig: Take 1 capsule by mouth 2 (Two) Times a Day With Meals.     Dispense:  60 capsule     Refill:  0       Follow Up Appointment:   Return in about 1 week (around 11/18/2024) for Recheck.             This document has been electronically signed by LULU Aggarwal  November 11, 2024 15:16 EST    Some of the data in this electronic note has been brought forward from a previous encounter, any necessary changes have been made, it has been reviewed by this APRN, and it is accurate        Total Time spent by this APRN for today's encounter:  60 total minutes were spent by this APRN on charting/documentation, review of past medical records, direct face to face patient care, coordination of care, and providing education related to lithium use, referral to IOP coordination, discussion with supervising  psychiatrist regarding plan of care, call back to patient in the afternoon to discuss.       Dictated Utilizing Dragon Dictation: Part of this note may be an electronic transcription/translation of spoken language to printed text using the Dragon Dictation System.

## 2024-11-11 NOTE — TELEPHONE ENCOUNTER
Received call from pt regarding referral to [facility]. Pt states she called [facility] and their office informed her that they did not receive a fax referral from our office. I called [facility] and re-faxed referral again at 3:29 p.m. [facility] is supposed to follow up with our office if the referral has not been received within 15 minutes.

## 2024-11-11 NOTE — TELEPHONE ENCOUNTER
Referral was received via fax to [facility]. Cha from [facility] states they have a phone assessment set up to call pt at 6 p.m. this evening. Called and informed the pt of this. Pt verbalized understanding.

## 2024-11-11 NOTE — PROGRESS NOTES
Specialty Pharmacy Refill Coordination Note     Lady is a 29 y.o. female contacted today regarding refills of Emgality specialty medication(s).Patient is due for injection on 11/15.    Reviewed and verified with patient:       Specialty medication(s) and dose(s) confirmed: yes    Refill Questions      Flowsheet Row Most Recent Value   Changes to allergies? No   Changes to medications? No   New conditions or infections since last clinic visit No   Unplanned office visit, urgent care, ED, or hospital admission in the last 4 weeks  No   How does patient/caregiver feel medication is working? Good   Financial problems or insurance changes  No   Since the previous refill, were any specialty medication doses or scheduled injections missed or delayed?  Yes   If yes, please provide the amount Nurtec have plenty   Why were doses missed? Nurtec prn   Does this patient require a clinical escalation to a pharmacist? No            Delivery Questions      Flowsheet Row Most Recent Value   Delivery method UPS   Delivery address verified with patient/caregiver? Yes   Delivery address Home   Number of medications in delivery 1   Medication(s) being filled and delivered Galcanezumab-gnlm (EMGALITY)   Doses left of specialty medications N/A   Copay verified? Yes   Copay amount Emgality =$0   Copay form of payment No copayment ($0)   Ship Date 11/11   Delivery Date 11/12   Signature Required No                   Follow-up: 28 day(s)     Nick Parker  Specialty Pharmacy Technician         What Was Performed First?: Curettage

## 2024-11-11 NOTE — TELEPHONE ENCOUNTER
Faxed referral to [facility] in [facility] to fax number provided by their office [phone]. Their office stated they would reach out to pt once referral is received to do an over the phone assessment. Called pt to inform her of this. Unable to reach pt, left vm with details and the number to [facility].

## 2024-11-13 ENCOUNTER — TELEPHONE (OUTPATIENT)
Dept: PSYCHIATRY | Facility: CLINIC | Age: 29
End: 2024-11-13
Payer: COMMERCIAL

## 2024-11-13 ENCOUNTER — HOSPITAL ENCOUNTER (OUTPATIENT)
Dept: MRI IMAGING | Facility: HOSPITAL | Age: 29
Discharge: HOME | End: 2024-11-13
Admitting: INTERNAL MEDICINE
Payer: COMMERCIAL

## 2024-11-13 DIAGNOSIS — H53.8 BLURRED VISION, RIGHT EYE: ICD-10-CM

## 2024-11-13 DIAGNOSIS — R26.81 UNSTEADINESS: ICD-10-CM

## 2024-11-13 PROCEDURE — A9577 INJ MULTIHANCE: HCPCS | Performed by: INTERNAL MEDICINE

## 2024-11-13 PROCEDURE — 70553 MRI BRAIN STEM W/O & W/DYE: CPT

## 2024-11-13 PROCEDURE — 25510000002 GADOBENATE DIMEGLUMINE 529 MG/ML SOLUTION: Performed by: INTERNAL MEDICINE

## 2024-11-13 RX ADMIN — GADOBENATE DIMEGLUMINE 20 ML: 529 INJECTION, SOLUTION INTRAVENOUS at 11:10

## 2024-11-14 ENCOUNTER — TELEMEDICINE (OUTPATIENT)
Dept: PSYCHIATRY | Facility: CLINIC | Age: 29
End: 2024-11-14
Payer: COMMERCIAL

## 2024-11-14 ENCOUNTER — TELEPHONE (OUTPATIENT)
Dept: INTERNAL MEDICINE | Facility: CLINIC | Age: 29
End: 2024-11-14

## 2024-11-14 DIAGNOSIS — F31.60 BIPOLAR AFFECTIVE DISORDER, MIXED: ICD-10-CM

## 2024-11-14 DIAGNOSIS — F60.3 BORDERLINE PERSONALITY DISORDER: Primary | ICD-10-CM

## 2024-11-14 NOTE — PROGRESS NOTES
Date: 2024  Time In: 12:59 EST  Time out: 1:57 PM EST    This provider is located at [facility], [facility] [place], [place], [facility], using a secure [employer] Video Visit through [employer]. Patient is being seen remotely via telehealth at their home address is located in [place]. Patient stated they are in a secure environment for this session. The patient's condition being diagnosed and treated is appropriate for telemedicine. The provider identified themself as well as their credentials. The patient, or  patient's legal guardian consent to be seen remotely, and when consent is given they understand that the consent allows for patient identifiable information to be sent to a third party as needed. They may refuse to be seen remotely at any time. The electronic data is encrypted and password protected, and the patient's or  legal guardian has been advised of the potential risks to privacy not withstanding such measures.   PT Identifiers used: Name and .    You have chosen to receive care through a telehealth visit.  Do you consent to use a video/audio connection for your medical care today? Yes    Subjective   Lady Diamond is a 29 y.o. female who presents today for follow up    Chief Complaint:   Chief Complaint   Patient presents with    Anxiety    Depression    Manic Behavior    Suicidal        Data: Pt reported that she was admitted to [facility] for a night and [facility] for a night after SI 2w ago. Pt reflected on her experience and emotions faced. Pt reported that her sister is staying with her since her fiance has moved out. Pt reported some passive SI with no intent or plan at this time. Pt and clinician discussed a plan if thoughts were to change. Pt reported new medication appears good (first day). Pt and clinician identified protective factors during session and goals for the future. Pt reported that she is expected to start IOP tomorrow and hopeful for  this. Clinician and pt discussed grieving the end of her relationship, creating a new space and creating structure for herself during this adjustment.      Clinical Maneuvering/Intervention: Assisted patient in processing above session content; acknowledged and normalized patient’s thoughts, feelings, and concerns.  Rationalized patient thought process regarding concerns presented at session.  Discussed triggers associated with patient's  anxiety , depression , and manic symptoms  Also discussed coping skills for patient to implement such as grounding , mindfulness , self care , positive self talk , and lists, focusing on accomplishments instead of perceived failures, support    Allowed patient to freely discuss issues without interruption or judgment. Provided safe, confidential environment to facilitate the development of positive therapeutic relationship and encourage open, honest communication. Assisted patient in identifying risk factors which would indicate the need for higher level of care including thoughts to harm self or others and/or self-harming behavior and encouraged patient to contact this office, call 911, or present to the nearest emergency room should any of these events occur. Discussed crisis intervention services and means to access. Patient adamantly and convincingly denies current suicidal or homicidal ideation or perceptual disturbance.    Assessment: Pt was located at home for session. Pt appears to be struggling with the adjustment of her fiance moving out and relationship ending. Pt appears to feel overwhelmed. Pts mood fluctuated throughout session; tearful, laughing, jokes, sadness. Pt was verbal re passive SI. She was very convincing that she couldn't leave her children this way. Pt appeared receptive to seeking higher level of care if thoughts developed into a plan/intent. Pt appeared excited and hopeful re starting IOP tomorrow at [facility] in [place], [place]. Pt appears to feel the  need to defend herself when faced with conflict/concerns which have lead to consequences pt is not proud with. This reaction appears to stem from childhood and having minimal, adequate support to protect her as a child. Unalterable demographics and a history of mental health intervention indicate this patient is in a high risk category compared to the general population. Pt appeared receptive to techniques and encouragements discussed in session.      Patient appears to maintain relative stability as compared to their baseline.  However, patient continues to struggle with   Chief Complaint   Patient presents with    Anxiety    Depression    Manic Behavior    Suicidal    which continues to cause impairment in important areas of functioning.  A result, they can be reasonably expected to continue to benefit from treatment and would likely be at increased risk for decompensation otherwise.      Mental Status Exam:   Hygiene:   fair  Cooperation:  Cooperative  Eye Contact:  Fair  Psychomotor Behavior:  Restless  Mood: fluctates  Speech:  Normal  Thought Process:  Disorganized  Thought Content:  Mood congruent  Suicidal:  passive, no intent  Homicidal:  None  Hallucinations:  None  Delusion:  None  Memory:  Deficits  Orientation:  Grossly intact  Reliability:  fair  Insight:  Fair  Judgement:  Fair  Impulse Control:  Fair  Physical/Medical Issues:  Yes          Patient's Support Network Includes:   limited healthy    Functional Status: Mild impairment     Progress toward goal: Not at goal    Prognosis: Fair with Ongoing Treatment     Plan: Continue to assess for SI, collaborate with psych. Wait listed    Patient will continue in individual outpatient therapy with focus on improved functioning and coping skills, maintaining stability, and avoiding decompensation and the need for higher level of care.    Patient will adhere to medication regimen as prescribed and report any side effects. Patient will contact this office,  call 911 or present to the nearest emergency room should suicidal or homicidal ideations occur. Provide Cognitive Behavioral Therapy and Solution Focused Therapy to improve functioning, maintain stability, and avoid decompensation and the need for higher level of care.     Return in about 4 weeks (around 12/12/2024).      VISIT DIAGNOSIS:    Diagnosis Plan   1. Borderline personality disorder        2. Bipolar affective disorder, mixed         12:59 EST         This document has been electronically signed by Anna Collier LCSW  November 14, 2024      Part of this note may be an electronic transcription/translation of spoken language to printed text using the Dragon Dictation System.

## 2024-11-14 NOTE — TELEPHONE ENCOUNTER
Caller: Lady Diamond    Relationship: Self    Best call back number: [phone]    Caller requesting test results: YES    What test was performed: MRI    Additional notes: P[T STATED THAT SHE RECEIVED RESULTS ONE MYCHART, WILL LIKE TO SPEAK WITH SOMEONE TO GO OVER RESULTS

## 2024-11-18 ENCOUNTER — TELEMEDICINE (OUTPATIENT)
Dept: INTERNAL MEDICINE | Facility: CLINIC | Age: 29
End: 2024-11-18
Payer: COMMERCIAL

## 2024-11-18 DIAGNOSIS — G93.2 INTRACRANIAL HYPERTENSION: Primary | ICD-10-CM

## 2024-11-18 DIAGNOSIS — R90.89 ABNORMAL FINDING ON MRI OF BRAIN: ICD-10-CM

## 2024-11-18 DIAGNOSIS — Z79.899 MEDICATION MANAGEMENT: Primary | ICD-10-CM

## 2024-11-18 PROCEDURE — 99213 OFFICE O/P EST LOW 20 MIN: CPT | Performed by: INTERNAL MEDICINE

## 2024-11-18 NOTE — PROGRESS NOTES
Mode of Visit: Video  Location of patient: home  Location of provider: [facility]  You have chosen to receive care through a telehealth visit.  The patient has signed the video visit consent form.  The visit included audio and video interaction. No technical issues occurred during this visit.     Subjective   Lady Diamond is a 29 y.o. female here for discussion on recent MRI of the brain.  She has never been diagnosed with intracranial hypertension.  She does have history of empty sella.  Says headaches are continuing, on a variety of medications for that.  She also has history of seizure so is on Keppra and Lamictal.  Has noticed some change in vision but no tinnitus.  Eels like balance is a bit off.    I have reviewed the patient's pertinent history and confirmed it is accurate.    I have personally reviewed and performed the ROS. Flores Carrion MD     Objective     Physical Exam  Constitutional:       Appearance: She is well-developed.   Pulmonary:      Effort: Pulmonary effort is normal.   Neurological:      General: No focal deficit present.      Mental Status: She is alert.   Psychiatric:         Behavior: Behavior normal.         Thought Content: Thought content normal.         Judgment: Judgment normal.         Current Outpatient Medications:     famotidine (Pepcid) 20 MG tablet, Take 1 tablet by mouth 2 (Two) Times a Day. (Patient taking differently: Take 1 tablet by mouth As Needed.), Disp: 60 tablet, Rfl: 2    galcanezumab-gnlm (EMGALITY) 120 MG/ML auto-injector pen, Inject 1 mL under the skin into the appropriate area as directed Every 28 (Twenty-Eight) Days., Disp: 1 mL, Rfl: 11    hydrOXYzine (ATARAX) 25 MG tablet, Take 2 tablets by mouth At Night As Needed for Anxiety (anxiety and/or sleep)., Disp: 60 tablet, Rfl: 0    lamoTRIgine (LaMICtal) 25 MG tablet, Take 2 tablets by mouth Daily., Disp: 60 tablet, Rfl: 0    lamoTRIgine ER (LaMICtal XR) 250 MG tablet sustained-release 24  hour, Take 1 tablet by mouth Daily., Disp: 30 tablet, Rfl: 0    levETIRAcetam (KEPPRA) 250 MG tablet, Take 1 tablet by mouth 2 (Two) Times a Day., Disp: , Rfl:     lithium carbonate 150 MG capsule, Take 1 capsule by mouth 2 (Two) Times a Day With Meals., Disp: 60 capsule, Rfl: 0    meclizine (ANTIVERT) 25 MG tablet, Take 1 tablet by mouth 3 (Three) Times a Day As Needed for Dizziness., Disp: 90 tablet, Rfl: 2    polyethylene glycol (MiraLax) 17 GM/SCOOP powder, Take 17 g by mouth Daily., Disp: 510 g, Rfl: 1    Rimegepant Sulfate (Nurtec) 75 MG tablet dispersible tablet, Take 1 tablet by mouth Daily As Needed (migraines). Take 1 tablet at the onset of headache, Max of 75 mg/24 hours, Max of 18 tabs/30 days., Disp: 16 tablet, Rfl: 0    vilazodone (VIIBRYD) 40 MG tablet tablet, Take 1 tablet by mouth Daily., Disp: 30 tablet, Rfl: 0    Assessment & Plan   Diagnoses and all orders for this visit:    1. Intracranial hypertension (Primary)  -suggestive based on recent MRI brain, may need LP/ophtho exam to confirm. Discussed dx with pt  -continue current HA meds    2. Abnormal finding on MRI of brain  -see #1           Flores Carrion MD

## 2024-11-19 ENCOUNTER — TELEMEDICINE (OUTPATIENT)
Dept: PSYCHIATRY | Facility: CLINIC | Age: 29
End: 2024-11-19
Payer: COMMERCIAL

## 2024-11-19 DIAGNOSIS — Z79.899 MEDICATION MANAGEMENT: ICD-10-CM

## 2024-11-19 DIAGNOSIS — F31.60 BIPOLAR AFFECTIVE DISORDER, MIXED: Primary | ICD-10-CM

## 2024-11-19 DIAGNOSIS — F33.41 RECURRENT MAJOR DEPRESSIVE DISORDER, IN PARTIAL REMISSION: ICD-10-CM

## 2024-11-19 PROCEDURE — 96127 BRIEF EMOTIONAL/BEHAV ASSMT: CPT

## 2024-11-19 PROCEDURE — 99214 OFFICE O/P EST MOD 30 MIN: CPT

## 2024-11-19 PROCEDURE — 1159F MED LIST DOCD IN RCRD: CPT

## 2024-11-19 PROCEDURE — 1160F RVW MEDS BY RX/DR IN RCRD: CPT

## 2024-11-19 NOTE — PROGRESS NOTES
This provider is located at the [facility] (through [facility]), [address], [place], [address] using a secure video visit through [employer]. Patient is being seen remotely via telehealth at their home address in [place], and stated they are in a secure environment for this session.The patient's condition being consulted/diagnosed/treated is appropriate for telemedicine. The provider identified herself as well as her credentials.   The patient, and/or patients guardian, consent to be seen remotely, and when consent is given they understand that the consent allows for patient identifiable information to be sent to a third party as needed. They may refuse to be seen remotely at any time. The electronic data is encrypted and password protected, and the patient and/or guardian has been advised of the potential risks to privacy not withstanding such measures.   The use of a video visit has been reviewed with the patient and verbal informed consent has been obtained.   Patient identifiers used: Name and .    Subjective   Lady Diamond is a 29 y.o. female who presents today for follow up     Mode of Visit: Video  Location of patient: -HOME-  Location of provider: +HOME+  You have chosen to receive care through a telehealth visit.  The patient has signed the video visit consent form.  The visit included audio and video interaction. No technical issues occurred during this visit.        Chief Complaint:    Chief Complaint   Patient presents with    Anxiety    Depression    Med Management    Irritable        History of Present Illness:   History of Present Illness  Patient is a 29-year-old female presenting for a one month follow-up for medication management related to irritability, sleep disturbance, anxiety, and depression.  Continues to experience multiple environmental stressors, however indicates overall she is coping more effectively with use of lithium.  Is  "tolerating medication well without side effects.  Voices compliance with use, has been taking twice daily.  Denies symptoms consistent with lithium toxicity, which are also discussed at this time.  PHQ reduced from 27-15, indicating moderately severe depression which patient states is \"mid low there but considering what I just went through.\"  TERESA reduced from 18-16, indicating severe anxiety which patient states is \"up there a little.\"  She denies SI currently and is convincing.  Is able to contract for safety, denies recent intention or plan.  States she is \"tired of my situation\" and considering moving away from family due to environmental stress caused.  States she has been \"keeping calm, instead of angry and irritable I am laughing and calm.\"  States she has had some weight gain but believes this might be related to her menstrual cycle.  Regarding side effects with use of medications she states \"no, I love it.\"  She denies HI, SIB, or hallucinations.  States the lithium doses have been \"scrambled\" but she has not missed any doses.  Occasionally will be off within an hour or 2 on timing.  Medically she had MRI of brain, recent diagnosis of idiopathic intracranial hypertension.    Patient lives in home with her 3 children.  Her sister is also currently staying with her.  Pippa moved out.  She cites some trauma related to a pregnancy. Patient acknowledges alcohol use was problematic, although has stated that she has reduced these habits, is drinking less than 1/5 of tequila weekly.  Denies drug use.  Smokes quarter of a pack per day of cigarettes.  She states she smokes weed occasionally, not daily just socially.  Denies Alevism preference.  She has 5 siblings, 2 she speaks with frequently.  1 sister recently involved in a shootout, contributing to her stress.  Son is recently back home, some stress surrounding truancy issues.  She is also in school online part-time to begin pursuing a degree in mortuary " science, she states her grades are good.  Plans to graduate next spring.  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  Currently in counseling.  Hallucinations    Anxiety  Symptoms include depressed mood and nervous/anxious behavior.       DepressionPatient presents with the following symptoms: depressed mood and nervousness/anxiety.          Last Menstrual Period:  Last week-no tubes    The patient denies any chance of pregnancy at this time.  The patient was educated that her prescribed medications can have potential risk to a developing fetus. The patient is advised to contact this APRN/this office if she becomes pregnant or plans to become pregnant.  Pt verbalizes understanding and acknowledged agreement with this plan in her own words.      The following portions of the patient's history were reviewed and updated as appropriate: allergies, current medications, past family history, past medical history, past social history, past surgical history and problem list.    Past Psychiatric History:  Began Treatment: age 10  Diagnoses:Depression and Anxiety  Psychiatrist: previously  Therapist: previously 2016  Admission History: [facility] 2016 1 week, [facility] one week later. [facility] for 2 days.  Medication Trials: Lexapro, elavil, lamictal, propranolol (irritability worse), vraylar (increased depression/suicidal thoughts), buspar (didn't work), abilify 5mg (didn't work-weight gain) topamax (slurred speech), atarax 30 mg BID (not effective), vistaril 25 mg (too sedating), rexulti (weight gain)  Self Harm:  cuttin, once yearly, started at age 10  Suicide Attempts: 3 total, last in 2016   Psychosis, Anxiety, Depression:  PPD possibly 7 years ago undiagnosed    Past Medical History:  Past Medical History:   Diagnosis Date    Anemia 2019    Anxiety with depression 2006    no meds    Bipolar disorder     Cholelithiasis 2014    Chronic constipation 2018    Cluster headache 2013     H/O chlamydia infection     H/O gonorrhea     H/O gonorrhea     H/O gonorrhea     H/O gonorrhea     H/O trichomoniasis     H/O trichomoniasis 2021    Seizures 2022    May have had those in her sleep    Urinary tract infection 2006       Substance Abuse History:   Types:Denies all, including illicit  Withdrawal Symptoms:Denies  Longest Period Sober:Not Applicable   AA: Not applicable     Social History:  Social History     Socioeconomic History    Marital status: Single    Number of children: 3   Tobacco Use    Smoking status: Every Day     Current packs/day: 0.00     Average packs/day: 0.6 packs/day for 26.4 years (15.0 ttl pk-yrs)     Types: Cigarettes, Electronic Cigarette     Start date: 2010     Last attempt to quit: 3/1/2019     Years since quittin.7     Passive exposure: Past    Smokeless tobacco: Never   Vaping Use    Vaping status: Every Day    Substances: Nicotine   Substance and Sexual Activity    Alcohol use: Not Currently     Alcohol/week: 6.0 standard drinks of alcohol     Types: 6 Shots of liquor per week     Comment: tequila    Drug use: Never    Sexual activity: Yes     Partners: Male     Birth control/protection: Bilateral salpingectomy , Surgical       Family History:  Family History   Problem Relation Age of Onset    Bipolar disorder Mother     Anxiety disorder Mother     Depression Mother         Everyone in my family    Multiple sclerosis Mother     Arthritis Mother     Drug abuse Mother     Hearing loss Mother     Hypertension Mother     Mental illness Mother         All of us    Depression Father     Alcohol abuse Father     Drug abuse Father     Bipolar disorder Sister     Depression Sister     Anxiety disorder Sister     Drug abuse Sister     Suicide Attempts Maternal Grandmother     Birth defects Sister         Her daughter my niece jessy    Miscarriages / Stillbirths Sister     Diabetes Paternal Grandmother        Past Surgical History:  Past  Surgical History:   Procedure Laterality Date     SECTION  11/15/2014    LTCS (1 layer closure)     SECTION N/A 2019    Procedure:  SECTION REPEAT WITH SALPINGECTOMY;  Surgeon: Regine José MD;  Location: [facility] LABOR DELIVERY;  Service: Obstetrics;  Laterality: N/A;    LAPAROSCOPIC CHOLECYSTECTOMY  2015    TONSILLECTOMY  Dec.     TUBAL ABDOMINAL LIGATION  19    WISDOM TOOTH EXTRACTION         Problem List:  Patient Active Problem List   Diagnosis    Annual GYN exam w/o problems    Body mass index (BMI) 40.0-44.9, adult    Smoker    Chronic constipation    Generalized convulsive seizures    Migraine without aura and without status migrainosus, not intractable    Bipolar disorder       Allergy:   No Known Allergies     Current Medications:   Current Outpatient Medications   Medication Sig Dispense Refill    famotidine (Pepcid) 20 MG tablet Take 1 tablet by mouth 2 (Two) Times a Day. (Patient taking differently: Take 1 tablet by mouth As Needed.) 60 tablet 2    galcanezumab-gnlm (EMGALITY) 120 MG/ML auto-injector pen Inject 1 mL under the skin into the appropriate area as directed Every 28 (Twenty-Eight) Days. 1 mL 11    hydrOXYzine (ATARAX) 25 MG tablet Take 2 tablets by mouth At Night As Needed for Anxiety (anxiety and/or sleep). 60 tablet 0    lamoTRIgine (LaMICtal) 25 MG tablet Take 2 tablets by mouth Daily. 60 tablet 0    lamoTRIgine ER (LaMICtal XR) 250 MG tablet sustained-release 24 hour Take 1 tablet by mouth Daily. 30 tablet 0    levETIRAcetam (KEPPRA) 250 MG tablet Take 1 tablet by mouth 2 (Two) Times a Day.      lithium carbonate 150 MG capsule Take 1 capsule by mouth 2 (Two) Times a Day With Meals. 60 capsule 0    meclizine (ANTIVERT) 25 MG tablet Take 1 tablet by mouth 3 (Three) Times a Day As Needed for Dizziness. 90 tablet 2    polyethylene glycol (MiraLax) 17 GM/SCOOP powder Take 17 g by mouth Daily. 510 g 1    Rimegepant Sulfate (Nurtec) 75 MG tablet  dispersible tablet Take 1 tablet by mouth Daily As Needed (migraines). Take 1 tablet at the onset of headache, Max of 75 mg/24 hours, Max of 18 tabs/30 days. 16 tablet 0    vilazodone (VIIBRYD) 40 MG tablet tablet Take 1 tablet by mouth Daily. 30 tablet 0     No current facility-administered medications for this visit.       Review of Systems:    Review of Systems   Constitutional:  Positive for appetite change.   HENT: Negative.     Eyes: Negative.         Photosensitivity   Respiratory: Negative.     Cardiovascular: Negative.    Gastrointestinal:  Positive for constipation.   Endocrine: Negative.    Genitourinary:  Positive for menstrual problem.        Severe cramping   Musculoskeletal: Negative.    Skin: Negative.         Eczema   Allergic/Immunologic: Negative.    Neurological:  Positive for seizures and headache.   Hematological: Negative.    Psychiatric/Behavioral:  Positive for hallucinations, depressed mood and stress. The patient is nervous/anxious.          Physical Exam:   Physical Exam  Constitutional:       Appearance: Normal appearance.   HENT:      Head: Normocephalic.      Nose: Nose normal.   Pulmonary:      Effort: Pulmonary effort is normal.   Musculoskeletal:         General: Normal range of motion.      Cervical back: Normal range of motion.   Neurological:      General: No focal deficit present.      Mental Status: She is alert and oriented to person, place, and time. Mental status is at baseline.   Psychiatric:         Attention and Perception: Attention and perception normal.         Mood and Affect: Mood is anxious and depressed. Affect is labile and flat.         Speech: Speech normal.         Behavior: Behavior normal. Behavior is cooperative.         Thought Content: Thought content normal.         Cognition and Memory: Cognition and memory normal.         Judgment: Judgment is impulsive.      Comments: Restless, distracted         Vitals:  not currently breastfeeding. There is no height  or weight on file to calculate BMI.  Due to extenuating circumstances and possible current health risks associated with the patient being present in a clinical setting (with current health restrictions in place in regards to possible COVID 19 transmission/exposure), the patient was seen remotely today via a MyChart Video Visit through [facility].  Unable to obtain vital signs due to nature of remote visit.  Height stated at 5ft6 inches.  Weight stated at 245 pounds.    Last 3 Blood Pressure Readings:  BP Readings from Last 3 Encounters:   11/04/24 128/70   10/24/24 126/70   10/17/24 120/82       PHQ-9 Depression Screening  Little interest or pleasure in doing things? (Patient-Rptd) Several days   Feeling down, depressed, or hopeless? (Patient-Rptd) Several days   PHQ-2 Total Score (Patient-Rptd) 2   Trouble falling or staying asleep, or sleeping too much? (Patient-Rptd) Over half   Feeling tired or having little energy? (Patient-Rptd) Almost all   Poor appetite or overeating? (Patient-Rptd) Several days   Feeling bad about yourself - or that you are a failure or have let yourself or your family down? (Patient-Rptd) Several days   Trouble concentrating on things, such as reading the newspaper or watching television? (Patient-Rptd) Almost all   Moving or speaking so slowly that other people could have noticed? Or the opposite - being so fidgety or restless that you have been moving around a lot more than usual? (Patient-Rptd) Almost all   Thoughts that you would be better off dead, or of hurting yourself in some way? (Patient-Rptd) Not at all   PHQ-9 Total Score (Patient-Rptd) 15   If you checked off any problems, how difficult have these problems made it for you to do your work, take care of things at home, or get along with other people? (Patient-Rptd) Somewhat difficult       TERESA-7 Score:   Feeling nervous, anxious or on edge: (Patient-Rptd) More than half the days  Not being able to stop or control worrying:  (Patient-Rptd) Nearly every day  Worrying too much about different things: (Patient-Rptd) Nearly every day  Trouble Relaxing: (Patient-Rptd) More than half the days  Being so restless that it is hard to sit still: (Patient-Rptd) Nearly every day  Feeling afraid as if something awful might happen: (Patient-Rptd) More than half the days  Becoming easily annoyed or irritable: (Patient-Rptd) Several days  TERESA 7 Total Score: (Patient-Rptd) 16  If you checked any problems, how difficult have these problems made it for you to do your work, take care of things at home, or get along with other people: (Patient-Rptd) Somewhat difficult     Mental Status Exam:   Hygiene:   good  Cooperation:  Cooperative  Eye Contact:  Good  Psychomotor Behavior:  Restless  Affect:   flat  Mood: fluctates  Hopelessness: 6  Speech:  Rapid  Thought Process:  Goal directed  Thought Content:  Normal  Suicidal:  None  Homicidal:  None  Hallucinations:  Not demonstrated today  Delusion:  None  Memory:  Intact  Orientation:  Person, Place, Time and Situation  Reliability:  fair  Insight:  Fair  Judgement:  Poor  Impulse Control:  Fair  Physical/Medical Issues:   seizures in sleep, unsure of when she has them        Lab Results:   Lab on 10/17/2024   Component Date Value Ref Range Status    Hepatitis B Surface Ag 10/17/2024 Non-Reactive  Non-Reactive Final    Hep A IgM 10/17/2024 Non-Reactive  Non-Reactive Final    Hep B C IgM 10/17/2024 Non-Reactive  Non-Reactive Final    Hepatitis C Ab 10/17/2024 Non-Reactive  Non-Reactive Final    HIV DUO 10/17/2024 Non-Reactive  Non-Reactive Final    RPR 10/17/2024 Non-Reactive  Non-Reactive Final   Office Visit on 10/17/2024   Component Date Value Ref Range Status    WBC 10/17/2024 8.13  3.40 - 10.80 10*3/mm3 Final    RBC 10/17/2024 4.68  3.77 - 5.28 10*6/mm3 Final    Hemoglobin 10/17/2024 11.9 (L)  12.0 - 15.9 g/dL Final    Hematocrit 10/17/2024 36.8  34.0 - 46.6 % Final    MCV 10/17/2024 78.6 (L)  79.0 - 97.0  fL Final    MCH 10/17/2024 25.4 (L)  26.6 - 33.0 pg Final    MCHC 10/17/2024 32.3  31.5 - 35.7 g/dL Final    RDW 10/17/2024 14.0  12.3 - 15.4 % Final    RDW-SD 10/17/2024 39.6  37.0 - 54.0 fl Final    MPV 10/17/2024 10.1  6.0 - 12.0 fL Final    Platelets 10/17/2024 390  140 - 450 10*3/mm3 Final    Glucose 10/17/2024 91  65 - 99 mg/dL Final    BUN 10/17/2024 11  6 - 20 mg/dL Final    Creatinine 10/17/2024 1.09 (H)  0.57 - 1.00 mg/dL Final    Sodium 10/17/2024 136  136 - 145 mmol/L Final    Potassium 10/17/2024 4.5  3.5 - 5.2 mmol/L Final    Chloride 10/17/2024 101  98 - 107 mmol/L Final    CO2 10/17/2024 25.6  22.0 - 29.0 mmol/L Final    Calcium 10/17/2024 9.7  8.6 - 10.5 mg/dL Final    Total Protein 10/17/2024 7.2  6.0 - 8.5 g/dL Final    Albumin 10/17/2024 4.0  3.5 - 5.2 g/dL Final    ALT (SGPT) 10/17/2024 15  1 - 33 U/L Final    AST (SGOT) 10/17/2024 17  1 - 32 U/L Final    Alkaline Phosphatase 10/17/2024 72  39 - 117 U/L Final    Total Bilirubin 10/17/2024 <0.2  0.0 - 1.2 mg/dL Final    Globulin 10/17/2024 3.2  gm/dL Final    A/G Ratio 10/17/2024 1.3  g/dL Final    BUN/Creatinine Ratio 10/17/2024 10.1  7.0 - 25.0 Final    Anion Gap 10/17/2024 9.4  5.0 - 15.0 mmol/L Final    eGFR 10/17/2024 69.8  >60.0 mL/min/1.73 Final    Hemoglobin A1C 10/17/2024 5.50  4.80 - 5.60 % Final    TSH 10/17/2024 1.320  0.270 - 4.200 uIU/mL Final    Ferritin 10/17/2024 18.10  13.00 - 150.00 ng/mL Final         Assessment & Plan   Problems Addressed this Visit    None  Visit Diagnoses       Bipolar affective disorder, mixed    -  Primary    Recurrent major depressive disorder, in partial remission        Medication management        Relevant Orders    Lithium Level    Comprehensive Metabolic Panel    TSH    T4, free    Lipid Panel    Hemoglobin A1c          Diagnoses         Codes Comments    Bipolar affective disorder, mixed    -  Primary ICD-10-CM: F31.60  ICD-9-CM: 296.60     Recurrent major depressive disorder, in partial remission      ICD-10-CM: F33.41  ICD-9-CM: 296.35     Medication management     ICD-10-CM: Z79.899  ICD-9-CM: V58.69             Visit Diagnoses:    ICD-10-CM ICD-9-CM   1. Bipolar affective disorder, mixed  F31.60 296.60   2. Recurrent major depressive disorder, in partial remission  F33.41 296.35   3. Medication management  Z79.899 V58.69     Continue Viibryd, Lamictal, refilled last week.  Stay the course with lithium 150 twice daily for now.  Labs ordered to monitor lipids, kidney function, and lithium levels.  Instructed patient to have labs drawn right before her next dosage of lithium is due if possible. Previously educated upon side effects with use of these medications as well as when to present for emergency services, denies at this time.  Continue therapy and IOP.  Follow up with this provider in 4 weeks or sooner if needed.    GOALS:  Short Term Goals: Patient will be compliant with medication, and patient will have no significant medication related side effects.  Patient will be engaged in psychotherapy as indicated.  Patient will report subjective improvement of symptoms.  Long term goals: To stabilize mood and treat/improve subjective symptoms, the patient will stay out of the hospital, the patient will be at an optimal level of functioning, and the patient will take all medications as prescribed.  The patient/guardian verbalized understanding and agreement with goals that were mutually set.      TREATMENT PLAN: Continue supportive psychotherapy efforts and medications as indicated.  Pharmacological and Non-Pharmacological treatment options discussed during today's visit. Patient/Guardian acknowledged and verbally consented with current treatment plan and was educated on the importance of compliance with treatment and follow-up appointments.      MEDICATION ISSUES:  Discussed medication options and treatment plan of prescribed medication as well as the risks, benefits, any black box warnings, and side effects  including potential falls, possible impaired driving, and metabolic adversities among others. Patient is agreeable to call the office with any worsening of symptoms or onset of side effects, or if any concerns or questions arise.  The contact information for the office is made available to the patient. Patient is agreeable to call 911 or go to the nearest ER should they begin having any SI/HI, or if any urgent concerns arise. No medication side effects or related complaints today.     MEDS ORDERED DURING VISIT:  No orders of the defined types were placed in this encounter.      Follow Up Appointment:   Return in about 4 weeks (around 12/17/2024) for Recheck.             This document has been electronically signed by LULU Aggarwal  November 19, 2024 16:22 EST    Some of the data in this electronic note has been brought forward from a previous encounter, any necessary changes have been made, it has been reviewed by this APRN, and it is accurate        Total Time spent by this APRN for today's encounter:  60 total minutes were spent by this APRN on charting/documentation, review of past medical records, direct face to face patient care, coordination of care, and providing education related to lithium use, referral to IOP coordination, discussion with supervising psychiatrist regarding plan of care, call back to patient in the afternoon to discuss.       Dictated Utilizing Dragon Dictation: Part of this note may be an electronic transcription/translation of spoken language to printed text using the Dragon Dictation System.

## 2024-11-21 ENCOUNTER — TELEPHONE (OUTPATIENT)
Dept: NEUROLOGY | Facility: CLINIC | Age: 29
End: 2024-11-21
Payer: COMMERCIAL

## 2024-11-21 ENCOUNTER — LAB (OUTPATIENT)
Dept: INTERNAL MEDICINE | Facility: CLINIC | Age: 29
End: 2024-11-21
Payer: COMMERCIAL

## 2024-11-21 DIAGNOSIS — Z79.899 MEDICATION MANAGEMENT: Primary | ICD-10-CM

## 2024-11-21 LAB
LITHIUM SERPL-SCNC: 0.2 MMOL/L (ref 0.6–1.2)
T4 FREE SERPL-MCNC: 1.28 NG/DL (ref 0.92–1.68)
TSH SERPL DL<=0.05 MIU/L-ACNC: 3.39 UIU/ML (ref 0.27–4.2)

## 2024-11-21 PROCEDURE — 36415 COLL VENOUS BLD VENIPUNCTURE: CPT | Performed by: INTERNAL MEDICINE

## 2024-11-21 PROCEDURE — 83036 HEMOGLOBIN GLYCOSYLATED A1C: CPT | Performed by: INTERNAL MEDICINE

## 2024-11-21 PROCEDURE — 80178 ASSAY OF LITHIUM: CPT | Performed by: INTERNAL MEDICINE

## 2024-11-21 PROCEDURE — 84443 ASSAY THYROID STIM HORMONE: CPT | Performed by: INTERNAL MEDICINE

## 2024-11-21 PROCEDURE — 84439 ASSAY OF FREE THYROXINE: CPT | Performed by: INTERNAL MEDICINE

## 2024-11-21 PROCEDURE — 80061 LIPID PANEL: CPT | Performed by: INTERNAL MEDICINE

## 2024-11-21 PROCEDURE — 80053 COMPREHEN METABOLIC PANEL: CPT | Performed by: INTERNAL MEDICINE

## 2024-11-21 NOTE — TELEPHONE ENCOUNTER
Dizziness, disorientation, and blackout episodes would not be caused by increased intracranial hypertension.  Since it has been so long since she was here for a visit she will have to be seen in person so I can examine her and make a plan for what needs to be done.

## 2024-11-21 NOTE — TELEPHONE ENCOUNTER
Provider: JAMEY LUTHER    Caller: Lady Diamond    Relationship to Patient: Self    Phone Number: [phone]    Reason for Call: STATES SHE WAS JUST DIAGNOSED WITH INTRACRANIAL HYPERTENSION BY HER PCP. STATES SHE HAS BEEN HAVING INCREASED DIZZINESS, DISORIENTATION AND BLACKOUT EPISODES. SHE WILL GET LOST AND CONFUSED IN HER OWN HOME & NOT REALIZE WHERE SHE IS IN RANDOM PLACES. STATES SHE HAD MENTIONED THIS TO PROVIDER BEFORE BUT IT IS WORSENING AND REALLY STARTING TO SCARE HER. WOULD LIKE TO SEE IF SHE CAN GET A SOONER APPT OR GET ADVICE. PLEASE REVIEW, THANK YOU.

## 2024-11-22 LAB
ALBUMIN SERPL-MCNC: 4.3 G/DL (ref 3.5–5.2)
ALBUMIN/GLOB SERPL: 1.7 G/DL
ALP SERPL-CCNC: 79 U/L (ref 39–117)
ALT SERPL W P-5'-P-CCNC: 19 U/L (ref 1–33)
ANION GAP SERPL CALCULATED.3IONS-SCNC: 14 MMOL/L (ref 5–15)
AST SERPL-CCNC: 22 U/L (ref 1–32)
BILIRUB SERPL-MCNC: 0.2 MG/DL (ref 0–1.2)
BUN SERPL-MCNC: 8 MG/DL (ref 6–20)
BUN/CREAT SERPL: 8.4 (ref 7–25)
CALCIUM SPEC-SCNC: 9.4 MG/DL (ref 8.6–10.5)
CHLORIDE SERPL-SCNC: 97 MMOL/L (ref 98–107)
CHOLEST SERPL-MCNC: 166 MG/DL (ref 0–200)
CO2 SERPL-SCNC: 25 MMOL/L (ref 22–29)
CREAT SERPL-MCNC: 0.95 MG/DL (ref 0.57–1)
EGFRCR SERPLBLD CKD-EPI 2021: 82.3 ML/MIN/1.73
GLOBULIN UR ELPH-MCNC: 2.5 GM/DL
GLUCOSE SERPL-MCNC: 88 MG/DL (ref 65–99)
HBA1C MFR BLD: 5.7 % (ref 4.8–5.6)
HDLC SERPL-MCNC: 66 MG/DL (ref 40–60)
LDLC SERPL CALC-MCNC: 90 MG/DL (ref 0–100)
LDLC/HDLC SERPL: 1.38 {RATIO}
POTASSIUM SERPL-SCNC: 4.1 MMOL/L (ref 3.5–5.2)
PROT SERPL-MCNC: 6.8 G/DL (ref 6–8.5)
SODIUM SERPL-SCNC: 136 MMOL/L (ref 136–145)
TRIGL SERPL-MCNC: 45 MG/DL (ref 0–150)
VLDLC SERPL-MCNC: 10 MG/DL (ref 5–40)

## 2024-11-22 NOTE — TELEPHONE ENCOUNTER
"Relayed Providers message to Pt:    \"Dizziness, disorientation, and blackout episodes would not be caused by increased intracranial hypertension.  Since it has been so long since she was here for a visit she will have to be seen in person so I can examine her and make a plan for what needs to be done.\"    Pt stated they already have a F/U appt on 12/30/2024, advised Pt to make sure and keep that appt so the Provider can get them on a treatment plan.    Pt verbalized understanding.      "

## 2024-11-25 ENCOUNTER — TELEMEDICINE (OUTPATIENT)
Dept: PSYCHIATRY | Facility: CLINIC | Age: 29
End: 2024-11-25
Payer: COMMERCIAL

## 2024-11-25 ENCOUNTER — TELEPHONE (OUTPATIENT)
Dept: PSYCHIATRY | Facility: CLINIC | Age: 29
End: 2024-11-25

## 2024-11-25 DIAGNOSIS — F60.3 BORDERLINE PERSONALITY DISORDER: Primary | ICD-10-CM

## 2024-11-25 DIAGNOSIS — F33.41 RECURRENT MAJOR DEPRESSIVE DISORDER, IN PARTIAL REMISSION: ICD-10-CM

## 2024-11-25 DIAGNOSIS — Z79.899 MEDICATION MANAGEMENT: ICD-10-CM

## 2024-11-25 DIAGNOSIS — F41.0 PANIC ATTACKS: ICD-10-CM

## 2024-11-25 DIAGNOSIS — F31.60 BIPOLAR AFFECTIVE DISORDER, MIXED: ICD-10-CM

## 2024-11-25 RX ORDER — LITHIUM CARBONATE 150 MG/1
CAPSULE ORAL
Qty: 90 CAPSULE | Refills: 0 | Status: SHIPPED | OUTPATIENT
Start: 2024-11-25 | End: 2024-11-27 | Stop reason: SDUPTHER

## 2024-11-25 NOTE — TELEPHONE ENCOUNTER
"Sorry about that!  Patient message states \"Am I taking 2pills of lithium with my Lamectcial?\"  "
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
## 2024-11-26 ENCOUNTER — LAB (OUTPATIENT)
Dept: INTERNAL MEDICINE | Facility: CLINIC | Age: 29
End: 2024-11-26
Payer: COMMERCIAL

## 2024-11-26 ENCOUNTER — TELEPHONE (OUTPATIENT)
Dept: PSYCHIATRY | Facility: CLINIC | Age: 29
End: 2024-11-26
Payer: COMMERCIAL

## 2024-11-26 ENCOUNTER — OFFICE VISIT (OUTPATIENT)
Dept: INTERNAL MEDICINE | Facility: CLINIC | Age: 29
End: 2024-11-26
Payer: COMMERCIAL

## 2024-11-26 VITALS
SYSTOLIC BLOOD PRESSURE: 116 MMHG | OXYGEN SATURATION: 100 % | HEIGHT: 66 IN | WEIGHT: 266.4 LBS | BODY MASS INDEX: 42.81 KG/M2 | TEMPERATURE: 96.9 F | DIASTOLIC BLOOD PRESSURE: 80 MMHG | HEART RATE: 86 BPM

## 2024-11-26 DIAGNOSIS — Z11.3 SCREEN FOR STD (SEXUALLY TRANSMITTED DISEASE): Primary | ICD-10-CM

## 2024-11-26 DIAGNOSIS — F31.9 BIPOLAR AFFECTIVE DISORDER, REMISSION STATUS UNSPECIFIED: ICD-10-CM

## 2024-11-26 DIAGNOSIS — G43.009 MIGRAINE WITHOUT AURA AND WITHOUT STATUS MIGRAINOSUS, NOT INTRACTABLE: ICD-10-CM

## 2024-11-26 DIAGNOSIS — Z11.3 SCREEN FOR STD (SEXUALLY TRANSMITTED DISEASE): ICD-10-CM

## 2024-11-26 LAB
BACTERIA UR QL AUTO: ABNORMAL /HPF
BILIRUB UR QL STRIP: NEGATIVE
CLARITY UR: CLEAR
COLOR UR: YELLOW
GLUCOSE UR STRIP-MCNC: NEGATIVE MG/DL
HGB UR QL STRIP.AUTO: NEGATIVE
HOLD SPECIMEN: NORMAL
HYALINE CASTS UR QL AUTO: ABNORMAL /LPF
KETONES UR QL STRIP: NEGATIVE
LEUKOCYTE ESTERASE UR QL STRIP.AUTO: ABNORMAL
NITRITE UR QL STRIP: NEGATIVE
PH UR STRIP.AUTO: 7.5 [PH] (ref 5–8)
PROT UR QL STRIP: NEGATIVE
RBC # UR STRIP: ABNORMAL /HPF
REF LAB TEST METHOD: ABNORMAL
SP GR UR STRIP: 1.01 (ref 1–1.03)
SQUAMOUS #/AREA URNS HPF: ABNORMAL /HPF
UROBILINOGEN UR QL STRIP: ABNORMAL
WBC # UR STRIP: ABNORMAL /HPF

## 2024-11-26 PROCEDURE — 87491 CHLMYD TRACH DNA AMP PROBE: CPT | Performed by: PHYSICIAN ASSISTANT

## 2024-11-26 PROCEDURE — 81001 URINALYSIS AUTO W/SCOPE: CPT | Performed by: PHYSICIAN ASSISTANT

## 2024-11-26 PROCEDURE — 86592 SYPHILIS TEST NON-TREP QUAL: CPT | Performed by: PHYSICIAN ASSISTANT

## 2024-11-26 PROCEDURE — 86803 HEPATITIS C AB TEST: CPT | Performed by: PHYSICIAN ASSISTANT

## 2024-11-26 PROCEDURE — 36415 COLL VENOUS BLD VENIPUNCTURE: CPT | Performed by: PHYSICIAN ASSISTANT

## 2024-11-26 PROCEDURE — 1160F RVW MEDS BY RX/DR IN RCRD: CPT | Performed by: PHYSICIAN ASSISTANT

## 2024-11-26 PROCEDURE — 86708 HEPATITIS A ANTIBODY: CPT | Performed by: PHYSICIAN ASSISTANT

## 2024-11-26 PROCEDURE — G0432 EIA HIV-1/HIV-2 SCREEN: HCPCS | Performed by: PHYSICIAN ASSISTANT

## 2024-11-26 PROCEDURE — 86695 HERPES SIMPLEX TYPE 1 TEST: CPT | Performed by: PHYSICIAN ASSISTANT

## 2024-11-26 PROCEDURE — 86696 HERPES SIMPLEX TYPE 2 TEST: CPT | Performed by: PHYSICIAN ASSISTANT

## 2024-11-26 PROCEDURE — 87591 N.GONORRHOEAE DNA AMP PROB: CPT | Performed by: PHYSICIAN ASSISTANT

## 2024-11-26 PROCEDURE — 86706 HEP B SURFACE ANTIBODY: CPT | Performed by: PHYSICIAN ASSISTANT

## 2024-11-26 PROCEDURE — 86704 HEP B CORE ANTIBODY TOTAL: CPT | Performed by: PHYSICIAN ASSISTANT

## 2024-11-26 PROCEDURE — 87340 HEPATITIS B SURFACE AG IA: CPT | Performed by: PHYSICIAN ASSISTANT

## 2024-11-26 PROCEDURE — 87077 CULTURE AEROBIC IDENTIFY: CPT | Performed by: PHYSICIAN ASSISTANT

## 2024-11-26 PROCEDURE — 87086 URINE CULTURE/COLONY COUNT: CPT | Performed by: PHYSICIAN ASSISTANT

## 2024-11-26 PROCEDURE — 99214 OFFICE O/P EST MOD 30 MIN: CPT | Performed by: PHYSICIAN ASSISTANT

## 2024-11-26 PROCEDURE — 1159F MED LIST DOCD IN RCRD: CPT | Performed by: PHYSICIAN ASSISTANT

## 2024-11-26 PROCEDURE — 87186 SC STD MICRODIL/AGAR DIL: CPT

## 2024-11-26 NOTE — TELEPHONE ENCOUNTER
Patient called this morning stated she was kicked out of [facility] this morning. Patient stated she was told she had 4 unexcused absences. Patient denied having any SI/HI tendencies at this time. Patient was informed if that changes to go to the ED or call 911.  Patient made the comment while on the phone that a few weeks ago but is currently fine.   Patient did state as well that she is depressed but was not going to take her life that she just isn't sure what to do about being kicked out of the [facility]. Patient did confirm that she will be logging in for video visit tomorrow 11/27/24 with Chelsea Miranda.   I did make patient aware I would send this note to both providers to make aware what has happened this morning. Again patient was made aware if any SI/HI tendencies to call 911 or go to ED. Patient stated she was at her mothers house at the moment and not alone.

## 2024-11-26 NOTE — PROGRESS NOTES
[facility] PRIMARY [facility]  [address]  [facility] [address]  Dept: [phone]  Dept Fax: [phone]  Loc: [phone]  Loc Fax: [phone]    Lady Diamond  1995    Follow Up Office Visit Note    History of Present Illness:  Pt is a 28 y/o female in today for STI screening. Pt currently in between partner and would like tested. Denies any sx.    Exposure to STD   The patient's pertinent negatives include no dysuria. Pertinent negatives include no fever or sore throat.       The following portions of the patient's history were reviewed and updated as appropriate: allergies, current medications, past family history, past medical history, past social history, past surgical history, and problem list.    Medications:    Current Outpatient Medications:     famotidine (Pepcid) 20 MG tablet, Take 1 tablet by mouth 2 (Two) Times a Day. (Patient taking differently: Take 1 tablet by mouth As Needed.), Disp: 60 tablet, Rfl: 2    galcanezumab-gnlm (EMGALITY) 120 MG/ML auto-injector pen, Inject 1 mL under the skin into the appropriate area as directed Every 28 (Twenty-Eight) Days., Disp: 1 mL, Rfl: 11    hydrOXYzine (ATARAX) 25 MG tablet, Take 2 tablets by mouth At Night As Needed for Anxiety (anxiety and/or sleep)., Disp: 60 tablet, Rfl: 0    lamoTRIgine (LaMICtal) 25 MG tablet, Take 2 tablets by mouth Daily., Disp: 60 tablet, Rfl: 0    lamoTRIgine ER (LaMICtal XR) 250 MG tablet sustained-release 24 hour, Take 1 tablet by mouth Daily., Disp: 30 tablet, Rfl: 0    levETIRAcetam (KEPPRA) 250 MG tablet, Take 1 tablet by mouth 2 (Two) Times a Day., Disp: , Rfl:     lithium carbonate 150 MG capsule, Take 2 caps by mouth every morning, 1 cap by mouth every night., Disp: 90 capsule, Rfl: 0    meclizine (ANTIVERT) 25 MG tablet, Take 1 tablet by mouth 3 (Three) Times a Day As Needed for Dizziness., Disp: 90 tablet, Rfl: 2    polyethylene glycol (MiraLax) 17 GM/SCOOP powder,  Take 17 g by mouth Daily., Disp: 510 g, Rfl: 1    Rimegepant Sulfate (Nurtec) 75 MG tablet dispersible tablet, Take 1 tablet by mouth Daily As Needed (migraines). Take 1 tablet at the onset of headache, Max of 75 mg/24 hours, Max of 18 tabs/30 days., Disp: 16 tablet, Rfl: 0    vilazodone (VIIBRYD) 40 MG tablet tablet, Take 1 tablet by mouth Daily., Disp: 30 tablet, Rfl: 0    Subjective  No Known Allergies     Past Medical History:   Diagnosis Date    Anemia     Anxiety with depression     no meds    Bipolar disorder     Cholelithiasis     Chronic constipation     Cluster headache     H/O chlamydia infection     H/O gonorrhea     H/O gonorrhea     H/O gonorrhea     H/O gonorrhea     H/O trichomoniasis     H/O trichomoniasis 2021    Seizures 2022    May have had those in her sleep    Urinary tract infection        Past Surgical History:   Procedure Laterality Date     SECTION  11/15/2014    LTCS (1 layer closure)     SECTION N/A 2019    Procedure:  SECTION REPEAT WITH SALPINGECTOMY;  Surgeon: Regine José MD;  Location: [facility] LABOR DELIVERY;  Service: Obstetrics;  Laterality: N/A;    LAPAROSCOPIC CHOLECYSTECTOMY  2015    TONSILLECTOMY  Dec. 14    TUBAL ABDOMINAL LIGATION  19    WISDOM TOOTH EXTRACTION  2016       Family History   Problem Relation Age of Onset    Bipolar disorder Mother     Anxiety disorder Mother     Depression Mother         Everyone in my family    Multiple sclerosis Mother     Arthritis Mother     Drug abuse Mother     Hearing loss Mother     Hypertension Mother     Mental illness Mother         All of us    Depression Father     Alcohol abuse Father     Drug abuse Father     Bipolar disorder Sister     Depression Sister     Anxiety disorder Sister     Drug abuse Sister     Suicide Attempts Maternal Grandmother     Birth defects Sister         Her daughter my niece jessy    Miscarriages /  Stillbirths Sister     Diabetes Paternal Grandmother         Social History     Socioeconomic History    Marital status: Single    Number of children: 3   Tobacco Use    Smoking status: Every Day     Current packs/day: 0.00     Average packs/day: 0.6 packs/day for 26.4 years (15.0 ttl pk-yrs)     Types: Cigarettes, Electronic Cigarette     Start date: 2010     Last attempt to quit: 3/1/2019     Years since quittin.7     Passive exposure: Past    Smokeless tobacco: Never   Vaping Use    Vaping status: Every Day    Substances: Nicotine   Substance and Sexual Activity    Alcohol use: Not Currently     Alcohol/week: 6.0 standard drinks of alcohol     Types: 6 Shots of liquor per week     Comment: tequila    Drug use: Never    Sexual activity: Yes     Partners: Male     Birth control/protection: Bilateral salpingectomy , Surgical       Review of Systems   Constitutional:  Negative for activity change, chills, fatigue, fever and unexpected weight change.   HENT:  Negative for congestion, ear pain, postnasal drip, sinus pressure and sore throat.    Eyes:  Negative for pain, discharge and redness.   Respiratory:  Negative for cough, shortness of breath and wheezing.    Cardiovascular:  Negative for chest pain, palpitations and leg swelling.   Gastrointestinal:  Negative for diarrhea, nausea and vomiting.   Endocrine: Negative for cold intolerance and heat intolerance.   Genitourinary:  Negative for decreased urine volume and dysuria.   Musculoskeletal:  Negative for arthralgias and myalgias.   Skin:  Negative for rash and wound.   Neurological:  Negative for dizziness, light-headedness and headaches.   Hematological:  Does not bruise/bleed easily.   Psychiatric/Behavioral:  Negative for confusion, dysphoric mood and sleep disturbance. The patient is not nervous/anxious.          Objective  Vitals:    24 1454   BP: 116/80   BP Location: Left arm   Patient Position: Sitting   Cuff Size: Large Adult   Pulse: 86  "  Temp: 96.9 °F (36.1 °C)   TempSrc: Temporal   SpO2: 100%   Weight: 121 kg (266 lb 6.4 oz)   Height: 167.6 cm (65.98\")     Body mass index is 43.02 kg/m².     Physical Exam  Physical Exam  Vitals and nursing note reviewed.   Constitutional:       General: She is not in acute distress.     Appearance: She is not ill-appearing.   HENT:      Head: Normocephalic.      Right Ear: Tympanic membrane, ear canal and external ear normal. There is no impacted cerumen.      Left Ear: Tympanic membrane, ear canal and external ear normal. There is no impacted cerumen.      Nose: No congestion or rhinorrhea.      Mouth/Throat:      Mouth: Mucous membranes are moist.      Pharynx: Oropharynx is clear. No oropharyngeal exudate or posterior oropharyngeal erythema.   Eyes:      General:         Right eye: No discharge.         Left eye: No discharge.      Extraocular Movements: Extraocular movements intact.      Conjunctiva/sclera: Conjunctivae normal.      Pupils: Pupils are equal, round, and reactive to light.   Cardiovascular:      Rate and Rhythm: Normal rate and regular rhythm.      Heart sounds: Normal heart sounds. No murmur heard.     No friction rub. No gallop.   Pulmonary:      Effort: Pulmonary effort is normal. No respiratory distress.      Breath sounds: Normal breath sounds. No wheezing.   Abdominal:      General: Bowel sounds are normal. There is no distension.      Palpations: Abdomen is soft. There is no mass.      Tenderness: There is no abdominal tenderness.   Musculoskeletal:         General: No swelling. Normal range of motion.      Cervical back: Normal range of motion. No tenderness.      Right lower leg: No edema.      Left lower leg: No edema.   Lymphadenopathy:      Cervical: No cervical adenopathy.   Skin:     Findings: No bruising, erythema or rash.   Neurological:      Mental Status: She is oriented to person, place, and time.      Gait: Gait normal.   Psychiatric:         Mood and Affect: Mood normal.    "      Behavior: Behavior normal.         Thought Content: Thought content normal.         Judgment: Judgment normal.         Diagnostic Data  Procedures    Assessment  Diagnoses and all orders for this visit:    1. Screen for STD (sexually transmitted disease) (Primary)  -     Hepatitis B surface antigen; Future  -     Hepatitis B surface antibody; Future  -     Hepatitis B core antibody, total; Future  -     Hepatitis A antibody, total; Future  -     Hepatitis C antibody; Future  -     RPR; Future  -     HIV-1 & HIV-2 Antibodies; Future  -     HSV 1 & 2 - Specific Antibody, IgG  -     Chlamydia trachomatis, Neisseria gonorrhoeae, PCR - , Urine, Clean Catch; Future  -     Urinalysis With Culture If Indicated -; Future    2. Bipolar affective disorder, remission status unspecified    3. Migraine without aura and without status migrainosus, not intractable        Plan    1. Screen for STD (sexually transmitted disease) (Primary)- ordered labs per above.    2. Bipolar affective disorder, remission status unspecified- stable on lithium, lamictal, and viibryd.    3. Migraine without aura and without status migrainosus, not intractable- stable on nurtec.      Return for Next scheduled follow up.    Kiel Ye PA-C  11/26/2024

## 2024-11-27 DIAGNOSIS — N39.0 CHRONIC UTI: Primary | ICD-10-CM

## 2024-11-27 DIAGNOSIS — F31.60 BIPOLAR AFFECTIVE DISORDER, MIXED: ICD-10-CM

## 2024-11-27 DIAGNOSIS — R73.03 PREDIABETES: Primary | ICD-10-CM

## 2024-11-27 DIAGNOSIS — F33.41 RECURRENT MAJOR DEPRESSIVE DISORDER, IN PARTIAL REMISSION: ICD-10-CM

## 2024-11-27 DIAGNOSIS — F41.0 PANIC ATTACKS: ICD-10-CM

## 2024-11-27 DIAGNOSIS — Z79.899 MEDICATION MANAGEMENT: ICD-10-CM

## 2024-11-27 LAB
HBV SURFACE AB SER RIA-ACNC: REACTIVE
HBV SURFACE AG SERPL QL IA: NORMAL
HCV AB SER QL: NORMAL
HIV 1+2 AB+HIV1 P24 AG SERPL QL IA: NORMAL
RPR SER QL: NORMAL

## 2024-11-27 RX ORDER — VILAZODONE HYDROCHLORIDE 40 MG/1
40 TABLET ORAL DAILY
Qty: 30 TABLET | Refills: 0 | Status: SHIPPED | OUTPATIENT
Start: 2024-11-27

## 2024-11-27 RX ORDER — LAMOTRIGINE 25 MG/1
50 TABLET ORAL DAILY
Qty: 60 TABLET | Refills: 0 | Status: SHIPPED | OUTPATIENT
Start: 2024-11-27

## 2024-11-27 RX ORDER — LITHIUM CARBONATE 150 MG/1
CAPSULE ORAL
Qty: 90 CAPSULE | Refills: 0 | Status: SHIPPED | OUTPATIENT
Start: 2024-11-27

## 2024-11-27 RX ORDER — LAMOTRIGINE 250 MG/1
250 TABLET, EXTENDED RELEASE ORAL DAILY
Qty: 30 TABLET | Refills: 0 | Status: SHIPPED | OUTPATIENT
Start: 2024-11-27

## 2024-11-27 RX ORDER — NITROFURANTOIN 25; 75 MG/1; MG/1
100 CAPSULE ORAL 2 TIMES DAILY
Qty: 10 CAPSULE | Refills: 0 | Status: SHIPPED | OUTPATIENT
Start: 2024-11-27 | End: 2024-12-02

## 2024-11-27 NOTE — TELEPHONE ENCOUNTER
Patient called stating her alarm did not go off this morning and she slept through today's appointment and she is so sorry about that.  Patient was rescheduled for 01/06/2025 which is provider's first available.  Patient would like to know if provider can send in enough medication to get patient through to this appointment.  Please advise.

## 2024-11-28 LAB
HAV AB SER QL IA: NEGATIVE
HBV CORE AB SERPL QL IA: NEGATIVE
HSV1 IGG SERPL QL IA: REACTIVE
HSV2 IGG SERPL QL IA: REACTIVE

## 2024-11-29 LAB — BACTERIA SPEC AEROBE CULT: ABNORMAL

## 2024-11-30 LAB
C TRACH RRNA SPEC QL NAA+PROBE: NEGATIVE
N GONORRHOEA RRNA SPEC QL NAA+PROBE: NEGATIVE

## 2024-12-01 ENCOUNTER — TELEPHONE (OUTPATIENT)
Dept: INTERNAL MEDICINE | Facility: CLINIC | Age: 29
End: 2024-12-01
Payer: COMMERCIAL

## 2024-12-03 ENCOUNTER — TELEPHONE (OUTPATIENT)
Dept: INTERNAL MEDICINE | Facility: CLINIC | Age: 29
End: 2024-12-03
Payer: COMMERCIAL

## 2024-12-03 RX ORDER — NICOTINE 21 MG/24HR
1 PATCH, TRANSDERMAL 24 HOURS TRANSDERMAL EVERY 24 HOURS
Qty: 14 EACH | Refills: 2 | Status: SHIPPED | OUTPATIENT
Start: 2024-12-03

## 2024-12-03 NOTE — TELEPHONE ENCOUNTER
Spoke with patient regarding canceled swab. She understood why the test was canceled, but declined to come back in and redo it. She stated she no longer needed to have the testing.

## 2024-12-05 ENCOUNTER — SPECIALTY PHARMACY (OUTPATIENT)
Dept: ONCOLOGY | Facility: HOSPITAL | Age: 29
End: 2024-12-05
Payer: COMMERCIAL

## 2024-12-05 ENCOUNTER — OFFICE VISIT (OUTPATIENT)
Dept: INTERNAL MEDICINE | Facility: CLINIC | Age: 29
End: 2024-12-05
Payer: COMMERCIAL

## 2024-12-05 VITALS — OXYGEN SATURATION: 99 % | BODY MASS INDEX: 43.07 KG/M2 | HEART RATE: 90 BPM | HEIGHT: 66 IN | WEIGHT: 268 LBS

## 2024-12-05 DIAGNOSIS — R55 BLACKOUT SPELL: ICD-10-CM

## 2024-12-05 DIAGNOSIS — R11.0 NAUSEA: ICD-10-CM

## 2024-12-05 DIAGNOSIS — G93.2 IIH (IDIOPATHIC INTRACRANIAL HYPERTENSION): Primary | ICD-10-CM

## 2024-12-05 DIAGNOSIS — R90.89 ABNORMAL FINDING ON MRI OF BRAIN: ICD-10-CM

## 2024-12-05 DIAGNOSIS — G43.009 MIGRAINE WITHOUT AURA AND WITHOUT STATUS MIGRAINOSUS, NOT INTRACTABLE: ICD-10-CM

## 2024-12-05 PROCEDURE — 1159F MED LIST DOCD IN RCRD: CPT | Performed by: INTERNAL MEDICINE

## 2024-12-05 PROCEDURE — 1160F RVW MEDS BY RX/DR IN RCRD: CPT | Performed by: INTERNAL MEDICINE

## 2024-12-05 PROCEDURE — 99214 OFFICE O/P EST MOD 30 MIN: CPT | Performed by: INTERNAL MEDICINE

## 2024-12-05 RX ORDER — SUMATRIPTAN SUCCINATE 100 MG/1
100 TABLET ORAL ONCE AS NEEDED
COMMUNITY
Start: 2024-12-02

## 2024-12-05 RX ORDER — ONDANSETRON 4 MG/1
4 TABLET, ORALLY DISINTEGRATING ORAL EVERY 8 HOURS PRN
Qty: 30 TABLET | Refills: 2 | Status: SHIPPED | OUTPATIENT
Start: 2024-12-05

## 2024-12-05 RX ORDER — ACETAZOLAMIDE 250 MG/1
250 TABLET ORAL 2 TIMES DAILY
Qty: 60 TABLET | Refills: 1 | Status: SHIPPED | OUTPATIENT
Start: 2024-12-05

## 2024-12-05 NOTE — PROGRESS NOTES
"Subjective   Lady Crystal Diamond is a 29 y.o. female here for continuing headache, blurred vision, dizziness, blackout spells.  The blackout spells are becoming more frequent, had 1 for 3 days in a row recently.  It usually last when she is feeling tired and she usually wakes up but cannot remember going to sleep.  She had 1 recently where she was talking on the phone with her sister and told her that she felt tired and then she woke up an hour later but does not remember going to sleep.  She does have an appointment with neurology finally at the end of this month.  She would also like a new neurologist as she does not like driving to [place] for that appointment.    I have reviewed the patient's relevant medical history and confirmed it is accurate.    I have personally reviewed and performed the ROS. Flores Carrion MD     Objective   Pulse 90   Ht 167.6 cm (66\")   Wt 122 kg (268 lb)   SpO2 99%   BMI 43.26 kg/m²     Physical Exam  Constitutional:       Appearance: She is well-developed.   Pulmonary:      Effort: Pulmonary effort is normal.   Neurological:      General: No focal deficit present.      Mental Status: She is alert.   Psychiatric:         Behavior: Behavior normal.         Thought Content: Thought content normal.         Judgment: Judgment normal.           Current Outpatient Medications:     famotidine (Pepcid) 20 MG tablet, Take 1 tablet by mouth 2 (Two) Times a Day. (Patient taking differently: Take 1 tablet by mouth As Needed.), Disp: 60 tablet, Rfl: 2    galcanezumab-gnlm (EMGALITY) 120 MG/ML auto-injector pen, Inject 1 mL under the skin into the appropriate area as directed Every 28 (Twenty-Eight) Days., Disp: 1 mL, Rfl: 11    hydrOXYzine (ATARAX) 25 MG tablet, Take 2 tablets by mouth At Night As Needed for Anxiety (anxiety and/or sleep)., Disp: 60 tablet, Rfl: 0    lamoTRIgine (LaMICtal) 25 MG tablet, Take 2 tablets by mouth Daily., Disp: 60 tablet, Rfl: 0    lamoTRIgine ER " (LaMICtal XR) 250 MG tablet sustained-release 24 hour, Take 1 tablet by mouth Daily., Disp: 30 tablet, Rfl: 0    levETIRAcetam (KEPPRA) 250 MG tablet, Take 1 tablet by mouth 2 (Two) Times a Day., Disp: , Rfl:     lithium carbonate 150 MG capsule, Take 2 caps by mouth every morning, 1 cap by mouth every night., Disp: 90 capsule, Rfl: 0    meclizine (ANTIVERT) 25 MG tablet, Take 1 tablet by mouth 3 (Three) Times a Day As Needed for Dizziness., Disp: 90 tablet, Rfl: 2    nicotine (Nicoderm CQ) 14 MG/24HR patch, Place 1 patch on the skin as directed by provider Daily., Disp: 14 each, Rfl: 2    nicotine (Nicoderm CQ) 7 MG/24HR patch, Place 1 patch on the skin as directed by provider Daily., Disp: 14 each, Rfl: 2    polyethylene glycol (MiraLax) 17 GM/SCOOP powder, Take 17 g by mouth Daily., Disp: 510 g, Rfl: 1    Rimegepant Sulfate (Nurtec) 75 MG tablet dispersible tablet, Take 1 tablet by mouth Daily As Needed (migraines). Take 1 tablet at the onset of headache, Max of 75 mg/24 hours, Max of 18 tabs/30 days., Disp: 16 tablet, Rfl: 0    SUMAtriptan (IMITREX) 100 MG tablet, 1 tablet 1 (One) Time As Needed., Disp: , Rfl:     vilazodone (VIIBRYD) 40 MG tablet tablet, Take 1 tablet by mouth Daily., Disp: 30 tablet, Rfl: 0    Assessment & Plan   Diagnoses and all orders for this visit:    1. IIH (idiopathic intracranial hypertension): suspected (Primary)  -     acetaZOLAMIDE (DIAMOX) 250 MG tablet; Take 1 tablet by mouth 2 (Two) Times a Day.  Dispense: 60 tablet; Refill: 1  -     Ambulatory Referral to Neurology  -Suggestive of IIH on MRI.  Patient having symptomatic headaches, vision change, dizziness.  She is also having blackout spells which may or may not be related.  She has not seen neurology until 12/30 so we will go ahead and start acetazolamide after discussion with the patient on risk versus benefit of starting this versus waiting for neurology.  -Recent BMP normal    2. Nausea  -     ondansetron ODT (ZOFRAN-ODT) 4  MG disintegrating tablet; Place 1 tablet on the tongue Every 8 (Eight) Hours As Needed for Nausea or Vomiting.  Dispense: 30 tablet; Refill: 2    3. Blackout spell  -     Ambulatory Referral to Neurology    4. Migraine without aura and without status migrainosus, not intractable  -     Ambulatory Referral to Neurology    5. Abnormal finding on MRI of brain  -     Ambulatory Referral to Neurology             Flores Carrion MD

## 2024-12-05 NOTE — PROGRESS NOTES
Specialty Pharmacy Refill Coordination Note     Lady is a 29 y.o. female contacted today regarding refills of Emgality specialty medication(s).Patient is due for injection on 12/15.    Reviewed and verified with patient:       Specialty medication(s) and dose(s) confirmed: yes    Refill Questions      Flowsheet Row Most Recent Value   Changes to allergies? No   Changes to medications? No   New conditions or infections since last clinic visit No   Unplanned office visit, urgent care, ED, or hospital admission in the last 4 weeks  No   How does patient/caregiver feel medication is working? Good   Financial problems or insurance changes  No   Since the previous refill, were any specialty medication doses or scheduled injections missed or delayed?  Yes   If yes, please provide the amount Nurtec have plenty   Why were doses missed? Nurtec prn   Does this patient require a clinical escalation to a pharmacist? No            Delivery Questions      Flowsheet Row Most Recent Value   Delivery method UPS   Delivery address verified with patient/caregiver? Yes   Delivery address Home   Number of medications in delivery 1   Medication(s) being filled and delivered Galcanezumab-gnlm (EMGALITY)   Doses left of specialty medications N/A   Copay verified? Yes   Copay amount [employer]gality =$0   Copay form of payment No copayment ($0)   Ship Date 12/09   Delivery Date 12/10   Signature Required No                   Follow-up: 28 day(s)     Nick Parker  Specialty Pharmacy Technician

## 2024-12-11 ENCOUNTER — TELEPHONE (OUTPATIENT)
Dept: PSYCHIATRY | Facility: CLINIC | Age: 29
End: 2024-12-11

## 2024-12-11 ENCOUNTER — TELEMEDICINE (OUTPATIENT)
Dept: PSYCHIATRY | Facility: CLINIC | Age: 29
End: 2024-12-11
Payer: COMMERCIAL

## 2024-12-11 DIAGNOSIS — F33.41 RECURRENT MAJOR DEPRESSIVE DISORDER, IN PARTIAL REMISSION: ICD-10-CM

## 2024-12-11 DIAGNOSIS — F60.3 BORDERLINE PERSONALITY DISORDER: Primary | ICD-10-CM

## 2024-12-11 NOTE — TELEPHONE ENCOUNTER
At 8:50 am this morning provider Anna Collier reached out to myself via secure chat and ask that I reach out to this patient. Provider stated she was in video session with patient and all at once unable to hear the patient. Provider stated she logged out of the session and tried logging back in and still unable to hear anything. Provider at that time called the patient via telephone and the patient seemed to be unable to hear provider and kept saying hello until she hung up. I contacted the patient at 8:51 am  who sounded as if she was asleep. After explaining who I was and that I was with Anna Collier's office the patient stated she had no idea who Anna Scott is. At that time I confirmed date of birth and ask patient to confirm her address, patient was able to give me an address but struggled, but was unable to verify the last four of her ss#. I reached out to provider via secure chat made her aware the patient was stating she does not who the provider along with saying she does not what my chart is. Provider stated we may need to call EMS that she was sure it was the patient on session due to she has had video visit with this patient in the past. I continue to talk with the patient who continued to say she had been asleep all morning and that I was the only person she had spoken with. Patient also continue to state she did not know who Anna Collier was even with me explaining  that Anna is her therapist and that she has had video sessions with her in the past. I ask patient if she was home alone and she stated her sister was there. After telling the patient I had to speak with her sister just so we could make sure she is ok. Patient stated she was diagnosed about a month ago with fluid on her brain which has effected her memory. Patient then stated he sister may not be home that she was really unsure. I explained I had to speak with her again just to make sure that someone was home with her. Patient then  "at once stated \"oh yeah I know who Anna Collier is and I did log in for a visit with her I remember now.\"   I was able at that time to speak with the sister Joanne who confirmed the patient does have memory loss. I confirmed with sister that she would be there the remaining of the day with the patient and made her aware if patients symptoms was to worsen that she should take her to the local ED. Sister Joanne at that time confirmed she would do so, but ask what to tell them when she got to ED if they had to go. I explained to make sure to tell them about the memory loss patient had this morning. Sister gave verbal understanding and stated she would do so.   "

## 2024-12-11 NOTE — PROGRESS NOTES
Date: 2024  Time In: 08:00 EST  Time out: 8:46 AM EST    This provider is located at [facility], [facility] [place], [place], [facility], using a secure [employer] Video Visit through [employer]. Patient is being seen remotely via telehealth at their home address is located in [place]. Patient stated they are in a secure environment for this session. The patient's condition being diagnosed and treated is appropriate for telemedicine. The provider identified themself as well as their credentials. The patient, or  patient's legal guardian consent to be seen remotely, and when consent is given they understand that the consent allows for patient identifiable information to be sent to a third party as needed. They may refuse to be seen remotely at any time. The electronic data is encrypted and password protected, and the patient's or  legal guardian has been advised of the potential risks to privacy not withstanding such measures.   PT Identifiers used: Name and .    You have chosen to receive care through a telehealth visit.  Do you consent to use a video/audio connection for your medical care today? Yes    Subjective   Lady Diamond is a 29 y.o. female who presents today for follow up    Chief Complaint:   Chief Complaint   Patient presents with    Anxiety    Depression          Data: Pt reported that she is feeling better overall. Pt reported that depression has decreased as well as mood instability. Pt reported that she was kicked out of IOP at the [facility]. Pt reported that she is more accepting of her breakup, denies false hope. Pt reported that her sister is back and fourth in her home. Pt reported that she is focusing on school and expected to graduate in May 2025 -feeling excited. Pt was recently dx with new health issues that can effect memory at times.    Around 8:45 AM it appeared pt could no longer hear clinician, pt stopped verbally responding. Pt was still in front of  the camera looking at the camera and around the room. Clinician logged off and logged back on assuming it was an audio issue with Epic. Pt appeared to still be unable to hear clinician. Clinician called pt and it appeared pt could not hear clinician again, 'hello' 'hello', pt hung up. Clinician messaged schedule representative via secure chat, Tina reached out to the pt. Tina reported pt was confused and struggling with memory. Tina was able to talk to pts sister who was in the home about current concerns.     Clinical Maneuvering/Intervention: Assisted patient in processing above session content; acknowledged and normalized patient’s thoughts, feelings, and concerns.  Rationalized patient thought process regarding concerns presented at session.  Discussed triggers associated with patient's  anxiety , depression , and BPD  Also discussed coping skills for patient to implement such as grounding , mindfulness , self care , and positive self talk     Allowed patient to freely discuss issues without interruption or judgment. Provided safe, confidential environment to facilitate the development of positive therapeutic relationship and encourage open, honest communication. Assisted patient in identifying risk factors which would indicate the need for higher level of care including thoughts to harm self or others and/or self-harming behavior and encouraged patient to contact this office, call 911, or present to the nearest emergency room should any of these events occur. Discussed crisis intervention services and means to access. Patient adamantly and convincingly denies current suicidal or homicidal ideation or perceptual disturbance.    Assessment: Pt was alert and oriented at the beginning of session. Pt appears to be processing recent break up better, progress towards acceptance. Pt appears to have more stable mood and depressive symptoms. Pt may benefit from a new IOP, referred to [facility]. Pt  appears to have anxiety re her health issues. Pts recent health issues may have been a contributing factor to confusion at the end of session. Pt was actively participating in session until around 8:45 AM EST.     Patient appears to maintain relative stability as compared to their baseline.  However, patient continues to struggle with   Chief Complaint   Patient presents with    Anxiety    Depression    which continues to cause impairment in important areas of functioning.  A result, they can be reasonably expected to continue to benefit from treatment and would likely be at increased risk for decompensation otherwise.      Mental Status Exam:   Hygiene:   good  Cooperation:  Cooperative  Eye Contact:  Good  Psychomotor Behavior:  Restless  Affect:  Appropriate  Mood: normal  Speech:  Normal  Thought Process:  Linear  Thought Content:  Normal  Suicidal:  None  Homicidal:  None  Hallucinations:  None  Delusion:  None  Memory:  Deficits  Orientation:  Grossly intact  Reliability:  fair  Insight:  Fair  Judgement:  Fair  Impulse Control:  Fair  Physical/Medical Issues:  No        Patient's Support Network Includes:  children and extended family    Functional Status: No impairment    Progress toward goal: Not at goal    Prognosis: Fair with Ongoing Treatment     Plan: referred to [facility] for IOP.    Patient will continue in individual outpatient therapy with focus on improved functioning and coping skills, maintaining stability, and avoiding decompensation and the need for higher level of care.    Patient will adhere to medication regimen as prescribed and report any side effects. Patient will contact this office, call 911 or present to the nearest emergency room should suicidal or homicidal ideations occur. Provide Cognitive Behavioral Therapy and Solution Focused Therapy to improve functioning, maintain stability, and avoid decompensation and the need for higher level of care.     Return in about 3 weeks  (around 1/1/2025).      VISIT DIAGNOSIS:    Diagnosis Plan   1. Borderline personality disorder        2. Recurrent major depressive disorder, in partial remission         08:00 EST         This document has been electronically signed by Anna Collier LCSW  December 11, 2024      Part of this note may be an electronic transcription/translation of spoken language to printed text using the Dragon Dictation System.

## 2024-12-19 ENCOUNTER — HOSPITAL ENCOUNTER (EMERGENCY)
Facility: HOSPITAL | Age: 29
Discharge: HOME | End: 2024-12-19
Attending: EMERGENCY MEDICINE
Payer: COMMERCIAL

## 2024-12-19 VITALS
WEIGHT: 268.96 LBS | RESPIRATION RATE: 18 BRPM | BODY MASS INDEX: 43.23 KG/M2 | OXYGEN SATURATION: 97 % | SYSTOLIC BLOOD PRESSURE: 121 MMHG | TEMPERATURE: 98.5 F | HEIGHT: 66 IN | DIASTOLIC BLOOD PRESSURE: 72 MMHG | HEART RATE: 67 BPM

## 2024-12-19 DIAGNOSIS — R56.9 CONVULSIONS, UNSPECIFIED CONVULSION TYPE: ICD-10-CM

## 2024-12-19 DIAGNOSIS — G93.2 IIH (IDIOPATHIC INTRACRANIAL HYPERTENSION): ICD-10-CM

## 2024-12-19 DIAGNOSIS — R25.9 ABNORMAL MOVEMENTS: Primary | ICD-10-CM

## 2024-12-19 LAB
ALBUMIN SERPL-MCNC: 4 G/DL (ref 3.5–5.2)
ALBUMIN/GLOB SERPL: 1.7 G/DL
ALP SERPL-CCNC: 64 U/L (ref 39–117)
ALT SERPL W P-5'-P-CCNC: 14 U/L (ref 1–33)
AMPHET+METHAMPHET UR QL: NEGATIVE
AMPHETAMINES UR QL: NEGATIVE
ANION GAP SERPL CALCULATED.3IONS-SCNC: 9 MMOL/L (ref 5–15)
AST SERPL-CCNC: 19 U/L (ref 1–32)
B-HCG UR QL: NEGATIVE
BACTERIA UR QL AUTO: ABNORMAL /HPF
BARBITURATES UR QL SCN: NEGATIVE
BASOPHILS # BLD AUTO: 0.09 10*3/MM3 (ref 0–0.2)
BASOPHILS NFR BLD AUTO: 0.9 % (ref 0–1.5)
BENZODIAZ UR QL SCN: NEGATIVE
BILIRUB SERPL-MCNC: <0.2 MG/DL (ref 0–1.2)
BILIRUB UR QL STRIP: NEGATIVE
BUN SERPL-MCNC: 11 MG/DL (ref 6–20)
BUN/CREAT SERPL: 12.2 (ref 7–25)
BUPRENORPHINE SERPL-MCNC: NEGATIVE NG/ML
CALCIUM SPEC-SCNC: 8.1 MG/DL (ref 8.6–10.5)
CANNABINOIDS SERPL QL: NEGATIVE
CHLORIDE SERPL-SCNC: 108 MMOL/L (ref 98–107)
CLARITY UR: ABNORMAL
CO2 SERPL-SCNC: 24 MMOL/L (ref 22–29)
COCAINE UR QL: NEGATIVE
COLOR UR: YELLOW
CREAT SERPL-MCNC: 0.9 MG/DL (ref 0.57–1)
DEPRECATED RDW RBC AUTO: 47.2 FL (ref 37–54)
EGFRCR SERPLBLD CKD-EPI 2021: 87.8 ML/MIN/1.73
EOSINOPHIL # BLD AUTO: 0.21 10*3/MM3 (ref 0–0.4)
EOSINOPHIL NFR BLD AUTO: 2.2 % (ref 0.3–6.2)
ERYTHROCYTE [DISTWIDTH] IN BLOOD BY AUTOMATED COUNT: 15.8 % (ref 12.3–15.4)
ETHANOL BLD-MCNC: <10 MG/DL (ref 0–10)
EXPIRATION DATE: NORMAL
FENTANYL UR-MCNC: NEGATIVE NG/ML
GLOBULIN UR ELPH-MCNC: 2.3 GM/DL
GLUCOSE SERPL-MCNC: 115 MG/DL (ref 65–99)
GLUCOSE UR STRIP-MCNC: NEGATIVE MG/DL
HCT VFR BLD AUTO: 36.2 % (ref 34–46.6)
HGB BLD-MCNC: 11.1 G/DL (ref 12–15.9)
HGB UR QL STRIP.AUTO: NEGATIVE
HOLD SPECIMEN: NORMAL
HYALINE CASTS UR QL AUTO: ABNORMAL /LPF
IMM GRANULOCYTES # BLD AUTO: 0.02 10*3/MM3 (ref 0–0.05)
IMM GRANULOCYTES NFR BLD AUTO: 0.2 % (ref 0–0.5)
INTERNAL NEGATIVE CONTROL: NEGATIVE
INTERNAL POSITIVE CONTROL: POSITIVE
KETONES UR QL STRIP: NEGATIVE
LEUKOCYTE ESTERASE UR QL STRIP.AUTO: ABNORMAL
LYMPHOCYTES # BLD AUTO: 2.49 10*3/MM3 (ref 0.7–3.1)
LYMPHOCYTES NFR BLD AUTO: 25.6 % (ref 19.6–45.3)
Lab: NORMAL
MCH RBC QN AUTO: 25.1 PG (ref 26.6–33)
MCHC RBC AUTO-ENTMCNC: 30.7 G/DL (ref 31.5–35.7)
MCV RBC AUTO: 81.9 FL (ref 79–97)
METHADONE UR QL SCN: NEGATIVE
MONOCYTES # BLD AUTO: 0.58 10*3/MM3 (ref 0.1–0.9)
MONOCYTES NFR BLD AUTO: 6 % (ref 5–12)
NEUTROPHILS NFR BLD AUTO: 6.32 10*3/MM3 (ref 1.7–7)
NEUTROPHILS NFR BLD AUTO: 65.1 % (ref 42.7–76)
NITRITE UR QL STRIP: NEGATIVE
NRBC BLD AUTO-RTO: 0 /100 WBC (ref 0–0.2)
OPIATES UR QL: NEGATIVE
OXYCODONE UR QL SCN: NEGATIVE
PCP UR QL SCN: NEGATIVE
PH UR STRIP.AUTO: 6 [PH] (ref 5–8)
PLATELET # BLD AUTO: 391 10*3/MM3 (ref 140–450)
PMV BLD AUTO: 9.7 FL (ref 6–12)
POTASSIUM SERPL-SCNC: 4.1 MMOL/L (ref 3.5–5.2)
PROT SERPL-MCNC: 6.3 G/DL (ref 6–8.5)
PROT UR QL STRIP: ABNORMAL
RBC # BLD AUTO: 4.42 10*6/MM3 (ref 3.77–5.28)
RBC # UR STRIP: ABNORMAL /HPF
REF LAB TEST METHOD: ABNORMAL
SODIUM SERPL-SCNC: 141 MMOL/L (ref 136–145)
SP GR UR STRIP: 1.02 (ref 1–1.03)
SQUAMOUS #/AREA URNS HPF: ABNORMAL /HPF
TRICYCLICS UR QL SCN: NEGATIVE
UROBILINOGEN UR QL STRIP: ABNORMAL
WBC # UR STRIP: ABNORMAL /HPF
WBC NRBC COR # BLD AUTO: 9.71 10*3/MM3 (ref 3.4–10.8)
WHOLE BLOOD HOLD COAG: NORMAL
WHOLE BLOOD HOLD SPECIMEN: NORMAL

## 2024-12-19 PROCEDURE — 85025 COMPLETE CBC W/AUTO DIFF WBC: CPT | Performed by: PHYSICIAN ASSISTANT

## 2024-12-19 PROCEDURE — 99283 EMERGENCY DEPT VISIT LOW MDM: CPT

## 2024-12-19 PROCEDURE — 81025 URINE PREGNANCY TEST: CPT | Performed by: PHYSICIAN ASSISTANT

## 2024-12-19 PROCEDURE — 82077 ASSAY SPEC XCP UR&BREATH IA: CPT | Performed by: PHYSICIAN ASSISTANT

## 2024-12-19 PROCEDURE — 81001 URINALYSIS AUTO W/SCOPE: CPT | Performed by: PHYSICIAN ASSISTANT

## 2024-12-19 PROCEDURE — 80307 DRUG TEST PRSMV CHEM ANLYZR: CPT | Performed by: PHYSICIAN ASSISTANT

## 2024-12-19 PROCEDURE — 80053 COMPREHEN METABOLIC PANEL: CPT | Performed by: PHYSICIAN ASSISTANT

## 2024-12-19 RX ORDER — LEVETIRACETAM 250 MG/1
500 TABLET ORAL 2 TIMES DAILY
Qty: 120 TABLET | Refills: 0 | Status: SHIPPED | OUTPATIENT
Start: 2024-12-19 | End: 2024-12-20

## 2024-12-19 RX ORDER — SODIUM CHLORIDE 0.9 % (FLUSH) 0.9 %
10 SYRINGE (ML) INJECTION AS NEEDED
Status: DISCONTINUED | OUTPATIENT
Start: 2024-12-19 | End: 2024-12-19 | Stop reason: HOSPADM

## 2024-12-19 RX ORDER — VALACYCLOVIR HYDROCHLORIDE 1 G/1
1000 TABLET, FILM COATED ORAL 2 TIMES DAILY
Qty: 20 TABLET | Refills: 0 | Status: SHIPPED | OUTPATIENT
Start: 2024-12-19 | End: 2024-12-29

## 2024-12-19 RX ORDER — LEVETIRACETAM 500 MG/1
500 TABLET ORAL ONCE
Status: COMPLETED | OUTPATIENT
Start: 2024-12-19 | End: 2024-12-19

## 2024-12-19 RX ADMIN — LEVETIRACETAM 500 MG: 500 TABLET, FILM COATED ORAL at 21:09

## 2024-12-19 NOTE — TELEPHONE ENCOUNTER
We need to get her scheduled for a follow-up visit.  It has been so long since I saw her and she needs a physical exam.

## 2024-12-19 NOTE — ED PROVIDER NOTES
"Subjective   History of Present Illness  Pt is a 28 yo female presenting to ED by EMS with complaints of seizure. PMHx significant for Bipolar, Anemia, Anxiety, Depression and SI. Pt explains she has had multiple seizures today. She is able to describe her seizures and \"shaking all over\" and \"can't control my body\". She explains she is awake during the episodes. She also has been having \"black out spells\", dizziness and headaches for months. Pt denies hitting her head or current dizziness in ED.  Denies urinary incontinence or tongue / mouth injury. No reports from EMS of post ictal state. Pt denies neck or back pain. Denies current vision changes, weakness, numbness, fever, CP, SOB, cough, V/D, abdominal pain or urinary sx. Pt explains she has \"fluid on the brain\" and is waiting to f/u with Neurology. Pt had MRI 11/13/2024 showing  prominently expanded and empty sella with concern for IIH. Pt denies tobacco, drug or ETOH use.     NOTE  Patient was resting comfortably when I first approached the ED room. Once I entered and started asking questions she started shaking all over and making gurgling noises. I asked the patient why she started these movements after I walked into ED room and she then stopped and began talking.     History provided by:  Patient, medical records and EMS personnel      Review of Systems   Constitutional:  Negative for fever.   HENT:  Negative for congestion and sore throat.    Eyes:  Negative for visual disturbance.   Respiratory:  Negative for cough and shortness of breath.    Cardiovascular:  Negative for chest pain.   Gastrointestinal:  Negative for abdominal pain, diarrhea, nausea and vomiting.   Genitourinary:  Negative for difficulty urinating, dysuria and flank pain.   Musculoskeletal:  Negative for arthralgias and back pain.   Neurological:  Positive for dizziness, seizures and headaches. Negative for syncope, speech difficulty and weakness.   Psychiatric/Behavioral:  Negative for " confusion.        Past Medical History:   Diagnosis Date    Anemia 2019    Anxiety with depression 2006    no meds    Bipolar disorder     Cholelithiasis     Chronic constipation     Cluster headache     H/O chlamydia infection     H/O gonorrhea     H/O gonorrhea     H/O gonorrhea     H/O gonorrhea 2018    H/O trichomoniasis 2018    H/O trichomoniasis 2021    Seizures 2022    May have had those in her sleep    Urinary tract infection        No Known Allergies    Past Surgical History:   Procedure Laterality Date     SECTION  11/15/2014    LTCS (1 layer closure)     SECTION N/A 2019    Procedure:  SECTION REPEAT WITH SALPINGECTOMY;  Surgeon: Regine José MD;  Location: [facility] LABOR DELIVERY;  Service: Obstetrics;  Laterality: N/A;    LAPAROSCOPIC CHOLECYSTECTOMY  2015    TONSILLECTOMY  Dec. 14    TUBAL ABDOMINAL LIGATION  19    WISDOM TOOTH EXTRACTION  2016       Family History   Problem Relation Age of Onset    Bipolar disorder Mother     Anxiety disorder Mother     Depression Mother         Everyone in my family    Multiple sclerosis Mother     Arthritis Mother     Drug abuse Mother     Hearing loss Mother     Hypertension Mother     Mental illness Mother         All of us    Depression Father     Alcohol abuse Father     Drug abuse Father     Bipolar disorder Sister     Depression Sister     Anxiety disorder Sister     Drug abuse Sister     Suicide Attempts Maternal Grandmother     Birth defects Sister         Her daughter my niece jessy    Miscarriages / Stillbirths Sister     Diabetes Paternal Grandmother        Social History     Socioeconomic History    Marital status: Single    Number of children: 3   Tobacco Use    Smoking status: Former     Average packs/day: 0.6 packs/day for 26.4 years (15.0 ttl pk-yrs)     Types: Cigarettes, Electronic Cigarette     Start date: 2010     Quit date: 3/1/2019     Years since  quittin.8     Passive exposure: Past    Smokeless tobacco: Never   Vaping Use    Vaping status: Former    Quit date: 2024    Substances: Nicotine   Substance and Sexual Activity    Alcohol use: Not Currently     Alcohol/week: 6.0 standard drinks of alcohol     Types: 6 Shots of liquor per week     Comment: tequila    Drug use: Never    Sexual activity: Yes     Partners: Male     Birth control/protection: Bilateral salpingectomy , Surgical           Objective   Physical Exam  Vitals and nursing note reviewed.   Constitutional:       General: She is not in acute distress.     Appearance: She is well-developed. She is obese.   HENT:      Head: Atraumatic.      Nose: Nose normal.   Eyes:      General: Lids are normal.      Conjunctiva/sclera: Conjunctivae normal.      Pupils: Pupils are equal, round, and reactive to light.   Cardiovascular:      Rate and Rhythm: Normal rate and regular rhythm.      Heart sounds: Normal heart sounds.   Pulmonary:      Effort: Pulmonary effort is normal.      Breath sounds: Normal breath sounds. No wheezing.   Abdominal:      General: There is no distension.      Palpations: Abdomen is soft.      Tenderness: There is no abdominal tenderness. There is no guarding or rebound.   Musculoskeletal:         General: No tenderness. Normal range of motion.      Cervical back: Normal range of motion and neck supple.   Skin:     General: Skin is warm and dry.      Findings: No erythema or rash.   Neurological:      Mental Status: She is alert and oriented to person, place, and time.      Cranial Nerves: Cranial nerves 2-12 are intact.      Sensory: Sensation is intact. No sensory deficit.      Motor: Motor function is intact.      Coordination: Finger-Nose-Finger Test normal.      Gait: Gait is intact.      Comments: No pronator drift    Psychiatric:         Mood and Affect: Mood is anxious.         Speech: Speech normal.         Behavior: Behavior normal.         Procedures           ED  Course  ED Course as of 12/19/24 2110   u Dec 19, 2024   1843 I personally reviewed and interpreted labs results and went over with patient. I personally reviewed and interpreted vitals. [RT]   1902 BP: 147/98 [RT]   1902 Heart Rate: 84 [RT]   1902 SpO2: 98 % [RT]   1902 Temp: 98.5 °F (36.9 °C) [RT]   2000 WBC: 9.71 [RT]   2000 Glucose(!): 115 [RT]   2000 Creatinine: 0.90 [RT]   2000 Potassium: 4.1  UDS clean [RT]   2103 Discussed patient with Dr. Patton who is agreeable with ED course and tx plan. He is agreeable with plan for discharge. Patient has Keppra on her medication list. [RT]   2104 Re-examined patient and discussed results and tx plan. Patient explains she is not taking Keppra. Discussed dc home and close f/u with PCP and Neurology. Will restart her Keppra. Went over precautions including no driving or operating machinery for 3 months.  [RT]      ED Course User Index  [RT] Aurea Main, PA      Recent Results (from the past 24 hours)   Comprehensive Metabolic Panel    Collection Time: 12/19/24  7:17 PM    Specimen: Blood   Result Value Ref Range    Glucose 115 (H) 65 - 99 mg/dL    BUN 11 6 - 20 mg/dL    Creatinine 0.90 0.57 - 1.00 mg/dL    Sodium 141 136 - 145 mmol/L    Potassium 4.1 3.5 - 5.2 mmol/L    Chloride 108 (H) 98 - 107 mmol/L    CO2 24.0 22.0 - 29.0 mmol/L    Calcium 8.1 (L) 8.6 - 10.5 mg/dL    Total Protein 6.3 6.0 - 8.5 g/dL    Albumin 4.0 3.5 - 5.2 g/dL    ALT (SGPT) 14 1 - 33 U/L    AST (SGOT) 19 1 - 32 U/L    Alkaline Phosphatase 64 39 - 117 U/L    Total Bilirubin <0.2 0.0 - 1.2 mg/dL    Globulin 2.3 gm/dL    A/G Ratio 1.7 g/dL    BUN/Creatinine Ratio 12.2 7.0 - 25.0    Anion Gap 9.0 5.0 - 15.0 mmol/L    eGFR 87.8 >60.0 mL/min/1.73   Ethanol    Collection Time: 12/19/24  7:17 PM    Specimen: Blood   Result Value Ref Range    Ethanol <10 0 - 10 mg/dL   CBC Auto Differential    Collection Time: 12/19/24  7:17 PM    Specimen: Blood   Result Value Ref Range    WBC 9.71 3.40 - 10.80 10*3/mm3     RBC 4.42 3.77 - 5.28 10*6/mm3    Hemoglobin 11.1 (L) 12.0 - 15.9 g/dL    Hematocrit 36.2 34.0 - 46.6 %    MCV 81.9 79.0 - 97.0 fL    MCH 25.1 (L) 26.6 - 33.0 pg    MCHC 30.7 (L) 31.5 - 35.7 g/dL    RDW 15.8 (H) 12.3 - 15.4 %    RDW-SD 47.2 37.0 - 54.0 fl    MPV 9.7 6.0 - 12.0 fL    Platelets 391 140 - 450 10*3/mm3    Neutrophil % 65.1 42.7 - 76.0 %    Lymphocyte % 25.6 19.6 - 45.3 %    Monocyte % 6.0 5.0 - 12.0 %    Eosinophil % 2.2 0.3 - 6.2 %    Basophil % 0.9 0.0 - 1.5 %    Immature Grans % 0.2 0.0 - 0.5 %    Neutrophils, Absolute 6.32 1.70 - 7.00 10*3/mm3    Lymphocytes, Absolute 2.49 0.70 - 3.10 10*3/mm3    Monocytes, Absolute 0.58 0.10 - 0.90 10*3/mm3    Eosinophils, Absolute 0.21 0.00 - 0.40 10*3/mm3    Basophils, Absolute 0.09 0.00 - 0.20 10*3/mm3    Immature Grans, Absolute 0.02 0.00 - 0.05 10*3/mm3    nRBC 0.0 0.0 - 0.2 /100 WBC   Green Top (Gel)    Collection Time: 12/19/24  7:17 PM   Result Value Ref Range    Extra Tube Hold for add-ons.    Lavender Top    Collection Time: 12/19/24  7:17 PM   Result Value Ref Range    Extra Tube hold for add-on    Gold Top - SST    Collection Time: 12/19/24  7:17 PM   Result Value Ref Range    Extra Tube Hold for add-ons.    Gray Top    Collection Time: 12/19/24  7:17 PM   Result Value Ref Range    Extra Tube Hold for add-ons.    Light Blue Top    Collection Time: 12/19/24  7:17 PM   Result Value Ref Range    Extra Tube Hold for add-ons.    Urinalysis With Microscopic If Indicated (No Culture) - Urine, Clean Catch    Collection Time: 12/19/24  7:26 PM    Specimen: Urine, Clean Catch   Result Value Ref Range    Color, UA Yellow Yellow, Straw    Appearance, UA Cloudy (A) Clear    pH, UA 6.0 5.0 - 8.0    Specific Gravity, UA 1.018 1.001 - 1.030    Glucose, UA Negative Negative    Ketones, UA Negative Negative    Bilirubin, UA Negative Negative    Blood, UA Negative Negative    Protein, UA Trace (A) Negative    Leuk Esterase, UA Trace (A) Negative    Nitrite, UA Negative  Negative    Urobilinogen, UA 1.0 E.U./dL 0.2 - 1.0 E.U./dL   Urine Drug Screen - Urine, Clean Catch    Collection Time: 12/19/24  7:26 PM    Specimen: Urine, Clean Catch   Result Value Ref Range    THC, Screen, Urine Negative Negative    Phencyclidine (PCP), Urine Negative Negative    Cocaine Screen, Urine Negative Negative    Methamphetamine, Ur Negative Negative    Opiate Screen Negative Negative    Amphetamine Screen, Urine Negative Negative    Benzodiazepine Screen, Urine Negative Negative    Tricyclic Antidepressants Screen Negative Negative    Methadone Screen, Urine Negative Negative    Barbiturates Screen, Urine Negative Negative    Oxycodone Screen, Urine Negative Negative    Buprenorphine, Screen, Urine Negative Negative   Fentanyl, Urine - Urine, Clean Catch    Collection Time: 12/19/24  7:26 PM    Specimen: Urine, Clean Catch   Result Value Ref Range    Fentanyl, Urine Negative Negative   Urinalysis, Microscopic Only - Urine, Clean Catch    Collection Time: 12/19/24  7:26 PM    Specimen: Urine, Clean Catch   Result Value Ref Range    RBC, UA 0-2 None Seen, 0-2 /HPF    WBC, UA 6-10 (A) None Seen, 0-2 /HPF    Bacteria, UA 1+ (A) None Seen, Trace /HPF    Squamous Epithelial Cells, UA 21-30 (A) None Seen, 0-2 /HPF    Hyaline Casts, UA 0-6 0 - 6 /LPF    Methodology Manual Light Microscopy    POC Urine Pregnancy    Collection Time: 12/19/24  7:32 PM    Specimen: Urine   Result Value Ref Range    HCG, Urine, QL Negative Negative    Lot Number [id number]     Internal Positive Control Positive Positive, Passed    Internal Negative Control Negative Negative, Passed    Expiration Date 04/22/2026      Note: In addition to lab results from this visit, the labs listed above may include labs taken at another facility or during a different encounter within the last 24 hours. Please correlate lab times with ED admission and discharge times for further clarification of the services performed during this visit.    No orders  to display     Vitals:    12/19/24 2034 12/19/24 2035 12/19/24 2036 12/19/24 2038   BP:       Pulse: 65 62 67 67   Resp:       Temp:       SpO2: 100% 99% 98% 97%   Weight:       Height:         Medications   sodium chloride 0.9 % flush 10 mL (has no administration in time range)   levETIRAcetam (KEPPRA) tablet 500 mg (500 mg Oral Given 12/19/24 2109)     ECG/EMG Results (last 24 hours)       ** No results found for the last 24 hours. **          No orders to display                                                        Medical Decision Making  Pt is a 28 yo female presenting to ED with concern for seizures. I had a discussion with the patient / family regarding ED course, diagnosis, diagnostic results and treatment plan including medications and admission / discharge. Discussed if new or worse symptoms / concerns to return to ED for further evaluation. Discussed need for close follow up with PCP / specialists.    DDx  Seizures, Drug use, Non epileptic movements, Anxiety, Suicidal, UTI, SOFIA, Electrolyte abnormality , Brain mass, IIH    Problems Addressed:  Abnormal movements: complicated acute illness or injury  Convulsions, unspecified convulsion type: complicated acute illness or injury  IIH (idiopathic intracranial hypertension): complicated acute illness or injury    Amount and/or Complexity of Data Reviewed  Independent Historian: EMS  External Data Reviewed: notes.     Details: Reviewed previous non ED visits including prior labs, imaging, available notes, medications, allergies and surgical hx.   Neurology notes  PCP 12-5-24  Labs: ordered. Decision-making details documented in ED Course.    Risk  Prescription drug management.        Final diagnoses:   Abnormal movements   IIH (idiopathic intracranial hypertension)   Convulsions, unspecified convulsion type       ED Disposition  ED Disposition       ED Disposition   Discharge    Condition   Stable    Comment   --               Flores Carrion,  MD  [address]  [place] [id number] [phone]    Schedule an appointment as soon as possible for a visit       [facility] EMERGENCY DEPARTMENT  [address]  [facility] [address] [phone]    If symptoms worsen         Medication List        Changed      famotidine 20 MG tablet  Commonly known as: Pepcid  Take 1 tablet by mouth 2 (Two) Times a Day.  What changed:   when to take this  reasons to take this     levETIRAcetam 250 MG tablet  Commonly known as: KEPPRA  Take 2 tablets by mouth 2 (Two) Times a Day for 30 days.  What changed: how much to take               Where to Get Your Medications        These medications were sent to [id number] - [place], [place] - [address] - [phone]  - [phone] FX  [address], [facility] [address]      Phone: [phone]   levETIRAcetam 250 MG tablet            Aurea Main PA  12/19/24 6566

## 2024-12-20 ENCOUNTER — TELEPHONE (OUTPATIENT)
Dept: NEUROLOGY | Facility: CLINIC | Age: 29
End: 2024-12-20

## 2024-12-20 ENCOUNTER — LAB (OUTPATIENT)
Dept: INTERNAL MEDICINE | Facility: CLINIC | Age: 29
End: 2024-12-20
Payer: COMMERCIAL

## 2024-12-20 ENCOUNTER — OFFICE VISIT (OUTPATIENT)
Dept: INTERNAL MEDICINE | Facility: CLINIC | Age: 29
End: 2024-12-20
Payer: COMMERCIAL

## 2024-12-20 VITALS
BODY MASS INDEX: 44.36 KG/M2 | HEART RATE: 71 BPM | TEMPERATURE: 97 F | WEIGHT: 276 LBS | SYSTOLIC BLOOD PRESSURE: 130 MMHG | DIASTOLIC BLOOD PRESSURE: 78 MMHG | OXYGEN SATURATION: 99 % | HEIGHT: 66 IN

## 2024-12-20 DIAGNOSIS — R56.9 SEIZURE: Primary | ICD-10-CM

## 2024-12-20 DIAGNOSIS — R56.9 GENERALIZED CONVULSIVE SEIZURES: Primary | ICD-10-CM

## 2024-12-20 LAB
ANION GAP SERPL CALCULATED.3IONS-SCNC: 9.2 MMOL/L (ref 5–15)
BUN SERPL-MCNC: 9 MG/DL (ref 6–20)
BUN/CREAT SERPL: 11.3 (ref 7–25)
CALCIUM SPEC-SCNC: 8.9 MG/DL (ref 8.6–10.5)
CHLORIDE SERPL-SCNC: 105 MMOL/L (ref 98–107)
CO2 SERPL-SCNC: 22.8 MMOL/L (ref 22–29)
CREAT SERPL-MCNC: 0.8 MG/DL (ref 0.57–1)
EGFRCR SERPLBLD CKD-EPI 2021: 101.2 ML/MIN/1.73
GLUCOSE SERPL-MCNC: 98 MG/DL (ref 65–99)
POTASSIUM SERPL-SCNC: 3.8 MMOL/L (ref 3.5–5.2)
SODIUM SERPL-SCNC: 137 MMOL/L (ref 136–145)

## 2024-12-20 PROCEDURE — 80048 BASIC METABOLIC PNL TOTAL CA: CPT | Performed by: INTERNAL MEDICINE

## 2024-12-20 PROCEDURE — 36415 COLL VENOUS BLD VENIPUNCTURE: CPT | Performed by: INTERNAL MEDICINE

## 2024-12-20 PROCEDURE — 99213 OFFICE O/P EST LOW 20 MIN: CPT | Performed by: NURSE PRACTITIONER

## 2024-12-20 PROCEDURE — 1159F MED LIST DOCD IN RCRD: CPT | Performed by: NURSE PRACTITIONER

## 2024-12-20 PROCEDURE — 1160F RVW MEDS BY RX/DR IN RCRD: CPT | Performed by: NURSE PRACTITIONER

## 2024-12-20 RX ORDER — LEVETIRACETAM 500 MG/1
500 TABLET ORAL 2 TIMES DAILY
Qty: 60 TABLET | Refills: 6 | Status: SHIPPED | OUTPATIENT
Start: 2024-12-20

## 2024-12-20 NOTE — TELEPHONE ENCOUNTER
Provider: JENNIFER LAWSON     Caller: Amrik Diamond    Relationship to Patient: Emergency Contact    Pharmacy: [id number] [address] AT [facility] [address]  - [phone]  - [phone] FX     Phone Number: [phone]     Reason for Call: THE PT HAD MULTIPLE SEIZURES YESTERDAY AND WAS TAKEN TO THE ED. THE PT WAS GIVEN KEPPRA FOR HER SEIZURES BUT THE PHARMACY STATED THE INS WONT COVER UNLESS THE DOSAGE WAS CHANGED FOR THE RX.     THEY STATED THAT IT NEEDS TO BE WRITTEN  MG 2 X A DAY. THE CURRENT RX STATES 2 250 MG TABLETS 2 X A DAY.     AMRIK STATED CONCERN DUE TO SHE IS HAVING THESE SEIZURES AND BLACK OUTS OF CONFUSIONS. SHE STATED THE PT IS GETTING THESE RANDOM SPURTS OF EXHAUSTION AND SHE PASSES OUT. YESTERDAY IT ALL CLIMAXED AND ULTIMATELY THEY HAD TO CALL 911 TO TAKE HER TO THE ED.     PLEASE ADVISE  THANK YOU

## 2024-12-20 NOTE — PROGRESS NOTES
Follow Up Office Visit      Patient Name: Lady Diamond  : 1995   MRN: [id number]   Care Team: Patient Care Team:  Flores Carrion MD as PCP - General (Internal Medicine)  Camden Thomas MD as Obstetrician (Obstetrics and Gynecology)  Elba Mendoza APRN as Nurse Practitioner (Neurology)  Chelsea Miranda APRN as Nurse Practitioner (Behavioral Health)  Anna Collier LCSW as  (Behavioral Health)  Little Colvin RN as Ambulatory  ([facility])    Chief Complaint:    Chief Complaint   Patient presents with    Altered Mental Status     Was in ER       History of Present Illness: Lady Diamond is a 29 y.o. female with pertinent medical history significant for bipolar disorder, generalized convulsive seizures, migraine without aura and chronic constipation.    She presented to [facility] ED on 2024 for concerns of episodic seizure activity.  She had been experiencing violent shaking at home for approximately 24 hours.  She voices concern that providers at the ER did not seem to take her condition seriously and she is worried as they did not obtain a lithium level or any additional imaging.  She was discharged home with refill of Keppra and advised to follow-up with PCP.    Patient had MRI of the brain with and without contrast in 2024 which redemonstrated concerns of IIH.  The patient has appointment with neurology on 2024 and notes that she plans to keep that appointment.    She admits that she had been off of Keppra for quite some time but she plans to  the medication today and restart it.    She has not had any additional episodes of seizure-like activity since returning home from the hospital.    Additionally, she missed dose of lithium last p.m. and also today.  She verbalizes fears that her lithium levels may be too high.  She follows mental health provider regularly and has scheduled follow-up.      She  endorses feeling tired, fatigued.  She verbalizes fear that she may have a brain or pituitary tumor.    Subjective       I have reviewed and the following portions of the patient's history were updated as appropriate: past family history, past medical history, past social history, past surgical history and problem list.    Medications:     Current Outpatient Medications:     acetaZOLAMIDE (DIAMOX) 250 MG tablet, Take 1 tablet by mouth 2 (Two) Times a Day., Disp: 60 tablet, Rfl: 1    famotidine (Pepcid) 20 MG tablet, Take 1 tablet by mouth 2 (Two) Times a Day. (Patient taking differently: Take 1 tablet by mouth As Needed.), Disp: 60 tablet, Rfl: 2    galcanezumab-gnlm (EMGALITY) 120 MG/ML auto-injector pen, Inject 1 mL under the skin into the appropriate area as directed Every 28 (Twenty-Eight) Days., Disp: 1 mL, Rfl: 11    hydrOXYzine (ATARAX) 25 MG tablet, Take 2 tablets by mouth At Night As Needed for Anxiety (anxiety and/or sleep)., Disp: 60 tablet, Rfl: 0    lamoTRIgine (LaMICtal) 25 MG tablet, Take 2 tablets by mouth Daily., Disp: 60 tablet, Rfl: 0    lamoTRIgine ER (LaMICtal XR) 250 MG tablet sustained-release 24 hour, Take 1 tablet by mouth Daily., Disp: 30 tablet, Rfl: 0    levETIRAcetam (KEPPRA) 250 MG tablet, Take 2 tablets by mouth 2 (Two) Times a Day for 30 days., Disp: 120 tablet, Rfl: 0    lithium carbonate 150 MG capsule, Take 2 caps by mouth every morning, 1 cap by mouth every night., Disp: 90 capsule, Rfl: 0    meclizine (ANTIVERT) 25 MG tablet, Take 1 tablet by mouth 3 (Three) Times a Day As Needed for Dizziness., Disp: 90 tablet, Rfl: 2    nicotine (Nicoderm CQ) 14 MG/24HR patch, Place 1 patch on the skin as directed by provider Daily., Disp: 14 each, Rfl: 2    nicotine (Nicoderm CQ) 7 MG/24HR patch, Place 1 patch on the skin as directed by provider Daily., Disp: 14 each, Rfl: 2    ondansetron ODT (ZOFRAN-ODT) 4 MG disintegrating tablet, Place 1 tablet on the tongue Every 8 (Eight) Hours As Needed  "for Nausea or Vomiting., Disp: 30 tablet, Rfl: 2    polyethylene glycol (MiraLax) 17 GM/SCOOP powder, Take 17 g by mouth Daily., Disp: 510 g, Rfl: 1    Rimegepant Sulfate (Nurtec) 75 MG tablet dispersible tablet, Take 1 tablet by mouth Daily As Needed (migraines). Take 1 tablet at the onset of headache, Max of 75 mg/24 hours, Max of 18 tabs/30 days., Disp: 16 tablet, Rfl: 0    SUMAtriptan (IMITREX) 100 MG tablet, 1 tablet 1 (One) Time As Needed., Disp: , Rfl:     valACYclovir (VALTREX) 1000 MG tablet, Take 1 tablet by mouth 2 (Two) Times a Day for 10 days., Disp: 20 tablet, Rfl: 0    vilazodone (VIIBRYD) 40 MG tablet tablet, Take 1 tablet by mouth Daily., Disp: 30 tablet, Rfl: 0  No current facility-administered medications for this visit.    Allergies:   No Known Allergies    Objective     Physical Exam:  Vital Signs:   Vitals:    12/20/24 1247   BP: 130/78   BP Location: Left arm   Patient Position: Sitting   Pulse: 71   Temp: 97 °F (36.1 °C)   SpO2: 99%   Weight: 125 kg (276 lb)   Height: 167.6 cm (66\")     Body mass index is 44.55 kg/m².     Physical Exam  Vitals and nursing note reviewed.   Constitutional:       General: She is not in acute distress.  HENT:      Head: Normocephalic and atraumatic.      Right Ear: Tympanic membrane, ear canal and external ear normal.      Left Ear: Ear canal and external ear normal.      Mouth/Throat:      Mouth: Mucous membranes are moist.      Pharynx: Oropharynx is clear. No oropharyngeal exudate.   Eyes:      General: No scleral icterus.     Extraocular Movements: Extraocular movements intact.      Conjunctiva/sclera: Conjunctivae normal.      Pupils: Pupils are equal, round, and reactive to light.   Cardiovascular:      Rate and Rhythm: Normal rate and regular rhythm.   Pulmonary:      Effort: Pulmonary effort is normal. No respiratory distress.   Musculoskeletal:      Cervical back: Neck supple. No tenderness.   Lymphadenopathy:      Cervical: No cervical adenopathy. "   Neurological:      Mental Status: She is alert.      Cranial Nerves: No cranial nerve deficit.      Gait: Gait normal.   Psychiatric:         Mood and Affect: Mood normal.         Assessment / Plan      Assessment/Plan:   Problems Addressed This Visit    ICD-10-CM ICD-9-CM   1. Generalized convulsive seizures  R56.9 780.39      ER visit follow-up  Generalized convulsive seizures  -Reviewed ER visit note  -Encourage patient to restart Keppra as soon as possible as this is the recommended treatment for seizure disorder  -Reassured patient that her MRI of the brain did not indicate any tumor  -Keep scheduled appointment with neurology at the end of this month    Mood disorder  -Keep scheduled appointment with mental health provider  -Patient to obtain lithium level today as ordered by mental health provider in November (needs collected)  -Encourage patient to take lithium as prescribed to avoid disruption in her management of mood disorder symptoms      Plan of care reviewed with patient at the conclusion of today's visit. Education was provided regarding diagnosis and management.  Patient verbalizes understanding of and agreement with management plan.      Follow Up:   Return in about 3 months (around 3/20/2025).        LULU Champion  [facility] [address] [facility]    Please note that portions of this note were completed with a voice recognition program.

## 2024-12-23 ENCOUNTER — PATIENT MESSAGE (OUTPATIENT)
Dept: NEUROLOGY | Facility: CLINIC | Age: 29
End: 2024-12-23
Payer: COMMERCIAL

## 2024-12-30 ENCOUNTER — OFFICE VISIT (OUTPATIENT)
Dept: NEUROLOGY | Facility: CLINIC | Age: 29
End: 2024-12-30
Payer: COMMERCIAL

## 2024-12-30 ENCOUNTER — TELEMEDICINE (OUTPATIENT)
Dept: PSYCHIATRY | Facility: CLINIC | Age: 29
End: 2024-12-30
Payer: COMMERCIAL

## 2024-12-30 VITALS
OXYGEN SATURATION: 99 % | HEIGHT: 66 IN | DIASTOLIC BLOOD PRESSURE: 80 MMHG | SYSTOLIC BLOOD PRESSURE: 122 MMHG | HEART RATE: 80 BPM | WEIGHT: 269 LBS | BODY MASS INDEX: 43.23 KG/M2

## 2024-12-30 DIAGNOSIS — F60.3 BORDERLINE PERSONALITY DISORDER: Primary | ICD-10-CM

## 2024-12-30 DIAGNOSIS — R55 SYNCOPE AND COLLAPSE: Primary | ICD-10-CM

## 2024-12-30 DIAGNOSIS — R56.9 SEIZURE: ICD-10-CM

## 2024-12-30 PROCEDURE — 90832 PSYTX W PT 30 MINUTES: CPT

## 2024-12-30 PROCEDURE — 99214 OFFICE O/P EST MOD 30 MIN: CPT | Performed by: NURSE PRACTITIONER

## 2024-12-30 PROCEDURE — 1159F MED LIST DOCD IN RCRD: CPT | Performed by: NURSE PRACTITIONER

## 2024-12-30 PROCEDURE — 1160F RVW MEDS BY RX/DR IN RCRD: CPT | Performed by: NURSE PRACTITIONER

## 2024-12-30 NOTE — PROGRESS NOTES
Date: 2024  Time In: 11:01 EST  Time out: 11:23 AM EST    This provider is located at [facility], [facility], [place], [place], [facility] [place], using a secure [employer] Video Visit through [employer] Patient is being seen remotely via telehealth at their [facility] parking lot address is located in [place]. Patient stated they are in a secure environment for this session. The patient's condition being diagnosed and treated is appropriate for telemedicine. The provider identified themself as well as their credentials. The patient, or  patient's legal guardian consent to be seen remotely, and when consent is given they understand that the consent allows for patient identifiable information to be sent to a third party as needed. They may refuse to be seen remotely at any time. The electronic data is encrypted and password protected, and the patient's or  legal guardian has been advised of the potential risks to privacy not withstanding such measures.   PT Identifiers used: Name and .    You have chosen to receive care through a telehealth visit.  Do you consent to use a video/audio connection for your medical care today? Yes    Subjective   Lady Diamond is a 29 y.o. female who presents today for follow up    Chief Complaint:   Chief Complaint   Patient presents with    Anxiety    Depression        Data: Pt was walking around [facility] upon arrival to session. Pt was instructed that session should be in a private area, pt walked to the vehicle -shorter session. Pt reflected on her health issues. Pt reported struggling with memory loss. 'blackouts' and seizures. Pt reported she has been scheduled for test and feeling numb. Pt reflected on some denial with her health issues. Pt reported that she has been sleeping a lot over the last few weeks. Pt reported that theres some anxiety with her health issues and catastrophizing. Pt reported that she does not want to attend IOP again at this time  with her health issues going on. Pt was open to seeing an additional therapist to be seen on weekly/couple times a week as writers availability is limited. Pts sister has been staying with her during this time to aid with watching over her with continued health issues. Pt reported she drank alcohol for two days, dicussed contributing factors and discussed justification.       Clinical Maneuvering/Intervention: Assisted patient in processing above session content; acknowledged and normalized patient’s thoughts, feelings, and concerns.  Rationalized patient thought process regarding concerns presented at session.  Discussed triggers associated with patient's  anxiety  and depression  Also discussed coping skills for patient to implement such as grounding , mindfulness , self care , and positive self talk     Allowed patient to freely discuss issues without interruption or judgment. Provided safe, confidential environment to facilitate the development of positive therapeutic relationship and encourage open, honest communication. Assisted patient in identifying risk factors which would indicate the need for higher level of care including thoughts to harm self or others and/or self-harming behavior and encouraged patient to contact this office, call 911, or present to the nearest emergency room should any of these events occur. Discussed crisis intervention services and means to access. Patient adamantly and convincingly denies current suicidal or homicidal ideation or perceptual disturbance.    Assessment: Pt was located in the [facility] parking lot for session. Pt appears to have continued health issues that increase anxiety and cognitive distortions. Pt appears to think worst case scenario with her health. Pt has been proactive in keeping appointments to figure out health issues. Pt appeared to turn to alcohol to aid with processing issues, although verbalized this was not helpful. Pt was aware that she was justifying  her reason for increased drinking -unhealthy coping skill. Pt verbalized she would not be drinking anymore at this time. Pt would benefit from additional support. Pt appears to have increased sleep over the last few weeks. Pt appears to still be processing breakup.    Patient appears to maintain relative stability as compared to their baseline.  However, patient continues to struggle with   Chief Complaint   Patient presents with    Anxiety    Depression    which continues to cause impairment in important areas of functioning.  A result, they can be reasonably expected to continue to benefit from treatment and would likely be at increased risk for decompensation otherwise.      Mental Status Exam:   Hygiene:   good  Cooperation:  Cooperative  Eye Contact:  Good  Psychomotor Behavior:  Appropriate  Affect:  Appropriate  Mood: depressed  Speech:  Normal  Thought Process:  Linear  Thought Content:  Mood congruent  Suicidal:  None  Homicidal:  None  Hallucinations:  None  Delusion:  None  Memory:  Deficits  Orientation:  Grossly intact  Reliability:  fair  Insight:  Fair  Judgement:  Fair  Impulse Control:  Fair  Physical/Medical Issues:  No        Patient's Support Network Includes:   sister    Functional Status: Mild impairment     Progress toward goal: Not at goal    Prognosis: Fair with Ongoing Treatment     Plan:     Patient will continue in individual outpatient therapy with focus on improved functioning and coping skills, maintaining stability, and avoiding decompensation and the need for higher level of care.    Patient will adhere to medication regimen as prescribed and report any side effects. Patient will contact this office, call 911 or present to the nearest emergency room should suicidal or homicidal ideations occur. Provide Cognitive Behavioral Therapy and Solution Focused Therapy to improve functioning, maintain stability, and avoid decompensation and the need for higher level of care.     Return in  about 2 weeks (around 1/13/2025).      VISIT DIAGNOSIS:    Diagnosis Plan   1. Borderline personality disorder         11:01 EST         This document has been electronically signed by Anna Collier LCSW  December 30, 2024      Part of this note may be an electronic transcription/translation of spoken language to printed text using the Dragon Dictation System.

## 2024-12-30 NOTE — PROGRESS NOTES
Neuro Office Visit      Encounter Date: 2024   Patient Name: Lady Diamond  : 1995   MRN: [id number]   PCP:  Flores Carrion MD     Chief Complaint:    Chief Complaint   Patient presents with    Seizures       History of Present Illness: Lady Diamond is a 29 y.o. female who is here today in Neurology for syncope.    Last visit with me via telemedicine on 2023, continued Emgality and Nurtec, continued Keppra, started lamotrigine.  History of Present Illness  The patient presents for evaluation of passing out spells.    She reports experiencing episodes of syncope, with the most recent episode occurring yesterday, lasting approximately 3 minutes.     She was able to anticipate the onset of this episode, describing a sensation of her brain shutting down and her body becoming numb.     Her sister has been monitoring these episodes, noting that they often result in her falling asleep and waking up in a confused state.     She also experiences dizziness and lightheadedness throughout the day, which are most pronounced upon standing.     These episodes have been ongoing since the initiation of her current medication regimen, which includes Keppra 1000 mg and lamotrigine.     Prior to starting these medications, she experienced similar episodes twice weekly.     She reports no associated loss of bowel or bladder function but does experience balance issues and memory loss during these episodes.     She is not currently driving due to these symptoms. She has not received any communication from Dr. Hernandez's office regarding her referral.     She has not previously consulted with a cardiologist.    She has a history of seizure and continues to take her prescribed medication daily, despite experiencing fatigue and nausea as side effects.     She has not experienced any instances of biting her jaw or lip during sleep.     She recently had blood work done, which included a drug and  alcohol screen but did not include a lithium level check.     She believes her lithium level may be elevated.     She has noticed an increase in tremors, which have been a longstanding issue.    Supplemental Information  She reports an overall stable mood but has noticed an increase in anxiety and a habit of rocking.    MEDICATIONS  Current: Keppra, lamotrigine  MRI Brain With & Without Contrast (2024 11:36)     Subjective      Past Medical History:   Past Medical History:   Diagnosis Date    Anemia 2019    Anxiety with depression 2006    no meds    Bipolar disorder     Cholelithiasis     Chronic constipation     Cluster headache     H/O chlamydia infection     H/O gonorrhea     H/O gonorrhea     H/O gonorrhea     H/O gonorrhea     H/O trichomoniasis     H/O trichomoniasis 2021    Headache, tension-type     Memory loss     Migraine 2013    Seizures 2022    May have had those in her sleep    Urinary tract infection 2006       Past Surgical History:   Past Surgical History:   Procedure Laterality Date     SECTION  11/15/2014    LTCS (1 layer closure)     SECTION N/A 2019    Procedure:  SECTION REPEAT WITH SALPINGECTOMY;  Surgeon: Regine José MD;  Location: [facility] LABOR DELIVERY;  Service: Obstetrics;  Laterality: N/A;    LAPAROSCOPIC CHOLECYSTECTOMY  2015    TONSILLECTOMY  Dec. 14    TUBAL ABDOMINAL LIGATION  19    WISDOM TOOTH EXTRACTION         Family History:   Family History   Problem Relation Age of Onset    Bipolar disorder Mother     Anxiety disorder Mother     Depression Mother         Everyone in my family    Multiple sclerosis Mother     Arthritis Mother     Drug abuse Mother     Hearing loss Mother     Hypertension Mother     Mental illness Mother         All of us    Depression Father     Alcohol abuse Father     Drug abuse Father     Bipolar disorder Sister     Depression Sister     Anxiety disorder Sister      Drug abuse Sister     Suicide Attempts Maternal Grandmother     Birth defects Sister         Her daughter my niece jessy    Miscarriages / Stillbirths Sister     Diabetes Paternal Grandmother        Social History:   Social History     Socioeconomic History    Marital status: Single    Number of children: 3   Tobacco Use    Smoking status: Some Days     Average packs/day: 0.6 packs/day for 26.4 years (15.0 ttl pk-yrs)     Types: Cigarettes, Electronic Cigarette     Start date: 2010     Last attempt to quit: 3/1/2019     Years since quittin.8     Passive exposure: Past    Smokeless tobacco: Never   Vaping Use    Vaping status: Former    Quit date: 2024    Substances: Nicotine   Substance and Sexual Activity    Alcohol use: Not Currently     Alcohol/week: 6.0 standard drinks of alcohol     Types: 6 Shots of liquor per week     Comment: tequila    Drug use: Never    Sexual activity: Yes     Partners: Male     Birth control/protection: Bilateral salpingectomy , Surgical       Medications:     Current Outpatient Medications:     acetaZOLAMIDE (DIAMOX) 250 MG tablet, Take 1 tablet by mouth 2 (Two) Times a Day., Disp: 60 tablet, Rfl: 1    famotidine (Pepcid) 20 MG tablet, Take 1 tablet by mouth 2 (Two) Times a Day. (Patient taking differently: Take 1 tablet by mouth As Needed.), Disp: 60 tablet, Rfl: 2    galcanezumab-gnlm (EMGALITY) 120 MG/ML auto-injector pen, Inject 1 mL under the skin into the appropriate area as directed Every 28 (Twenty-Eight) Days., Disp: 1 mL, Rfl: 11    hydrOXYzine (ATARAX) 25 MG tablet, Take 2 tablets by mouth At Night As Needed for Anxiety (anxiety and/or sleep)., Disp: 60 tablet, Rfl: 0    lamoTRIgine (LaMICtal) 25 MG tablet, Take 2 tablets by mouth Daily., Disp: 60 tablet, Rfl: 0    lamoTRIgine ER (LaMICtal XR) 250 MG tablet sustained-release 24 hour, Take 1 tablet by mouth Daily., Disp: 30 tablet, Rfl: 0    levETIRAcetam (KEPPRA) 500 MG tablet, Take 1 tablet by mouth 2  (Two) Times a Day., Disp: 60 tablet, Rfl: 6    lithium carbonate 150 MG capsule, Take 2 caps by mouth every morning, 1 cap by mouth every night., Disp: 90 capsule, Rfl: 0    meclizine (ANTIVERT) 25 MG tablet, Take 1 tablet by mouth 3 (Three) Times a Day As Needed for Dizziness., Disp: 90 tablet, Rfl: 2    nicotine (Nicoderm CQ) 14 MG/24HR patch, Place 1 patch on the skin as directed by provider Daily., Disp: 14 each, Rfl: 2    nicotine (Nicoderm CQ) 7 MG/24HR patch, Place 1 patch on the skin as directed by provider Daily., Disp: 14 each, Rfl: 2    ondansetron ODT (ZOFRAN-ODT) 4 MG disintegrating tablet, Place 1 tablet on the tongue Every 8 (Eight) Hours As Needed for Nausea or Vomiting., Disp: 30 tablet, Rfl: 2    polyethylene glycol (MiraLax) 17 GM/SCOOP powder, Take 17 g by mouth Daily., Disp: 510 g, Rfl: 1    Rimegepant Sulfate (Nurtec) 75 MG tablet dispersible tablet, Take 1 tablet by mouth Daily As Needed (migraines). Take 1 tablet at the onset of headache, Max of 75 mg/24 hours, Max of 18 tabs/30 days., Disp: 16 tablet, Rfl: 0    SUMAtriptan (IMITREX) 100 MG tablet, 1 tablet 1 (One) Time As Needed., Disp: , Rfl:     vilazodone (VIIBRYD) 40 MG tablet tablet, Take 1 tablet by mouth Daily., Disp: 30 tablet, Rfl: 0    Allergies:   No Known Allergies    PHQ-9 Total Score:     STEADI Fall Risk Assessment has not been completed.    Objective     Physical Exam:     Neurological Exam  Mental Status  Awake, alert and oriented to person, place and time. Recent and remote memory are intact. Speech is normal. Language is fluent with no aphasia. Attention and concentration are normal. Fund of knowledge is appropriate for level of education.    Cranial Nerves  CN II: Visual acuity is normal.  CN III, IV, VI: Extraocular movements intact bilaterally. Pupils equal round and reactive to light bilaterally.  CN V: Facial sensation is normal.  CN VII: Full and symmetric facial movement.  CN IX, X: Palate elevates symmetrically  CN  "XI: Shoulder shrug strength is normal.  CN XII: Tongue midline without atrophy or fasciculations.    Motor  Normal muscle bulk throughout. No fasciculations present. Normal muscle tone. Strength is 5/5 throughout all four extremities.    Sensory  Sensation is intact to light touch, pinprick, vibration and proprioception in all four extremities.    Reflexes                                            Right                      Left  Brachioradialis                    2+                         2+  Biceps                                 2+                         2+  Triceps                                2+                         2+  Finger flex                           2+                         2+  Hamstring                            2+                         2+  Patellar                                2+                         2+  Achilles                                2+                         2+    Coordination    Finger-to-nose, rapid alternating movements and heel-to-shin normal bilaterally without dysmetria.    Gait  Normal casual, toe, heel and tandem gait.        Vital Signs:   Vitals:    12/30/24 0841   BP: 122/80   Pulse: 80   SpO2: 99%   Weight: 122 kg (269 lb)   Height: 167.6 cm (66\")     Body mass index is 43.42 kg/m².     Results:   Results  Laboratory Studies  Lithium level was 0.2 on 11/21/2023.    Imaging  MRI in November 2023 was normal.     Imaging:   MRI Brain With & Without Contrast    Result Date: 11/13/2024  Impression: Redemonstrated accommodation of findings as above suggesting IIH. No acute findings or significant change from comparison otherwise. Electronically Signed: Denilson Veronica MD  11/13/2024 4:22 PM EST  Workstation ID: [id number]    XR chest 2 views    Result Date: 10/22/2024  Nodular opacity  as above. Follow-up to complete resolution recommended. Images reviewed, interpreted, and dictated by Dr. IVIS Sprague. Transcribed by Brigette Reardon PA-C.       Labs: " "  No results found for: \"CMP\", \"PROTEIN\", \"ANTIMOGAB\", \"BLACRP7ZSBS\", \"JCVRESULT\", \"QUANTTBGOLD\", \"CBCDIF\", \"IGGALBSER\"     Assessment / Plan      Assessment/Plan:   Diagnoses and all orders for this visit:    1. Syncope and collapse (Primary)  -     Adult Transthoracic Echo Complete W/ Cont if Necessary Per Protocol; Future  -     Ambulatory Referral to [facility] [place] [facility] [place]    2. Seizure  Comments:  Awaiting referral to Dr. Hernandez         Assessment & Plan  1. Syncope.  Her last EEG was conducted 2 years ago. She has been experiencing frequent episodes of syncope, with the most recent episode occurring yesterday. She reports feeling her brain \"shutting down\" and her body going numb before these episodes. She also experiences dizziness and balance issues daily. An echocardiogram will be ordered to evaluate her cardiac function. [facility] will be made for further evaluation. She will continue her current regimen of Keppra and lamotrigine. A 30-day Holter monitor will be recommended to capture any potential arrhythmias.    2. Seizure disorder.  She reports having had one seizure and experiences blackouts where she wakes up confused and lightheaded. She is currently on Keppra and lamotrigine but reports significant fatigue and nausea. A referral to Dr. Chin, an epileptologist, will be made for a continuous EEG monitoring study. She will be taken off her medications during this study to allow for seizure monitoring. The possibility of switching from Keppra to Vimpat was discussed to potentially reduce side effects.    Follow-up  The patient will follow up in 3 months via video consultation.    Patient Education:     Reviewed medications, potential side effects and signs and symptoms to report. Discussed risk versus benefits of treatment plan with patient and/or family-including medications, labs and radiology that may be ordered. Addressed questions and concerns " during visit. Patient and/or family verbalized understanding and agree with plan. Instructed to call the office with any questions and report to ER with any life-threatening symptoms.     Follow Up:   Return in about 3 months (around 3/30/2025) for Video visit.    I spent 40 minutes caring for Lady on this date of service. This time includes time spent by me in the following activities: preparing for the visit, performing a medically appropriate examination and/or evaluation, counseling and educating the patient/family/caregiver, and documenting information in the medical record.        During this visit the following were done:  Labs Reviewed []    Labs Ordered []    Radiology Reports Reviewed []    Radiology Ordered []    PCP Records Reviewed []    Referring Provider Records Reviewed []    ER Records Reviewed []    Hospital Records Reviewed []    History Obtained From Family []    Radiology Images Reviewed []    Other Reviewed []    Records Requested []      Patient or patient representative verbalized consent for the use of Ambient Listening during the visit with  LULU Thompson for chart documentation. 12/30/2024  08:43 EST    LULU Thompson   [facility] NEUROLOGY  [address] [place] [address]  [facility] [id number] [phone]

## 2025-01-03 ENCOUNTER — SPECIALTY PHARMACY (OUTPATIENT)
Dept: ONCOLOGY | Facility: HOSPITAL | Age: 30
End: 2025-01-03
Payer: COMMERCIAL

## 2025-01-03 NOTE — PROGRESS NOTES
Specialty Pharmacy Refill Coordination Note     Lady is a 29 y.o. female contacted today regarding refills of Emgality and Nurtec specialty medication(s).Patient is due for injection on 01/15.    Reviewed and verified with patient:       Specialty medication(s) and dose(s) confirmed: yes    Refill Questions      Flowsheet Row Most Recent Value   Changes to allergies? No   Changes to medications? No   New conditions or infections since last clinic visit No   Unplanned office visit, urgent care, ED, or hospital admission in the last 4 weeks  No   How does patient/caregiver feel medication is working? Good   Financial problems or insurance changes  No   Since the previous refill, were any specialty medication doses or scheduled injections missed or delayed?  No   If yes, please provide the amount N/A   Why were doses missed? N/A   Does this patient require a clinical escalation to a pharmacist? No            Delivery Questions      Flowsheet Row Most Recent Value   Delivery method UPS   Delivery address verified with patient/caregiver? Yes   Delivery address Home   Number of medications in delivery 2   Medication(s) being filled and delivered Galcanezumab-gnlm (EMGALITY)   Doses left of specialty medications Nurtec 2 tablets left as of 01/03   Copay verified? Yes   Copay amount Nurtec and Emgality =$0   Copay form of payment No copayment ($0)   Ship Date 01/8   Delivery Date 01/9   Signature Required Yes                   Follow-up: 28 day(s)     Nick Parker  Specialty Pharmacy Technician

## 2025-01-06 ENCOUNTER — TELEMEDICINE (OUTPATIENT)
Dept: PSYCHIATRY | Facility: CLINIC | Age: 30
End: 2025-01-06
Payer: COMMERCIAL

## 2025-01-06 DIAGNOSIS — F41.0 PANIC ATTACKS: ICD-10-CM

## 2025-01-06 DIAGNOSIS — F31.60 BIPOLAR AFFECTIVE DISORDER, MIXED: Primary | ICD-10-CM

## 2025-01-06 DIAGNOSIS — Z79.899 MEDICATION MANAGEMENT: ICD-10-CM

## 2025-01-06 DIAGNOSIS — F33.41 RECURRENT MAJOR DEPRESSIVE DISORDER, IN PARTIAL REMISSION: ICD-10-CM

## 2025-01-06 RX ORDER — LAMOTRIGINE 25 MG/1
50 TABLET ORAL DAILY
Qty: 60 TABLET | Refills: 0 | Status: SHIPPED | OUTPATIENT
Start: 2025-01-06

## 2025-01-06 RX ORDER — LITHIUM CARBONATE 150 MG/1
CAPSULE ORAL
Qty: 90 CAPSULE | Refills: 0 | Status: SHIPPED | OUTPATIENT
Start: 2025-01-06

## 2025-01-06 RX ORDER — LAMOTRIGINE 250 MG/1
250 TABLET, EXTENDED RELEASE ORAL DAILY
Qty: 30 TABLET | Refills: 0 | Status: SHIPPED | OUTPATIENT
Start: 2025-01-06

## 2025-01-06 RX ORDER — VILAZODONE HYDROCHLORIDE 40 MG/1
40 TABLET ORAL DAILY
Qty: 30 TABLET | Refills: 0 | Status: SHIPPED | OUTPATIENT
Start: 2025-01-06

## 2025-01-06 NOTE — PROGRESS NOTES
This provider is located at the [facility] (through [facility]), [address], [facility], [address] using a secure video visit through [employer]. Patient is being seen remotely via telehealth at their home address in [place], and stated they are in a secure environment for this session.The patient's condition being consulted/diagnosed/treated is appropriate for telemedicine. The provider identified herself as well as her credentials.   The patient, and/or patients guardian, consent to be seen remotely, and when consent is given they understand that the consent allows for patient identifiable information to be sent to a third party as needed. They may refuse to be seen remotely at any time. The electronic data is encrypted and password protected, and the patient and/or guardian has been advised of the potential risks to privacy not withstanding such measures.   The use of a video visit has been reviewed with the patient and verbal informed consent has been obtained.   Patient identifiers used: Name and .    Subjective   Lady Diamond is a 29 y.o. female who presents today for follow up     Mode of Visit: Video  Location of patient: -HOME-  Location of provider: +HOME+  You have chosen to receive care through a telehealth visit.  The patient has signed the video visit consent form.  The visit included audio and video interaction. No technical issues occurred during this visit.        Chief Complaint:    Chief Complaint   Patient presents with    Anxiety    Depression    Med Management    Sleeping Problem    Irritable        History of Present Illness:   History of Present Illness  Patient is a 29-year-old female presenting for a one month follow-up for medication management related to irritability, sleep disturbance, anxiety, and depression.  She states she has recently been compliant with medications, denies side effects.  Tolerated recent increase in lithium well  "\"it is my best friend\" regarding efficacy with use of this medication.  Environmental stressors continue to contribute to symptoms somewhat.  Regarding mood lability, patient continues to cite irritability \"more than normal.\"  PHQ increased from 15 and 19, indicating severe depression which patient states \"I am always depressed, not suicidal just down a lot.\"  She denies SI, HI, SIB, or hallucinations currently and is convincing.  TERESA score of 16 remains the same indicating severe anxiety which was rates at 9/10 on average \"a lot lately.\"  She states regarding utilization of all medications she is currently taking \"medicine making me tired.\"  She continues to express \"blackout episodes, I have had a seizure, blacking out, zoning out, something wrong with my heart.\"  Acknowledges anxiety related to this.  She states \"sickness is stressing me out, not realizing how sick I am.\"  Also some forgetfulness \"I will start laundry and then forget about it.\"  She states \"I am barely able to leave the house \"mostly due to stress surrounding these conditions.  Sleep inconsistent, cites 6 hours but also napping during the day.  Regarding \"blackout\" episodes, asked about frequency \"I do not know.\"  She states when she is at home she will just go to sleep when feeling confused, when out in public continues to experience confusion but still \"moving.\"  Multiple medical changes, had a screening for STD, follow-up related to blackout spells, ED visit December 19 due to believing she had a seizure.  Lithium level was not drawn at that time.  Patient has not had most recent lithium level drawn, not drawn since November.  She has also had a follow-up with neurology whom ordered an echo and referred out to heart and valve [place].  States she has an appointment tomorrow.    Patient lives in home with her 3 children.  Her sister is also currently staying with her.  She is remodeling her home currently.  She cites some trauma related to a " pregnancy. Patient acknowledges alcohol use was problematic, although has stated that she has reduced these habits, is drinking less than 1/5 of tequila weekly.  Denies drug use.  Smokes quarter of a pack per day of cigarettes.  She states she smokes weed occasionally, not daily just socially.  Denies [employer] preference.  She has 5 siblings, 2 she speaks with frequently.  1 sister recently involved in a shootout, contributing to her stress.  Son is recently back home, some stress surrounding truancy issues.  She is also in school online part-time to begin pursuing a degree in [employer] science, she states her grades are good.  Plans to graduate this spring.  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  Currently in counseling.  Hallucinations    Anxiety  Symptoms include depressed mood.       DepressionPatient presents with the following symptoms: depressed mood.          Last Menstrual Period:  today-no tubes    The patient denies any chance of pregnancy at this time.  The patient was educated that her prescribed medications can have potential risk to a developing fetus. The patient is advised to contact this APRN/this office if she becomes pregnant or plans to become pregnant.  Pt verbalizes understanding and acknowledged agreement with this plan in her own words.      The following portions of the patient's history were reviewed and updated as appropriate: allergies, current medications, past family history, past medical history, past social history, past surgical history and problem list.    Past Psychiatric History:  Began Treatment: age 10  Diagnoses:Depression and Anxiety  Psychiatrist: previously  Therapist: previously 2016  Admission History: [facility] 2016 1 week, [facility] one week later. [facility] 2024, [facility] for 2 days.  Medication Trials: Lexapro, elavil, lamictal, propranolol (irritability worse), vraylar (increased depression/suicidal thoughts), buspar (didn't work), abilify 5mg  (didn't work-weight gain) topamax (slurred speech), atarax 30 mg BID (not effective), vistaril 25 mg (too sedating), rexulti (weight gain)  Self Harm:  cutting, once yearly, started at age 10-last 2024  Suicide Attempts: 3 total, last in    Psychosis, Anxiety, Depression:  PPD possibly 7 years ago undiagnosed    Past Medical History:  Past Medical History:   Diagnosis Date    Anemia 2019    Anxiety with depression     no meds    Bipolar disorder     Cholelithiasis     Chronic constipation     Cluster headache     H/O chlamydia infection     H/O gonorrhea     H/O gonorrhea     H/O gonorrhea     H/O gonorrhea     H/O trichomoniasis     H/O trichomoniasis 2021    Headache, tension-type     Memory loss     Migraine 2013    Seizures 2022    May have had those in her sleep    Urinary tract infection        Substance Abuse History:   Types:Denies all, including illicit  Withdrawal Symptoms:Denies  Longest Period Sober:Not Applicable   AA: Not applicable     Social History:  Social History     Socioeconomic History    Marital status: Single    Number of children: 3   Tobacco Use    Smoking status: Some Days     Average packs/day: 0.6 packs/day for 26.4 years (15.0 ttl pk-yrs)     Types: Cigarettes, Electronic Cigarette     Start date: 2010     Last attempt to quit: 3/1/2019     Years since quittin.8     Passive exposure: Past    Smokeless tobacco: Never   Vaping Use    Vaping status: Former    Quit date: 2024    Substances: Nicotine   Substance and Sexual Activity    Alcohol use: Not Currently     Alcohol/week: 6.0 standard drinks of alcohol     Types: 6 Shots of liquor per week     Comment: tequila    Drug use: Never    Sexual activity: Yes     Partners: Male     Birth control/protection: Bilateral salpingectomy , Surgical       Family History:  Family History   Problem Relation Age of Onset    Bipolar disorder Mother     Anxiety  disorder Mother     Depression Mother         Everyone in my family    Multiple sclerosis Mother     Arthritis Mother     Drug abuse Mother     Hearing loss Mother     Hypertension Mother     Mental illness Mother         All of us    Depression Father     Alcohol abuse Father     Drug abuse Father     Bipolar disorder Sister     Depression Sister     Anxiety disorder Sister     Drug abuse Sister     Suicide Attempts Maternal Grandmother     Birth defects Sister         Her daughter my niece jessy    Miscarriages / Stillbirths Sister     Diabetes Paternal Grandmother        Past Surgical History:  Past Surgical History:   Procedure Laterality Date     SECTION  11/15/2014    LTCS (1 layer closure)     SECTION N/A 2019    Procedure:  SECTION REPEAT WITH SALPINGECTOMY;  Surgeon: Regine José MD;  Location: [facility] LABOR DELIVERY;  Service: Obstetrics;  Laterality: N/A;    LAPAROSCOPIC CHOLECYSTECTOMY  2015    TONSILLECTOMY  Dec. 14    TUBAL ABDOMINAL LIGATION  19    WISDOM TOOTH EXTRACTION         Problem List:  Patient Active Problem List   Diagnosis    Annual GYN exam w/o problems    Body mass index (BMI) 40.0-44.9, adult    Smoker    Chronic constipation    Generalized convulsive seizures    Migraine without aura and without status migrainosus, not intractable    Bipolar disorder       Allergy:   No Known Allergies     Current Medications:   Current Outpatient Medications   Medication Sig Dispense Refill    lamoTRIgine (LaMICtal) 25 MG tablet Take 2 tablets by mouth Daily. 60 tablet 0    lamoTRIgine ER (LaMICtal XR) 250 MG tablet sustained-release 24 hour Take 1 tablet by mouth Daily. 30 tablet 0    lithium carbonate 150 MG capsule Take 2 caps by mouth every morning, 1 cap by mouth every night. 90 capsule 0    vilazodone (VIIBRYD) 40 MG tablet tablet Take 1 tablet by mouth Daily. 30 tablet 0    acetaZOLAMIDE (DIAMOX) 250 MG tablet Take 1 tablet by mouth 2 (Two)  Times a Day. 60 tablet 1    famotidine (Pepcid) 20 MG tablet Take 1 tablet by mouth 2 (Two) Times a Day. (Patient taking differently: Take 1 tablet by mouth As Needed.) 60 tablet 2    galcanezumab-gnlm (EMGALITY) 120 MG/ML auto-injector pen Inject 1 mL under the skin into the appropriate area as directed Every 28 (Twenty-Eight) Days. 1 mL 11    hydrOXYzine (ATARAX) 25 MG tablet Take 2 tablets by mouth At Night As Needed for Anxiety (anxiety and/or sleep). 60 tablet 0    levETIRAcetam (KEPPRA) 500 MG tablet Take 1 tablet by mouth 2 (Two) Times a Day. 60 tablet 6    meclizine (ANTIVERT) 25 MG tablet Take 1 tablet by mouth 3 (Three) Times a Day As Needed for Dizziness. 90 tablet 2    nicotine (Nicoderm CQ) 14 MG/24HR patch Place 1 patch on the skin as directed by provider Daily. 14 each 2    nicotine (Nicoderm CQ) 7 MG/24HR patch Place 1 patch on the skin as directed by provider Daily. 14 each 2    ondansetron ODT (ZOFRAN-ODT) 4 MG disintegrating tablet Place 1 tablet on the tongue Every 8 (Eight) Hours As Needed for Nausea or Vomiting. 30 tablet 2    polyethylene glycol (MiraLax) 17 GM/SCOOP powder Take 17 g by mouth Daily. 510 g 1    rimegepant sulfate (Nurtec) 75 MG tablet Take 1 tablet at the onset of headache, Max of 75 mg/24 hours, Max of 18 tabs/30 days. 16 tablet 11    SUMAtriptan (IMITREX) 100 MG tablet 1 tablet 1 (One) Time As Needed.       No current facility-administered medications for this visit.       Review of Systems:    Review of Systems   Constitutional:  Positive for appetite change.   HENT: Negative.     Eyes: Negative.         Photosensitivity   Respiratory: Negative.     Cardiovascular: Negative.    Gastrointestinal:  Positive for constipation.   Endocrine: Negative.    Genitourinary:  Positive for menstrual problem.        Severe cramping   Musculoskeletal: Negative.    Skin: Negative.         Eczema   Allergic/Immunologic: Negative.    Neurological:  Positive for seizures and headache.    Hematological: Negative.    Psychiatric/Behavioral:  Positive for dysphoric mood, depressed mood and stress.          Physical Exam:   Physical Exam  Constitutional:       Appearance: Normal appearance.   HENT:      Head: Normocephalic.      Nose: Nose normal.   Pulmonary:      Effort: Pulmonary effort is normal.   Musculoskeletal:         General: Normal range of motion.      Cervical back: Normal range of motion.   Neurological:      General: No focal deficit present.      Mental Status: She is alert and oriented to person, place, and time. Mental status is at baseline.   Psychiatric:         Attention and Perception: Attention and perception normal.         Mood and Affect: Mood is depressed. Affect is flat.         Speech: Speech normal.         Behavior: Behavior normal. Behavior is cooperative.         Thought Content: Thought content normal.         Cognition and Memory: Cognition and memory normal.         Judgment: Judgment is impulsive.      Comments: Restless, distracted         Vitals:  not currently breastfeeding. There is no height or weight on file to calculate BMI.  Due to extenuating circumstances and possible current health risks associated with the patient being present in a clinical setting (with current health restrictions in place in regards to possible COVID 19 transmission/exposure), the patient was seen remotely today via a MyChart Video Visit through [employer].  Unable to obtain vital signs due to nature of remote visit.  Height stated at 5ft6 inches.  Weight stated at 245 pounds.    Last 3 Blood Pressure Readings:  BP Readings from Last 3 Encounters:   12/30/24 122/80   12/20/24 130/78   12/19/24 121/72       PHQ-9 Depression Screening  Little interest or pleasure in doing things? (Patient-Rptd) Almost all   Feeling down, depressed, or hopeless? (Patient-Rptd) Several days   PHQ-2 Total Score (Patient-Rptd) 4   Trouble falling or staying asleep, or sleeping too much? (Patient-Rptd) Almost all    Feeling tired or having little energy? (Patient-Rptd) Almost all   Poor appetite or overeating? (Patient-Rptd) Over half   Feeling bad about yourself - or that you are a failure or have let yourself or your family down? (Patient-Rptd) Almost all   Trouble concentrating on things, such as reading the newspaper or watching television? (Patient-Rptd) Almost all   Moving or speaking so slowly that other people could have noticed? Or the opposite - being so fidgety or restless that you have been moving around a lot more than usual? (Patient-Rptd) Several days   Thoughts that you would be better off dead, or of hurting yourself in some way? (Patient-Rptd) Not at all   PHQ-9 Total Score (Patient-Rptd) 19   If you checked off any problems, how difficult have these problems made it for you to do your work, take care of things at home, or get along with other people? (Patient-Rptd) Very difficult       TERESA-7 Score:   Feeling nervous, anxious or on edge: (Patient-Rptd) More than half the days  Not being able to stop or control worrying: (Patient-Rptd) Nearly every day  Worrying too much about different things: (Patient-Rptd) Nearly every day  Trouble Relaxing: (Patient-Rptd) More than half the days  Being so restless that it is hard to sit still: (Patient-Rptd) Nearly every day  Feeling afraid as if something awful might happen: (Patient-Rptd) More than half the days  Becoming easily annoyed or irritable: (Patient-Rptd) Several days  TERESA 7 Total Score: (Patient-Rptd) 16  If you checked any problems, how difficult have these problems made it for you to do your work, take care of things at home, or get along with other people: (Patient-Rptd) Very difficult     Mental Status Exam:   Hygiene:   good  Cooperation:  Cooperative  Eye Contact:  Good  Psychomotor Behavior:  Restless  Affect:   flat  Mood: fluctates  Hopelessness: 8  Speech:  Rapid  Thought Process:  Goal directed  Thought Content:  Normal  Suicidal:  None  Homicidal:   None  Hallucinations:  Not demonstrated today  Delusion:  None  Memory:  Intact  Orientation:  Person, Place, Time and Situation  Reliability:  fair  Insight:  Fair  Judgement:  Poor  Impulse Control:  Fair  Physical/Medical Issues:   seizures in sleep, unsure of when she has them        Lab Results:   Admission on 12/19/2024, Discharged on 12/19/2024   Component Date Value Ref Range Status    Glucose 12/19/2024 115 (H)  65 - 99 mg/dL Final    BUN 12/19/2024 11  6 - 20 mg/dL Final    Creatinine 12/19/2024 0.90  0.57 - 1.00 mg/dL Final    Sodium 12/19/2024 141  136 - 145 mmol/L Final    Potassium 12/19/2024 4.1  3.5 - 5.2 mmol/L Final    Slight hemolysis detected by analyzer. Result may be falsely elevated.    Chloride 12/19/2024 108 (H)  98 - 107 mmol/L Final    CO2 12/19/2024 24.0  22.0 - 29.0 mmol/L Final    Calcium 12/19/2024 8.1 (L)  8.6 - 10.5 mg/dL Final    Total Protein 12/19/2024 6.3  6.0 - 8.5 g/dL Final    Albumin 12/19/2024 4.0  3.5 - 5.2 g/dL Final    ALT (SGPT) 12/19/2024 14  1 - 33 U/L Final    AST (SGOT) 12/19/2024 19  1 - 32 U/L Final    Alkaline Phosphatase 12/19/2024 64  39 - 117 U/L Final    Total Bilirubin 12/19/2024 <0.2  0.0 - 1.2 mg/dL Final    Globulin 12/19/2024 2.3  gm/dL Final    Calculated Result    A/G Ratio 12/19/2024 1.7  g/dL Final    BUN/Creatinine Ratio 12/19/2024 12.2  7.0 - 25.0 Final    Anion Gap 12/19/2024 9.0  5.0 - 15.0 mmol/L Final    eGFR 12/19/2024 87.8  >60.0 mL/min/1.73 Final    HCG, Urine, QL 12/19/2024 Negative  Negative Final    Lot Number 12/19/2024 [id number]   Final    Internal Positive Control 12/19/2024 Positive  Positive, Passed Final    Internal Negative Control 12/19/2024 Negative  Negative, Passed Final    Expiration Date 12/19/2024 04/22/2026   Final    Color, UA 12/19/2024 Yellow  Yellow, Straw Final    Appearance, UA 12/19/2024 Cloudy (A)  Clear Final    pH, UA 12/19/2024 6.0  5.0 - 8.0 Final    Specific Gravity,  12/19/2024 1.018  1.001 - 1.030 Final     Glucose, UA 12/19/2024 Negative  Negative Final    Ketones, UA 12/19/2024 Negative  Negative Final    Bilirubin, UA 12/19/2024 Negative  Negative Final    Blood, UA 12/19/2024 Negative  Negative Final    Protein, UA 12/19/2024 Trace (A)  Negative Final    Leuk Esterase, UA 12/19/2024 Trace (A)  Negative Final    Nitrite, UA 12/19/2024 Negative  Negative Final    Urobilinogen, UA 12/19/2024 1.0 E.U./dL  0.2 - 1.0 E.U./dL Final    Ethanol 12/19/2024 <10  0 - 10 mg/dL Final    THC, Screen, Urine 12/19/2024 Negative  Negative Final    Phencyclidine (PCP), Urine 12/19/2024 Negative  Negative Final    Cocaine Screen, Urine 12/19/2024 Negative  Negative Final    Methamphetamine, Ur 12/19/2024 Negative  Negative Final    Opiate Screen 12/19/2024 Negative  Negative Final    Amphetamine Screen, Urine 12/19/2024 Negative  Negative Final    Benzodiazepine Screen, Urine 12/19/2024 Negative  Negative Final    Tricyclic Antidepressants Screen 12/19/2024 Negative  Negative Final    Methadone Screen, Urine 12/19/2024 Negative  Negative Final    Barbiturates Screen, Urine 12/19/2024 Negative  Negative Final    Oxycodone Screen, Urine 12/19/2024 Negative  Negative Final    Buprenorphine, Screen, Urine 12/19/2024 Negative  Negative Final    WBC 12/19/2024 9.71  3.40 - 10.80 10*3/mm3 Final    RBC 12/19/2024 4.42  3.77 - 5.28 10*6/mm3 Final    Hemoglobin 12/19/2024 11.1 (L)  12.0 - 15.9 g/dL Final    Hematocrit 12/19/2024 36.2  34.0 - 46.6 % Final    MCV 12/19/2024 81.9  79.0 - 97.0 fL Final    MCH 12/19/2024 25.1 (L)  26.6 - 33.0 pg Final    MCHC 12/19/2024 30.7 (L)  31.5 - 35.7 g/dL Final    RDW 12/19/2024 15.8 (H)  12.3 - 15.4 % Final    RDW-SD 12/19/2024 47.2  37.0 - 54.0 fl Final    MPV 12/19/2024 9.7  6.0 - 12.0 fL Final    Platelets 12/19/2024 391  140 - 450 10*3/mm3 Final    Neutrophil % 12/19/2024 65.1  42.7 - 76.0 % Final    Lymphocyte % 12/19/2024 25.6  19.6 - 45.3 % Final    Monocyte % 12/19/2024 6.0  5.0 - 12.0 % Final     Eosinophil % 12/19/2024 2.2  0.3 - 6.2 % Final    Basophil % 12/19/2024 0.9  0.0 - 1.5 % Final    Immature Grans % 12/19/2024 0.2  0.0 - 0.5 % Final    Neutrophils, Absolute 12/19/2024 6.32  1.70 - 7.00 10*3/mm3 Final    Lymphocytes, Absolute 12/19/2024 2.49  0.70 - 3.10 10*3/mm3 Final    Monocytes, Absolute 12/19/2024 0.58  0.10 - 0.90 10*3/mm3 Final    Eosinophils, Absolute 12/19/2024 0.21  0.00 - 0.40 10*3/mm3 Final    Basophils, Absolute 12/19/2024 0.09  0.00 - 0.20 10*3/mm3 Final    Immature Grans, Absolute 12/19/2024 0.02  0.00 - 0.05 10*3/mm3 Final    nRBC 12/19/2024 0.0  0.0 - 0.2 /100 WBC Final    Extra Tube 12/19/2024 Hold for add-ons.   Final    Auto resulted.    Extra Tube 12/19/2024 hold for add-on   Final    Auto resulted    Extra Tube 12/19/2024 Hold for add-ons.   Final    Auto resulted.    Extra Tube 12/19/2024 Hold for add-ons.   Final    Auto resulted.    Extra Tube 12/19/2024 Hold for add-ons.   Final    Auto resulted    Fentanyl, Urine 12/19/2024 Negative  Negative Final    RBC, UA 12/19/2024 0-2  None Seen, 0-2 /HPF Final    WBC, UA 12/19/2024 6-10 (A)  None Seen, 0-2 /HPF Final    Bacteria, UA 12/19/2024 1+ (A)  None Seen, Trace /HPF Final    Squamous Epithelial Cells, UA 12/19/2024 21-30 (A)  None Seen, 0-2 /HPF Final    Hyaline Casts, UA 12/19/2024 0-6  0 - 6 /LPF Final    Methodology 12/19/2024 Manual Light Microscopy   Final   Lab on 11/26/2024   Component Date Value Ref Range Status    Hepatitis B Surface Ag 11/26/2024 Non-Reactive  Non-Reactive Final    Hep B S Ab 11/26/2024 Reactive   Final    Hep B Core Total Ab 11/26/2024 Negative  Negative Final    Hep A Total Ab 11/26/2024 Negative  Negative Final    Comment: The HAV total antibody assay detects both IgG and IgM  but does not differentiate between them. A negative result  suggests susceptibility to infection. A positive result could  be due to vaccination, previously resolved infection or active  infection. Testing for HAV IgM  should be performed if active  HAV infection is suspected. [facility] offers profiles that will  automatically reflex positive HAV total antibody results to  IgM (e.g., panel #[id number] HAV Antibody w/ Rfx).    Hepatitis C Ab 11/26/2024 Non-Reactive  Non-Reactive Final    RPR 11/26/2024 Non-Reactive  Non-Reactive Final    Chlamydia trachomatis, BEN 11/26/2024 Negative  Negative Final    Neisseria gonorrhoeae, BEN 11/26/2024 Negative  Negative Final    Color, UA 11/26/2024 Yellow  Yellow, Straw Final    Appearance, UA 11/26/2024 Clear  Clear Final    pH, UA 11/26/2024 7.5  5.0 - 8.0 Final    Specific [place], UA 11/26/2024 1.011  1.005 - 1.030 Final    Glucose, UA 11/26/2024 Negative  Negative Final    Ketones, UA 11/26/2024 Negative  Negative Final    Bilirubin, UA 11/26/2024 Negative  Negative Final    Blood, UA 11/26/2024 Negative  Negative Final    Protein, UA 11/26/2024 Negative  Negative Final    Leuk Esterase, UA 11/26/2024 Small (1+) (A)  Negative Final    Nitrite, UA 11/26/2024 Negative  Negative Final    Urobilinogen, UA 11/26/2024 0.2 E.U./dL  0.2 - 1.0 E.U./dL Final    HIV DUO 11/26/2024 Non-Reactive  Non-Reactive Final    Extra Tube 11/26/2024 Hold for add-ons.   Final    Auto resulted.    RBC, UA 11/26/2024 0-2  None Seen, 0-2 /HPF Final    WBC, UA 11/26/2024 21-50 (A)  None Seen, 0-2 /HPF Final    Bacteria, UA 11/26/2024 4+ (A)  None Seen /HPF Final    Squamous Epithelial Cells, UA 11/26/2024 7-12 (A)  None Seen, 0-2 /HPF Final    Hyaline Casts, UA 11/26/2024 None Seen  None Seen /LPF Final    Methodology 11/26/2024 Automated Microscopy   Final    Urine Culture 11/26/2024 >100,000 CFU/mL Escherichia coli (A)   Final   Office Visit on 11/26/2024   Component Date Value Ref Range Status    HSV 1 IgG, Type Specific 11/26/2024 Reactive (A)  Non Reactive Final    **Please note reference interval change**  HSV-1 IgG testing performed using the Roche Elecsys HSV-1 IgG assay.    HSV 2 IgG, Type Spec 11/26/2024  Reactive (A)  Non Reactive Final    **Please note reference interval change**  Current guidelines and recommendations do not recommend routine  screening for HSV-2 in asymptomatic individuals, including those that  are pregnant. The detection of HSV-2 IgG antibodies in a single sample  indicates previous exposure to HSV-2 but does not give information as  to the site of HSV infection or the timing of exposure. The predictive  value of positive and negative results depends on the population's  prevalence and the pretest likelihood of HSV-2. HSV-2 IgG testing  performed using the Roche Elecsys HSV-2 IgG assay.   Lab on 11/21/2024   Component Date Value Ref Range Status    Hemoglobin A1C 11/21/2024 5.70 (H)  4.80 - 5.60 % Final    Total Cholesterol 11/21/2024 166  0 - 200 mg/dL Final    Triglycerides 11/21/2024 45  0 - 150 mg/dL Final    HDL Cholesterol 11/21/2024 66 (H)  40 - 60 mg/dL Final    LDL Cholesterol  11/21/2024 90  0 - 100 mg/dL Final    VLDL Cholesterol 11/21/2024 10  5 - 40 mg/dL Final    LDL/HDL Ratio 11/21/2024 1.38   Final    Free T4 11/21/2024 1.28  0.92 - 1.68 ng/dL Final    TSH 11/21/2024 3.390  0.270 - 4.200 uIU/mL Final    Glucose 11/21/2024 88  65 - 99 mg/dL Final    BUN 11/21/2024 8  6 - 20 mg/dL Final    Creatinine 11/21/2024 0.95  0.57 - 1.00 mg/dL Final    Sodium 11/21/2024 136  136 - 145 mmol/L Final    Potassium 11/21/2024 4.1  3.5 - 5.2 mmol/L Final    Chloride 11/21/2024 97 (L)  98 - 107 mmol/L Final    CO2 11/21/2024 25.0  22.0 - 29.0 mmol/L Final    Calcium 11/21/2024 9.4  8.6 - 10.5 mg/dL Final    Total Protein 11/21/2024 6.8  6.0 - 8.5 g/dL Final    Albumin 11/21/2024 4.3  3.5 - 5.2 g/dL Final    ALT (SGPT) 11/21/2024 19  1 - 33 U/L Final    AST (SGOT) 11/21/2024 22  1 - 32 U/L Final    Alkaline Phosphatase 11/21/2024 79  39 - 117 U/L Final    Total Bilirubin 11/21/2024 0.2  0.0 - 1.2 mg/dL Final    Globulin 11/21/2024 2.5  gm/dL Final    A/G Ratio 11/21/2024 1.7  g/dL Final     BUN/Creatinine Ratio 11/21/2024 8.4  7.0 - 25.0 Final    Anion Gap 11/21/2024 14.0  5.0 - 15.0 mmol/L Final    eGFR 11/21/2024 82.3  >60.0 mL/min/1.73 Final    Lithium 11/21/2024 0.2 (L)  0.6 - 1.2 mmol/L Final   Orders Only on 10/21/2024   Component Date Value Ref Range Status    Glucose 12/20/2024 98  65 - 99 mg/dL Final    BUN 12/20/2024 9  6 - 20 mg/dL Final    Creatinine 12/20/2024 0.80  0.57 - 1.00 mg/dL Final    Sodium 12/20/2024 137  136 - 145 mmol/L Final    Potassium 12/20/2024 3.8  3.5 - 5.2 mmol/L Final    Chloride 12/20/2024 105  98 - 107 mmol/L Final    CO2 12/20/2024 22.8  22.0 - 29.0 mmol/L Final    Calcium 12/20/2024 8.9  8.6 - 10.5 mg/dL Final    BUN/Creatinine Ratio 12/20/2024 11.3  7.0 - 25.0 Final    Anion Gap 12/20/2024 9.2  5.0 - 15.0 mmol/L Final    eGFR 12/20/2024 101.2  >60.0 mL/min/1.73 Final   Lab on 10/17/2024   Component Date Value Ref Range Status    Hepatitis B Surface Ag 10/17/2024 Non-Reactive  Non-Reactive Final    Hep A IgM 10/17/2024 Non-Reactive  Non-Reactive Final    Hep B C IgM 10/17/2024 Non-Reactive  Non-Reactive Final    Hepatitis C Ab 10/17/2024 Non-Reactive  Non-Reactive Final    HIV DUO 10/17/2024 Non-Reactive  Non-Reactive Final    RPR 10/17/2024 Non-Reactive  Non-Reactive Final   Office Visit on 10/17/2024   Component Date Value Ref Range Status    WBC 10/17/2024 8.13  3.40 - 10.80 10*3/mm3 Final    RBC 10/17/2024 4.68  3.77 - 5.28 10*6/mm3 Final    Hemoglobin 10/17/2024 11.9 (L)  12.0 - 15.9 g/dL Final    Hematocrit 10/17/2024 36.8  34.0 - 46.6 % Final    MCV 10/17/2024 78.6 (L)  79.0 - 97.0 fL Final    MCH 10/17/2024 25.4 (L)  26.6 - 33.0 pg Final    MCHC 10/17/2024 32.3  31.5 - 35.7 g/dL Final    RDW 10/17/2024 14.0  12.3 - 15.4 % Final    RDW-SD 10/17/2024 39.6  37.0 - 54.0 fl Final    MPV 10/17/2024 10.1  6.0 - 12.0 fL Final    Platelets 10/17/2024 390  140 - 450 10*3/mm3 Final    Glucose 10/17/2024 91  65 - 99 mg/dL Final    BUN 10/17/2024 11  6 - 20 mg/dL Final     Creatinine 10/17/2024 1.09 (H)  0.57 - 1.00 mg/dL Final    Sodium 10/17/2024 136  136 - 145 mmol/L Final    Potassium 10/17/2024 4.5  3.5 - 5.2 mmol/L Final    Chloride 10/17/2024 101  98 - 107 mmol/L Final    CO2 10/17/2024 25.6  22.0 - 29.0 mmol/L Final    Calcium 10/17/2024 9.7  8.6 - 10.5 mg/dL Final    Total Protein 10/17/2024 7.2  6.0 - 8.5 g/dL Final    Albumin 10/17/2024 4.0  3.5 - 5.2 g/dL Final    ALT (SGPT) 10/17/2024 15  1 - 33 U/L Final    AST (SGOT) 10/17/2024 17  1 - 32 U/L Final    Alkaline Phosphatase 10/17/2024 72  39 - 117 U/L Final    Total Bilirubin 10/17/2024 <0.2  0.0 - 1.2 mg/dL Final    Globulin 10/17/2024 3.2  gm/dL Final    A/G Ratio 10/17/2024 1.3  g/dL Final    BUN/Creatinine Ratio 10/17/2024 10.1  7.0 - 25.0 Final    Anion Gap 10/17/2024 9.4  5.0 - 15.0 mmol/L Final    eGFR 10/17/2024 69.8  >60.0 mL/min/1.73 Final    Hemoglobin A1C 10/17/2024 5.50  4.80 - 5.60 % Final    TSH 10/17/2024 1.320  0.270 - 4.200 uIU/mL Final    Ferritin 10/17/2024 18.10  13.00 - 150.00 ng/mL Final         Assessment & Plan   Problems Addressed this Visit    None  Visit Diagnoses       Bipolar affective disorder, mixed    -  Primary    Relevant Medications    lamoTRIgine ER (LaMICtal XR) 250 MG tablet sustained-release 24 hour    lamoTRIgine (LaMICtal) 25 MG tablet    lithium carbonate 150 MG capsule    vilazodone (VIIBRYD) 40 MG tablet tablet    Panic attacks        Relevant Medications    vilazodone (VIIBRYD) 40 MG tablet tablet    Recurrent major depressive disorder, in partial remission        Relevant Medications    lamoTRIgine (LaMICtal) 25 MG tablet    lithium carbonate 150 MG capsule    vilazodone (VIIBRYD) 40 MG tablet tablet    Medication management        Relevant Medications    lamoTRIgine ER (LaMICtal XR) 250 MG tablet sustained-release 24 hour    lamoTRIgine (LaMICtal) 25 MG tablet    lithium carbonate 150 MG capsule    vilazodone (VIIBRYD) 40 MG tablet tablet          Diagnoses         Codes  Comments    Bipolar affective disorder, mixed    -  Primary ICD-10-CM: F31.60  ICD-9-CM: 296.60     Panic attacks     ICD-10-CM: F41.0  ICD-9-CM: 300.01     Recurrent major depressive disorder, in partial remission     ICD-10-CM: F33.41  ICD-9-CM: 296.35     Medication management     ICD-10-CM: Z79.899  ICD-9-CM: V58.69             Visit Diagnoses:    ICD-10-CM ICD-9-CM   1. Bipolar affective disorder, mixed  F31.60 296.60   2. Panic attacks  F41.0 300.01   3. Recurrent major depressive disorder, in partial remission  F33.41 296.35   4. Medication management  Z79.899 V58.69       Continue Viibryd, Lamictal, refilled last week.  We will stay the course with current lithium dosage for now until labs are performed.  Reminded patient needs to have performed ASAP, whether conditions have stopped her this week but states once environment improves she will go out.   Previously educated upon side effects with use of these medications as well as when to present for emergency services, denies at this time.  Continue therapy. Follow up with this provider in 4 weeks or sooner if needed.    GOALS:  Short Term Goals: Patient will be compliant with medication, and patient will have no significant medication related side effects.  Patient will be engaged in psychotherapy as indicated.  Patient will report subjective improvement of symptoms.  Long term goals: To stabilize mood and treat/improve subjective symptoms, the patient will stay out of the hospital, the patient will be at an optimal level of functioning, and the patient will take all medications as prescribed.  The patient/guardian verbalized understanding and agreement with goals that were mutually set.      TREATMENT PLAN: Continue supportive psychotherapy efforts and medications as indicated.  Pharmacological and Non-Pharmacological treatment options discussed during today's visit. Patient/Guardian acknowledged and verbally consented with current treatment plan and was  educated on the importance of compliance with treatment and follow-up appointments.      MEDICATION ISSUES:  Discussed medication options and treatment plan of prescribed medication as well as the risks, benefits, any black box warnings, and side effects including potential falls, possible impaired driving, and metabolic adversities among others. Patient is agreeable to call the office with any worsening of symptoms or onset of side effects, or if any concerns or questions arise.  The contact information for the office is made available to the patient. Patient is agreeable to call 911 or go to the nearest ER should they begin having any SI/HI, or if any urgent concerns arise. No medication side effects or related complaints today.     MEDS ORDERED DURING VISIT:  New Medications Ordered This Visit   Medications    lamoTRIgine ER (LaMICtal XR) 250 MG tablet sustained-release 24 hour     Sig: Take 1 tablet by mouth Daily.     Dispense:  30 tablet     Refill:  0    lamoTRIgine (LaMICtal) 25 MG tablet     Sig: Take 2 tablets by mouth Daily.     Dispense:  60 tablet     Refill:  0    lithium carbonate 150 MG capsule     Sig: Take 2 caps by mouth every morning, 1 cap by mouth every night.     Dispense:  90 capsule     Refill:  0    vilazodone (VIIBRYD) 40 MG tablet tablet     Sig: Take 1 tablet by mouth Daily.     Dispense:  30 tablet     Refill:  0       Follow Up Appointment:   Return in about 4 weeks (around 2/3/2025) for Recheck.             This document has been electronically signed by LULU Aggarwal  January 6, 2025 11:33 EST    Some of the data in this electronic note has been brought forward from a previous encounter, any necessary changes have been made, it has been reviewed by this APRN, and it is accurate    Unable to complete visit using a video connection to the patient. A phone visit was used to complete this visits. Total time of discussion was 25 minutes.         Dictated Utilizing Dragon  Dictation: Part of this note may be an electronic transcription/translation of spoken language to printed text using the Dragon Dictation System.

## 2025-01-08 DIAGNOSIS — Z79.899 MEDICATION MANAGEMENT: Primary | ICD-10-CM

## 2025-01-13 ENCOUNTER — HOSPITAL ENCOUNTER (OUTPATIENT)
Dept: CARDIOLOGY | Facility: HOSPITAL | Age: 30
Discharge: HOME | End: 2025-01-13
Admitting: NURSE PRACTITIONER
Payer: COMMERCIAL

## 2025-01-13 ENCOUNTER — LAB (OUTPATIENT)
Dept: INTERNAL MEDICINE | Facility: CLINIC | Age: 30
End: 2025-01-13
Payer: COMMERCIAL

## 2025-01-13 ENCOUNTER — TELEPHONE (OUTPATIENT)
Dept: PSYCHIATRY | Facility: CLINIC | Age: 30
End: 2025-01-13
Payer: COMMERCIAL

## 2025-01-13 VITALS
BODY MASS INDEX: 43.23 KG/M2 | SYSTOLIC BLOOD PRESSURE: 143 MMHG | DIASTOLIC BLOOD PRESSURE: 86 MMHG | HEIGHT: 66 IN | WEIGHT: 268.96 LBS

## 2025-01-13 DIAGNOSIS — R55 SYNCOPE AND COLLAPSE: ICD-10-CM

## 2025-01-13 DIAGNOSIS — Z79.899 MEDICATION MANAGEMENT: Primary | ICD-10-CM

## 2025-01-13 LAB
AV MEAN PRESS GRAD SYS DOP V1V2: 3.5 MMHG
AV VMAX SYS DOP: 131.5 CM/SEC
BH CV ECHO MEAS - AO MAX PG: 7 MMHG
BH CV ECHO MEAS - AO ROOT DIAM: 2.8 CM
BH CV ECHO MEAS - AO V2 VTI: 30.1 CM
BH CV ECHO MEAS - AVA(I,D): 2.6 CM2
BH CV ECHO MEAS - EDV(CUBED): 125 ML
BH CV ECHO MEAS - EDV(MOD-SP2): 157 ML
BH CV ECHO MEAS - EDV(MOD-SP4): 148 ML
BH CV ECHO MEAS - EF(MOD-SP2): 68.9 %
BH CV ECHO MEAS - EF(MOD-SP4): 54.9 %
BH CV ECHO MEAS - ESV(CUBED): 29.8 ML
BH CV ECHO MEAS - ESV(MOD-SP2): 48.8 ML
BH CV ECHO MEAS - ESV(MOD-SP4): 66.8 ML
BH CV ECHO MEAS - FS: 38 %
BH CV ECHO MEAS - IVS/LVPW: 0.89 CM
BH CV ECHO MEAS - IVSD: 0.8 CM
BH CV ECHO MEAS - LA DIMENSION: 3.3 CM
BH CV ECHO MEAS - LAT PEAK E' VEL: 16.5 CM/SEC
BH CV ECHO MEAS - LV DIASTOLIC VOL/BSA (35-75): 65.3 CM2
BH CV ECHO MEAS - LV MASS(C)D: 146.8 GRAMS
BH CV ECHO MEAS - LV MAX PG: 3.5 MMHG
BH CV ECHO MEAS - LV MEAN PG: 2 MMHG
BH CV ECHO MEAS - LV SYSTOLIC VOL/BSA (12-30): 29.5 CM2
BH CV ECHO MEAS - LV V1 MAX: 93.3 CM/SEC
BH CV ECHO MEAS - LV V1 VTI: 20.3 CM
BH CV ECHO MEAS - LVIDD: 5 CM
BH CV ECHO MEAS - LVIDS: 3.1 CM
BH CV ECHO MEAS - LVOT AREA: 3.8 CM2
BH CV ECHO MEAS - LVOT DIAM: 2.2 CM
BH CV ECHO MEAS - LVPWD: 0.9 CM
BH CV ECHO MEAS - MED PEAK E' VEL: 11.6 CM/SEC
BH CV ECHO MEAS - MV A MAX VEL: 63.9 CM/SEC
BH CV ECHO MEAS - MV DEC SLOPE: 440 CM/SEC2
BH CV ECHO MEAS - MV DEC TIME: 0.18 SEC
BH CV ECHO MEAS - MV E MAX VEL: 87.5 CM/SEC
BH CV ECHO MEAS - MV E/A: 1.37
BH CV ECHO MEAS - MV MAX PG: 5.3 MMHG
BH CV ECHO MEAS - MV MEAN PG: 2 MMHG
BH CV ECHO MEAS - MV P1/2T: 73.9 MSEC
BH CV ECHO MEAS - MV V2 VTI: 29.8 CM
BH CV ECHO MEAS - MVA(P1/2T): 3 CM2
BH CV ECHO MEAS - MVA(VTI): 2.6 CM2
BH CV ECHO MEAS - PA ACC TIME: 0.2 SEC
BH CV ECHO MEAS - PA V2 MAX: 86.6 CM/SEC
BH CV ECHO MEAS - PI END-D VEL: 72.2 CM/SEC
BH CV ECHO MEAS - RAP SYSTOLE: 3 MMHG
BH CV ECHO MEAS - RVSP: 21 MMHG
BH CV ECHO MEAS - SV(LVOT): 77.2 ML
BH CV ECHO MEAS - SV(MOD-SP2): 108.2 ML
BH CV ECHO MEAS - SV(MOD-SP4): 81.2 ML
BH CV ECHO MEAS - SVI(LVOT): 34.1 ML/M2
BH CV ECHO MEAS - SVI(MOD-SP2): 47.8 ML/M2
BH CV ECHO MEAS - SVI(MOD-SP4): 35.8 ML/M2
BH CV ECHO MEAS - TAPSE (>1.6): 2.17 CM
BH CV ECHO MEAS - TR MAX PG: 18 MMHG
BH CV ECHO MEAS - TR MAX VEL: 201.3 CM/SEC
BH CV ECHO MEASUREMENTS AVERAGE E/E' RATIO: 6.23
BH CV XLRA - RV BASE: 3.6 CM
BH CV XLRA - RV LENGTH: 7.7 CM
BH CV XLRA - RV MID: 3.5 CM
BH CV XLRA - TDI S': 15.2 CM/SEC
LEFT ATRIUM VOLUME INDEX: 27.1 ML/M2
LV EF BIPLANE MOD: 62.8 %

## 2025-01-13 PROCEDURE — 36415 COLL VENOUS BLD VENIPUNCTURE: CPT | Performed by: INTERNAL MEDICINE

## 2025-01-13 PROCEDURE — 93306 TTE W/DOPPLER COMPLETE: CPT

## 2025-01-13 PROCEDURE — 80178 ASSAY OF LITHIUM: CPT | Performed by: INTERNAL MEDICINE

## 2025-01-13 NOTE — TELEPHONE ENCOUNTER
Patient called stating that she has spoken to the provider previously about things getting worse.  Patient states she is forgetting and blacking out as she has previously discussed with the provider.  Patient would like to know if the cause for this could be the lithium she is taking.  Please advise.

## 2025-01-14 ENCOUNTER — TELEPHONE (OUTPATIENT)
Dept: NEUROLOGY | Facility: CLINIC | Age: 30
End: 2025-01-14
Payer: COMMERCIAL

## 2025-01-14 DIAGNOSIS — R55 SYNCOPE AND COLLAPSE: ICD-10-CM

## 2025-01-14 DIAGNOSIS — I35.9 AORTIC VALVE CALCIFICATION: Primary | ICD-10-CM

## 2025-01-14 LAB — LITHIUM SERPL-SCNC: 0.5 MMOL/L (ref 0.6–1.2)

## 2025-01-14 NOTE — TELEPHONE ENCOUNTER
Tried to call patient but no inbox set up.       ----- Message from Chante Vick sent at 1/14/2025  4:14 PM EST -----  Please notify pt echocardiogram shows good pump function. There is some calcification of the aortic valve. We are referring her to cardiology for further evaluation.

## 2025-01-15 ENCOUNTER — TELEPHONE (OUTPATIENT)
Dept: PSYCHIATRY | Facility: CLINIC | Age: 30
End: 2025-01-15
Payer: COMMERCIAL

## 2025-01-15 ENCOUNTER — TELEPHONE (OUTPATIENT)
Dept: NEUROLOGY | Facility: CLINIC | Age: 30
End: 2025-01-15
Payer: COMMERCIAL

## 2025-01-15 DIAGNOSIS — F41.0 PANIC ATTACKS: ICD-10-CM

## 2025-01-15 DIAGNOSIS — F31.60 BIPOLAR AFFECTIVE DISORDER, MIXED: Primary | ICD-10-CM

## 2025-01-15 DIAGNOSIS — Z79.899 MEDICATION MANAGEMENT: ICD-10-CM

## 2025-01-15 NOTE — TELEPHONE ENCOUNTER
Tried to call patient but no inbox set up.  Will send MyChart letter.    ----- Message from Chante Vick sent at 1/14/2025  4:14 PM EST -----  Please notify pt echocardiogram shows good pump function. There is some calcification of the aortic valve. We are referring her to cardiology for further evaluation.

## 2025-01-15 NOTE — TELEPHONE ENCOUNTER
Pt called requesting the Lithium to be discontinued.Pt stated that her medical conditions has gotten worse with the nausea and blackouts.Pt would like to be out on a different medication

## 2025-01-24 ENCOUNTER — APPOINTMENT (OUTPATIENT)
Dept: CT IMAGING | Facility: HOSPITAL | Age: 30
End: 2025-01-24
Payer: COMMERCIAL

## 2025-01-24 ENCOUNTER — HOSPITAL ENCOUNTER (EMERGENCY)
Facility: HOSPITAL | Age: 30
Discharge: HOME | End: 2025-01-24
Attending: EMERGENCY MEDICINE
Payer: COMMERCIAL

## 2025-01-24 VITALS
SYSTOLIC BLOOD PRESSURE: 134 MMHG | DIASTOLIC BLOOD PRESSURE: 93 MMHG | RESPIRATION RATE: 18 BRPM | TEMPERATURE: 98.2 F | WEIGHT: 268.96 LBS | BODY MASS INDEX: 43.23 KG/M2 | HEIGHT: 66 IN | OXYGEN SATURATION: 99 % | HEART RATE: 99 BPM

## 2025-01-24 DIAGNOSIS — U07.1 COVID: Primary | ICD-10-CM

## 2025-01-24 DIAGNOSIS — R42 DIZZINESS: ICD-10-CM

## 2025-01-24 DIAGNOSIS — R55 SYNCOPE AND COLLAPSE: ICD-10-CM

## 2025-01-24 LAB
ALBUMIN SERPL-MCNC: 4.1 G/DL (ref 3.5–5.2)
ALBUMIN/GLOB SERPL: 1.5 G/DL
ALP SERPL-CCNC: 76 U/L (ref 39–117)
ALT SERPL W P-5'-P-CCNC: 17 U/L (ref 1–33)
ANION GAP SERPL CALCULATED.3IONS-SCNC: 11 MMOL/L (ref 5–15)
AST SERPL-CCNC: 15 U/L (ref 1–32)
BASOPHILS # BLD AUTO: 0.06 10*3/MM3 (ref 0–0.2)
BASOPHILS NFR BLD AUTO: 1.1 % (ref 0–1.5)
BILIRUB SERPL-MCNC: <0.2 MG/DL (ref 0–1.2)
BUN SERPL-MCNC: 12 MG/DL (ref 6–20)
BUN/CREAT SERPL: 11.1 (ref 7–25)
CALCIUM SPEC-SCNC: 8.5 MG/DL (ref 8.6–10.5)
CHLORIDE SERPL-SCNC: 106 MMOL/L (ref 98–107)
CO2 SERPL-SCNC: 22 MMOL/L (ref 22–29)
CREAT SERPL-MCNC: 1.08 MG/DL (ref 0.57–1)
DEPRECATED RDW RBC AUTO: 48.7 FL (ref 37–54)
EGFRCR SERPLBLD CKD-EPI 2021: 70.6 ML/MIN/1.73
EOSINOPHIL # BLD AUTO: 0.03 10*3/MM3 (ref 0–0.4)
EOSINOPHIL NFR BLD AUTO: 0.5 % (ref 0.3–6.2)
ERYTHROCYTE [DISTWIDTH] IN BLOOD BY AUTOMATED COUNT: 16.3 % (ref 12.3–15.4)
FLUAV RNA RESP QL NAA+PROBE: NOT DETECTED
FLUBV RNA RESP QL NAA+PROBE: NOT DETECTED
GLOBULIN UR ELPH-MCNC: 2.8 GM/DL
GLUCOSE SERPL-MCNC: 87 MG/DL (ref 65–99)
HCG INTACT+B SERPL-ACNC: <0.1 MIU/ML
HCT VFR BLD AUTO: 38.1 % (ref 34–46.6)
HGB BLD-MCNC: 11.7 G/DL (ref 12–15.9)
IMM GRANULOCYTES # BLD AUTO: 0.02 10*3/MM3 (ref 0–0.05)
IMM GRANULOCYTES NFR BLD AUTO: 0.4 % (ref 0–0.5)
LYMPHOCYTES # BLD AUTO: 0.92 10*3/MM3 (ref 0.7–3.1)
LYMPHOCYTES NFR BLD AUTO: 16.3 % (ref 19.6–45.3)
MAGNESIUM SERPL-MCNC: 1.8 MG/DL (ref 1.6–2.6)
MCH RBC QN AUTO: 25.3 PG (ref 26.6–33)
MCHC RBC AUTO-ENTMCNC: 30.7 G/DL (ref 31.5–35.7)
MCV RBC AUTO: 82.5 FL (ref 79–97)
MONOCYTES # BLD AUTO: 0.87 10*3/MM3 (ref 0.1–0.9)
MONOCYTES NFR BLD AUTO: 15.4 % (ref 5–12)
NEUTROPHILS NFR BLD AUTO: 3.75 10*3/MM3 (ref 1.7–7)
NEUTROPHILS NFR BLD AUTO: 66.3 % (ref 42.7–76)
NRBC BLD AUTO-RTO: 0 /100 WBC (ref 0–0.2)
PLATELET # BLD AUTO: 344 10*3/MM3 (ref 140–450)
PMV BLD AUTO: 9.9 FL (ref 6–12)
POTASSIUM SERPL-SCNC: 3.9 MMOL/L (ref 3.5–5.2)
PROT SERPL-MCNC: 6.9 G/DL (ref 6–8.5)
QT INTERVAL: 406 MS
QTC INTERVAL: 450 MS
RBC # BLD AUTO: 4.62 10*6/MM3 (ref 3.77–5.28)
RSV RNA RESP QL NAA+PROBE: NOT DETECTED
S PYO AG THROAT QL: NEGATIVE
SARS-COV-2 RNA RESP QL NAA+PROBE: DETECTED
SODIUM SERPL-SCNC: 139 MMOL/L (ref 136–145)
TROPONIN T SERPL HS-MCNC: <6 NG/L
TSH SERPL DL<=0.05 MIU/L-ACNC: 1.31 UIU/ML (ref 0.27–4.2)
WBC NRBC COR # BLD AUTO: 5.65 10*3/MM3 (ref 3.4–10.8)

## 2025-01-24 PROCEDURE — 84484 ASSAY OF TROPONIN QUANT: CPT | Performed by: NURSE PRACTITIONER

## 2025-01-24 PROCEDURE — 25010000002 DEXAMETHASONE PER 1 MG: Performed by: NURSE PRACTITIONER

## 2025-01-24 PROCEDURE — 87081 CULTURE SCREEN ONLY: CPT | Performed by: NURSE PRACTITIONER

## 2025-01-24 PROCEDURE — 99284 EMERGENCY DEPT VISIT MOD MDM: CPT

## 2025-01-24 PROCEDURE — 83735 ASSAY OF MAGNESIUM: CPT | Performed by: NURSE PRACTITIONER

## 2025-01-24 PROCEDURE — 25010000002 KETOROLAC TROMETHAMINE PER 15 MG: Performed by: NURSE PRACTITIONER

## 2025-01-24 PROCEDURE — 25010000002 METOCLOPRAMIDE PER 10 MG: Performed by: NURSE PRACTITIONER

## 2025-01-24 PROCEDURE — 87637 SARSCOV2&INF A&B&RSV AMP PRB: CPT | Performed by: NURSE PRACTITIONER

## 2025-01-24 PROCEDURE — 25010000002 DIPHENHYDRAMINE PER 50 MG: Performed by: NURSE PRACTITIONER

## 2025-01-24 PROCEDURE — 80053 COMPREHEN METABOLIC PANEL: CPT | Performed by: NURSE PRACTITIONER

## 2025-01-24 PROCEDURE — 87880 STREP A ASSAY W/OPTIC: CPT | Performed by: NURSE PRACTITIONER

## 2025-01-24 PROCEDURE — 84702 CHORIONIC GONADOTROPIN TEST: CPT | Performed by: NURSE PRACTITIONER

## 2025-01-24 PROCEDURE — 96375 TX/PRO/DX INJ NEW DRUG ADDON: CPT

## 2025-01-24 PROCEDURE — 70450 CT HEAD/BRAIN W/O DYE: CPT

## 2025-01-24 PROCEDURE — 93005 ELECTROCARDIOGRAM TRACING: CPT | Performed by: NURSE PRACTITIONER

## 2025-01-24 PROCEDURE — 25810000003 SODIUM CHLORIDE 0.9 % SOLUTION: Performed by: NURSE PRACTITIONER

## 2025-01-24 PROCEDURE — 84443 ASSAY THYROID STIM HORMONE: CPT | Performed by: NURSE PRACTITIONER

## 2025-01-24 PROCEDURE — 96374 THER/PROPH/DIAG INJ IV PUSH: CPT

## 2025-01-24 PROCEDURE — 85025 COMPLETE CBC W/AUTO DIFF WBC: CPT | Performed by: NURSE PRACTITIONER

## 2025-01-24 RX ORDER — SODIUM CHLORIDE 0.9 % (FLUSH) 0.9 %
10 SYRINGE (ML) INJECTION AS NEEDED
Status: DISCONTINUED | OUTPATIENT
Start: 2025-01-24 | End: 2025-01-24 | Stop reason: HOSPADM

## 2025-01-24 RX ORDER — KETOROLAC TROMETHAMINE 15 MG/ML
15 INJECTION, SOLUTION INTRAMUSCULAR; INTRAVENOUS ONCE
Status: COMPLETED | OUTPATIENT
Start: 2025-01-24 | End: 2025-01-24

## 2025-01-24 RX ORDER — DEXAMETHASONE SODIUM PHOSPHATE 10 MG/ML
10 INJECTION INTRAMUSCULAR; INTRAVENOUS ONCE
Status: COMPLETED | OUTPATIENT
Start: 2025-01-24 | End: 2025-01-24

## 2025-01-24 RX ORDER — DIPHENHYDRAMINE HYDROCHLORIDE 50 MG/ML
25 INJECTION INTRAMUSCULAR; INTRAVENOUS ONCE
Status: COMPLETED | OUTPATIENT
Start: 2025-01-24 | End: 2025-01-24

## 2025-01-24 RX ORDER — METOCLOPRAMIDE HYDROCHLORIDE 5 MG/ML
10 INJECTION INTRAMUSCULAR; INTRAVENOUS ONCE
Status: COMPLETED | OUTPATIENT
Start: 2025-01-24 | End: 2025-01-24

## 2025-01-24 RX ADMIN — DIPHENHYDRAMINE HYDROCHLORIDE 25 MG: 50 INJECTION INTRAMUSCULAR; INTRAVENOUS at 15:20

## 2025-01-24 RX ADMIN — METOCLOPRAMIDE 10 MG: 5 INJECTION, SOLUTION INTRAMUSCULAR; INTRAVENOUS at 15:20

## 2025-01-24 RX ADMIN — SODIUM CHLORIDE 500 ML: 9 INJECTION, SOLUTION INTRAVENOUS at 15:19

## 2025-01-24 RX ADMIN — DEXAMETHASONE SODIUM PHOSPHATE 10 MG: 10 INJECTION INTRAMUSCULAR; INTRAVENOUS at 15:20

## 2025-01-24 RX ADMIN — KETOROLAC TROMETHAMINE 15 MG: 15 INJECTION, SOLUTION INTRAMUSCULAR; INTRAVENOUS at 15:20

## 2025-01-24 NOTE — ED PROVIDER NOTES
" EMERGENCY DEPARTMENT ENCOUNTER    Pt Name: Lady Diamond  MRN: [id number]  Pt :   1995  Room Number:    Date of encounter:  2025  PCP: Flores Carrion MD  ED Provider: LULU Benavides    Historian: Patient, spouse      HPI:  Chief Complaint: Dizziness, blackout        Context: Lady Diamond is a 29 y.o. female who presents to the ED c/o dizziness, blackout.  Patient is a 29-year-old female who presents for evaluation of dizziness, unsteadiness on the feet and \"blackout\".  Patient reports severe headache in the left temple radiating through the head and shooting sensation.  States pain is ongoing since 3 days ago and worsening.  She had an episode of blackout that lasted for about an hour.  Her spouse said that she was conscious but not responding.  Her eyes were dilated at that time.  Patient told me that she took all her antimigraine medicines without relief.  Reports cough, sore throat, no changes in bowel bladder habits.  Followed by neurology.  Was advised to come to ER if blackouts persist.      PAST MEDICAL HISTORY  Past Medical History:   Diagnosis Date    Anemia 2019    Anxiety with depression 2006    no meds    Bipolar disorder     Cholelithiasis     Chronic constipation 2018    Cluster headache     H/O chlamydia infection     H/O gonorrhea     H/O gonorrhea     H/O gonorrhea 2012    H/O gonorrhea 2018    H/O trichomoniasis 2018    H/O trichomoniasis 2021    Headache, tension-type     Memory loss     Migraine 2013    Seizures 2022    May have had those in her sleep    Urinary tract infection 2006         PAST SURGICAL HISTORY  Past Surgical History:   Procedure Laterality Date     SECTION  11/15/2014    LTCS (1 layer closure)     SECTION N/A 2019    Procedure:  SECTION REPEAT WITH SALPINGECTOMY;  Surgeon: Regine José MD;  Location: [facility] LABOR DELIVERY;  Service: Obstetrics;  Laterality: N/A; "    LAPAROSCOPIC CHOLECYSTECTOMY  2015    TONSILLECTOMY  Dec. 14    TUBAL ABDOMINAL LIGATION  19    WISDOM TOOTH EXTRACTION  2016         FAMILY HISTORY  Family History   Problem Relation Age of Onset    Bipolar disorder Mother     Anxiety disorder Mother     Depression Mother         Everyone in my family    Multiple sclerosis Mother     Arthritis Mother     Drug abuse Mother     Hearing loss Mother     Hypertension Mother     Mental illness Mother         All of us    Depression Father     Alcohol abuse Father     Drug abuse Father     Bipolar disorder Sister     Depression Sister     Anxiety disorder Sister     Drug abuse Sister     Suicide Attempts Maternal Grandmother     Birth defects Sister         Her daughter my niece jessy    Miscarriages / Stillbirths Sister     Diabetes Paternal Grandmother          SOCIAL HISTORY  Social History     Socioeconomic History    Marital status: Single    Number of children: 3   Tobacco Use    Smoking status: Some Days     Average packs/day: 0.6 packs/day for 26.4 years (15.0 ttl pk-yrs)     Types: Cigarettes, Electronic Cigarette     Start date: 2010     Last attempt to quit: 3/1/2019     Years since quittin.9     Passive exposure: Past    Smokeless tobacco: Never   Vaping Use    Vaping status: Former    Quit date: 2024    Substances: Nicotine   Substance and Sexual Activity    Alcohol use: Not Currently     Alcohol/week: 6.0 standard drinks of alcohol     Types: 6 Shots of liquor per week     Comment: tequila    Drug use: Never    Sexual activity: Yes     Partners: Male     Birth control/protection: Bilateral salpingectomy , Surgical         ALLERGIES  Patient has no known allergies.        REVIEW OF SYSTEMS  Review of Systems       All systems reviewed and negative except for those discussed in HPI.       PHYSICAL EXAM    I have reviewed the triage vital signs and nursing notes.    ED Triage Vitals [25 1426]   Temp Heart Rate Resp BP SpO2    98.2 °F (36.8 °C) 99 18 134/93 99 %      Temp src Heart Rate Source Patient Position BP Location FiO2 (%)   Oral Monitor Sitting Left arm --       Physical Exam  GENERAL:   Appears in no acute distress.  Obese  HENT: Nares patent.  Mildly erythematous left tympanic membrane, posterior pharynx drainage, no significant erythema.  EYES: No scleral icterus.  CV: Regular rhythm, regular rate.  RESPIRATORY: Normal effort.  No audible wheezes, rales or rhonchi.  ABDOMEN: Soft, nontender  MUSCULOSKELETAL: No deformities.   NEURO: Alert, moves all extremities, follows commands.  No focal deficits  SKIN: Warm, dry, no rash visualized.      LAB RESULTS  Recent Results (from the past 24 hours)   Comprehensive Metabolic Panel    Collection Time: 01/24/25  3:09 PM    Specimen: Blood   Result Value Ref Range    Glucose 87 65 - 99 mg/dL    BUN 12 6 - 20 mg/dL    Creatinine 1.08 (H) 0.57 - 1.00 mg/dL    Sodium 139 136 - 145 mmol/L    Potassium 3.9 3.5 - 5.2 mmol/L    Chloride 106 98 - 107 mmol/L    CO2 22.0 22.0 - 29.0 mmol/L    Calcium 8.5 (L) 8.6 - 10.5 mg/dL    Total Protein 6.9 6.0 - 8.5 g/dL    Albumin 4.1 3.5 - 5.2 g/dL    ALT (SGPT) 17 1 - 33 U/L    AST (SGOT) 15 1 - 32 U/L    Alkaline Phosphatase 76 39 - 117 U/L    Total Bilirubin <0.2 0.0 - 1.2 mg/dL    Globulin 2.8 gm/dL    A/G Ratio 1.5 g/dL    BUN/Creatinine Ratio 11.1 7.0 - 25.0    Anion Gap 11.0 5.0 - 15.0 mmol/L    eGFR 70.6 >60.0 mL/min/1.73   hCG, Quantitative, Pregnancy    Collection Time: 01/24/25  3:09 PM    Specimen: Blood   Result Value Ref Range    HCG Quantitative <0.10 mIU/mL   TSH Rfx On Abnormal To Free T4    Collection Time: 01/24/25  3:09 PM    Specimen: Blood   Result Value Ref Range    TSH 1.310 0.270 - 4.200 uIU/mL   Magnesium    Collection Time: 01/24/25  3:09 PM    Specimen: Blood   Result Value Ref Range    Magnesium 1.8 1.6 - 2.6 mg/dL   CBC Auto Differential    Collection Time: 01/24/25  3:09 PM    Specimen: Blood   Result Value Ref Range     WBC 5.65 3.40 - 10.80 10*3/mm3    RBC 4.62 3.77 - 5.28 10*6/mm3    Hemoglobin 11.7 (L) 12.0 - 15.9 g/dL    Hematocrit 38.1 34.0 - 46.6 %    MCV 82.5 79.0 - 97.0 fL    MCH 25.3 (L) 26.6 - 33.0 pg    MCHC 30.7 (L) 31.5 - 35.7 g/dL    RDW 16.3 (H) 12.3 - 15.4 %    RDW-SD 48.7 37.0 - 54.0 fl    MPV 9.9 6.0 - 12.0 fL    Platelets 344 140 - 450 10*3/mm3    Neutrophil % 66.3 42.7 - 76.0 %    Lymphocyte % 16.3 (L) 19.6 - 45.3 %    Monocyte % 15.4 (H) 5.0 - 12.0 %    Eosinophil % 0.5 0.3 - 6.2 %    Basophil % 1.1 0.0 - 1.5 %    Immature Grans % 0.4 0.0 - 0.5 %    Neutrophils, Absolute 3.75 1.70 - 7.00 10*3/mm3    Lymphocytes, Absolute 0.92 0.70 - 3.10 10*3/mm3    Monocytes, Absolute 0.87 0.10 - 0.90 10*3/mm3    Eosinophils, Absolute 0.03 0.00 - 0.40 10*3/mm3    Basophils, Absolute 0.06 0.00 - 0.20 10*3/mm3    Immature Grans, Absolute 0.02 0.00 - 0.05 10*3/mm3    nRBC 0.0 0.0 - 0.2 /100 WBC   High Sensitivity Troponin T    Collection Time: 01/24/25  3:09 PM    Specimen: Blood   Result Value Ref Range    HS Troponin T <6 <14 ng/L   Rapid Strep A Screen - Swab, Throat    Collection Time: 01/24/25  3:21 PM    Specimen: Throat; Swab   Result Value Ref Range    Strep A Ag Negative Negative   COVID-19, FLU A/B, RSV PCR 1 HR TAT - Swab, Nasopharynx    Collection Time: 01/24/25  3:21 PM    Specimen: Nasopharynx; Swab   Result Value Ref Range    COVID19 Detected (C) Not Detected - Ref. Range    Influenza A PCR Not Detected Not Detected    Influenza B PCR Not Detected Not Detected    RSV, PCR Not Detected Not Detected   ECG 12 Lead Syncope    Collection Time: 01/24/25  6:27 PM   Result Value Ref Range    QT Interval 406 ms    QTC Interval 450 ms       If labs were ordered, I independently reviewed the results and considered them in treating the patient.        RADIOLOGY  CT Head Without Contrast    Result Date: 1/24/2025  CT HEAD WO CONTRAST Date of Exam: 1/24/2025 3:01 PM EST Indication: dizziness, headache. Comparison: MRI brain from  November 13, 2024 Technique: Axial CT images were obtained of the head without contrast administration.  Automated exposure control and iterative construction methods were used. Findings: No acute intracranial hemorrhage or extra-axial collection is identified. The ventricles appear normal in caliber, with no evidence of mass effect or midline shift. The basal cisterns appear patent. The gray-white differentiation appears preserved. The calvarium appear intact. The paranasal sinuses are clear. The mastoid air cells are well-aerated.     Impression: No acute intracranial process identified. Electronically Signed: Maury Arteaga MD  1/24/2025 3:24 PM EST  Workstation ID: [id number]     I ordered and independently reviewed the above noted radiographic studies.           PROCEDURES    Procedures    ECG 12 Lead Syncope   Final Result   Test Reason : Syncope   Blood Pressure :   */*   mmHG   Vent. Rate :  74 BPM     Atrial Rate :  74 BPM      P-R Int : 160 ms          QRS Dur :  84 ms       QT Int : 406 ms       P-R-T Axes :  61  34  33 degrees     QTcB Int : 450 ms      Normal sinus rhythm with sinus arrhythmia   Normal ECG   No previous ECGs available   Confirmed by SANDY BURGESS MD ([id number]) on 1/24/2025 6:40:00 PM      Referred By: EDMD           Confirmed By: SANDY BURGESS MD          MEDICATIONS GIVEN IN ER    Medications   dexAMETHasone (DECADRON) injection 10 mg (10 mg Intravenous Given 1/24/25 1520)   sodium chloride 0.9 % bolus 500 mL (0 mL Intravenous Stopped 1/24/25 1901)   diphenhydrAMINE (BENADRYL) injection 25 mg (25 mg Intravenous Given 1/24/25 1520)   metoclopramide (REGLAN) injection 10 mg (10 mg Intravenous Given 1/24/25 1520)   ketorolac (TORADOL) injection 15 mg (15 mg Intravenous Given 1/24/25 1520)         MEDICAL DECISION MAKING, PROGRESS, and CONSULTS    All labs, if obtained, have been independently reviewed by me.  All radiology studies, if obtained, have been reviewed by me and the radiologist  dictating the report.  All EKG's, if obtained, have been independently viewed and interpreted by me/my attending physician.      Discussion below represents my analysis of pertinent findings related to patient's condition, differential diagnosis, treatment plan and final disposition.  Patient is 29-year-old female with a history of migraines, seizures, bipolar, IIH followed by neurology.  Patient presents for evaluation of dizziness, lightheadedness and episode of blackout today.  Furthermore she had a headache that was unrelieved with her home remedies with onset of 3 days ago.  On physical exam she was nontoxic-appearing, no focal deficits.  Lab work was obtained was grossly unremarkable including negative troponin, her respiratory panel was positive for COVID, CT of the brain no acute intracranial process.  Her recent echo from 12/30/2024 was normal with ejection fraction of 62.8% and calcification of aortic valve, EEG from 2022 was normal without focal features or epileptiform activity.  Patient is followed by neurology, awaiting for another EEG.  I spoke with Dr. Thibodeaux neurology, given above findings it is reasonable for patient to follow-up with her neurologist on outpatient basis.  Patient received migraine cocktail with close reduction of her symptoms.  She did not have no epilepsy or blackout episodes while in ER.  She felt much better and was ready to go home.                       Differential diagnosis:    Seizure, pseudoseizure, hypovolemia, anemia, syncope      Additional sources:    - Discussed/ obtained information from independent historians: Significant other    - External (non-ED) record review: Echo from 12/30/2024 interpretation Summary         Left ventricular systolic function is normal. Calculated left ventricular EF = 62.8%    There is calcification of the aortic valve.    Estimated right ventricular systolic pressure from tricuspid regurgitation is normal (<35 mmHg).  EEG  11/14/2022SUMMARY:     Normal EEG in the awake and asleep states     No focal features or epileptiform activity are seen     IMPRESSION:     Normal study     - Chronic or social conditions impacting care: IIH    - Shared decision making: Patient      Orders placed during this visit:  Orders Placed This Encounter   Procedures    Rapid Strep A Screen - Swab, Throat    COVID-19, FLU A/B, RSV PCR 1 HR TAT - Swab, Nasopharynx    Beta Strep Culture, Throat - Swab, Throat    CT Head Without Contrast    Comprehensive Metabolic Panel    hCG, Quantitative, Pregnancy    TSH Rfx On Abnormal To Free T4    Magnesium    CBC Auto Differential    High Sensitivity Troponin T    Ambulatory Referral to [facility]    ECG 12 Lead Syncope    CBC & Differential         Additional orders considered but not ordered:  Brain MRI, UDS    ED Course:    Consultants:      ED Course as of 01/24/25 2207 Fri Jan 24, 2025   1621 Covid + [IR]   1708 Spoke with patient and family, she feels better, her headache somewhat improved [IR]   1715 Spoke with Dr. Thibodeaux neurology, given abnormal recent echo and EEG 2 years ago patient can be followed outpatient with neurology and PCP [IR]   1839 The patient, she feels much better.  She is ready to go home.  She will follow-up outpatient with her neurology [IR]      ED Course User Index  [IR] Emma Ulloa, LULU              Shared Decision Making:  After my consideration of clinical presentation and any laboratory/radiology studies obtained, I discussed the findings with the patient/patient representative who is in agreement with the treatment plan and the final disposition.   Risks and benefits of discharge and/or observation/admission were discussed.       AS OF 22:07 EST VITALS:    BP - 134/93  HR - 99  TEMP - 98.2 °F (36.8 °C) (Oral)  O2 SATS - 99%                  DIAGNOSIS  Final diagnoses:   COVID   Dizziness   Syncope and collapse         DISPOSITION  DISCHARGE    Patient discharged in  stable condition.    Reviewed implications of results, diagnosis, meds, responsibility to follow up, warning signs and symptoms of possible worsening, potential complications and reasons to return to ER.    Patient/Family voiced understanding of above instructions.    Discussed plan for discharge, as there is no emergent indication for admission.  Pt/family is agreeable and understands need for follow up and possible repeat testing.  Pt/family is aware that discharge does not mean that nothing is wrong but that it indicates no emergency is currently present that requires admission and they must continue care with follow-up as given below or with a physician of their choice.     FOLLOW-UP  Elba Mendoza, APRN  [address]  [address]  [place] [id number]  [phone]    Schedule an appointment as soon as possible for a visit       [facility] CARDIOLOGY  [address]  [address]  [facility] [address] [phone]             Medication List        Changed      famotidine 20 MG tablet  Commonly known as: Pepcid  Take 1 tablet by mouth 2 (Two) Times a Day.  What changed:   when to take this  reasons to take this                 Please note that portions of this document were completed with voice recognition software.     LULU Benavides   01/24/25   22:07 Emma Dick APRN  01/24/25 [id number]

## 2025-01-26 LAB — BACTERIA SPEC AEROBE CULT: NORMAL

## 2025-01-30 ENCOUNTER — TELEMEDICINE (OUTPATIENT)
Dept: PSYCHIATRY | Facility: CLINIC | Age: 30
End: 2025-01-30
Payer: COMMERCIAL

## 2025-01-30 DIAGNOSIS — F60.3 BORDERLINE PERSONALITY DISORDER: ICD-10-CM

## 2025-01-30 DIAGNOSIS — F31.60 BIPOLAR AFFECTIVE DISORDER, MIXED: Primary | ICD-10-CM

## 2025-01-30 NOTE — PROGRESS NOTES
Date: 2025  Time In: 12:26 EST  Time out: 12:51 P EST    This provider is located at [facility] [place], [place], [facility], using a secure [employer] Video Visit through [employer]. Patient is being seen remotely via telehealth at their home address is located in [place]. Patient stated they are in a secure environment for this session. The patient's condition being diagnosed and treated is appropriate for telemedicine. The provider identified themself as well as their credentials. The patient, or  patient's legal guardian consent to be seen remotely, and when consent is given they understand that the consent allows for patient identifiable information to be sent to a third party as needed. They may refuse to be seen remotely at any time. The electronic data is encrypted and password protected, and the patient's or  legal guardian has been advised of the potential risks to privacy not withstanding such measures.   PT Identifiers used: Name and .    You have chosen to receive care through a telehealth visit.  Do you consent to use a video/audio connection for your medical care today? Yes    Subjective   Lady Diamond is a 29 y.o. female who presents today for follow up    Chief Complaint:   Chief Complaint   Patient presents with    Anxiety    Depression        Data: Pt reported that she and her kids have Covid, recovering from that. Pt reported that she has been experiencing 2-3 blackouts a day. Pt reported hat her sister is staying with her for the time being. Pt reported that she has been seeing a doctor with her blackouts. Pt reported that she is having more test ran at the end of February. Pt reported that her graduation has been pushed to 2025. Pt reported that she had to drop down to PT at school to manage her health better. Pt reported that this has made her feel bad about her self and discouraged. Pt reported she and her psych are tapering off the  Lithium.    Session time was exclusive to therapy and separate from time spent outside of session to aid with MH symptoms.    Clinical Maneuvering/Intervention: Assisted patient in processing above session content; acknowledged and normalized patient’s thoughts, feelings, and concerns.  Rationalized patient thought process regarding concerns presented at session.  Discussed triggers associated with patient's  anxiety  and depression  Also discussed coping skills for patient to implement such as grounding , mindfulness , self care , positive self talk , and focusing on big picture, accomplishments.    Allowed patient to freely discuss issues without interruption or judgment. Provided safe, confidential environment to facilitate the development of positive therapeutic relationship and encourage open, honest communication. Assisted patient in identifying risk factors which would indicate the need for higher level of care including thoughts to harm self or others and/or self-harming behavior and encouraged patient to contact this office, call 911, or present to the nearest emergency room should any of these events occur. Discussed crisis intervention services and means to access. Patient adamantly and convincingly denies current suicidal or homicidal ideation or perceptual disturbance.    Assessment:  Pt was alert and oriented x3.  Pt appears open and honest re MH symptoms that have affected life in a negative way. Pts main contributing factor to poor MH appears to be her health. Pt appears to need increased sleep when 'blackouts' happen, effecting memory as well. Pt appears proactive in advocating for self with doctors and participating in all test recommended. Pt appears to be struggling with self doubt re not finishing school when she was expected to. Pt appears to be struggling with increased depression due to recent/long term stressors. Pt appears more stressed today with kids home sick from school. Pt was rocking  back and forth throughout session as a self soothing technique.    Patient appears to maintain relative stability as compared to their baseline.  However, patient continues to struggle with   Chief Complaint   Patient presents with    Anxiety    Depression    which continues to cause impairment in important areas of functioning.  A result, they can be reasonably expected to continue to benefit from treatment and would likely be at increased risk for decompensation otherwise.      Mental Status Exam:   Hygiene:   fair  Cooperation:  Cooperative  Eye Contact:  Good  Psychomotor Behavior:  Appropriate  Affect:  Appropriate  Mood: normal  Speech:  Normal  Thought Process:  Linear  Thought Content:  Normal  Suicidal:  None  Homicidal:  None  Hallucinations:  None  Delusion:  None  Memory:  Deficits  Orientation:  Grossly intact  Reliability:  fair  Insight:  Fair  Judgement:  Fair  Impulse Control:  Fair  Physical/Medical Issues:  Yes          Patient's Support Network Includes:   limited healthy support    Functional Status: Mild impairment     Progress toward goal: Not at goal    Prognosis: Fair with Ongoing Treatment         Plan: update tx plan at next full session    Patient will continue in individual outpatient therapy with focus on improved functioning and coping skills, maintaining stability, and avoiding decompensation and the need for higher level of care.    Patient will adhere to medication regimen as prescribed and report any side effects. Patient will contact this office, call 911 or present to the nearest emergency room should suicidal or homicidal ideations occur. Provide Cognitive Behavioral Therapy and Solution Focused Therapy to improve functioning, maintain stability, and avoid decompensation and the need for higher level of care.     Return in about 2 weeks (around 2/13/2025).      VISIT DIAGNOSIS:    Diagnosis Plan   1. Bipolar affective disorder, mixed        2. Borderline personality disorder          12:26 EST         This document has been electronically signed by Anna Collier LCSW  January 30, 2025      Part of this note may be an electronic transcription/translation of spoken language to printed text using the Dragon Dictation System.

## 2025-01-31 ENCOUNTER — SPECIALTY PHARMACY (OUTPATIENT)
Dept: ONCOLOGY | Facility: HOSPITAL | Age: 30
End: 2025-01-31
Payer: COMMERCIAL

## 2025-01-31 NOTE — PROGRESS NOTES
Specialty Pharmacy Refill Coordination Note     Lady is a 29 y.o. female contacted today regarding refills of Emgality and Nurtec specialty medication(s).Patient is due for injection on 02/15.    Reviewed and verified with patient:       Specialty medication(s) and dose(s) confirmed: yes    Refill Questions      Flowsheet Row Most Recent Value   Changes to allergies? No   Changes to medications? No   New conditions or infections since last clinic visit No   Unplanned office visit, urgent care, ED, or hospital admission in the last 4 weeks  No   How does patient/caregiver feel medication is working? Good   Financial problems or insurance changes  No   Since the previous refill, were any specialty medication doses or scheduled injections missed or delayed?  No   If yes, please provide the amount N/A   Why were doses missed? N/A   Does this patient require a clinical escalation to a pharmacist? No            Delivery Questions      Flowsheet Row Most Recent Value   Delivery method UPS   Delivery address verified with patient/caregiver? Yes   Delivery address Home   Number of medications in delivery 2   Medication(s) being filled and delivered Rimegepant Sulfate (NURTEC), Galcanezumab-gnlm (EMGALITY)   Doses left of specialty medications Nurtec 4 tablets left as of 01/31   Copay verified? Yes   Copay amount Emgality and Nurtec =$0   Copay form of payment No copayment ($0)   Ship Date 02/03   Delivery Date Selection 02/04/25   Signature Required Yes                   Follow-up: 28 day(s)     Nick Parker  Specialty Pharmacy Technician

## 2025-02-07 ENCOUNTER — OFFICE VISIT (OUTPATIENT)
Age: 30
End: 2025-02-07
Payer: COMMERCIAL

## 2025-02-07 VITALS
OXYGEN SATURATION: 98 % | SYSTOLIC BLOOD PRESSURE: 132 MMHG | HEART RATE: 83 BPM | HEIGHT: 66 IN | BODY MASS INDEX: 44.61 KG/M2 | WEIGHT: 277.6 LBS | DIASTOLIC BLOOD PRESSURE: 84 MMHG

## 2025-02-07 DIAGNOSIS — Z51.81 ENCOUNTER FOR THERAPEUTIC DRUG MONITORING: ICD-10-CM

## 2025-02-07 DIAGNOSIS — F43.23 ADJUSTMENT DISORDER WITH MIXED ANXIETY AND DEPRESSED MOOD: Primary | ICD-10-CM

## 2025-02-07 DIAGNOSIS — R56.9 GENERALIZED CONVULSIVE SEIZURES: ICD-10-CM

## 2025-02-07 DIAGNOSIS — F60.3 BORDERLINE PERSONALITY DISORDER: ICD-10-CM

## 2025-02-07 DIAGNOSIS — Z86.59 HISTORY OF BIPOLAR DISORDER: ICD-10-CM

## 2025-02-07 RX ORDER — LAMOTRIGINE 100 MG/1
TABLET ORAL
Qty: 105 TABLET | Refills: 0 | Status: SHIPPED | OUTPATIENT
Start: 2025-02-07 | End: 2025-03-09

## 2025-02-07 RX ORDER — VILAZODONE HYDROCHLORIDE 40 MG/1
40 TABLET ORAL DAILY
Start: 2025-02-07

## 2025-02-07 RX ORDER — HYDROXYZINE HYDROCHLORIDE 25 MG/1
50 TABLET, FILM COATED ORAL NIGHTLY PRN
Start: 2025-02-07

## 2025-02-07 RX ORDER — LEVETIRACETAM 500 MG/1
500 TABLET ORAL 2 TIMES DAILY
Start: 2025-02-07
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
## 2025-02-10 ENCOUNTER — OFFICE VISIT (OUTPATIENT)
Dept: INTERNAL MEDICINE | Facility: CLINIC | Age: 30
End: 2025-02-10
Payer: COMMERCIAL

## 2025-02-10 VITALS
OXYGEN SATURATION: 97 % | WEIGHT: 280 LBS | HEIGHT: 66 IN | HEART RATE: 103 BPM | DIASTOLIC BLOOD PRESSURE: 78 MMHG | BODY MASS INDEX: 45 KG/M2 | SYSTOLIC BLOOD PRESSURE: 116 MMHG | TEMPERATURE: 97.3 F

## 2025-02-10 DIAGNOSIS — F41.8 MIXED ANXIETY AND DEPRESSIVE DISORDER: ICD-10-CM

## 2025-02-10 DIAGNOSIS — N89.8 VAGINA ITCHING: Primary | ICD-10-CM

## 2025-02-10 DIAGNOSIS — R56.9 GENERALIZED CONVULSIVE SEIZURES: ICD-10-CM

## 2025-02-10 DIAGNOSIS — G43.009 MIGRAINE WITHOUT AURA AND WITHOUT STATUS MIGRAINOSUS, NOT INTRACTABLE: ICD-10-CM

## 2025-02-10 PROCEDURE — 87529 HSV DNA AMP PROBE: CPT | Performed by: PHYSICIAN ASSISTANT

## 2025-02-10 PROCEDURE — 87798 DETECT AGENT NOS DNA AMP: CPT | Performed by: PHYSICIAN ASSISTANT

## 2025-02-10 PROCEDURE — 1160F RVW MEDS BY RX/DR IN RCRD: CPT | Performed by: PHYSICIAN ASSISTANT

## 2025-02-10 PROCEDURE — 87661 TRICHOMONAS VAGINALIS AMPLIF: CPT | Performed by: PHYSICIAN ASSISTANT

## 2025-02-10 PROCEDURE — 87801 DETECT AGNT MULT DNA AMPLI: CPT | Performed by: PHYSICIAN ASSISTANT

## 2025-02-10 PROCEDURE — 87491 CHLMYD TRACH DNA AMP PROBE: CPT | Performed by: PHYSICIAN ASSISTANT

## 2025-02-10 PROCEDURE — 1159F MED LIST DOCD IN RCRD: CPT | Performed by: PHYSICIAN ASSISTANT

## 2025-02-10 PROCEDURE — 99214 OFFICE O/P EST MOD 30 MIN: CPT | Performed by: PHYSICIAN ASSISTANT

## 2025-02-10 PROCEDURE — 87591 N.GONORRHOEAE DNA AMP PROB: CPT | Performed by: PHYSICIAN ASSISTANT

## 2025-02-10 RX ORDER — VALACYCLOVIR HYDROCHLORIDE 1 G/1
1000 TABLET, FILM COATED ORAL 2 TIMES DAILY
Qty: 20 TABLET | Refills: 0 | Status: SHIPPED | OUTPATIENT
Start: 2025-02-10

## 2025-02-10 NOTE — PROGRESS NOTES
[facility] PRIMARY CARE  [address]  [facility] [address]  Dept: [phone]  Dept Fax: [phone]  Loc: [phone]  Loc Fax: [phone]    Lady Diamond  1995    Follow Up Office Visit Note    History of Present Illness:  Patient 29-year-old female in today for vaginal itching.  Would like swabbed for STDs or infection.        The following portions of the patient's history were reviewed and updated as appropriate: allergies, current medications, past family history, past medical history, past social history, past surgical history, and problem list.    Medications:    Current Outpatient Medications:     acetaZOLAMIDE (DIAMOX) 250 MG tablet, Take 1 tablet by mouth 2 (Two) Times a Day., Disp: 60 tablet, Rfl: 1    galcanezumab-gnlm (EMGALITY) 120 MG/ML auto-injector pen, Inject 1 mL under the skin into the appropriate area as directed Every 28 (Twenty-Eight) Days., Disp: 1 mL, Rfl: 11    hydrOXYzine (ATARAX) 25 MG tablet, Take 2 tablets by mouth At Night As Needed for Anxiety (anxiety and/or sleep)., Disp: , Rfl:     lamoTRIgine (LaMICtal) 100 MG tablet, Take 2.5 tablets by mouth Daily AND 1 tablet Every Night. Do all this for 30 days., Disp: 105 tablet, Rfl: 0    levETIRAcetam (KEPPRA) 500 MG tablet, Take 1 tablet by mouth 2 (Two) Times a Day., Disp: , Rfl:     meclizine (ANTIVERT) 25 MG tablet, Take 1 tablet by mouth 3 (Three) Times a Day As Needed for Dizziness., Disp: 90 tablet, Rfl: 2    nicotine (Nicoderm CQ) 14 MG/24HR patch, Place 1 patch on the skin as directed by provider Daily., Disp: 14 each, Rfl: 2    nicotine (Nicoderm CQ) 7 MG/24HR patch, Place 1 patch on the skin as directed by provider Daily., Disp: 14 each, Rfl: 2    ondansetron ODT (ZOFRAN-ODT) 4 MG disintegrating tablet, Place 1 tablet on the tongue Every 8 (Eight) Hours As Needed for Nausea or Vomiting., Disp: 30 tablet, Rfl: 2    polyethylene glycol (MiraLax) 17 GM/SCOOP powder,  Take 17 g by mouth Daily., Disp: 510 g, Rfl: 1    rimegepant sulfate ODT (Nurtec) 75 MG tablet, Take 1 tablet by mouth once daily as needed at the onset of headache. MAX: 75 mg/24 hours, & 18 tabs/30 days., Disp: 16 tablet, Rfl: 11    SUMAtriptan (IMITREX) 100 MG tablet, 1 tablet 1 (One) Time As Needed., Disp: , Rfl:     valACYclovir (VALTREX) 1000 MG tablet, TAKE 1 TABLET BY MOUTH TWICE DAILY FOR 10 DAYS, Disp: 20 tablet, Rfl: 0    vilazodone (VIIBRYD) 40 MG tablet tablet, Take 1 tablet by mouth Daily., Disp: , Rfl:     famotidine (Pepcid) 20 MG tablet, Take 1 tablet by mouth 2 (Two) Times a Day. (Patient not taking: Reported on 2/10/2025), Disp: 60 tablet, Rfl: 2    Subjective  Allergies   Allergen Reactions    Shellfish-Derived Products Itching     Itchy eyes and throat, nausea.        Past Medical History:   Diagnosis Date    Anemia     Anxiety with depression     no meds    Bipolar disorder     Cholelithiasis     Chronic constipation     Cluster headache     H/O chlamydia infection     H/O gonorrhea     H/O gonorrhea     H/O gonorrhea     H/O gonorrhea     H/O trichomoniasis     H/O trichomoniasis 2021    Headache, tension-type     Memory loss     Migraine 2013    Seizures 2022    May have had those in her sleep    Urinary tract infection 2006       Past Surgical History:   Procedure Laterality Date     SECTION  11/15/2014    LTCS (1 layer closure)     SECTION N/A 2019    Procedure:  SECTION REPEAT WITH SALPINGECTOMY;  Surgeon: Regine José MD;  Location: [facility] LABOR DELIVERY;  Service: Obstetrics;  Laterality: N/A;    LAPAROSCOPIC CHOLECYSTECTOMY  2015    TONSILLECTOMY  Dec. 14    TUBAL ABDOMINAL LIGATION  19    WISDOM TOOTH EXTRACTION  2016       Family History   Problem Relation Age of Onset    Bipolar disorder Mother     Anxiety disorder Mother     Depression Mother         Everyone in my family    Multiple  sclerosis Mother     Arthritis Mother     Drug abuse Mother     Hearing loss Mother     Hypertension Mother     Mental illness Mother         All of us    Depression Father     Alcohol abuse Father     Drug abuse Father     Bipolar disorder Sister     Depression Sister     Anxiety disorder Sister     Drug abuse Sister     Suicide Attempts Maternal Grandmother     Birth defects Sister         Her daughter my niece jessy    Miscarriages / Stillbirths Sister     Diabetes Paternal Grandmother         Social History     Socioeconomic History    Marital status: Single    Number of children: 3   Tobacco Use    Smoking status: Some Days     Average packs/day: 0.6 packs/day for 26.4 years (15.0 ttl pk-yrs)     Types: Cigarettes, Electronic Cigarette     Start date: 2010     Last attempt to quit: 3/1/2019     Years since quittin.9     Passive exposure: Past    Smokeless tobacco: Never   Vaping Use    Vaping status: Some Days    Last attempt to quit: 2024    Substances: Nicotine   Substance and Sexual Activity    Alcohol use: Yes     Alcohol/week: 6.0 standard drinks of alcohol     Types: 6 Shots of liquor per week     Comment: socially    Drug use: Never    Sexual activity: Yes     Partners: Male     Birth control/protection: Bilateral salpingectomy , Surgical       Review of Systems   Constitutional:  Negative for activity change, chills, fatigue, fever and unexpected weight change.   HENT:  Negative for congestion, ear pain, postnasal drip, sinus pressure and sore throat.    Eyes:  Negative for pain, discharge and redness.   Respiratory:  Negative for cough, shortness of breath and wheezing.    Cardiovascular:  Negative for chest pain, palpitations and leg swelling.   Gastrointestinal:  Negative for diarrhea, nausea and vomiting.   Endocrine: Negative for cold intolerance and heat intolerance.   Genitourinary:  Negative for decreased urine volume and dysuria.   Musculoskeletal:  Negative for arthralgias  "and myalgias.   Skin:  Negative for rash and wound.   Neurological:  Negative for dizziness, light-headedness and headaches.   Hematological:  Does not bruise/bleed easily.   Psychiatric/Behavioral:  Negative for confusion, dysphoric mood and sleep disturbance. The patient is not nervous/anxious.          Objective  Vitals:    02/10/25 1327   BP: 116/78   Pulse: 103   Temp: 97.3 °F (36.3 °C)   TempSrc: Temporal   SpO2: 97%   Weight: 127 kg (280 lb)   Height: 167.6 cm (65.98\")     Body mass index is 45.22 kg/m².     Physical Exam  Physical Exam  Vitals and nursing note reviewed.   Constitutional:       General: She is not in acute distress.     Appearance: She is not ill-appearing.   HENT:      Head: Normocephalic.      Right Ear: Tympanic membrane, ear canal and external ear normal. There is no impacted cerumen.      Left Ear: Tympanic membrane, ear canal and external ear normal. There is no impacted cerumen.      Nose: No congestion or rhinorrhea.      Mouth/Throat:      Mouth: Mucous membranes are moist.      Pharynx: Oropharynx is clear. No oropharyngeal exudate or posterior oropharyngeal erythema.   Eyes:      General:         Right eye: No discharge.         Left eye: No discharge.      Extraocular Movements: Extraocular movements intact.      Conjunctiva/sclera: Conjunctivae normal.      Pupils: Pupils are equal, round, and reactive to light.   Cardiovascular:      Rate and Rhythm: Normal rate and regular rhythm.      Heart sounds: Normal heart sounds. No murmur heard.     No friction rub. No gallop.   Pulmonary:      Effort: Pulmonary effort is normal. No respiratory distress.      Breath sounds: Normal breath sounds. No wheezing.   Abdominal:      General: Bowel sounds are normal. There is no distension.      Palpations: Abdomen is soft. There is no mass.      Tenderness: There is no abdominal tenderness.   Musculoskeletal:         General: No swelling. Normal range of motion.      Cervical back: Normal " range of motion. No tenderness.      Right lower leg: No edema.      Left lower leg: No edema.   Lymphadenopathy:      Cervical: No cervical adenopathy.   Skin:     Findings: No bruising, erythema or rash.   Neurological:      Mental Status: She is oriented to person, place, and time.      Gait: Gait normal.   Psychiatric:         Mood and Affect: Mood normal.         Behavior: Behavior normal.         Thought Content: Thought content normal.         Judgment: Judgment normal.         Diagnostic Data  Procedures    Assessment  Diagnoses and all orders for this visit:    1. Vagina itching (Primary)  -     NuSwab VG+ & HSV; Future    2. Mixed anxiety and depressive disorder    3. Generalized convulsive seizures    4. Migraine without aura and without status migrainosus, not intractable        Plan    1. Vagina itching (Primary)- ordered swab.    2. Mixed anxiety and depressive disorder- stable on Viibryd.    3. Generalized convulsive seizures- stable on Keppra and Lamictal.    4. Migraine without aura and without status migrainosus, not intractable- stable on Emgality.      Return if symptoms worsen or fail to improve.    Kiel Ye PA-C  02/10/2025

## 2025-02-11 LAB
1OH-MIDAZOLAM UR QL SCN: NOT DETECTED NG/MG CREAT
6MAM UR QL SCN: NEGATIVE NG/ML
7AMINOCLONAZEPAM/CREAT UR: NOT DETECTED NG/MG CREAT
A-OH ALPRAZ/CREAT UR: NOT DETECTED NG/MG CREAT
A-OH-TRIAZOLAM/CREAT UR CFM: NOT DETECTED NG/MG CREAT
ACP UR QL CFM: NOT DETECTED
ALPRAZ/CREAT UR CFM: NOT DETECTED NG/MG CREAT
AMPHETAMINES UR QL SCN: NEGATIVE NG/ML
APAP UR QL SCN: NEGATIVE UG/ML
BARBITURATES UR QL SCN: NEGATIVE NG/ML
BENZODIAZ SCN METH UR: NEGATIVE
BUPRENORPHINE UR QL SCN: NEGATIVE
BUPRENORPHINE/CREAT UR: NOT DETECTED NG/MG CREAT
CANNABINOIDS UR QL SCN: NEGATIVE NG/ML
CARISOPRODOL UR QL: NEGATIVE NG/ML
CLONAZEPAM/CREAT UR CFM: NOT DETECTED NG/MG CREAT
COCAINE+BZE UR QL SCN: NEGATIVE NG/ML
CREAT UR-MCNC: 103 MG/DL
D-METHORPHAN UR-MCNC: NOT DETECTED NG/ML
D-METHORPHAN+LEVORPHANOL UR QL: NOT DETECTED
DESALKYLFLURAZ/CREAT UR: NOT DETECTED NG/MG CREAT
DIAZEPAM/CREAT UR: NOT DETECTED NG/MG CREAT
ETHANOL UR QL SCN: NEGATIVE G/DL
ETHANOL UR QL SCN: NEGATIVE NG/ML
FENTANYL CTO UR SCN-MCNC: NEGATIVE NG/ML
FENTANYL/CREAT UR: NOT DETECTED NG/MG CREAT
FLUNITRAZEPAM UR QL SCN: NOT DETECTED NG/MG CREAT
GABAPENTIN UR-MCNC: NEGATIVE UG/ML
HALLUCINOGENS UR: NEGATIVE
HCG UR QL: NEGATIVE
HYPNOTICS UR QL SCN: NEGATIVE
KETAMINE UR QL: NOT DETECTED
LORAZEPAM/CREAT UR: NOT DETECTED NG/MG CREAT
MEPERIDINE UR QL SCN: NEGATIVE NG/ML
METHADONE UR QL SCN: NEGATIVE NG/ML
METHADONE+METAB UR QL SCN: NEGATIVE NG/ML
MIDAZOLAM/CREAT UR CFM: NOT DETECTED NG/MG CREAT
MISCELLANEOUS, UR: NEGATIVE
NORBUPRENORPHINE/CREAT UR: NOT DETECTED NG/MG CREAT
NORDIAZEPAM/CREAT UR: NOT DETECTED NG/MG CREAT
NORFENTANYL/CREAT UR: NOT DETECTED NG/MG CREAT
NORFLUNITRAZEPAM UR-MCNC: NOT DETECTED NG/MG CREAT
NORKETAMINE UR-MCNC: NOT DETECTED UG/ML
OPIATES UR SCN-MCNC: NEGATIVE NG/ML
OXAZEPAM/CREAT UR: NOT DETECTED NG/MG CREAT
OXYCODONE CTO UR SCN-MCNC: NEGATIVE NG/ML
PCP UR QL SCN: NEGATIVE NG/ML
PRESCRIBED MEDICATIONS: NORMAL
PROPOXYPH UR QL SCN: NEGATIVE NG/ML
TAPENTADOL CTO UR SCN-MCNC: NEGATIVE NG/ML
TEMAZEPAM/CREAT UR: NOT DETECTED NG/MG CREAT
TRAMADOL UR QL SCN: NEGATIVE NG/ML
ZALEPLON UR-MCNC: NOT DETECTED NG/ML
ZOLPIDEM PHENYL-4-CARB UR QL SCN: NOT DETECTED
ZOLPIDEM UR QL SCN: NOT DETECTED
ZOPICLONE-N-OXIDE UR-MCNC: NOT DETECTED NG/ML

## 2025-02-12 ENCOUNTER — OFFICE VISIT (OUTPATIENT)
Dept: INTERNAL MEDICINE | Facility: CLINIC | Age: 30
End: 2025-02-12
Payer: COMMERCIAL

## 2025-02-12 ENCOUNTER — APPOINTMENT (OUTPATIENT)
Dept: CT IMAGING | Facility: HOSPITAL | Age: 30
End: 2025-02-12
Payer: COMMERCIAL

## 2025-02-12 ENCOUNTER — HOSPITAL ENCOUNTER (EMERGENCY)
Facility: HOSPITAL | Age: 30
Discharge: HOME | End: 2025-02-12
Attending: EMERGENCY MEDICINE | Admitting: EMERGENCY MEDICINE
Payer: COMMERCIAL

## 2025-02-12 ENCOUNTER — LAB (OUTPATIENT)
Dept: INTERNAL MEDICINE | Facility: CLINIC | Age: 30
End: 2025-02-12
Payer: COMMERCIAL

## 2025-02-12 VITALS
RESPIRATION RATE: 16 BRPM | HEART RATE: 94 BPM | WEIGHT: 278 LBS | DIASTOLIC BLOOD PRESSURE: 91 MMHG | HEIGHT: 66 IN | BODY MASS INDEX: 44.68 KG/M2 | TEMPERATURE: 97.5 F | SYSTOLIC BLOOD PRESSURE: 112 MMHG | OXYGEN SATURATION: 94 %

## 2025-02-12 VITALS
SYSTOLIC BLOOD PRESSURE: 150 MMHG | HEART RATE: 88 BPM | OXYGEN SATURATION: 98 % | HEIGHT: 66 IN | WEIGHT: 278 LBS | BODY MASS INDEX: 44.68 KG/M2 | DIASTOLIC BLOOD PRESSURE: 100 MMHG

## 2025-02-12 DIAGNOSIS — K92.2 LOWER GI BLEEDING: ICD-10-CM

## 2025-02-12 DIAGNOSIS — K62.5 RECTAL BLEEDING: Primary | ICD-10-CM

## 2025-02-12 DIAGNOSIS — R40.4 SPELL OF ALTERED CONSCIOUSNESS: Primary | ICD-10-CM

## 2025-02-12 LAB
ALBUMIN SERPL-MCNC: 4 G/DL (ref 3.5–5.2)
ALBUMIN/GLOB SERPL: 1.6 G/DL
ALP SERPL-CCNC: 71 U/L (ref 39–117)
ALT SERPL W P-5'-P-CCNC: 10 U/L (ref 1–33)
ANION GAP SERPL CALCULATED.3IONS-SCNC: 10 MMOL/L (ref 5–15)
APTT PPP: 26.6 SECONDS (ref 60–90)
AST SERPL-CCNC: 16 U/L (ref 1–32)
B-HCG UR QL: NEGATIVE
BACTERIA UR QL AUTO: ABNORMAL /HPF
BASOPHILS # BLD AUTO: 0.08 10*3/MM3 (ref 0–0.2)
BASOPHILS NFR BLD AUTO: 0.9 % (ref 0–1.5)
BILIRUB SERPL-MCNC: <0.2 MG/DL (ref 0–1.2)
BILIRUB UR QL STRIP: NEGATIVE
BUN SERPL-MCNC: 9 MG/DL (ref 6–20)
BUN/CREAT SERPL: 11.5 (ref 7–25)
CALCIUM SPEC-SCNC: 9 MG/DL (ref 8.6–10.5)
CHLORIDE SERPL-SCNC: 104 MMOL/L (ref 98–107)
CLARITY UR: ABNORMAL
CO2 SERPL-SCNC: 27 MMOL/L (ref 22–29)
COLOR UR: YELLOW
CREAT SERPL-MCNC: 0.78 MG/DL (ref 0.57–1)
DEPRECATED RDW RBC AUTO: 41.6 FL (ref 37–54)
DEPRECATED RDW RBC AUTO: 47.2 FL (ref 37–54)
EGFRCR SERPLBLD CKD-EPI 2021: 104.3 ML/MIN/1.73
EOSINOPHIL # BLD AUTO: 0.09 10*3/MM3 (ref 0–0.4)
EOSINOPHIL NFR BLD AUTO: 1 % (ref 0.3–6.2)
ERYTHROCYTE [DISTWIDTH] IN BLOOD BY AUTOMATED COUNT: 14.5 % (ref 12.3–15.4)
ERYTHROCYTE [DISTWIDTH] IN BLOOD BY AUTOMATED COUNT: 15.5 % (ref 12.3–15.4)
FERRITIN SERPL-MCNC: 12 NG/ML (ref 13–150)
GLOBULIN UR ELPH-MCNC: 2.5 GM/DL
GLUCOSE SERPL-MCNC: 99 MG/DL (ref 65–99)
GLUCOSE UR STRIP-MCNC: NEGATIVE MG/DL
HCT VFR BLD AUTO: 32.7 % (ref 34–46.6)
HCT VFR BLD AUTO: 36.9 % (ref 34–46.6)
HGB BLD-MCNC: 10.4 G/DL (ref 12–15.9)
HGB BLD-MCNC: 11 G/DL (ref 12–15.9)
HGB UR QL STRIP.AUTO: NEGATIVE
HYALINE CASTS UR QL AUTO: ABNORMAL /LPF
IMM GRANULOCYTES # BLD AUTO: 0.02 10*3/MM3 (ref 0–0.05)
IMM GRANULOCYTES NFR BLD AUTO: 0.2 % (ref 0–0.5)
INR PPP: 0.95 (ref 0.89–1.12)
KETONES UR QL STRIP: NEGATIVE
LEUKOCYTE ESTERASE UR QL STRIP.AUTO: ABNORMAL
LYMPHOCYTES # BLD AUTO: 2.45 10*3/MM3 (ref 0.7–3.1)
LYMPHOCYTES NFR BLD AUTO: 28 % (ref 19.6–45.3)
MAGNESIUM SERPL-MCNC: 1.7 MG/DL (ref 1.6–2.6)
MCH RBC QN AUTO: 24.8 PG (ref 26.6–33)
MCH RBC QN AUTO: 25.6 PG (ref 26.6–33)
MCHC RBC AUTO-ENTMCNC: 29.8 G/DL (ref 31.5–35.7)
MCHC RBC AUTO-ENTMCNC: 31.8 G/DL (ref 31.5–35.7)
MCV RBC AUTO: 80.3 FL (ref 79–97)
MCV RBC AUTO: 83.3 FL (ref 79–97)
MONOCYTES # BLD AUTO: 0.64 10*3/MM3 (ref 0.1–0.9)
MONOCYTES NFR BLD AUTO: 7.3 % (ref 5–12)
NEUTROPHILS NFR BLD AUTO: 5.48 10*3/MM3 (ref 1.7–7)
NEUTROPHILS NFR BLD AUTO: 62.6 % (ref 42.7–76)
NITRITE UR QL STRIP: NEGATIVE
NRBC BLD AUTO-RTO: 0 /100 WBC (ref 0–0.2)
PH UR STRIP.AUTO: 7 [PH] (ref 5–8)
PLATELET # BLD AUTO: 326 10*3/MM3 (ref 140–450)
PLATELET # BLD AUTO: 341 10*3/MM3 (ref 140–450)
PMV BLD AUTO: 10.1 FL (ref 6–12)
PMV BLD AUTO: 9.7 FL (ref 6–12)
POTASSIUM SERPL-SCNC: 4.1 MMOL/L (ref 3.5–5.2)
PROT SERPL-MCNC: 6.5 G/DL (ref 6–8.5)
PROT UR QL STRIP: NEGATIVE
PROTHROMBIN TIME: 12.8 SECONDS (ref 12.2–14.5)
RBC # BLD AUTO: 4.07 10*6/MM3 (ref 3.77–5.28)
RBC # BLD AUTO: 4.43 10*6/MM3 (ref 3.77–5.28)
RBC # UR STRIP: ABNORMAL /HPF
REF LAB TEST METHOD: ABNORMAL
SODIUM SERPL-SCNC: 141 MMOL/L (ref 136–145)
SP GR UR STRIP: 1.01 (ref 1–1.03)
SQUAMOUS #/AREA URNS HPF: ABNORMAL /HPF
UROBILINOGEN UR QL STRIP: ABNORMAL
WBC # UR STRIP: ABNORMAL /HPF
WBC NRBC COR # BLD AUTO: 8.1 10*3/MM3 (ref 3.4–10.8)
WBC NRBC COR # BLD AUTO: 8.76 10*3/MM3 (ref 3.4–10.8)

## 2025-02-12 PROCEDURE — 80053 COMPREHEN METABOLIC PANEL: CPT | Performed by: EMERGENCY MEDICINE

## 2025-02-12 PROCEDURE — 36415 COLL VENOUS BLD VENIPUNCTURE: CPT | Performed by: INTERNAL MEDICINE

## 2025-02-12 PROCEDURE — 1159F MED LIST DOCD IN RCRD: CPT | Performed by: INTERNAL MEDICINE

## 2025-02-12 PROCEDURE — 74177 CT ABD & PELVIS W/CONTRAST: CPT

## 2025-02-12 PROCEDURE — 25010000002 LEVETRIRACETAM PER 10 MG: Performed by: EMERGENCY MEDICINE

## 2025-02-12 PROCEDURE — 96374 THER/PROPH/DIAG INJ IV PUSH: CPT

## 2025-02-12 PROCEDURE — 82728 ASSAY OF FERRITIN: CPT | Performed by: INTERNAL MEDICINE

## 2025-02-12 PROCEDURE — 83735 ASSAY OF MAGNESIUM: CPT | Performed by: EMERGENCY MEDICINE

## 2025-02-12 PROCEDURE — 85027 COMPLETE CBC AUTOMATED: CPT | Performed by: INTERNAL MEDICINE

## 2025-02-12 PROCEDURE — 36415 COLL VENOUS BLD VENIPUNCTURE: CPT

## 2025-02-12 PROCEDURE — 99285 EMERGENCY DEPT VISIT HI MDM: CPT

## 2025-02-12 PROCEDURE — 99213 OFFICE O/P EST LOW 20 MIN: CPT | Performed by: INTERNAL MEDICINE

## 2025-02-12 PROCEDURE — 81025 URINE PREGNANCY TEST: CPT | Performed by: EMERGENCY MEDICINE

## 2025-02-12 PROCEDURE — 81001 URINALYSIS AUTO W/SCOPE: CPT | Performed by: EMERGENCY MEDICINE

## 2025-02-12 PROCEDURE — 85610 PROTHROMBIN TIME: CPT | Performed by: EMERGENCY MEDICINE

## 2025-02-12 PROCEDURE — 25510000001 IOPAMIDOL PER 1 ML: Performed by: EMERGENCY MEDICINE

## 2025-02-12 PROCEDURE — 1160F RVW MEDS BY RX/DR IN RCRD: CPT | Performed by: INTERNAL MEDICINE

## 2025-02-12 PROCEDURE — 85025 COMPLETE CBC W/AUTO DIFF WBC: CPT | Performed by: EMERGENCY MEDICINE

## 2025-02-12 PROCEDURE — 85730 THROMBOPLASTIN TIME PARTIAL: CPT | Performed by: EMERGENCY MEDICINE

## 2025-02-12 RX ORDER — LAMOTRIGINE 100 MG/1
200 TABLET ORAL ONCE
Status: COMPLETED | OUTPATIENT
Start: 2025-02-12 | End: 2025-02-12

## 2025-02-12 RX ORDER — IOPAMIDOL 755 MG/ML
100 INJECTION, SOLUTION INTRAVASCULAR
Status: COMPLETED | OUTPATIENT
Start: 2025-02-12 | End: 2025-02-12

## 2025-02-12 RX ORDER — LEVETIRACETAM 500 MG/5ML
1500 INJECTION, SOLUTION, CONCENTRATE INTRAVENOUS ONCE
Status: COMPLETED | OUTPATIENT
Start: 2025-02-12 | End: 2025-02-12

## 2025-02-12 RX ORDER — SODIUM CHLORIDE 0.9 % (FLUSH) 0.9 %
10 SYRINGE (ML) INJECTION AS NEEDED
Status: DISCONTINUED | OUTPATIENT
Start: 2025-02-12 | End: 2025-02-12 | Stop reason: HOSPADM

## 2025-02-12 RX ADMIN — LEVETIRACETAM 1500 MG: 100 INJECTION, SOLUTION INTRAVENOUS at 15:55

## 2025-02-12 RX ADMIN — IOPAMIDOL 90 ML: 755 INJECTION, SOLUTION INTRAVENOUS at 16:48

## 2025-02-12 RX ADMIN — LAMOTRIGINE 200 MG: 100 TABLET ORAL at 16:13

## 2025-02-12 NOTE — ED PROVIDER NOTES
Subjective   History of Present Illness    Pt presents with report of a seizure today.  She says he has a history of them and is prescribed meds but she missed her last few doses.    She also complains of rectal bleeding for about two weeks.  Denies abdominal pain, fever, vomiting or any URI symptoms.    She says she is scheduled to have 24 hour EEG sometime in the near future, says they can't figure out her seizures.    History provided by:  Patient      Review of Systems    Past Medical History:   Diagnosis Date    Anemia 2019    Anxiety with depression 2006    no meds    Bipolar disorder     Cholelithiasis     Chronic constipation     Cluster headache     H/O chlamydia infection     H/O gonorrhea     H/O gonorrhea     H/O gonorrhea     H/O gonorrhea     H/O trichomoniasis     H/O trichomoniasis 2021    Headache, tension-type     Memory loss     Migraine 2013    Seizures 2022    May have had those in her sleep    Urinary tract infection 2006       Allergies   Allergen Reactions    Lithium Confusion    Shellfish-Derived Products Itching     Itchy eyes and throat, nausea.       Past Surgical History:   Procedure Laterality Date     SECTION  11/15/2014    LTCS (1 layer closure)     SECTION N/A 2019    Procedure:  SECTION REPEAT WITH SALPINGECTOMY;  Surgeon: Regine José MD;  Location: [facility] LABOR DELIVERY;  Service: Obstetrics;  Laterality: N/A;    LAPAROSCOPIC CHOLECYSTECTOMY  2015    TONSILLECTOMY  Dec. 14    TUBAL ABDOMINAL LIGATION  19    WISDOM TOOTH EXTRACTION  2016       Family History   Problem Relation Age of Onset    Bipolar disorder Mother     Anxiety disorder Mother     Depression Mother         Everyone in my family    Multiple sclerosis Mother     Arthritis Mother     Drug abuse Mother     Hearing loss Mother     Hypertension Mother     Mental illness Mother         All of us    Depression Father     Alcohol abuse  Father     Drug abuse Father     Bipolar disorder Sister     Depression Sister     Anxiety disorder Sister     Drug abuse Sister     Suicide Attempts Maternal Grandmother     Birth defects Sister         Her daughter my niece jessy    Miscarriages / Stillbirths Sister     Diabetes Paternal Grandmother        Social History     Socioeconomic History    Marital status: Single    Number of children: 3   Tobacco Use    Smoking status: Former     Average packs/day: 0.6 packs/day for 26.4 years (15.0 ttl pk-yrs)     Types: Cigarettes, Electronic Cigarette     Start date: 2010     Quit date: 3/1/2019     Years since quittin.9     Passive exposure: Past    Smokeless tobacco: Never   Vaping Use    Vaping status: Some Days    Last attempt to quit: 2024    Substances: Nicotine   Substance and Sexual Activity    Alcohol use: Not Currently     Alcohol/week: 6.0 standard drinks of alcohol     Types: 6 Shots of liquor per week     Comment: socially    Drug use: Never    Sexual activity: Yes     Partners: Male     Birth control/protection: Bilateral salpingectomy , Surgical           Objective   Physical Exam  Vitals and nursing note reviewed.   Constitutional:       General: She is not in acute distress.     Appearance: Normal appearance. She is not ill-appearing.   HENT:      Head: Normocephalic and atraumatic.      Mouth/Throat:      Mouth: Mucous membranes are moist.   Eyes:      General: No scleral icterus.        Right eye: No discharge.         Left eye: No discharge.      Conjunctiva/sclera: Conjunctivae normal.   Cardiovascular:      Rate and Rhythm: Normal rate and regular rhythm.      Heart sounds: No murmur heard.  Pulmonary:      Effort: Pulmonary effort is normal. No respiratory distress.      Breath sounds: Normal breath sounds. No wheezing.   Abdominal:      General: Bowel sounds are normal. There is no distension.      Palpations: Abdomen is soft.      Tenderness: There is abdominal tenderness  (mild diffuse). There is no guarding or rebound.   Musculoskeletal:         General: No swelling. Normal range of motion.      Cervical back: Normal range of motion and neck supple.   Skin:     General: Skin is warm and dry.      Findings: No rash.   Neurological:      General: No focal deficit present.      Mental Status: She is alert and oriented to person, place, and time. Mental status is at baseline.   Psychiatric:         Mood and Affect: Mood normal.         Behavior: Behavior normal.         Thought Content: Thought content normal.         Procedures           ED Course    I reviewed outside records.  PCP visit this morning for rectal bleeding, referred to colorectal.  EEG 11/14/22 normal study.  ED visit here 12/19 for seizure episode with witnessed nonepileptic episode.      Pt had a witnessed episode in the ED, felt to be nonepileptic in nature.  She stopped shaking immediately when nurse touched her to roll her into recovery position.  Returned to full alertness quickly.    Labs benign.    CT abd/pel read by me no acute process.    Patient stable on serial rechecks.  Discussed findings, concerns, plan of care, expected course, reasons to return and followup.  Provided the opportunity to ask questions.    She says she does not drive but she is counseled not to drive for 90 days by law.                                                   Medical Decision Making  Problems Addressed:  Lower GI bleeding: complicated acute illness or injury  Spell of altered consciousness: complicated acute illness or injury with systemic symptoms    Amount and/or Complexity of Data Reviewed  Labs: ordered. Decision-making details documented in ED Course.  Radiology: ordered and independent interpretation performed. Decision-making details documented in ED Course.    Risk  Prescription drug management.  Decision regarding hospitalization.        Final diagnoses:   Lower GI bleeding   Spell of altered consciousness       ED  Disposition  ED Disposition       ED Disposition   Discharge    Condition   Stable    Comment   --               Flores Carrion MD  [address]  [place] [id number] [phone]               Medication List        Changed      famotidine 20 MG tablet  Commonly known as: Pepcid  Take 1 tablet by mouth 2 (Two) Times a Day.  What changed:   when to take this  reasons to take this     polyethylene glycol 17 GM/SCOOP powder  Commonly known as: MiraLax  Take 17 g by mouth Daily.  What changed:   when to take this  reasons to take this                 Ruel Marinelli MD  02/12/25 2012

## 2025-02-12 NOTE — PROGRESS NOTES
"Subjective   Lady Crystal Diamond is a 29 y.o. female here for rectal bleeding for 2 weeks. There is some burning with defecation. Sees the blood every time she uses the bathroom, including if she doesn't have a BM. Nothing like this before.    I have reviewed the patient's relevant medical history and confirmed it is accurate.    I have personally reviewed and performed the ROS. Flores Carrion MD     Objective   /100 (BP Location: Left arm)   Pulse 88   Ht 167.6 cm (66\")   Wt 126 kg (278 lb)   SpO2 98%   BMI 44.87 kg/m²     Physical Exam  Constitutional:       Appearance: She is well-developed.   Pulmonary:      Effort: Pulmonary effort is normal.   Neurological:      General: No focal deficit present.      Mental Status: She is alert.   Psychiatric:         Behavior: Behavior normal.         Thought Content: Thought content normal.         Judgment: Judgment normal.           Current Outpatient Medications:     acetaZOLAMIDE (DIAMOX) 250 MG tablet, Take 1 tablet by mouth 2 (Two) Times a Day., Disp: 60 tablet, Rfl: 1    famotidine (Pepcid) 20 MG tablet, Take 1 tablet by mouth 2 (Two) Times a Day. (Patient taking differently: Take 1 tablet by mouth As Needed.), Disp: 60 tablet, Rfl: 2    galcanezumab-gnlm (EMGALITY) 120 MG/ML auto-injector pen, Inject 1 mL under the skin into the appropriate area as directed Every 28 (Twenty-Eight) Days., Disp: 1 mL, Rfl: 11    hydrOXYzine (ATARAX) 25 MG tablet, Take 2 tablets by mouth At Night As Needed for Anxiety (anxiety and/or sleep)., Disp: , Rfl:     lamoTRIgine (LaMICtal) 100 MG tablet, Take 2.5 tablets by mouth Daily AND 1 tablet Every Night. Do all this for 30 days., Disp: 105 tablet, Rfl: 0    levETIRAcetam (KEPPRA) 500 MG tablet, Take 1 tablet by mouth 2 (Two) Times a Day., Disp: , Rfl:     meclizine (ANTIVERT) 25 MG tablet, Take 1 tablet by mouth 3 (Three) Times a Day As Needed for Dizziness., Disp: 90 tablet, Rfl: 2    nicotine (Nicoderm CQ) 14 " MG/24HR patch, Place 1 patch on the skin as directed by provider Daily., Disp: 14 each, Rfl: 2    nicotine (Nicoderm CQ) 7 MG/24HR patch, Place 1 patch on the skin as directed by provider Daily., Disp: 14 each, Rfl: 2    ondansetron ODT (ZOFRAN-ODT) 4 MG disintegrating tablet, Place 1 tablet on the tongue Every 8 (Eight) Hours As Needed for Nausea or Vomiting., Disp: 30 tablet, Rfl: 2    polyethylene glycol (MiraLax) 17 GM/SCOOP powder, Take 17 g by mouth Daily. (Patient taking differently: Take 17 g by mouth As Needed.), Disp: 510 g, Rfl: 1    rimegepant sulfate ODT (Nurtec) 75 MG tablet, Take 1 tablet by mouth once daily as needed at the onset of headache. MAX: 75 mg/24 hours, & 18 tabs/30 days., Disp: 16 tablet, Rfl: 11    SUMAtriptan (IMITREX) 100 MG tablet, 1 tablet 1 (One) Time As Needed., Disp: , Rfl:     valACYclovir (VALTREX) 1000 MG tablet, TAKE 1 TABLET BY MOUTH TWICE DAILY FOR 10 DAYS, Disp: 20 tablet, Rfl: 0    vilazodone (VIIBRYD) 40 MG tablet tablet, Take 1 tablet by mouth Daily., Disp: , Rfl:     Assessment & Plan   Diagnoses and all orders for this visit:    1. Rectal bleeding (Primary)  -     Ambulatory Referral to Colorectal Surgery  -     CBC (No Diff)  -     Ferritin  -ddx hemorrhoid, fissure, polyp             Flores Carrion MD      Answers submitted by the patient for this visit:  Problem not listed (Submitted on 2/12/2025)  Chief Complaint: Other medical problem  abdominal pain: Yes  anorexia: Yes  change in stool: Yes  chills: Yes  fatigue: Yes  dysuria: Yes  Onset: in the past 7 days  Chronicity: recurrent  Frequency: constantly  Medications tried: Hemerroid cream

## 2025-02-14 DIAGNOSIS — G93.2 IIH (IDIOPATHIC INTRACRANIAL HYPERTENSION): ICD-10-CM

## 2025-02-14 RX ORDER — ACETAZOLAMIDE 250 MG/1
250 TABLET ORAL 2 TIMES DAILY
Qty: 60 TABLET | Refills: 1 | Status: SHIPPED | OUTPATIENT
Start: 2025-02-14

## 2025-02-14 RX ORDER — ALBUTEROL SULFATE 90 UG/1
2 INHALANT RESPIRATORY (INHALATION) EVERY 4 HOURS PRN
Qty: 18 G | Refills: 0 | Status: SHIPPED | OUTPATIENT
Start: 2025-02-14

## 2025-02-15 LAB
A VAGINAE DNA VAG QL NAA+PROBE: ABNORMAL SCORE
BVAB2 DNA VAG QL NAA+PROBE: ABNORMAL SCORE
C ALBICANS DNA VAG QL NAA+PROBE: NEGATIVE
C GLABRATA DNA VAG QL NAA+PROBE: NEGATIVE
C TRACH DNA SPEC QL NAA+PROBE: NEGATIVE
HSV1 DNA SPEC QL NAA+PROBE: NEGATIVE
HSV2 DNA SPEC QL NAA+PROBE: NEGATIVE
MEGA1 DNA VAG QL NAA+PROBE: ABNORMAL SCORE
N GONORRHOEA DNA VAG QL NAA+PROBE: NEGATIVE
T VAGINALIS DNA VAG QL NAA+PROBE: NEGATIVE

## 2025-02-17 ENCOUNTER — OFFICE VISIT (OUTPATIENT)
Dept: PULMONOLOGY | Facility: CLINIC | Age: 30
End: 2025-02-17
Payer: COMMERCIAL

## 2025-02-17 VITALS
BODY MASS INDEX: 44.03 KG/M2 | HEART RATE: 71 BPM | DIASTOLIC BLOOD PRESSURE: 90 MMHG | SYSTOLIC BLOOD PRESSURE: 144 MMHG | OXYGEN SATURATION: 97 % | TEMPERATURE: 97.4 F | WEIGHT: 274 LBS | HEIGHT: 66 IN

## 2025-02-17 DIAGNOSIS — R91.1 INCIDENTAL LUNG NODULE, GREATER THAN OR EQUAL TO 8MM: ICD-10-CM

## 2025-02-17 DIAGNOSIS — R93.89 ABNORMAL CXR: Primary | ICD-10-CM

## 2025-02-17 RX ORDER — METRONIDAZOLE 500 MG/1
500 TABLET ORAL 2 TIMES DAILY
Qty: 14 TABLET | Refills: 0 | Status: SHIPPED | OUTPATIENT
Start: 2025-02-17 | End: 2025-02-24

## 2025-02-17 RX ORDER — MOMETASONE FUROATE AND FORMOTEROL FUMARATE DIHYDRATE 200; 5 UG/1; UG/1
2 AEROSOL RESPIRATORY (INHALATION)
Qty: 8.8 G | Refills: 11 | Status: SHIPPED | OUTPATIENT
Start: 2025-02-17

## 2025-02-17 NOTE — PROGRESS NOTES
Initial Pulmonary Consult Note    Subjective   Reason for consultation/Chief Complaint: Lung Nodule    Lady Crystal Diamond is a 29 y.o. female is being seen for consultation today at the request of Zackary Rivera DO    History of Present Illness    Ms. Diamond is a 28yo F former smoker who is referred for a lung nodule.     This was seen on a chest xray from 10/22/24. She has not had a CT scan of the chest.     She does have a history of asthma and has been using her rescue inhaler more often.     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    Migraine without aura and without status migrainosus, not intractable 01/06/2023    Bipolar disorder 2022     Resolved Ambulatory Problems     Diagnosis Date Noted    High-risk pregnancy in third trimester 10/27/2018    Previous LTCS (1 layer) - repeat C/S & BTL 6/5 @ 7:30 10/31/2018    Obesity affecting pregnancy 10/31/2018    ASx UTI  x 2 (suppressive therapy until delivery) 11/02/2018    Possible posterior placenta with anterior succenturiate lobe 01/29/2019    Request for sterilization - signed consents on chart 03/21/2019    Anemia in pregnancy, third trimester 03/21/2019    High-risk pregnancy in second trimester 03/21/2019    Postpartum care following rLTCS and BTL 6/5/19 ([facility]) 06/05/2019    Postpartum anemia 06/07/2019    Streptococcal tonsillitis 10/06/2022     Past Medical History:   Diagnosis Date    Anemia 2019    Anxiety with depression 2006    Cholelithiasis 2014    Cluster headache 2013    H/O chlamydia infection 2016    H/O gonorrhea 2016    H/O gonorrhea 2011    H/O gonorrhea 2012    H/O gonorrhea 2018    H/O trichomoniasis 2018    H/O trichomoniasis 07/2021    Headache, tension-type     Memory loss     Migraine 2013    Seizures 09/2022    Urinary tract infection         Past Surgical History:   Procedure Laterality Date     SECTION  11/15/2014    LTCS (1 layer closure)     SECTION N/A 2019    Procedure:  SECTION REPEAT WITH SALPINGECTOMY;  Surgeon: Regine José MD;  Location: [facility] LABOR DELIVERY;  Service: Obstetrics;  Laterality: N/A;    LAPAROSCOPIC CHOLECYSTECTOMY  2015    TONSILLECTOMY  Dec. 14    TUBAL ABDOMINAL LIGATION  19    WISDOM TOOTH EXTRACTION         Family History   Problem Relation Age of Onset    Bipolar disorder Mother     Anxiety disorder Mother     Depression Mother         Everyone in my family    Multiple sclerosis Mother     Arthritis Mother     Drug abuse Mother     Hearing loss Mother     Hypertension Mother     Mental illness Mother         All of us    Depression Father     Alcohol abuse Father     Drug abuse Father     Bipolar disorder Sister     Depression Sister     Anxiety disorder Sister     Drug abuse Sister     Suicide Attempts Maternal Grandmother     Birth defects Sister         Her daughter my niece jessy    Miscarriages / Stillbirths Sister     Diabetes Paternal Grandmother        Social History     Socioeconomic History    Marital status: Single    Number of children: 3   Tobacco Use    Smoking status: Former     Average packs/day: 0.6 packs/day for 26.4 years (15.0 ttl pk-yrs)     Types: Cigarettes, Electronic Cigarette     Start date: 2010     Quit date: 3/1/2019     Years since quittin.9     Passive exposure: Past    Smokeless tobacco: Never   Vaping Use    Vaping status: Some Days    Last attempt to quit: 2024    Substances: Nicotine   Substance and Sexual Activity    Alcohol use: Not Currently     Alcohol/week: 6.0 standard drinks of alcohol     Types: 6 Shots of liquor per week     Comment: socially    Drug use: Never    Sexual activity: Yes     Partners: Male     Birth control/protection: Bilateral salpingectomy , Surgical       Allergies   Allergen Reactions     Lithium Confusion    Shellfish-Derived Products Itching     Itchy eyes and throat, nausea.       Current Outpatient Medications   Medication Sig Dispense Refill    acetaZOLAMIDE (DIAMOX) 250 MG tablet TAKE 1 TABLET BY MOUTH TWICE DAILY 60 tablet 1    albuterol sulfate  (90 Base) MCG/ACT inhaler Inhale 2 puffs Every 4 (Four) Hours As Needed for Wheezing. 18 g 0    famotidine (Pepcid) 20 MG tablet Take 1 tablet by mouth 2 (Two) Times a Day. (Patient taking differently: Take 1 tablet by mouth As Needed.) 60 tablet 2    galcanezumab-gnlm (EMGALITY) 120 MG/ML auto-injector pen Inject 1 mL under the skin into the appropriate area as directed Every 28 (Twenty-Eight) Days. 1 mL 11    hydrOXYzine (ATARAX) 25 MG tablet Take 2 tablets by mouth At Night As Needed for Anxiety (anxiety and/or sleep).      lamoTRIgine (LaMICtal) 100 MG tablet Take 2.5 tablets by mouth Daily AND 1 tablet Every Night. Do all this for 30 days. 105 tablet 0    levETIRAcetam (KEPPRA) 500 MG tablet Take 1 tablet by mouth 2 (Two) Times a Day.      meclizine (ANTIVERT) 25 MG tablet Take 1 tablet by mouth 3 (Three) Times a Day As Needed for Dizziness. 90 tablet 2    metroNIDAZOLE (FLAGYL) 500 MG tablet Take 1 tablet by mouth 2 (Two) Times a Day for 7 days. 14 tablet 0    mometasone-formoterol (Dulera) 200-5 MCG/ACT inhaler Inhale 2 puffs 2 (Two) Times a Day. 8.8 g 11    nicotine (Nicoderm CQ) 14 MG/24HR patch Place 1 patch on the skin as directed by provider Daily. 14 each 2    nicotine (Nicoderm CQ) 7 MG/24HR patch Place 1 patch on the skin as directed by provider Daily. 14 each 2    ondansetron ODT (ZOFRAN-ODT) 4 MG disintegrating tablet Place 1 tablet on the tongue Every 8 (Eight) Hours As Needed for Nausea or Vomiting. 30 tablet 2    polyethylene glycol (MiraLax) 17 GM/SCOOP powder Take 17 g by mouth Daily. (Patient taking differently: Take 17 g by mouth As Needed.) 510 g 1    rimegepant sulfate ODT (Nurtec) 75 MG tablet Take 1 tablet by  "mouth once daily as needed at the onset of headache. MAX: 75 mg/24 hours, & 18 tabs/30 days. 16 tablet 11    SUMAtriptan (IMITREX) 100 MG tablet 1 tablet 1 (One) Time As Needed.      valACYclovir (VALTREX) 1000 MG tablet TAKE 1 TABLET BY MOUTH TWICE DAILY FOR 10 DAYS 20 tablet 0    vilazodone (VIIBRYD) 40 MG tablet tablet Take 1 tablet by mouth Daily.       No current facility-administered medications for this visit.       Review of Systems  All other systems were reviewed and are negative.  Exceptions are included in the HPI or as otherwise noted above.     Objective   Blood pressure 144/90, pulse 71, temperature 97.4 °F (36.3 °C), temperature source Infrared, height 167.6 cm (65.98\"), weight 124 kg (274 lb), SpO2 97%, not currently breastfeeding.  Physical Exam  Constitutional:       General: She is not in acute distress.     Appearance: She is well-developed.   HENT:      Head: Normocephalic and atraumatic.      Right Ear: External ear normal.      Left Ear: External ear normal.      Nose: Nose normal.   Eyes:      Pupils: Pupils are equal, round, and reactive to light.   Neck:      Trachea: No tracheal deviation.   Pulmonary:      Effort: Pulmonary effort is normal. No respiratory distress.   Musculoskeletal:         General: Normal range of motion.      Cervical back: Normal range of motion and neck supple.   Skin:     General: Skin is warm.      Findings: No erythema or rash.      Nails: There is no clubbing.   Neurological:      Mental Status: She is alert and oriented to person, place, and time.   Psychiatric:         Behavior: Behavior normal.         Thought Content: Thought content normal.         Judgment: Judgment normal.         PFTs:  Performed in clinic and personally reviewed.   There is no airway obstruction.  There is no restriction. Air trapping is present.   The DLCO is normal.     Imaging:  Chest xray from 10/22/24 reviewed. There is a right apical lung nodule.     Chest xray performed today. " This is a PA/Lateral film. The cardiac and mediastinal contours are within normal limits. There is a 15mm right apical pulmonary nodule with central calcification which suggests a benign process. There is no pneumothorax or pleural effusion.     Impression: No acute cardiopulmonary process. There is a stable right upper lobe nodule as compared to 10/22/24 with central calcification which suggests a benign process.     Assessment & Plan   Diagnoses and all orders for this visit:    1. Abnormal CXR (Primary)  -     XR Chest PA & Lateral    2. Incidental lung nodule, greater than or equal to 8mm  -     CT Chest Without Contrast; Future    Other orders  -     mometasone-formoterol (Dulera) 200-5 MCG/ACT inhaler; Inhale 2 puffs 2 (Two) Times a Day.  Dispense: 8.8 g; Refill: 11        Discussion:  Ms. Diamond is a 30yo F who is referred for a lung nodule.     1. Right Apical Lung Nodule  - First noted on chest xray from October 2024.   - Chest xray today shows central calcification which suggests a benign process.   - We will get a CT chest w/o contrast for confirmation. If consistent with a benign calcified granuloma, this nodule will not need any further follow up.     2. Former Smoker  - Does not qualify for lung cancer screening based on age.     3. Asthma  - Start Dulera 200 twice daily.   - Albuterol as needed.     Follow up in 6 months.       Lady Diamond  reports that she quit smoking about 5 years ago. Her smoking use included cigarettes and electronic cigarette. She started smoking about 15 years ago. She has a 15 pack-year smoking history. She has been exposed to tobacco smoke. She has never used smokeless tobacco.     I have spent 61 minutes reviewing the patient record, face to face with the patient in discussion of test results and plans for further management and in documentation and coordination of care. Excluding time spent on other separate services such as performing procedures or test  interpretation, if applicable.        Ev V Case, DO  Pulmonary and Critical Care Medicine  Note Electronically Signed

## 2025-02-26 RX ORDER — NICOTINE 21 MG/24HR
PATCH, TRANSDERMAL 24 HOURS TRANSDERMAL
Qty: 14 PATCH | Refills: 0 | Status: SHIPPED | OUTPATIENT
Start: 2025-02-26

## 2025-02-27 ENCOUNTER — SPECIALTY PHARMACY (OUTPATIENT)
Dept: ONCOLOGY | Facility: HOSPITAL | Age: 30
End: 2025-02-27
Payer: COMMERCIAL

## 2025-02-27 NOTE — PROGRESS NOTES
Specialty Pharmacy Patient Management Program  Neurology Reassessment     Lady Diamond is a 29 y.o. female with migraines seen by a Neurology provider and enrolled in the Neurology Patient Management program offered by [facility].  A follow-up outreach was conducted, including assessment of continued therapy appropriateness, medication adherence, and side effect incidence and management for Emgality and Nurtec.     Changes to Insurance Coverage or Financial Support  [place] Medicaid     Relevant Past Medical History and Comorbidities  Relevant medical history and concomitant health conditions were discussed with the patient. The patient's chart has been reviewed for relevant past medical history and comorbid health conditions and updated as necessary.   Past Medical History:   Diagnosis Date    2019    Anxiety with depression     no meds    Bipolar disorder     Cholelithiasis     Chronic constipation     Cluster headache     H/O chlamydia infection     H/O gonorrhea     H/O gonorrhea     H/O gonorrhea     H/O gonorrhea 2018    H/O trichomoniasis 2018    H/O trichomoniasis 2021    Headache, tension-type     Memory loss     Migraine 2013    Seizures 2022    May have had those in her sleep    Urinary tract infection 2006     Social History     Socioeconomic History    Marital status: Single    Number of children: 3   Tobacco Use    Smoking status: Former     Average packs/day: 0.6 packs/day for 26.4 years (15.0 ttl pk-yrs)     Types: Cigarettes, Electronic Cigarette     Start date: 2010     Quit date: 3/1/2019     Years since quittin.0     Passive exposure: Past    Smokeless tobacco: Never   Vaping Use    Vaping status: Some Days    Last attempt to quit: 2024    Substances: Nicotine   Substance and Sexual Activity    Alcohol use: Not Currently     Alcohol/week: 6.0 standard drinks of alcohol     Types: 6 Shots of liquor per  week     Comment: socially    Drug use: Never    Sexual activity: Yes     Partners: Male     Birth control/protection: Bilateral salpingectomy , Surgical     Problem list reviewed by Daivna Sahu PharmD on 2/27/2025 at  3:08 PM    Hospitalizations and Urgent Care Since Last Assessment  ED Visits, Admissions, or Hospitalizations: none   Urgent Office Visits: none     Allergies  Known allergies and reactions were discussed with the patient. The patient's chart has been reviewed for allergy information and updated as necessary.   Allergies   Allergen Reactions    Lithium Confusion    Shellfish-Derived Products Itching     Itchy eyes and throat, nausea.     Allergies reviewed by Davina Sahu, Meghan on 2/27/2025 at  3:08 PM    Relevant Laboratory Values  Common labs          12/20/2024    13:22 1/24/2025    15:09 2/12/2025    09:02 2/12/2025    15:48   Common Labs   Glucose 98  87   99    BUN 9  12   9    Creatinine 0.80  1.08   0.78    Sodium 137  139   141    Potassium 3.8  3.9   4.1    Chloride 105  106   104    Calcium 8.9  8.5   9.0    Albumin  4.1   4.0    Total Bilirubin  <0.2   <0.2    Alkaline Phosphatase  76   71    AST (SGOT)  15   16    ALT (SGPT)  17   10    WBC  5.65  8.10  8.76    Hemoglobin  11.7  10.4  11.0    Hematocrit  38.1  32.7  36.9    Platelets  344  341  326        Lab Assessment  The above labs have been reviewed. No dose adjustments are needed for the specialty medication(s) based on the labs.     Current Medication List  This medication list has been reviewed with the patient and evaluated for any interactions or necessary modifications/recommendations, and updated to include all prescription medications, OTC medications, and supplements the patient is currently taking.  This list reflects what is contained in the patient's profile, which has also been marked as reviewed to communicate to other providers it is the most up to date version of the patient's current medication therapy.      Current Outpatient Medications:     acetaZOLAMIDE (DIAMOX) 250 MG tablet, TAKE 1 TABLET BY MOUTH TWICE DAILY, Disp: 60 tablet, Rfl: 1    albuterol sulfate  (90 Base) MCG/ACT inhaler, Inhale 2 puffs Every 4 (Four) Hours As Needed for Wheezing., Disp: 18 g, Rfl: 0    famotidine (Pepcid) 20 MG tablet, Take 1 tablet by mouth 2 (Two) Times a Day. (Patient taking differently: Take 1 tablet by mouth As Needed.), Disp: 60 tablet, Rfl: 2    galcanezumab-gnlm (EMGALITY) 120 MG/ML auto-injector pen, Inject 1 mL under the skin into the appropriate area as directed Every 28 (Twenty-Eight) Days., Disp: 1 mL, Rfl: 11    hydrOXYzine (ATARAX) 25 MG tablet, Take 2 tablets by mouth At Night As Needed for Anxiety (anxiety and/or sleep)., Disp: , Rfl:     lamoTRIgine (LaMICtal) 100 MG tablet, Take 2.5 tablets by mouth Daily AND 1 tablet Every Night. Do all this for 30 days., Disp: 105 tablet, Rfl: 0    levETIRAcetam (KEPPRA) 500 MG tablet, Take 1 tablet by mouth 2 (Two) Times a Day., Disp: , Rfl:     meclizine (ANTIVERT) 25 MG tablet, Take 1 tablet by mouth 3 (Three) Times a Day As Needed for Dizziness., Disp: 90 tablet, Rfl: 2    mometasone-formoterol (Dulera) 200-5 MCG/ACT inhaler, Inhale 2 puffs 2 (Two) Times a Day., Disp: 8.8 g, Rfl: 11    nicotine (NICODERM CQ) 14 MG/24HR patch, PLACE ONCE PATCH ON THE SKIN AS DIRECTED BY PROVIDER DAILY., Disp: 14 patch, Rfl: 0    nicotine (Nicoderm CQ) 7 MG/24HR patch, Place 1 patch on the skin as directed by provider Daily., Disp: 14 each, Rfl: 2    ondansetron ODT (ZOFRAN-ODT) 4 MG disintegrating tablet, Place 1 tablet on the tongue Every 8 (Eight) Hours As Needed for Nausea or Vomiting., Disp: 30 tablet, Rfl: 2    polyethylene glycol (MiraLax) 17 GM/SCOOP powder, Take 17 g by mouth Daily. (Patient taking differently: Take 17 g by mouth As Needed.), Disp: 510 g, Rfl: 1    rimegepant sulfate ODT (Nurtec) 75 MG disintegrating tablet, Take 1 tablet by mouth once daily as needed at the  onset of headache. MAX: 75 mg/24 hours, & 18 tabs/30 days., Disp: 16 tablet, Rfl: 11    SUMAtriptan (IMITREX) 100 MG tablet, 1 tablet 1 (One) Time As Needed., Disp: , Rfl:     valACYclovir (VALTREX) 1000 MG tablet, TAKE 1 TABLET BY MOUTH TWICE DAILY FOR 10 DAYS, Disp: 20 tablet, Rfl: 0    vilazodone (VIIBRYD) 40 MG tablet tablet, Take 1 tablet by mouth Daily., Disp: , Rfl:   No current facility-administered medications for this visit.    Medicines reviewed by Davina Sahu, PharmD on 2/27/2025 at  3:06 PM    Drug Interactions  No relevant drug-drug interactions with specialty medication(s):  Emgality and Nurtec.        Adverse Drug Reactions  Medication tolerability: Tolerating with no to minimal ADRs          Medication plan: Continue therapy with normal follow-up  Plan for ADR Management: n/a      Adherence, Self-Administration, and Current Therapy Problems  Adherence related patient's specialty therapy was discussed with the patient. The Adherence segment of this outreach has been reviewed and updated.   Adherence Questions  Linked Medication(s) Assessed: Galcanezumab-gnlm (EMGALITY), Rimegepant Sulfate (NURTEC-ODT)  On average, how many doses/injections does the patient miss per month?: 0  What are the identified reasons for non-adherence or missed doses? : no problems identified  What is the estimated medication adherence level?: %  Based on the patient/caregiver response and refill history, does this patient require an MTP to track adherence improvements?: no    Additional Barriers to Patient Self-Administration: none  Methods for Supporting Patient Self-Administration: pt adept with Emgality self-injections    Recently Close Medication Therapy Problems  No medication therapy recommendations to display  Open Medication Therapy Problems  No medication therapy recommendations to display     Goals of Therapy  Goals related to the patient's specialty therapy was discussed with the patient. The Patient  Goals segment of this outreach has been reviewed and updated.    Goals Addressed Today        Specialty Pharmacy General Goal      On Average, Reduce:   Frequency of migraines by 50%  Symptom severity by 90 % within 2 hours of taking acute therapy.     Baseline Values/Notes on Enrollment  Frequency: daily headache  Symptom Severity: moderate to severe  Duration:several hours    Date of Reassessment Notes on Progress Toward Above Goals   2/27/25 dw Headaches down to 2-3 times weekly.  Nurtec aborts if taken at onset.  If she is not able to take at onset, Nurtec is not very effective.  Sumatriptan abort headache if she does not take Nurtec in time.                                                        Quality of Life Assessment   Quality of Life related to the patient's enrollment in the patient management program and services provided was discussed with the patient. The QOL segment of this outreach has been reviewed and updated.   Quality of Life Improvement Scale: 8-Moderately better    Reassessment Plan & Follow-Up  Medication Therapy Changes: Continue Emgality 120mg subq monthly and Nurtec 75 mg po once daily as needed for migraines. - Take 1 tablet at the onset of headache, Max of 75 mg/24 hours, Max of 18 tabs/30 days.  Related Plans, Therapy Recommendations, or Issues to Be Addressed: none  Pharmacist to perform regular reassessments no more than (6) months from the previous assessment.  Care Coordinator to set up future refill outreaches, coordinate prescription delivery, and escalate clinical questions to pharmacist.     Attestation  Therapeutic appropriateness: Appropriate  I attest the patient was actively involved in and has agreed to the above plan of care. If the prescribed therapy is at any point deemed not appropriate based on the current or future assessments, a consultation will be initiated with the patient's specialty care provider to determine the best course of action. The revised plan of  therapy will be documented along with any additional patient education provided. Discussed aforementioned material with patient via telemedicine.    Arthur CrowD, [facility]  Clinic Specialty Pharmacist, Neurology  2/27/2025  15:08 EST

## 2025-02-27 NOTE — PROGRESS NOTES
Specialty Pharmacy Patient Management Program  [facility]      Lady is a 29 y.o. female contacted today regarding refills of her medication(s).    Specialty medication(s) and dose(s) confirmed: Emgality and Nurtec  Other medications being refilled: none    Refill Questions      Flowsheet Row Most Recent Value   Changes to allergies? No   Changes to medications? No   New conditions or infections since last clinic visit No   Unplanned office visit, urgent care, ED, or hospital admission in the last 4 weeks  No   How does patient/caregiver feel medication is working? Good   Financial problems or insurance changes  No   Since the previous refill, were any specialty medication doses or scheduled injections missed or delayed?  No   If yes, please provide the amount N/A   Why were doses missed? N/A   Does this patient require a clinical escalation to a pharmacist? No            Delivery Questions      Flowsheet Row Most Recent Value   Delivery method UPS   Delivery address verified with patient/caregiver? Yes   Delivery address Home   Other address preferred n/a   Number of medications in delivery 2   Medication(s) being filled and delivered Galcanezumab-gnlm (EMGALITY), Rimegepant Sulfate (NURTEC-ODT)   Doses left of specialty medications Nurtec - a few tablets remaining, Emgality - 0   Copay verified? Yes   Copay amount Emgality and Nurtec =$0   Copay form of payment No copayment ($0)   Delivery Date Selection 03/04/25   Signature Required Yes                   Follow-up: 25 days     Davina Sahu, PharmD  Specialty Pharmacist  2/27/2025  15:08 EST

## 2025-03-03 ENCOUNTER — HOSPITAL ENCOUNTER (OUTPATIENT)
Dept: CT IMAGING | Facility: HOSPITAL | Age: 30
Discharge: HOME | End: 2025-03-03
Admitting: INTERNAL MEDICINE
Payer: COMMERCIAL

## 2025-03-03 DIAGNOSIS — R91.1 INCIDENTAL LUNG NODULE, GREATER THAN OR EQUAL TO 8MM: ICD-10-CM

## 2025-03-03 PROCEDURE — 71250 CT THORAX DX C-: CPT

## 2025-03-04 ENCOUNTER — TELEMEDICINE (OUTPATIENT)
Dept: PSYCHIATRY | Facility: CLINIC | Age: 30
End: 2025-03-04
Payer: COMMERCIAL

## 2025-03-04 DIAGNOSIS — F60.3 BORDERLINE PERSONALITY DISORDER: Primary | ICD-10-CM

## 2025-03-04 DIAGNOSIS — F31.60 BIPOLAR AFFECTIVE DISORDER, MIXED: ICD-10-CM

## 2025-03-04 DIAGNOSIS — F41.0 PANIC ATTACKS: ICD-10-CM

## 2025-03-04 NOTE — PROGRESS NOTES
The patient left the office after care was provided and did not complete the visit because of a family emergency.

## 2025-03-25 ENCOUNTER — TELEMEDICINE (OUTPATIENT)
Dept: PSYCHIATRY | Facility: CLINIC | Age: 30
End: 2025-03-25
Payer: COMMERCIAL

## 2025-03-25 DIAGNOSIS — F31.60 BIPOLAR AFFECTIVE DISORDER, MIXED: ICD-10-CM

## 2025-03-25 DIAGNOSIS — F60.3 BORDERLINE PERSONALITY DISORDER: Primary | ICD-10-CM

## 2025-03-25 NOTE — PROGRESS NOTES
Date: 2025  Time In: 09:55 EDT  Time out: 10:53 AM EST    This provider is located at [facility], [facility] [place], [place], [facility] [place], using a secure CBIT A/Shart Video Visit through [employer]. Patient is being seen remotely via telehealth at their home address is located in [place]. Patient stated they are in a secure environment for this session. The patient's condition being diagnosed and treated is appropriate for telemedicine. The provider identified themself as well as their credentials. The patient, or  patient's legal guardian consent to be seen remotely, and when consent is given they understand that the consent allows for patient identifiable information to be sent to a third party as needed. They may refuse to be seen remotely at any time. The electronic data is encrypted and password protected, and the patient's or  legal guardian has been advised of the potential risks to privacy not withstanding such measures.   PT Identifiers used: Name and .    You have chosen to receive care through a telehealth visit.  Do you consent to use a video/audio connection for your medical care today? Yes    Subjective   Lady Diamond is a 30 y.o. female who presents today for follow up    Chief Complaint:   Chief Complaint   Patient presents with    Anxiety    Depression        Data: Pt reported having a good birthday last week. Pt reported that she has had continued to have some blackouts and seizures. Pt reported that she has an appointment to stay at the hospital next month to monitor her brain activity. Pt reported that her mom head a heart attack and possible suicide attempt -pt reported that she has distanced herself from family. Pt reported that she and her ex have been communicating again. Pt reported that she has been struggling with her weight gain and depression symptoms.Pt reported that she is on medial leave with her school. Pt reported that she should be retuning to  school in the fall. Pt denied the want to attend [employer] at this  time.       Clinical Maneuvering/Intervention:Assisted patient in processing above session content; acknowledged and normalized patient’s thoughts, feelings, and concerns.  Rationalized patient thought process regarding concerns presented at session.  Discussed triggers associated with patient's  anxiety , depression , and BPD  Also discussed coping skills for patient to implement such as grounding , mindfulness , increasing activity , self care , positive self talk , and perspective shift    Allowed patient to freely discuss issues without interruption or judgment. Provided safe, confidential environment to facilitate the development of positive therapeutic relationship and encourage open, honest communication. Assisted patient in identifying risk factors which would indicate the need for higher level of care including thoughts to harm self or others and/or self-harming behavior and encouraged patient to contact this office, call 911, or present to the nearest emergency room should any of these events occur. Discussed crisis intervention services and means to access. Patient adamantly and convincingly denies current suicidal or homicidal ideation or perceptual disturbance.    Assessment: Pt was alert and oriented x3.  Pt appears open and honest re MH symptoms that have affected life in a negative way. Pt appears to be attempting to minimize stressors, avoiding peers and experiences that can increase stress. Pts self esteem appears to be altered with recent weight gain. Pt appears to have some depression symptoms although reported that she has improved some. Pt appears to be making progress and perspective is shifting into a more positive one.     Patient appears to maintain relative stability as compared to their baseline.  However, patient continues to struggle with   Chief Complaint   Patient presents with    Anxiety    Depression    which continues to  cause impairment in important areas of functioning.  A result, they can be reasonably expected to continue to benefit from treatment and would likely be at increased risk for decompensation otherwise.    Mental Status Exam:   Hygiene:   good  Cooperation:  Cooperative  Eye Contact:  Fair  Psychomotor Behavior:  Restless  Affect:  Appropriate  Mood: fluctates  Speech:  Normal  Thought Process:  Disorganized  Thought Content:  Mood congruent  Suicidal:  None  Homicidal:  None  Hallucinations:  None  Delusion:  None  Memory:  Deficits  Orientation:  Grossly intact  Reliability:  fair  Insight:  Fair  Judgement:  Fair  Impulse Control:  Fair  Physical/Medical Issues:  Yes          Patient's Support Network Includes:   limited healthy support     Functional Status: Mild impairment     Progress toward goal: Not at goal    Prognosis: Fair with Ongoing Treatment     Plan: tx plan and wait list    Patient will continue in individual outpatient therapy with focus on improved functioning and coping skills, maintaining stability, and avoiding decompensation and the need for higher level of care.    Patient will adhere to medication regimen as prescribed and report any side effects. Patient will contact this office, call 911 or present to the nearest emergency room should suicidal or homicidal ideations occur. Provide Cognitive Behavioral Therapy and Solution Focused Therapy to improve functioning, maintain stability, and avoid decompensation and the need for higher level of care.     Return in about 5 weeks (around 4/29/2025).      VISIT DIAGNOSIS:    Diagnosis Plan   1. Borderline personality disorder        2. Bipolar affective disorder, mixed         09:55 EDT         This document has been electronically signed by Anna Collier LCSW  March 25, 2025      Part of this note may be an electronic transcription/translation of spoken language to printed text using the Dragon Dictation System.

## 2025-03-26 ENCOUNTER — SPECIALTY PHARMACY (OUTPATIENT)
Dept: ONCOLOGY | Facility: HOSPITAL | Age: 30
End: 2025-03-26
Payer: COMMERCIAL

## 2025-03-26 NOTE — PROGRESS NOTES
Specialty Pharmacy Patient Management Program  Refill Outreach     Lady was contacted today regarding refills of their medication(s).    Refill Questions      Flowsheet Row Most Recent Value   Changes to allergies? No   Changes to medications? No   New conditions or infections since last clinic visit No   Unplanned office visit, urgent care, ED, or hospital admission in the last 4 weeks  No   How does patient/caregiver feel medication is working? Good   Financial problems or insurance changes  No   Since the previous refill, were any specialty medication doses or scheduled injections missed or delayed?  No   If yes, please provide the amount n/a   Why were doses missed? n/a   Does this patient require a clinical escalation to a pharmacist? No            Delivery Questions      Flowsheet Row Most Recent Value   Delivery method UPS   Delivery address verified with patient/caregiver? Yes   Delivery address Home   Other address preferred n/a   Number of medications in delivery 2   Medication(s) being filled and delivered Galcanezumab-gnlm (EMGALITY), Rimegepant Sulfate (NURTEC-ODT)   Doses left of specialty medications Nurtec = PRN   Copay verified? Yes   Copay amount no copay   Copay form of payment No copayment ($0)   Delivery Date Selection 03/28/26   Signature Required Yes   Do you consent to receive electronic handouts?  Yes                 Follow-up: 28 day(s)     Jc Juarez, Pharmacy Technician  3/26/2025  16:00 EDT

## 2025-03-28 DIAGNOSIS — F43.23 ADJUSTMENT DISORDER WITH MIXED ANXIETY AND DEPRESSED MOOD: ICD-10-CM

## 2025-03-28 DIAGNOSIS — F60.3 BORDERLINE PERSONALITY DISORDER: ICD-10-CM

## 2025-03-28 RX ORDER — SUMATRIPTAN SUCCINATE 100 MG/1
TABLET ORAL
Qty: 30 TABLET | Refills: 2 | Status: SHIPPED | OUTPATIENT
Start: 2025-03-28

## 2025-03-28 RX ORDER — NICOTINE 21 MG/24HR
PATCH, TRANSDERMAL 24 HOURS TRANSDERMAL
Qty: 14 PATCH | Refills: 0 | Status: SHIPPED | OUTPATIENT
Start: 2025-03-28

## 2025-03-28 NOTE — TELEPHONE ENCOUNTER
Rx Refill Note  Requested Prescriptions     Pending Prescriptions Disp Refills    SUMAtriptan (IMITREX) 100 MG tablet [Pharmacy Med Name: SUMATRIPTAN 100MG TABLETS] 30 tablet      Sig: TAKE 1 TABLET BY MOUTH 1 TIME AT ONSET OF HEADACHE AS NEEDED FOR MIGRAINE. MAY REPEAT DOSE 1 TIME IN 2 HOURS IF HEADACHE NOT RELIEVED      Last filled: 3/27/2024, 30 with 6 refills  Last office visit with prescribing clinician: 12/30/2024      Next office visit with prescribing clinician: 4/2/2025     Chelsea Spangler MA  03/28/25, 08:46 EDT

## 2025-03-31 DIAGNOSIS — F43.23 ADJUSTMENT DISORDER WITH MIXED ANXIETY AND DEPRESSED MOOD: ICD-10-CM

## 2025-03-31 DIAGNOSIS — F60.3 BORDERLINE PERSONALITY DISORDER: ICD-10-CM

## 2025-03-31 DIAGNOSIS — Z86.59 HISTORY OF BIPOLAR DISORDER: ICD-10-CM

## 2025-03-31 RX ORDER — MOMETASONE FUROATE AND FORMOTEROL FUMARATE DIHYDRATE 200; 5 UG/1; UG/1
2 AEROSOL RESPIRATORY (INHALATION)
Qty: 8.8 G | Refills: 11 | Status: SHIPPED | OUTPATIENT
Start: 2025-03-31

## 2025-03-31 RX ORDER — LAMOTRIGINE 100 MG/1
TABLET ORAL
Qty: 105 TABLET | Refills: 0 | OUTPATIENT
Start: 2025-03-31

## 2025-04-01 ENCOUNTER — OFFICE VISIT (OUTPATIENT)
Dept: INTERNAL MEDICINE | Facility: CLINIC | Age: 30
End: 2025-04-01
Payer: COMMERCIAL

## 2025-04-01 VITALS
OXYGEN SATURATION: 97 % | HEIGHT: 66 IN | WEIGHT: 277 LBS | SYSTOLIC BLOOD PRESSURE: 124 MMHG | DIASTOLIC BLOOD PRESSURE: 78 MMHG | HEART RATE: 80 BPM | BODY MASS INDEX: 44.52 KG/M2

## 2025-04-01 DIAGNOSIS — E66.813 CLASS 3 SEVERE OBESITY DUE TO EXCESS CALORIES WITHOUT SERIOUS COMORBIDITY WITH BODY MASS INDEX (BMI) OF 40.0 TO 44.9 IN ADULT: ICD-10-CM

## 2025-04-01 DIAGNOSIS — N76.0 ACUTE VAGINITIS: Primary | ICD-10-CM

## 2025-04-01 DIAGNOSIS — E66.01 CLASS 3 SEVERE OBESITY DUE TO EXCESS CALORIES WITHOUT SERIOUS COMORBIDITY WITH BODY MASS INDEX (BMI) OF 40.0 TO 44.9 IN ADULT: ICD-10-CM

## 2025-04-01 PROCEDURE — 87591 N.GONORRHOEAE DNA AMP PROB: CPT | Performed by: INTERNAL MEDICINE

## 2025-04-01 PROCEDURE — 87661 TRICHOMONAS VAGINALIS AMPLIF: CPT | Performed by: INTERNAL MEDICINE

## 2025-04-01 PROCEDURE — 99213 OFFICE O/P EST LOW 20 MIN: CPT | Performed by: INTERNAL MEDICINE

## 2025-04-01 PROCEDURE — 87801 DETECT AGNT MULT DNA AMPLI: CPT | Performed by: INTERNAL MEDICINE

## 2025-04-01 PROCEDURE — 87798 DETECT AGENT NOS DNA AMP: CPT | Performed by: INTERNAL MEDICINE

## 2025-04-01 PROCEDURE — 1159F MED LIST DOCD IN RCRD: CPT | Performed by: INTERNAL MEDICINE

## 2025-04-01 PROCEDURE — 1160F RVW MEDS BY RX/DR IN RCRD: CPT | Performed by: INTERNAL MEDICINE

## 2025-04-01 PROCEDURE — 87491 CHLMYD TRACH DNA AMP PROBE: CPT | Performed by: INTERNAL MEDICINE

## 2025-04-01 NOTE — PROGRESS NOTES
"Subjective   Lady Crystal Diamond is a 30 y.o. female here for discussion on weight loss medication.  She says she has done a diet over the last month and has only lost 4 pounds.  She has gained a lot of weight on her current medications.  This in turn, affects her mood and makes her feel worse.  She also thinks she may have a yeast infection as she has some itching and discomfort in the vaginal area.    I have reviewed the patient's relevant medical history and confirmed it is accurate.    I have personally reviewed and performed the ROS. Flores Carrion MD     Objective   /78 (BP Location: Left arm)   Pulse 80   Ht 167.6 cm (66\")   Wt 126 kg (277 lb)   SpO2 97%   BMI 44.71 kg/m²     Physical Exam  Constitutional:       Appearance: She is well-developed.   Pulmonary:      Effort: Pulmonary effort is normal.   Neurological:      General: No focal deficit present.      Mental Status: She is alert.   Psychiatric:         Behavior: Behavior normal.         Thought Content: Thought content normal.         Judgment: Judgment normal.           Current Outpatient Medications:     acetaZOLAMIDE (DIAMOX) 250 MG tablet, TAKE 1 TABLET BY MOUTH TWICE DAILY, Disp: 60 tablet, Rfl: 1    albuterol sulfate  (90 Base) MCG/ACT inhaler, Inhale 2 puffs Every 4 (Four) Hours As Needed for Wheezing., Disp: 18 g, Rfl: 0    famotidine (Pepcid) 20 MG tablet, Take 1 tablet by mouth 2 (Two) Times a Day. (Patient taking differently: Take 1 tablet by mouth As Needed.), Disp: 60 tablet, Rfl: 2    galcanezumab-gnlm (EMGALITY) 120 MG/ML auto-injector pen, Inject 1 mL under the skin into the appropriate area as directed Every 28 (Twenty-Eight) Days., Disp: 1 mL, Rfl: 11    hydrOXYzine (ATARAX) 25 MG tablet, Take 2 tablets by mouth At Night As Needed for Anxiety (anxiety and/or sleep)., Disp: , Rfl:     levETIRAcetam (KEPPRA) 500 MG tablet, Take 1 tablet by mouth 2 (Two) Times a Day., Disp: , Rfl:     meclizine (ANTIVERT) " 25 MG tablet, Take 1 tablet by mouth 3 (Three) Times a Day As Needed for Dizziness., Disp: 90 tablet, Rfl: 2    mometasone-formoterol (Dulera) 200-5 MCG/ACT inhaler, Inhale 2 puffs 2 (Two) Times a Day., Disp: 8.8 g, Rfl: 11    nicotine (NICODERM CQ) 14 MG/24HR patch, PLACE ONCE PATCH ON THE SKIN AS DIRECTED BY PROVIDER DAILY., Disp: 14 patch, Rfl: 0    nicotine (Nicoderm CQ) 7 MG/24HR patch, Place 1 patch on the skin as directed by provider Daily., Disp: 14 each, Rfl: 2    ondansetron ODT (ZOFRAN-ODT) 4 MG disintegrating tablet, Place 1 tablet on the tongue Every 8 (Eight) Hours As Needed for Nausea or Vomiting., Disp: 30 tablet, Rfl: 2    polyethylene glycol (MiraLax) 17 GM/SCOOP powder, Take 17 g by mouth Daily. (Patient taking differently: Take 17 g by mouth As Needed.), Disp: 510 g, Rfl: 1    rimegepant sulfate ODT (Nurtec) 75 MG disintegrating tablet, Take 1 tablet by mouth once daily as needed at the onset of headache. MAX: 75 mg/24 hours, & 18 tabs/30 days., Disp: 16 tablet, Rfl: 11    SUMAtriptan (IMITREX) 100 MG tablet, TAKE 1 TABLET BY MOUTH 1 TIME AT ONSET OF HEADACHE AS NEEDED FOR MIGRAINE. MAY REPEAT DOSE 1 TIME IN 2 HOURS IF HEADACHE NOT RELIEVED, Disp: 30 tablet, Rfl: 2    valACYclovir (VALTREX) 1000 MG tablet, TAKE 1 TABLET BY MOUTH TWICE DAILY FOR 10 DAYS, Disp: 20 tablet, Rfl: 0    vilazodone (VIIBRYD) 40 MG tablet tablet, Take 1 tablet by mouth Daily., Disp: , Rfl:     lamoTRIgine (LaMICtal) 100 MG tablet, Take 2.5 tablets by mouth Daily AND 1 tablet Every Night. Do all this for 30 days., Disp: 105 tablet, Rfl: 0    Assessment & Plan   Diagnoses and all orders for this visit:    1. Acute vaginitis (Primary)  -     NuSwab VG+ - Swab, Vagina; Future    2. Class 3 severe obesity due to excess calories without serious comorbidity with body mass index (BMI) of 40.0 to 44.9 in adult  -Weight loss medication will be more difficult for her given psychiatric and seizure medications.  Will think on it to  see if there is a good option for her and send in Rx.  GLP-1 not financially possible.           Flores Carrion MD      Answers submitted by the patient for this visit:  Weight Management (Submitted on 4/1/2025)  Chief Complaint: Weight Management  Weight: increased  Weight change (lbs): 10  Weight loss treatment: low calorie, low carb diet, portion control  Eating habit changes: Reduced appetite  Energy level: decreased  Physical activity tolerance: stable  Treatment barriers: medication side effects

## 2025-04-02 ENCOUNTER — OFFICE VISIT (OUTPATIENT)
Dept: NEUROLOGY | Facility: CLINIC | Age: 30
End: 2025-04-02
Payer: COMMERCIAL

## 2025-04-02 VITALS
WEIGHT: 277 LBS | OXYGEN SATURATION: 98 % | BODY MASS INDEX: 44.52 KG/M2 | SYSTOLIC BLOOD PRESSURE: 122 MMHG | HEART RATE: 86 BPM | DIASTOLIC BLOOD PRESSURE: 76 MMHG | HEIGHT: 66 IN

## 2025-04-02 DIAGNOSIS — G43.709 CHRONIC MIGRAINE WITHOUT AURA WITHOUT STATUS MIGRAINOSUS, NOT INTRACTABLE: Primary | ICD-10-CM

## 2025-04-02 DIAGNOSIS — R55 SYNCOPE AND COLLAPSE: ICD-10-CM

## 2025-04-02 DIAGNOSIS — R56.9 SEIZURE: ICD-10-CM

## 2025-04-02 PROCEDURE — 99213 OFFICE O/P EST LOW 20 MIN: CPT | Performed by: NURSE PRACTITIONER

## 2025-04-02 PROCEDURE — 1160F RVW MEDS BY RX/DR IN RCRD: CPT | Performed by: NURSE PRACTITIONER

## 2025-04-02 PROCEDURE — 1159F MED LIST DOCD IN RCRD: CPT | Performed by: NURSE PRACTITIONER

## 2025-04-02 RX ORDER — LAMOTRIGINE 250 MG/1
250 TABLET, EXTENDED RELEASE ORAL DAILY
COMMUNITY
Start: 2025-03-28

## 2025-04-02 RX ORDER — HYDROXYZINE HYDROCHLORIDE 10 MG/1
10 TABLET, FILM COATED ORAL 3 TIMES DAILY PRN
COMMUNITY

## 2025-04-02 NOTE — PROGRESS NOTES
Neuro Office Visit      Encounter Date: 2025   Patient Name: Lady Diamond  : 1995   MRN: [id number]   PCP:  Flores Carrion MD     Chief Complaint:    Chief Complaint   Patient presents with    Syncope and collapse    Migraine       History of Present Illness: Lady Diamond is a 30 y.o. female who is here today in Neurology for syncope, seizure-like activity.    Last visit with me on 2024 with referral to [facility].  She was waiting for an appointment with Dr. Hernandez.    Seen by Dr. Hernandez on 3/18/2025 will be scheduled for hospital admission for continuous EEG.  History of Present Illness  The patient presents for evaluation of seizures and migraines.    The chief complaint includes seizures, with the last episode occurring in 2025. She expresses concern about the potential for a seizure if medication doses are missed, as a previous seizure occurred after missing 2 or 3 doses.     Stress is believed to be a contributing factor to the seizures.     Additionally, blackouts are reported approximately once a week or biweekly, with the most recent episode lasting 2 hours. During this episode, she engaged in a conversation and attempted to unlock her bank card but has no recollection of these events.     She does not have a 's license and only recently thought about getting her 's permit.      Migraines are also a concern, though they occur less frequently due to Emgality.     Nurtec is taken once daily to manage symptoms, and headaches are experienced daily, though they are not severe but still noticeable.     Increased photosensitivity is also reported.     Medication is alternated between Nurtec and sumatriptan, with the latter being more effective.        Subjective      Past Medical History:   Past Medical History:   Diagnosis Date    Alcohol abuse     Anemia 2019    Anxiety with depression 2006    no meds    Bipolar disorder      Borderline personality disorder     Bulimia nervosa     Chlamydia 2018    Cholelithiasis 2014    Chronic constipation 2018    Cluster headache 2013    Gonorrhea 2018    H/O chlamydia infection 2016    H/O gonorrhea 2016    H/O gonorrhea     H/O gonorrhea 2012    H/O gonorrhea 2018    H/O trichomoniasis 2018    H/O trichomoniasis 2021    Headache, tension-type     Lung nodule     Memory loss     Migraine 2013    Multiple gestation     Ovarian cyst 2010    Placenta previa     Towards the end plecenta moved in place    PTSD (post-traumatic stress disorder)     Seizures 2022    May have had those in her sleep    Self-injurious behavior     Substance abuse     Suicide attempt     Urinary tract infection 2006    Urogenital trichomoniasis     Varicella 1997       Past Surgical History:   Past Surgical History:   Procedure Laterality Date     SECTION  11/15/2014    LTCS (1 layer closure)     SECTION N/A 2019    Procedure:  SECTION REPEAT WITH SALPINGECTOMY;  Surgeon: Regine José MD;  Location: [facility] LABOR DELIVERY;  Service: Obstetrics;  Laterality: N/A;     SECTION WITH TUBAL  19    GASTRECTOMY      LAPAROSCOPIC CHOLECYSTECTOMY  2015    TONSILLECTOMY  Dec. 14    TUBAL ABDOMINAL LIGATION  19    WISDOM TOOTH EXTRACTION         Family History:   Family History   Problem Relation Age of Onset    Bipolar disorder Mother     Anxiety disorder Mother     Depression Mother         Everyone in my family    Multiple sclerosis Mother     Arthritis Mother     Drug abuse Mother     Hearing loss Mother     Hypertension Mother     Mental illness Mother         All of us    OCD Mother     Paranoid behavior Mother     Self-Injurious Behavior  Mother     Suicide Attempts Mother     Ovarian cancer Mother     Osteoporosis Mother     Depression Father     Alcohol abuse Father     Drug abuse Father     Anxiety disorder Father     Bipolar disorder Sister      Depression Sister     Anxiety disorder Sister     Drug abuse Sister     Self-Injurious Behavior  Sister     Suicide Attempts Sister     ADD / ADHD Sister     Suicide Attempts Maternal Grandmother     Birth defects Sister         Her daughter my niece jessy    Miscarriages / Stillbirths Sister     Asthma Sister     Diabetes Paternal Grandmother     Anxiety disorder Sister     Depression Sister     Bipolar disorder Sister     Bipolar disorder Sister        Social History:   Social History     Socioeconomic History    Marital status: Single    Number of children: 3   Tobacco Use    Smoking status: Every Day     Average packs/day: 0.6 packs/day for 26.4 years (15.0 ttl pk-yrs)     Types: Cigarettes, Electronic Cigarette     Start date: 2010     Last attempt to quit: 3/1/2019     Years since quittin.0     Passive exposure: Past    Smokeless tobacco: Never   Vaping Use    Vaping status: Some Days    Last attempt to quit: 2024    Substances: Nicotine   Substance and Sexual Activity    Alcohol use: Not Currently     Alcohol/week: 6.0 standard drinks of alcohol     Types: 6 Shots of liquor per week     Comment: socially    Drug use: Never    Sexual activity: Yes     Partners: Male     Birth control/protection: Bilateral salpingectomy , Surgical       Medications:     Current Outpatient Medications:     acetaZOLAMIDE (DIAMOX) 250 MG tablet, TAKE 1 TABLET BY MOUTH TWICE DAILY, Disp: 60 tablet, Rfl: 1    albuterol sulfate  (90 Base) MCG/ACT inhaler, Inhale 2 puffs Every 4 (Four) Hours As Needed for Wheezing., Disp: 18 g, Rfl: 0    famotidine (Pepcid) 20 MG tablet, Take 1 tablet by mouth 2 (Two) Times a Day. (Patient taking differently: Take 1 tablet by mouth As Needed.), Disp: 60 tablet, Rfl: 2    galcanezumab-gnlm (EMGALITY) 120 MG/ML auto-injector pen, Inject 1 mL under the skin into the appropriate area as directed Every 28 (Twenty-Eight) Days., Disp: 1 mL, Rfl: 11    lamoTRIgine  MG tablet  sustained-release 24 hour, Take 1 tablet by mouth Daily., Disp: , Rfl:     levETIRAcetam (KEPPRA) 500 MG tablet, Take 1 tablet by mouth 2 (Two) Times a Day., Disp: , Rfl:     meclizine (ANTIVERT) 25 MG tablet, Take 1 tablet by mouth 3 (Three) Times a Day As Needed for Dizziness., Disp: 90 tablet, Rfl: 2    mometasone-formoterol (Dulera) 200-5 MCG/ACT inhaler, Inhale 2 puffs 2 (Two) Times a Day., Disp: 8.8 g, Rfl: 11    nicotine (NICODERM CQ) 14 MG/24HR patch, PLACE ONCE PATCH ON THE SKIN AS DIRECTED BY PROVIDER DAILY., Disp: 14 patch, Rfl: 0    nicotine (Nicoderm CQ) 7 MG/24HR patch, Place 1 patch on the skin as directed by provider Daily., Disp: 14 each, Rfl: 2    ondansetron ODT (ZOFRAN-ODT) 4 MG disintegrating tablet, Place 1 tablet on the tongue Every 8 (Eight) Hours As Needed for Nausea or Vomiting., Disp: 30 tablet, Rfl: 2    polyethylene glycol (MiraLax) 17 GM/SCOOP powder, Take 17 g by mouth Daily. (Patient taking differently: Take 17 g by mouth As Needed.), Disp: 510 g, Rfl: 1    rimegepant sulfate ODT (Nurtec) 75 MG disintegrating tablet, Take 1 tablet by mouth once daily as needed at the onset of headache. MAX: 75 mg/24 hours, & 18 tabs/30 days., Disp: 16 tablet, Rfl: 11    SUMAtriptan (IMITREX) 100 MG tablet, TAKE 1 TABLET BY MOUTH 1 TIME AT ONSET OF HEADACHE AS NEEDED FOR MIGRAINE. MAY REPEAT DOSE 1 TIME IN 2 HOURS IF HEADACHE NOT RELIEVED, Disp: 30 tablet, Rfl: 2    valACYclovir (VALTREX) 1000 MG tablet, TAKE 1 TABLET BY MOUTH TWICE DAILY FOR 10 DAYS (Patient taking differently: Take 1 tablet by mouth 2 (Two) Times a Day. PRN), Disp: 20 tablet, Rfl: 0    vilazodone (VIIBRYD) 40 MG tablet tablet, Take 1 tablet by mouth Daily., Disp: , Rfl:     hydrOXYzine (ATARAX) 10 MG tablet, Take 1 tablet by mouth 3 (Three) Times a Day As Needed., Disp: , Rfl:     Allergies:   Allergies   Allergen Reactions    Lithium Confusion    Shellfish-Derived Products Itching     Itchy eyes and throat, nausea.       PHQ-9 Total  "Score:     STEADI Fall Risk Assessment has not been completed.    Objective     Physical Exam:     Neurological Exam  Mental Status  Awake, alert and oriented to person, place and time. Recent and remote memory are intact. Speech is normal. Language is fluent with no aphasia. Attention and concentration are normal. Fund of knowledge is appropriate for level of education.    Cranial Nerves  CN II: Visual acuity is normal.  CN III, IV, VI: Extraocular movements intact bilaterally. Pupils equal round and reactive to light bilaterally.  CN V: Facial sensation is normal.  CN VII: Full and symmetric facial movement.  CN IX, X: Palate elevates symmetrically  CN XI: Shoulder shrug strength is normal.  CN XII: Tongue midline without atrophy or fasciculations.    Motor  Normal muscle bulk throughout. No fasciculations present. Normal muscle tone. Strength is 5/5 throughout all four extremities.    Sensory  Sensation is intact to light touch, pinprick, vibration and proprioception in all four extremities.    Reflexes                                            Right                      Left  Brachioradialis                    2+                         2+  Biceps                                 2+                         2+  Triceps                                2+                         2+  Finger flex                           2+                         2+  Hamstring                            2+                         2+  Patellar                                2+                         2+  Achilles                                2+                         2+    Coordination    Finger-to-nose, rapid alternating movements and heel-to-shin normal bilaterally without dysmetria.    Gait  Normal casual, toe, heel and tandem gait.        Vital Signs:   Vitals:    04/02/25 0836   BP: 122/76   Pulse: 86   SpO2: 98%   Weight: 126 kg (277 lb)   Height: 167.6 cm (65.98\")     Body mass index is 44.73 kg/m².     Results: " "  Results       Imaging:   CT Chest Without Contrast Diagnostic  Result Date: 3/6/2025  Impression: 1. No acute intrathoracic process. 2. Right upper lobe 13 mm pulmonary nodule consistent with benign granuloma. 3. Hypodense cardiac blood pool, correlate for anemia. 4. Punctate right upper pole nephrolithiasis and additional incidental findings above. Electronically Signed: Baldomero Fuller MD  3/6/2025 8:09 AM EST  Workstation ID: [id number]    CT Abdomen Pelvis With Contrast  Result Date: 2/12/2025  Impression: 1.No active intraintestinal hemorrhage is identified at time of examination. 2.There is mild to moderate right hydronephrosis without obstructive process identified. There is a nonobstructive 1-2 mm upper pole right renal calculus. Correlate with renal history. Electronically Signed: Kit García MD  2/12/2025 5:08 PM EST  Workstation ID: [id number]    CT Head Without Contrast  Result Date: 1/24/2025  Impression: No acute intracranial process identified. Electronically Signed: Maury Arteaag MD  1/24/2025 3:24 PM EST  Workstation ID: [id number]       Labs:   No results found for: \"CMP\", \"PROTEIN\", \"ANTIMOGAB\", \"MESOFQ2QHPH\", \"JCVRESULT\", \"QUANTTBGOLD\", \"CBCDIF\", \"IGGALBSER\"     Assessment / Plan      Assessment/Plan:   Diagnoses and all orders for this visit:    1. Chronic migraine without aura without status migrainosus, not intractable (Primary)  Comments:  Continue Emgality and Nurtec    2. Seizure  Comments:  Follow-up with Dr. eHrnandez for continuous EEG monitoring    3. Syncope and collapse         Assessment & Plan  1. Seizures.  She is scheduled for an extended EEG monitoring to confirm the presence of seizures. She has been advised to consult with Dr. Walters regarding her concerns about weaning off her current medication, especially given her history of having seizures after missing doses. The prolonged monitoring will help determine if her episodes are seizures or transient alterations in " awareness.    2. Migraines.  She reports that her migraines are less frequent due to Emgality but still requires Nurtec daily. She also uses sumatriptan occasionally, which she finds effective. She will continue with her current migraine management plan, including the use of Nurtec and sumatriptan as needed.    Follow-up  The patient will follow up in 6 months.    Patient Education:     Reviewed medications, potential side effects and signs and symptoms to report. Discussed risk versus benefits of treatment plan with patient and/or family-including medications, labs and radiology that may be ordered. Addressed questions and concerns during visit. Patient and/or family verbalized understanding and agree with plan. Instructed to call the office with any questions and report to ER with any life-threatening symptoms.     Follow Up:   Return in about 6 months (around 10/2/2025).    I spent 30 minutes caring for Lady on this date of service. This time includes time spent by me in the following activities: preparing for the visit, performing a medically appropriate examination and/or evaluation, counseling and educating the patient/family/caregiver, and documenting information in the medical record.        During this visit the following were done:  Labs Reviewed []    Labs Ordered []    Radiology Reports Reviewed []    Radiology Ordered []    PCP Records Reviewed []    Referring Provider Records Reviewed []    ER Records Reviewed []    Hospital Records Reviewed []    History Obtained From Family []    Radiology Images Reviewed []    Other Reviewed [x]    Records Requested []      Patient or patient representative verbalized consent for the use of Ambient Listening during the visit with  LULU Thompson for chart documentation. 4/2/2025  08:24 EDT    LULU Thompson   [facility] NEURO CENTER [facility] NEUROLOGY  [address]  [place] [address]  [phone]

## 2025-04-03 DIAGNOSIS — F43.23 ADJUSTMENT DISORDER WITH MIXED ANXIETY AND DEPRESSED MOOD: ICD-10-CM

## 2025-04-03 DIAGNOSIS — F60.3 BORDERLINE PERSONALITY DISORDER: ICD-10-CM

## 2025-04-03 LAB
A VAGINAE DNA VAG QL NAA+PROBE: ABNORMAL SCORE
BVAB2 DNA VAG QL NAA+PROBE: ABNORMAL SCORE
C ALBICANS DNA VAG QL NAA+PROBE: NEGATIVE
C GLABRATA DNA VAG QL NAA+PROBE: NEGATIVE
C TRACH DNA SPEC QL NAA+PROBE: NEGATIVE
MEGA1 DNA VAG QL NAA+PROBE: ABNORMAL SCORE
N GONORRHOEA DNA VAG QL NAA+PROBE: NEGATIVE
T VAGINALIS DNA VAG QL NAA+PROBE: NEGATIVE

## 2025-04-03 RX ORDER — METRONIDAZOLE 500 MG/1
500 TABLET ORAL 2 TIMES DAILY
Qty: 14 TABLET | Refills: 0 | Status: SHIPPED | OUTPATIENT
Start: 2025-04-03 | End: 2025-04-10

## 2025-04-03 RX ORDER — VILAZODONE HYDROCHLORIDE 40 MG/1
40 TABLET ORAL DAILY
Qty: 30 TABLET | Refills: 0 | Status: SHIPPED | OUTPATIENT
Start: 2025-04-03

## 2025-04-04 RX ORDER — VILAZODONE HYDROCHLORIDE 40 MG/1
40 TABLET ORAL DAILY
Qty: 30 TABLET | OUTPATIENT
Start: 2025-04-04

## 2025-04-21 ENCOUNTER — READMISSION MANAGEMENT (OUTPATIENT)
Dept: CALL CENTER | Facility: HOSPITAL | Age: 30
End: 2025-04-21
Payer: COMMERCIAL

## 2025-04-21 NOTE — OUTREACH NOTE
Prep Survey      Flowsheet Row Responses   [facility] facility patient discharged from? Non-BH   Is LACE score < 7 ? Non-BH Discharge   Eligibility Not Eligible   What are the reasons patient is not eligible? Other  [no recent hospital admissions detected]   Does the patient have one of the following disease processes/diagnoses(primary or secondary)? Other   Prep survey completed? Yes            Steph BRADLEY - Registered Nurse

## 2025-04-23 ENCOUNTER — SPECIALTY PHARMACY (OUTPATIENT)
Dept: NEUROLOGY | Facility: CLINIC | Age: 30
End: 2025-04-23
Payer: COMMERCIAL

## 2025-04-23 NOTE — PROGRESS NOTES
Specialty Pharmacy Patient Management Program  Refill Outreach     Lady was contacted today regarding refills of their medication(s).    Refill Questions      Flowsheet Row Most Recent Value   Changes to allergies? No   Changes to medications? Yes  [Patient discontinued keppra and started vimpat po]   New conditions or infections since last clinic visit No   Unplanned office visit, urgent care, ED, or hospital admission in the last 4 weeks  No   How does patient/caregiver feel medication is working? Good   Financial problems or insurance changes  No   Since the previous refill, were any specialty medication doses or scheduled injections missed or delayed?  No   If yes, please provide the amount n/a   Why were doses missed? n/a   Does this patient require a clinical escalation to a pharmacist? Yes  [Patient discontinued keppra and started vimpat po]            Delivery Questions      Flowsheet Row Most Recent Value   Delivery method UPS   Delivery address verified with patient/caregiver? Yes   Delivery address Home   Other address preferred n/a   Number of medications in delivery 2   Medication(s) being filled and delivered Galcanezumab-gnlm (EMGALITY), Rimegepant Sulfate (NURTEC-ODT)   Doses left of specialty medications Nurtec has 4 tablets left.   Copay verified? Yes   Copay amount Nurtec and Emgality = $0   Copay form of payment No copayment ($0)   Delivery Date Selection 04/25/25   Signature Required Yes   Do you consent to receive electronic handouts?  Yes                 Follow-up: 28 day(s)     Gold Toscano  4/23/2025  15:28 EDT

## 2025-04-25 RX ORDER — LACOSAMIDE 100 MG/1
200 TABLET ORAL EVERY 12 HOURS SCHEDULED
COMMUNITY

## 2025-05-01 ENCOUNTER — TELEMEDICINE (OUTPATIENT)
Dept: PSYCHIATRY | Facility: CLINIC | Age: 30
End: 2025-05-01
Payer: COMMERCIAL

## 2025-05-01 DIAGNOSIS — F31.60 BIPOLAR AFFECTIVE DISORDER, MIXED: ICD-10-CM

## 2025-05-01 DIAGNOSIS — F41.1 GENERALIZED ANXIETY DISORDER: ICD-10-CM

## 2025-05-01 DIAGNOSIS — F60.3 BORDERLINE PERSONALITY DISORDER: Primary | ICD-10-CM

## 2025-05-01 NOTE — PROGRESS NOTES
Date: May 1, 2025  Time In: 09:00 EDT  Time out: 9:20 AM EST      IOP: tel:[phone]          This provider is located at [facility], [address] [place] [address], [facility], using a secure [employer] Video Visit through [employer]. Patient is being seen remotely via telehealth at their home address is located in [place]. Patient stated they are in a secure environment for this session. The patient's condition being diagnosed and treated is appropriate for telemedicine. The provider identified themself as well as their credentials. The patient, or  patient's legal guardian consent to be seen remotely, and when consent is given they understand that the consent allows for patient identifiable information to be sent to a third party as needed. They may refuse to be seen remotely at any time. The electronic data is encrypted and password protected, and the patient's or  legal guardian has been advised of the potential risks to privacy not withstanding such measures.   PT Identifiers used: Name and .    You have chosen to receive care through a telehealth visit.  Do you consent to use a video/audio connection for your medical care today? Yes    Subjective   Lady Diamond is a 30 y.o. female who presents today for follow up    Chief Complaint: No chief complaint on file.     Pt requested shorter session as she had obligations during the time of her appointment.    Data: Pt reported that she is feeling good. Pt reported struggling with anxiety due to finances as well as some mood shifts. Pt reported feeling like she has a better handle on the anger and mood shifts. Pt reported that she found out her 'blackouts' were caused by 'heartbreak', 'i was mentally causing it for myself'. Pt reported that she participated in self harm last month, no recent thoughts of self harm or SI at this time. Pt reported that she and her ex partner have decided to be best friends which is helping her MH. Pt  reported that she will start school back in June. Pt reported she was open to the idea of IOP again but only for the month of May due to schedule.      Session time was exclusive to therapy and separate from time spent outside of session to aid with MH symptoms.    Clinical Maneuvering/Intervention:  Assisted patient in processing above session content; acknowledged and normalized patient’s thoughts, feelings, and concerns.  Rationalized patient thought process regarding concerns presented at session.  Discussed triggers associated with patient's  anxiety , depression , manic symptoms , and BPD  Also discussed coping skills for patient to implement such as grounding , mindfulness , increasing activity , self care , and positive self talk     Allowed patient to freely discuss issues without interruption or judgment. Provided safe, confidential environment to facilitate the development of positive therapeutic relationship and encourage open, honest communication. Assisted patient in identifying risk factors which would indicate the need for higher level of care including thoughts to harm self or others and/or self-harming behavior and encouraged patient to contact this office, call 911, or present to the nearest emergency room should any of these events occur. Discussed crisis intervention services and means to access. Patient adamantly and convincingly denies current suicidal or homicidal ideation or perceptual disturbance.    Assessment: Pt was alert and oriented x3.  Pt appears open and honest re MH symptoms that have affected life in a negative way. Pt appears to be inconsistent with appointments. Pt appears to have stopped having 'blackouts' since her ex is now more consistent in her life. Pt appears to be getting back into a routine and feeling hopeful with school starting in June again. Pt appears to struggle regulating emotions at times. Pt appeared open to attending IOP again for the month of May. IOP would  benefit pt. Pt was dismissed from her last IOP program due to attendance. Pts BPD appears to effect relationships strongly.    Patient appears to maintain relative stability as compared to their baseline.  However, patient continues to struggle with No chief complaint on file.   which continues to cause impairment in important areas of functioning.  A result, they can be reasonably expected to continue to benefit from treatment and would likely be at increased risk for decompensation otherwise.      Mental Status Exam:   Hygiene:   good  Cooperation:  Evasive  Eye Contact:  Fair  Psychomotor Behavior:  Restless  Mood:  happy  Speech:  Rapid  Thought Process:  Disorganized  Thought Content:  Mood congruent  Suicidal:  None  Homicidal:  None  Hallucinations:  None  Delusion:  Unable to demonstrate  Memory:  Deficits  Orientation:  Grossly intact  Reliability:  fair  Insight:  Fair  Judgement:  Fair  Impulse Control:  Fair  Physical/Medical Issues:  No        Patient's Support Network Includes:  significant other    Functional Status: Mild impairment     Progress toward goal: Not at goal    Prognosis: Fair with Ongoing Treatment     Plan:     Patient will continue in individual outpatient therapy with focus on improved functioning and coping skills, maintaining stability, and avoiding decompensation and the need for higher level of care.    Patient will adhere to medication regimen as prescribed and report any side effects. Patient will contact this office, call 911 or present to the nearest emergency room should suicidal or homicidal ideations occur. Provide Cognitive Behavioral Therapy and Solution Focused Therapy to improve functioning, maintain stability, and avoid decompensation and the need for higher level of care.     Return in about 3 weeks (around 5/22/2025).      VISIT DIAGNOSIS:    Diagnosis Plan   1. Borderline personality disorder        2. Generalized anxiety disorder        3. Bipolar affective  disorder, mixed         09:00 EDT         This document has been electronically signed by Anna Collier LCSW  May 1, 2025      Part of this note may be an electronic transcription/translation of spoken language to printed text using the Dragon Dictation System.

## 2025-05-01 NOTE — TREATMENT PLAN
Multi-Disciplinary Problems (from Behavioral Health Treatment Plan)      Active Problems       Problem: Anxiety  Start Date: 05/01/25      Problem Details: The patient self-scales this problem as a 8 with 10 being the worst.          Goal Priority Start Date Expected End Date End Date    Patient will develop and implement behavioral and cognitive strategies to reduce anxiety and irrational fears. -- 05/01/25 10/30/25 --    Goal Details: Progress toward goal:  The patient self-scales their progress related to this goal as a 8 with 10 being the worst.        Goal Intervention Frequency Start Date End Date    Help patient explore past emotional issues in relation to present anxiety. Q Month 05/01/25 --    Intervention Details: Duration of treatment until discharged.        Goal Intervention Frequency Start Date End Date    Help patient develop an awareness of their cognitive and physical responses to anxiety. Q Month 05/01/25 --    Intervention Details: Duration of treatment until discharged.                Problem: Depression  Start Date: 05/01/25      Problem Details: The patient self-scales this problem as a 5 with 10 being the worst.          Goal Priority Start Date Expected End Date End Date    Patient will demonstrate the ability to initiate new constructive life skills outside of sessions on a consistent basis. -- 05/01/25 10/30/25 --    Goal Details: Progress toward goal:  The patient self-scales their progress related to this goal as a 5 with 10 being the worst.        Goal Intervention Frequency Start Date End Date    Assist patient in setting attainable activities of daily living goals. Q Month 05/01/25 --    Intervention Details: Clinician will aid pt in increasing structure, routine, focusing accomplishments instead of perceived failures.     Clinician will aid pt with implementing coping skills or techniques that aid with increasing natural dopamine, serotonin and endorphins.          Goal Intervention  Frequency Start Date End Date    Provide education about depression Q Month 05/01/25 --    Intervention Details: Duration of treatment until discharged.        Goal Intervention Frequency Start Date End Date    Assist patient in developing healthy coping strategies. Q Month 05/01/25 --    Intervention Details: Duration of treatment until discharged.                Problem: Borderline Personality Disorder  Start Date: 05/01/25      Problem Details: The patient self-scales this problem as a 7 with 10 being the worst.        Goal Priority Start Date Expected End Date End Date    Increase awareness of boundaries and when they are violated. Verbalize feelings of anger in a controlled, assertive way. -- 05/01/25 10/30/25 --    Goal Details: Progress toward goal:  The patient self-scales their progress related to this goal as a 7 with 10 being the worst.        Goal Intervention Frequency Start Date End Date    Assist patient in developing new boundaries that support healthy independence. Q Month 05/01/25 --    Intervention Details: Duration of treatment until discharged.        Goal Intervention Frequency Start Date End Date    Assist patient in using role-play techniques to develop non self defeating ways of handling angry feelings. Q Month 05/01/25 --    Intervention Details: Duration of treatment until discharged.                Problem: Mood Disorder  Start Date: 05/01/25      Problem Details: The patient self-scales this problem as a 7 with 10 being the worst.        Goal Priority Start Date Expected End Date End Date    Decrease impulsivity in action- that is, not engaging in self-destructive behavious such as overspending, promiscuity, substance abuse, or use of profane language. -- 05/01/25 10/30/25 --    Goal Details: Progress toward goal:  The patient self-scales their progress related to this goal as a 7 with 10 being the worst.        Goal Intervention Frequency Start Date End Date    Provide structure and  focus to patients thoughts and action by establishing plans and routine. Q Month 05/01/25 --    Intervention Details: Duration of treatment until discharged.        Goal Intervention Frequency Start Date End Date    Assist patient in setting reasonable goals and limits on behavior. Q Month 05/01/25 --    Intervention Details: Duration of treatment until discharged.                        Reviewed By       Anna Collier LCSW 05/01/25 0961                     I have discussed and reviewed this treatment plan with the patient.

## 2025-05-01 NOTE — PLAN OF CARE
Discussed tx plan with patient. Tx plan was not printed due to meeting virtually. Pt was able to identify triggers relatively well. Pt has coping skills that could be implemented more in moments of increased emotions. Pt reported an understanding of how MH symptoms can affect behaviors/thought process. Pt may benefit from more frequent sessions, although due to schedule/availability this is unattainable at this time. Pt also has a hx of being inconsistent with appointments contributing to irregulare scheduling. Current life stressors include; finances, school, relationships

## 2025-05-05 DIAGNOSIS — F43.23 ADJUSTMENT DISORDER WITH MIXED ANXIETY AND DEPRESSED MOOD: ICD-10-CM

## 2025-05-05 DIAGNOSIS — F60.3 BORDERLINE PERSONALITY DISORDER: ICD-10-CM

## 2025-05-05 RX ORDER — VILAZODONE HYDROCHLORIDE 40 MG/1
40 TABLET ORAL DAILY
Qty: 30 TABLET | Refills: 0 | OUTPATIENT
Start: 2025-05-05

## 2025-05-08 DIAGNOSIS — F41.8 MIXED ANXIETY AND DEPRESSIVE DISORDER: Primary | ICD-10-CM

## 2025-05-08 DIAGNOSIS — F31.9 BIPOLAR AFFECTIVE DISORDER, REMISSION STATUS UNSPECIFIED: ICD-10-CM

## 2025-05-09 ENCOUNTER — PATIENT MESSAGE (OUTPATIENT)
Dept: INTERNAL MEDICINE | Age: 30
End: 2025-05-09
Payer: COMMERCIAL

## 2025-05-09 ENCOUNTER — TELEPHONE (OUTPATIENT)
Dept: INTERNAL MEDICINE | Age: 30
End: 2025-05-09

## 2025-05-09 NOTE — TELEPHONE ENCOUNTER
Caller: Lady Diamond    Relationship: Self    Best call back number:       [phone] (Mobile)     Requested Prescriptions:       vilazodone (VIIBRYD) 40 MG tablet tablet       lamoTRIgine  MG tablet sustained-release 24 hour     Pharmacy where request should be sent:      [facility] [place], [place]    TELEPHONE CONTACT:    [phone]    Last office visit with prescribing clinician: 4/1/2025   Last telemedicine visit with prescribing clinician: 11/18/2024   Next office visit with prescribing clinician: Visit date not found     Additional details provided by patient:     PATIENT STATED SHE IS OUT OF THE VILAZODONE AND WILL BE OUT OF THE LAMOTRIGINE TOMORROW, 5/10    PATIENT ALSO REQUESTED DR FINE REFILL THE TWO MEDICATIONS SINCE HER NEXT SCHEDULED VISIT WITH PSYCHOLOGIST IS NOT UNTIL 6/20    Does the patient have less than a 3 day supply:  [x] Yes  [] No    Would you like a call back once the refill request has been completed: [] Yes [] No    If the office needs to give you a call back, can they leave a voicemail: [] Yes [] No    Lizeth Leone Rep   05/09/25 14:48 EDT      Patient returned call and given the message below. She declined appt with Dr. Corbin Herrera. Sleep hygiene measures reviewed which patient was aware of. She states she might call her OB/GYN provider.

## 2025-05-12 DIAGNOSIS — F60.3 BORDERLINE PERSONALITY DISORDER: ICD-10-CM

## 2025-05-12 DIAGNOSIS — F43.23 ADJUSTMENT DISORDER WITH MIXED ANXIETY AND DEPRESSED MOOD: ICD-10-CM

## 2025-05-12 RX ORDER — VILAZODONE HYDROCHLORIDE 40 MG/1
40 TABLET ORAL DAILY
Qty: 30 TABLET | Refills: 0 | Status: SHIPPED | OUTPATIENT
Start: 2025-05-12

## 2025-05-12 RX ORDER — LAMOTRIGINE 250 MG/1
250 TABLET, EXTENDED RELEASE ORAL DAILY
Qty: 30 TABLET | Refills: 0 | Status: SHIPPED | OUTPATIENT
Start: 2025-05-12

## 2025-05-15 ENCOUNTER — TELEMEDICINE (OUTPATIENT)
Dept: PSYCHIATRY | Facility: CLINIC | Age: 30
End: 2025-05-15
Payer: COMMERCIAL

## 2025-05-15 DIAGNOSIS — F60.3 BORDERLINE PERSONALITY DISORDER: Primary | ICD-10-CM

## 2025-05-15 DIAGNOSIS — F31.60 BIPOLAR AFFECTIVE DISORDER, MIXED: ICD-10-CM

## 2025-05-15 DIAGNOSIS — F41.1 GENERALIZED ANXIETY DISORDER: ICD-10-CM

## 2025-05-15 NOTE — PROGRESS NOTES
Date: May 15, 2025  Time In: 09:58 EDT  Time out: 10:19 AM EST    This provider is located at [facility], [facility] [place], [place], [facility], using a secure INFIMEThart Video Visit through [employer]. Patient is being seen remotely via telehealth at their home address is located in [place]. Patient stated they are in a secure environment for this session. The patient's condition being diagnosed and treated is appropriate for telemedicine. The provider identified themself as well as their credentials. The patient, or  patient's legal guardian consent to be seen remotely, and when consent is given they understand that the consent allows for patient identifiable information to be sent to a third party as needed. They may refuse to be seen remotely at any time. The electronic data is encrypted and password protected, and the patient's or  legal guardian has been advised of the potential risks to privacy not withstanding such measures.   PT Identifiers used: Name and .    You have chosen to receive care through a telehealth visit.  Do you consent to use a video/audio connection for your medical care today? Yes    Subjective   Lady Diamond is a 30 y.o. female who presents today for follow up    Chief Complaint:   Chief Complaint   Patient presents with    Anxiety    Depression      Session time was exclusive to therapy and separate from time spent outside of session to aid with MH symptoms.    Data: Pt reported it has been a long week, feeling tired today. Pt reported some behavioral issues with her son. Pt reported that overall she feels good mentally. Pt reported she was off her medication for a few days, back on her medication. Pt reported that she has has some anger but overall working on reaction vs responding. Pt reported that she will start school back on Monday, excited.      Clinical Maneuvering/Intervention:  Assisted patient in processing above session content; acknowledged and  normalized patient’s thoughts, feelings, and concerns.  Rationalized patient thought process regarding concerns presented at session.  Discussed triggers associated with patient's  anxiety , depression , and BPD  Also discussed coping skills for patient to implement such as grounding , mindfulness , increasing activity , self care , and positive self talk     Allowed patient to freely discuss issues without interruption or judgment. Provided safe, confidential environment to facilitate the development of positive therapeutic relationship and encourage open, honest communication. Assisted patient in identifying risk factors which would indicate the need for higher level of care including thoughts to harm self or others and/or self-harming behavior and encouraged patient to contact this office, call 911, or present to the nearest emergency room should any of these events occur. Discussed crisis intervention services and means to access. Patient adamantly and convincingly denies current suicidal or homicidal ideation or perceptual disturbance.    Assessment: Pt was alert and oriented x3.  Pt appears open and honest re MH symptoms that have affected life in a negative way. Pts depression symptoms appear to stay relatively stable over the last few weeks. Pt appears to struggle with reaction vs responding at times, improvement. Pt appears to have gained self awareness when to remove self from negative interactions to prevent reactions. Pt appears to have limited healthy support. Pt appeared receptive to techniques and encouragements discussed in session.     Patient appears to maintain relative stability as compared to their baseline.  However, patient continues to struggle with   Chief Complaint   Patient presents with    Anxiety    Depression    which continues to cause impairment in important areas of functioning.  A result, they can be reasonably expected to continue to benefit from treatment and would likely be at  increased risk for decompensation otherwise.        Mental Status Exam:   Hygiene:   fair  Cooperation:  Cooperative  Eye Contact:  Fair  Psychomotor Behavior:  Appropriate  Affect:  Appropriate  Mood: normal  Speech:  Normal  Thought Process:  Linear  Thought Content:  Normal  Suicidal:  None  Homicidal:  None  Hallucinations:  None  Delusion:  None  Memory:  Intact  Orientation:  Grossly intact  Reliability:  fair  Insight:  Fair  Judgement:  Fair  Impulse Control:  Fair  Physical/Medical Issues:  No        Patient's Support Network Includes:   limited     Functional Status: No impairment    Progress toward goal: Not at goal    Prognosis: Fair with Ongoing Treatment     Plan: emotion regulation    Patient will continue in individual outpatient therapy with focus on improved functioning and coping skills, maintaining stability, and avoiding decompensation and the need for higher level of care.    Patient will adhere to medication regimen as prescribed and report any side effects. Patient will contact this office, call 911 or present to the nearest emergency room should suicidal or homicidal ideations occur. Provide Cognitive Behavioral Therapy and Solution Focused Therapy to improve functioning, maintain stability, and avoid decompensation and the need for higher level of care.     Return in about 1 week (around 5/22/2025).      VISIT DIAGNOSIS:    Diagnosis Plan   1. Borderline personality disorder        2. Generalized anxiety disorder        3. Bipolar affective disorder, mixed         09:58 EDT         This document has been electronically signed by Anna Collier LCSW  May 15, 2025      Part of this note may be an electronic transcription/translation of spoken language to printed text using the Dragon Dictation System.

## 2025-05-16 ENCOUNTER — SPECIALTY PHARMACY (OUTPATIENT)
Dept: ONCOLOGY | Facility: HOSPITAL | Age: 30
End: 2025-05-16
Payer: COMMERCIAL

## 2025-05-16 NOTE — PROGRESS NOTES
Specialty Pharmacy Patient Management Program  Refill Outreach     Lady was contacted today regarding refills of their medication(s).    Refill Questions      Flowsheet Row Most Recent Value   Changes to allergies? No   Changes to medications? No   New conditions or infections since last clinic visit No   Unplanned office visit, urgent care, ED, or hospital admission in the last 4 weeks  No   How does patient/caregiver feel medication is working? Good   Financial problems or insurance changes  No   Since the previous refill, were any specialty medication doses or scheduled injections missed or delayed?  No   If yes, please provide the amount N/A   Why were doses missed? N/A   Does this patient require a clinical escalation to a pharmacist? No            Delivery Questions      Flowsheet Row Most Recent Value   Delivery method UPS   Delivery address verified with patient/caregiver? Yes   Delivery address Home   Other address preferred N/A   Number of medications in delivery 2   Medication(s) being filled and delivered Rimegepant Sulfate (NURTEC-ODT), Galcanezumab-gnlm (EMGALITY)   Doses left of specialty medications Nurtec 2 tablets left   Copay verified? Yes   Copay amount Emgality and Nurtec =$0   Copay form of payment No copayment ($0)   Delivery Date Selection 05/20/25   Signature Required Yes   Do you consent to receive electronic handouts?  Yes                 Follow-up: 30 day(s)     Nick Parker  5/16/2025  14:30 EDT

## 2025-05-21 ENCOUNTER — OFFICE VISIT (OUTPATIENT)
Dept: INTERNAL MEDICINE | Age: 30
End: 2025-05-21
Payer: COMMERCIAL

## 2025-05-21 VITALS
DIASTOLIC BLOOD PRESSURE: 74 MMHG | BODY MASS INDEX: 44.03 KG/M2 | HEART RATE: 99 BPM | OXYGEN SATURATION: 97 % | SYSTOLIC BLOOD PRESSURE: 128 MMHG | HEIGHT: 66 IN | WEIGHT: 274 LBS

## 2025-05-21 DIAGNOSIS — Z00.00 HEALTH CARE MAINTENANCE: Primary | ICD-10-CM

## 2025-05-21 DIAGNOSIS — N76.0 ACUTE VAGINITIS: ICD-10-CM

## 2025-05-21 PROCEDURE — 87798 DETECT AGENT NOS DNA AMP: CPT | Performed by: INTERNAL MEDICINE

## 2025-05-21 PROCEDURE — 87491 CHLMYD TRACH DNA AMP PROBE: CPT | Performed by: INTERNAL MEDICINE

## 2025-05-21 PROCEDURE — 87661 TRICHOMONAS VAGINALIS AMPLIF: CPT | Performed by: INTERNAL MEDICINE

## 2025-05-21 PROCEDURE — 87529 HSV DNA AMP PROBE: CPT | Performed by: INTERNAL MEDICINE

## 2025-05-21 PROCEDURE — 87801 DETECT AGNT MULT DNA AMPLI: CPT | Performed by: INTERNAL MEDICINE

## 2025-05-21 PROCEDURE — 87591 N.GONORRHOEAE DNA AMP PROB: CPT | Performed by: INTERNAL MEDICINE

## 2025-05-21 RX ORDER — LEVETIRACETAM 500 MG/1
500 TABLET ORAL 2 TIMES DAILY
COMMUNITY
Start: 2025-05-12

## 2025-05-21 NOTE — PROGRESS NOTES
Subjective   Lady Diamond is a 30 y.o. female here for yearly physical. She has had some intermittent breast tenderness.    Past Medical History:   Diagnosis Date    Alcohol abuse     Anemia 2019    Anxiety with depression 2006    no meds    Bipolar disorder 2022    Borderline personality disorder     Bulimia nervosa     Chlamydia 2018    Cholelithiasis 2014    Chronic constipation 2018    Cluster headache 2013    Gonorrhea 2018    H/O chlamydia infection 2016    H/O gonorrhea 2016    H/O gonorrhea 2011    H/O gonorrhea 2012    H/O gonorrhea 2018    H/O trichomoniasis 2018    H/O trichomoniasis 07/2021    Headache, tension-type     Lung nodule     Memory loss     Migraine 2013    Multiple gestation     Ovarian cyst 2010    Placenta previa 2019    Towards the end plecenta moved in place    PTSD (post-traumatic stress disorder)     Seizures 09/2022    May have had those in her sleep    Self-injurious behavior     Substance abuse     Suicide attempt     Urinary tract infection 2006    Urogenital trichomoniasis 2018    Varicella 1997     Family History   Problem Relation Age of Onset    Bipolar disorder Mother     Anxiety disorder Mother     Depression Mother         Everyone in my family    Multiple sclerosis Mother     Arthritis Mother     Drug abuse Mother     Hearing loss Mother     Hypertension Mother     Mental illness Mother         All of us    OCD Mother     Paranoid behavior Mother     Self-Injurious Behavior  Mother     Suicide Attempts Mother     Ovarian cancer Mother     Osteoporosis Mother     Depression Father     Alcohol abuse Father     Drug abuse Father     Anxiety disorder Father     Bipolar disorder Sister     Depression Sister     Anxiety disorder Sister     Drug abuse Sister     Self-Injurious Behavior  Sister     Suicide Attempts Sister     ADD / ADHD Sister     Suicide Attempts Maternal Grandmother     Birth defects Sister         Her daughter my niece jessy    Miscarriages /  Stillbirths Sister     Asthma Sister     Diabetes Paternal Grandmother     Anxiety disorder Sister     Depression Sister     Bipolar disorder Sister     Bipolar disorder Sister      Past Surgical History:   Procedure Laterality Date     SECTION  11/15/2014    LTCS (1 layer closure)     SECTION N/A 2019    Procedure:  SECTION REPEAT WITH SALPINGECTOMY;  Surgeon: Regine José MD;  Location: [facility] LABOR DELIVERY;  Service: Obstetrics;  Laterality: N/A;     SECTION WITH TUBAL  19    GASTRECTOMY      LAPAROSCOPIC CHOLECYSTECTOMY  2015    TONSILLECTOMY  Dec. 14    TUBAL ABDOMINAL LIGATION  19    WISDOM TOOTH EXTRACTION       Social History     Socioeconomic History    Marital status: Single    Number of children: 3   Tobacco Use    Smoking status: Every Day     Average packs/day: 0.6 packs/day for 26.4 years (15.0 ttl pk-yrs)     Types: Cigarettes, Electronic Cigarette     Start date: 2010     Last attempt to quit: 3/1/2019     Years since quittin.2     Passive exposure: Past    Smokeless tobacco: Never   Vaping Use    Vaping status: Some Days    Last attempt to quit: 2024    Substances: Nicotine   Substance and Sexual Activity    Alcohol use: Not Currently     Alcohol/week: 6.0 standard drinks of alcohol     Types: 6 Shots of liquor per week     Comment: socially    Drug use: Never    Sexual activity: Yes     Partners: Male     Birth control/protection: Bilateral salpingectomy , Surgical         Current Outpatient Medications:     albuterol sulfate  (90 Base) MCG/ACT inhaler, Inhale 2 puffs Every 4 (Four) Hours As Needed for Wheezing., Disp: 18 g, Rfl: 0    famotidine (Pepcid) 20 MG tablet, Take 1 tablet by mouth 2 (Two) Times a Day. (Patient taking differently: Take 1 tablet by mouth As Needed.), Disp: 60 tablet, Rfl: 2    galcanezumab-gnlm (EMGALITY) 120 MG/ML auto-injector pen, Inject 1 mL under the skin into the appropriate area as  "directed Every 28 (Twenty-Eight) Days., Disp: 1 mL, Rfl: 11    lamoTRIgine  MG tablet sustained-release 24 hour, Take 1 tablet by mouth Daily., Disp: 30 tablet, Rfl: 0    levETIRAcetam (KEPPRA) 500 MG tablet, 1 tablet 2 (Two) Times a Day., Disp: , Rfl:     meclizine (ANTIVERT) 25 MG tablet, Take 1 tablet by mouth 3 (Three) Times a Day As Needed for Dizziness., Disp: 90 tablet, Rfl: 2    mometasone-formoterol (Dulera) 200-5 MCG/ACT inhaler, Inhale 2 puffs 2 (Two) Times a Day., Disp: 8.8 g, Rfl: 11    nicotine (NICODERM CQ) 14 MG/24HR patch, PLACE ONCE PATCH ON THE SKIN AS DIRECTED BY PROVIDER DAILY., Disp: 14 patch, Rfl: 0    ondansetron ODT (ZOFRAN-ODT) 4 MG disintegrating tablet, Place 1 tablet on the tongue Every 8 (Eight) Hours As Needed for Nausea or Vomiting., Disp: 30 tablet, Rfl: 2    polyethylene glycol (MiraLax) 17 GM/SCOOP powder, Take 17 g by mouth Daily. (Patient taking differently: Take 17 g by mouth As Needed.), Disp: 510 g, Rfl: 1    rimegepant sulfate ODT (Nurtec) 75 MG disintegrating tablet, Take 1 tablet by mouth once daily as needed at the onset of headache. MAX: 75 mg/24 hours, & 18 tabs/30 days., Disp: 16 tablet, Rfl: 11    SUMAtriptan (IMITREX) 100 MG tablet, TAKE 1 TABLET BY MOUTH 1 TIME AT ONSET OF HEADACHE AS NEEDED FOR MIGRAINE. MAY REPEAT DOSE 1 TIME IN 2 HOURS IF HEADACHE NOT RELIEVED, Disp: 30 tablet, Rfl: 2    vilazodone (VIIBRYD) 40 MG tablet tablet, Take 1 tablet by mouth Daily., Disp: 30 tablet, Rfl: 0    Objective   /74 (BP Location: Left arm)   Pulse 99   Ht 167.6 cm (66\")   Wt 124 kg (274 lb)   SpO2 97%   BMI 44.22 kg/m²   Physical Exam  Vitals reviewed. Exam conducted with a chaperone present.   Constitutional:       General: She is not in acute distress.     Appearance: Normal appearance. She is well-developed.   HENT:      Head: Normocephalic and atraumatic.      Right Ear: Tympanic membrane, ear canal and external ear normal.      Left Ear: Tympanic membrane, " ear canal and external ear normal.      Nose: Nose normal.      Mouth/Throat:      Lips: Pink.      Mouth: Mucous membranes are moist.      Tongue: No lesions.      Pharynx: Oropharynx is clear.   Eyes:      General: Lids are normal. Vision grossly intact. No scleral icterus.     Conjunctiva/sclera: Conjunctivae normal.      Pupils: Pupils are equal, round, and reactive to light.   Neck:      Thyroid: No thyroid mass, thyromegaly or thyroid tenderness.      Vascular: No carotid bruit.   Cardiovascular:      Rate and Rhythm: Normal rate and regular rhythm.      Heart sounds: Normal heart sounds. No murmur heard.     No friction rub. No gallop. No S3 or S4 sounds.   Pulmonary:      Effort: Pulmonary effort is normal.      Breath sounds: Normal breath sounds. No wheezing, rhonchi or rales.   Chest:   Breasts:     Right: Normal. No inverted nipple, mass, nipple discharge, skin change or tenderness.      Left: Normal. No inverted nipple, mass, nipple discharge, skin change or tenderness.   Abdominal:      General: Bowel sounds are normal. There is no distension.      Palpations: Abdomen is soft. There is no mass.      Tenderness: There is no abdominal tenderness.   Musculoskeletal:         General: Normal range of motion.      Cervical back: Neck supple.      Right lower leg: No edema.      Left lower leg: No edema.   Lymphadenopathy:      Cervical: No cervical adenopathy.   Skin:     General: Skin is warm and dry.      Coloration: Skin is not pale.      Findings: No erythema or rash.   Neurological:      Mental Status: She is alert and oriented to person, place, and time. Mental status is at baseline.      Cranial Nerves: No cranial nerve deficit.      Sensory: No sensory deficit.      Gait: Gait is intact.      Comments: CNs grossly intact. Balance grossly intact.   Psychiatric:         Attention and Perception: Attention normal.         Mood and Affect: Mood normal.         Speech: Speech normal.         Behavior:  Behavior normal.         Thought Content: Thought content normal.         Judgment: Judgment normal.         Assessment & Plan   Diagnoses and all orders for this visit:    1. Health care maintenance (Primary)        1. Health Care Maintenance  -pap due, pt to schedule with gyn  -mamm at 38, reassured her bilateral breast tenderness is usually hormonal but I do rec self breast exams monthly  -colon cancer screening at 43  -fasting labs  -Class 3 Severe Obesity (BMI >=40). Obesity-related health conditions include the following: none. Obesity is unchanged. BMI is is above average; BMI management plan is completed. We discussed Information on healthy weight added to patient's after visit summary.    Reviewed the following with the patient: advised patient of need for:  pap smear.  Counseled regarding: age-appropriate screening labs and tests, wearing seatbelt and sunscreen regularly  Discussed: regular exercise and diet changes to promote healthy weight, checking skin regularly for abnormal moles and lesions

## 2025-05-27 LAB
A VAGINAE DNA VAG QL NAA+PROBE: ABNORMAL SCORE
BVAB2 DNA VAG QL NAA+PROBE: ABNORMAL SCORE
C ALBICANS DNA VAG QL NAA+PROBE: NEGATIVE
C GLABRATA DNA VAG QL NAA+PROBE: NEGATIVE
C TRACH DNA SPEC QL NAA+PROBE: POSITIVE
HSV1 DNA SPEC QL NAA+PROBE: NEGATIVE
HSV2 DNA SPEC QL NAA+PROBE: NEGATIVE
MEGA1 DNA VAG QL NAA+PROBE: ABNORMAL SCORE
N GONORRHOEA DNA VAG QL NAA+PROBE: NEGATIVE
T VAGINALIS DNA VAG QL NAA+PROBE: NEGATIVE

## 2025-05-28 DIAGNOSIS — N76.0 BV (BACTERIAL VAGINOSIS): ICD-10-CM

## 2025-05-28 DIAGNOSIS — A74.9 CHLAMYDIA: Primary | ICD-10-CM

## 2025-05-28 DIAGNOSIS — B96.89 BV (BACTERIAL VAGINOSIS): ICD-10-CM

## 2025-05-28 RX ORDER — DOXYCYCLINE 100 MG/1
100 CAPSULE ORAL 2 TIMES DAILY
Qty: 14 CAPSULE | Refills: 0 | Status: SHIPPED | OUTPATIENT
Start: 2025-05-28 | End: 2025-06-04

## 2025-05-28 RX ORDER — METRONIDAZOLE 500 MG/1
500 TABLET ORAL 2 TIMES DAILY
Qty: 14 TABLET | Refills: 0 | Status: SHIPPED | OUTPATIENT
Start: 2025-05-28 | End: 2025-06-04

## 2025-05-29 ENCOUNTER — TELEPHONE (OUTPATIENT)
Dept: PSYCHIATRY | Facility: CLINIC | Age: 30
End: 2025-05-29

## 2025-06-03 ENCOUNTER — PATIENT MESSAGE (OUTPATIENT)
Dept: INTERNAL MEDICINE | Age: 30
End: 2025-06-03
Payer: COMMERCIAL

## 2025-06-06 DIAGNOSIS — F60.3 BORDERLINE PERSONALITY DISORDER: ICD-10-CM

## 2025-06-06 DIAGNOSIS — F43.23 ADJUSTMENT DISORDER WITH MIXED ANXIETY AND DEPRESSED MOOD: ICD-10-CM

## 2025-06-06 RX ORDER — VILAZODONE HYDROCHLORIDE 40 MG/1
40 TABLET ORAL DAILY
Qty: 30 TABLET | Refills: 0 | Status: SHIPPED | OUTPATIENT
Start: 2025-06-06

## 2025-06-17 ENCOUNTER — SPECIALTY PHARMACY (OUTPATIENT)
Dept: ONCOLOGY | Facility: HOSPITAL | Age: 30
End: 2025-06-17
Payer: COMMERCIAL

## 2025-06-17 NOTE — PROGRESS NOTES
Specialty Pharmacy Patient Management Program  Refill Outreach     Lady was contacted today regarding refills of their medication(s).    Refill Questions      Flowsheet Row Most Recent Value   Changes to allergies? No   Changes to medications? No   New conditions or infections since last clinic visit No   Unplanned office visit, urgent care, ED, or hospital admission in the last 4 weeks  No   How does patient/caregiver feel medication is working? Good   Financial problems or insurance changes  No   Since the previous refill, were any specialty medication doses or scheduled injections missed or delayed?  No   If yes, please provide the amount n/a   Why were doses missed? n/a            Delivery Questions      Flowsheet Row Most Recent Value   Delivery method UPS   Delivery address verified with patient/caregiver? Yes   Delivery address Home   Other address preferred n/a   Number of medications in delivery 2   Medication(s) being filled and delivered Galcanezumab-gnlm (EMGALITY), Rimegepant Sulfate (NURTEC-ODT)   Doses left of specialty medications Nurtec = PRN, Emgality = 0 injections   Copay verified? Yes   Copay amount no copay   Copay form of payment No copayment ($0)   Delivery Date Selection 06/18/25   Signature Required Yes   Do you consent to receive electronic handouts?  Yes                 Follow-up: 30 day(s)     Jc Juarez, Pharmacy Technician  6/17/2025  12:26 EDT

## 2025-06-19 ENCOUNTER — TELEMEDICINE (OUTPATIENT)
Age: 30
End: 2025-06-19
Payer: COMMERCIAL

## 2025-06-19 DIAGNOSIS — F31.9 BIPOLAR AND RELATED DISORDER: Primary | ICD-10-CM

## 2025-06-19 DIAGNOSIS — F60.3 BORDERLINE PERSONALITY DISORDER: ICD-10-CM

## 2025-06-19 DIAGNOSIS — F43.23 ADJUSTMENT DISORDER WITH MIXED ANXIETY AND DEPRESSED MOOD: ICD-10-CM

## 2025-06-19 RX ORDER — LACOSAMIDE 200 MG/1
200 TABLET ORAL EVERY 12 HOURS SCHEDULED
COMMUNITY
Start: 2025-06-19

## 2025-06-19 RX ORDER — VILAZODONE HYDROCHLORIDE 40 MG/1
40 TABLET ORAL DAILY
Qty: 30 TABLET | Refills: 0 | Status: SHIPPED | OUTPATIENT
Start: 2025-06-19

## 2025-06-19 RX ORDER — LAMOTRIGINE 250 MG/1
250 TABLET, EXTENDED RELEASE ORAL DAILY
Qty: 30 TABLET | Refills: 0 | Status: SHIPPED | OUTPATIENT
Start: 2025-06-19

## 2025-06-19 NOTE — PROGRESS NOTES
Video Visit Follow Up      Date: 2025  Patient Name: Lady Diamond  : 1995   MRN: [id number]   Time In: 11:10 am      Time Out: 11:30 am     Referring Physician: Flores Carrion MD    Chaperone Statement: Jamal CORREIA served as a chaperone for this visit and was present for the full visit from 11:10 to 11:30. Patient agreeable to chaperone presence during appointment.   Chief Complaint:      ICD-10-CM ICD-9-CM   1. Bipolar and related disorder  F31.9 296.80   2. Borderline personality disorder  F60.3 301.83   3. Adjustment disorder with mixed anxiety and depressed mood  F43.23 309.28        Mode of Visit: Video  Location of patient: -HOME-  Location of provider: +[facility]+  You have chosen to receive care through a telehealth visit.  The patient has signed the video visit consent form.  The visit included audio and video interaction. No technical issues occurred during this visit.    History of Present Illness: Lady Diamond is a 30 y.o. female who I saw today thru a video visit for medication management     History of Present Illness  The patient presents via virtual visit for evaluation of seizures, depression, and PTSD.    She was last seen on 2025, with a planned follow-up in 2 weeks for a recheck. However, she was hospitalized at [facility], where her seizure medication was adjusted. She is currently on lacosamide, having discontinued Keppra. She continues to take lamotrigine 250 ER, prescribed by her primary care physician, Dr. Flores Carrion.   She reports no side effects from her medications and has been managing her sleep well, achieving 4 to 6 hours of sleep per night. She has been experiencing seizures and blackouts, which she attributes to emotional distress following a breakup. She recently experienced 2 blackouts in one day after deciding to cease communication with her ex-partner. Her seizures appear to be triggered by thoughts of her  ex-partner. She had a seizure after not taking her medication for a week and spending the night with her ex-partner.    She reports feeling slightly depressed today due to reflecting on past life events. She experiences thoughts of hopelessness, rating them as 10 out of 10, and feels she is failing as a mother and in life generally. She does not currently have suicidal thoughts but had a suicide attempt a few months ago, during which she self-harmed by cutting her thigh. She occasionally feels lonely but has been managing well. She reports no current impulse control issues and has been working on slowing down her thought processes. She does not have access to firearms and occasionally experiences homicidal thoughts, particularly towards her daughter's stepmother, who she feels mistreats her daughter. She has not discussed these thoughts with her therapist. She has been told she has 3 different personalities and believes she may switch into different mindsets, often not remembering these episodes. She has not discussed these issues with her therapist. She is currently on lurasidone, also prescribed by Dr. Flores Carrion.    She experiences significant posttraumatic stress from various life events, including the death of her sister, the onset of seizures in 12/2024, and her mother's near-fatal overdose. She is a mother of 3 children, aged 14, 10, and 6, and is particularly concerned about her 14-year-old son, who she describes as out of control. She is interested in telehealth services due to transportation difficulties.    Social History:  - Mother of 3 children, aged 14, 10, and 6  - Concerned about her 14-year-old son's behavior  - Interested in telehealth services due to transportation difficulties    Substance Use:  - Reduced vaping habit, uses nicotine gum intermittently throughout the day  - Attempted to use nicotine patches but experienced skin irritation  - Reports no alcohol or marijuana  use    Pertinent Negatives:  - Reports no current suicidal or homicidal thoughts  - Reports no current impulse control issues  - Reports no access to firearms    SOCIAL HISTORY  The patient admits to vaping and using nicotine gum intermittently throughout the day. She reports no alcohol or marijuana use.    FAMILY HISTORY  Her biological grandmother committed suicide.        Subjective      Review of Systems:   The following portions of the patient's history were reviewed and updated as appropriate: allergies, current medications, past family history, past medical history, past social history, past surgical history and problem list.     Screening Scores:   PHQ-9 : 24  TERESA-7 : 6    Quality Measures:   Tobacco: Lady Diamond  reports that she has been smoking cigarettes and electronic cigarette. She started smoking about 15 years ago. She has a 15 pack-year smoking history. She has been exposed to tobacco smoke. She has never used smokeless tobacco. I have educated her on the risk of diseases from using tobacco products such as cancer, COPD, heart disease, and she vapes nicotine.   I advised her to quit and she is not willing to quit.  I spent 3  minutes counseling the patient.          Depression (PHQ >10): Patient screened positive for depression based on a PHQ-9 score of 24 on 2/7/2025. Follow up recommendations include: Prescribed antidepressant medication treatment, Suicide Risk Assessment performed, and Continue with medication management     Medications:     Current Outpatient Medications:     lamoTRIgine  MG tablet sustained-release 24 hour, Take 1 tablet by mouth Daily., Disp: 30 tablet, Rfl: 0    vilazodone (VIIBRYD) 40 MG tablet tablet, Take 1 tablet by mouth Daily., Disp: 30 tablet, Rfl: 0    albuterol sulfate  (90 Base) MCG/ACT inhaler, Inhale 2 puffs Every 4 (Four) Hours As Needed for Wheezing., Disp: 18 g, Rfl: 0    famotidine (Pepcid) 20 MG tablet, Take 1 tablet by mouth 2 (Two)  Times a Day. (Patient taking differently: Take 1 tablet by mouth As Needed.), Disp: 60 tablet, Rfl: 2    galcanezumab-gnlm (EMGALITY) 120 MG/ML auto-injector pen, Inject 1 mL under the skin into the appropriate area as directed Every 28 (Twenty-Eight) Days., Disp: 1 mL, Rfl: 11    lacosamide (VIMPAT) 200 MG tablet, Take 1 tablet by mouth Every 12 (Twelve) Hours., Disp: , Rfl:     meclizine (ANTIVERT) 25 MG tablet, Take 1 tablet by mouth 3 (Three) Times a Day As Needed for Dizziness., Disp: 90 tablet, Rfl: 2    mometasone-formoterol (Dulera) 200-5 MCG/ACT inhaler, Inhale 2 puffs 2 (Two) Times a Day., Disp: 8.8 g, Rfl: 11    nicotine (NICODERM CQ) 14 MG/24HR patch, PLACE ONCE PATCH ON THE SKIN AS DIRECTED BY PROVIDER DAILY., Disp: 14 patch, Rfl: 0    ondansetron ODT (ZOFRAN-ODT) 4 MG disintegrating tablet, Place 1 tablet on the tongue Every 8 (Eight) Hours As Needed for Nausea or Vomiting., Disp: 30 tablet, Rfl: 2    polyethylene glycol (MiraLax) 17 GM/SCOOP powder, Take 17 g by mouth Daily. (Patient taking differently: Take 17 g by mouth As Needed.), Disp: 510 g, Rfl: 1    rimegepant sulfate ODT (Nurtec) 75 MG disintegrating tablet, Take 1 tablet by mouth once daily as needed at the onset of headache. MAX: 75 mg/24 hours, & 18 tabs/30 days., Disp: 16 tablet, Rfl: 11    SUMAtriptan (IMITREX) 100 MG tablet, TAKE 1 TABLET BY MOUTH 1 TIME AT ONSET OF HEADACHE AS NEEDED FOR MIGRAINE. MAY REPEAT DOSE 1 TIME IN 2 HOURS IF HEADACHE NOT RELIEVED, Disp: 30 tablet, Rfl: 2    Medication Considerations:  DIONICIO reviewed and appropriate.     Allergies:   Allergies   Allergen Reactions    Lithium Confusion    Doxycycline GI Intolerance     vomit, have hot flashes as well as fatigued    Shellfish-Derived Products Itching     Itchy eyes and throat, nausea.       Objective       Vital Signs:   The patient was seen remotely today via a video visit. Unable to obtain vital signs due to nature of remote visit. Patient stated that their  height was 66 inches and their weight was 274 pounds.     Mental Status Exam:   MENTAL STATUS EXAM   General Appearance:  Cleanly groomed and dressed  Eye Contact:  Good eye contact  Attitude:  Cooperative  Motor Activity:  Normal gait, posture  Muscle Strength:  Normal  Speech:  Normal rate, tone, volume  Language:  Spontaneous  Mood and affect:  Depressed  Hopelessness:  10  Loneliness: 4  Thought Process:  Logical, goal-directed and linear  Associations/ Thought Content:  No delusions  Hallucinations:  None  Suicidal Ideations:  Not present  Homicidal Ideation:  Not present  Sensorium:  Alert and clear  Orientation:  Person, place, time and situation  Immediate Recall, Recent, and Remote Memory:  Intact  Attention Span/ Concentration:  Good  Fund of Knowledge:  Appropriate for age and educational level  Intellectual Functioning:  Above average  Insight:  Fair  Judgement:  Fair  Reliability:  Fair  Impulse Control:  Fair        SUICIDE RISK ASSESSMENT/CSSRS:  1. Does patient have thoughts of suicide? denies  2. Does patient have intent for suicide? denies  3. Does patient have a current plan for suicide? denies  4. History of suicide attempts: 2 previous SA  5. Family history of suicide or attempts: maternal grandmother committed suicide  6. History of violent behaviors towards others or property or thoughts of committing suicide: denies  7. History of sexual aggression toward others: denies  8. Access to firearms or weapons: denies    Assessment / Plan      Visit Diagnosis/Orders Placed This Visit:  Diagnoses and all orders for this visit:    1. Bipolar and related disorder (Primary)  -     lamoTRIgine  MG tablet sustained-release 24 hour; Take 1 tablet by mouth Daily.  Dispense: 30 tablet; Refill: 0  -     vilazodone (VIIBRYD) 40 MG tablet tablet; Take 1 tablet by mouth Daily.  Dispense: 30 tablet; Refill: 0    2. Borderline personality disorder  -     lamoTRIgine  MG tablet sustained-release 24  hour; Take 1 tablet by mouth Daily.  Dispense: 30 tablet; Refill: 0  -     vilazodone (VIIBRYD) 40 MG tablet tablet; Take 1 tablet by mouth Daily.  Dispense: 30 tablet; Refill: 0    3. Adjustment disorder with mixed anxiety and depressed mood  -     lamoTRIgine  MG tablet sustained-release 24 hour; Take 1 tablet by mouth Daily.  Dispense: 30 tablet; Refill: 0  -     vilazodone (VIIBRYD) 40 MG tablet tablet; Take 1 tablet by mouth Daily.  Dispense: 30 tablet; Refill: 0         Assessment & Plan  Problems:  - pseudoseizure disorder  - Depression  - chronic Posttraumatic stress disorder (PTSD)    Content of Therapy:  During the session, the patient discussed confusion regarding her psychiatric care and recent hospitalization where her seizure medication was changed. She expressed feelings of depression and hopelessness, rating these feelings at 10/10. The patient also shared her history of suicidal thoughts and a recent suicide attempt. Additionally, she described experiencing significant traumatic events, including the death of her sister and her mother's overdose. The patient mentioned ongoing issues with her 14-year-old son and difficulties with impulse control. She also discussed her efforts to reduce nicotine use and her preference for telehealth appointments due to transportation issues.    Clinical Impression:  The patient presents with a pseudoseizure disorder, currently managed with lacosamide and lamotrigine 250 mg ER, with no reported side effects. She exhibits symptoms of depression, including feelings of hopelessness and a history of suicidal thoughts and attempts. The patient also shows signs of PTSD, stemming from multiple traumatic events. Despite these challenges, she demonstrates insight into her condition and a willingness to engage in therapy. Her mood is currently depressed, but she is not experiencing active suicidal ideation. She has a supportive relationship with her therapist and is open  to discussing specific therapy for PTSD.    Therapeutic Intervention:  - Medication management: Discontinuation of Keppra and continuation of lacosamide and lamotrigine.  - Cognitive reframing: Encouraging the patient to focus on her progress and the support available to her.  - Conflict resolution: Addressing issues with her son's behavior and exploring ways to manage these challenges.  - Self-monitoring: Encouraging the patient to monitor her mood and thoughts, and to seek immediate help if suicidal or homicidal thoughts become intrusive.    Plan:  - Continue current seizure medications (lacosamide and lamotrigine).  - Discontinue Keppra.  - Continue vilazodone for depression.  - Discuss specific therapy for PTSD with her therapist.  - Monitor mood and thoughts, call 911 if suicidal or homicidal thoughts become intrusive.  - Reduce nicotine use and explore alternatives like nicotine gum.  - Schedule telehealth follow-up in 4 weeks.    Follow-up:  - Next appointment via telehealth in 4 weeks.  - Discuss therapy for PTSD with her therapist during the next visit.    Notes & Risk Factors:  - History of suicide attempt a few months ago.  - Significant traumatic events (sister's death, mother's overdose).  - Fleeting Homicidal thoughts, particularly related to daughter's stepmother.  - Protective factors: Insight into condition, supportive relationship with therapist, concern for her children.      Labs Reviewed : labs reviewed  UDS Reviewed : UDS from 2/7/2025 as expected  Chart since last visit reviewed : chart reviewed    Functional Status: No impairment    Prognosis: Good with Ongoing Treatment     Impression/Formulation:  Patient appeared alert and oriented. Patient is receptive to assistance with maintaining a stable lifestyle.  Patient presents with history of     ICD-10-CM ICD-9-CM   1. Bipolar and related disorder  F31.9 296.80   2. Borderline personality disorder  F60.3 301.83   3. Adjustment disorder with  mixed anxiety and depressed mood  F43.23 309.28   .     Treatment Plan:     Patient will continue supportive efforts and medications as indicated. Clinic will obtain release of information for current treatment team for continuity of care as needed. Patient will contact this office, call 911 or present to the nearest emergency room should suicidal or homicidal ideations occur.  Discussed medication options and treatment plan of prescribed medication(s) as well as the risks, benefits, and potential side effects. Patient acknowledged and verbally consented to continue with current treatment plan and was educated on the importance of compliance with treatment and follow-up appointments.       Follow Up:   Return in about 4 weeks (around 7/17/2025) for Recheck, Video visit.    Short-term goals: Patient will adhere to medication regimen and experience continued improvement in symptoms over the next 3 months.   Long-term goals: Patient will adhere to medication treatment plan and report improvement in symptoms over the next 6 months    Patient or patient representative verbalized consent for the use of Ambient Listening during the visit with  Maury Alonso MD for chart documentation. 6/19/2025  11:10 EDT    Maury Alonso MD  [facility]     This is electronically signed by Maury Alonso MD  06/19/2025 11:13 EDT

## 2025-06-27 ENCOUNTER — TELEPHONE (OUTPATIENT)
Dept: INTERNAL MEDICINE | Age: 30
End: 2025-06-27
Payer: COMMERCIAL

## 2025-06-27 NOTE — TELEPHONE ENCOUNTER
Caller: Lady Diamond    Relationship: Self    Best call back number:[phone]     What form or medical record are you requesting: LETTER FROM PCP STATING THAT YOU HAVE SEIZURES, MEMORY LOSS AND BLACK OUTS    Who is requesting this form or medical record from you: SELF    How would you like to receive the form or medical records (pick-up, mail, fax): MYCHART    Timeframe paperwork needed: ASAP    Additional notes: PLEASE CALL WITH ANY QUESTIONS OR WHEN COMPLETED.

## 2025-06-27 NOTE — LETTER
To whom it may concern:    My patient, Lady Diamond, suffers from seizures, blackout spells, and subsequent memory loss.  These are all medical conditions.    Sincerely,    Flores Carrion MD

## 2025-06-28 DIAGNOSIS — Z79.899 MEDICATION MANAGEMENT: ICD-10-CM

## 2025-06-28 DIAGNOSIS — F41.0 PANIC ATTACKS: ICD-10-CM

## 2025-06-29 RX ORDER — HYDROXYZINE PAMOATE 25 MG/1
CAPSULE ORAL
Qty: 120 CAPSULE | Refills: 0 | OUTPATIENT
Start: 2025-06-29

## 2025-07-08 ENCOUNTER — TELEMEDICINE (OUTPATIENT)
Dept: PSYCHIATRY | Facility: CLINIC | Age: 30
End: 2025-07-08
Payer: COMMERCIAL

## 2025-07-08 DIAGNOSIS — F60.3 BORDERLINE PERSONALITY DISORDER: Primary | ICD-10-CM

## 2025-07-08 DIAGNOSIS — F41.1 GENERALIZED ANXIETY DISORDER: ICD-10-CM

## 2025-07-08 DIAGNOSIS — F31.60 BIPOLAR AFFECTIVE DISORDER, MIXED: ICD-10-CM

## 2025-07-08 PROCEDURE — 90837 PSYTX W PT 60 MINUTES: CPT

## 2025-07-08 NOTE — PROGRESS NOTES
Date: 2025  Time In: 13:30 EDT  Time out: 2:28 PM EST    This provider is located at [facility], [facility] [place], [place], [facility], using a secure [employer] Video Visit through [employer]. Patient is being seen remotely via telehealth at their home address is located in [place]. Patient stated they are in a secure environment for this session. The patient's condition being diagnosed and treated is appropriate for telemedicine. The provider identified themself as well as their credentials. The patient, or  patient's legal guardian consent to be seen remotely, and when consent is given they understand that the consent allows for patient identifiable information to be sent to a third party as needed. They may refuse to be seen remotely at any time. The electronic data is encrypted and password protected, and the patient's or  legal guardian has been advised of the potential risks to privacy not withstanding such measures.   PT Identifiers used: Name and .    You have chosen to receive care through a telehealth visit.  Do you consent to use a video/audio connection for your medical care today? Yes    Subjective   Lady Diamond is a 30 y.o. female who presents today for follow up    Chief Complaint:   Chief Complaint   Patient presents with    Anxiety    Depression    Manic Behavior        Data: Pt reported she is doing alright. Pt reported she and her ex are now  no contact, 'feels freeing'. Pt reflected on emotions during this time and external triggers that remind her of him. Pt reported that she is preparing for an apartment walk thru, cleaning everything. Pt reported that her 'blackouts' have returned when she is under increased stress. Pt reported that anxiety is still a struggle with her stressors. Pt reflected on issues with her son and behaviors.       Clinical Maneuvering/Intervention:  Assisted patient in processing above session content; acknowledged and normalized  patient’s thoughts, feelings, and concerns.  Rationalized patient thought process regarding concerns presented at session.  Discussed triggers associated with patient's  anxiety , depression , and BPD Also discussed coping skills for patient to implement such as grounding , 4:6 breathing , mindfulness , increasing activity , self care , and positive self talk     Allowed patient to freely discuss issues without interruption or judgment. Provided safe, confidential environment to facilitate the development of positive therapeutic relationship and encourage open, honest communication. Assisted patient in identifying risk factors which would indicate the need for higher level of care including thoughts to harm self or others and/or self-harming behavior and encouraged patient to contact this office, call 911, or present to the nearest emergency room should any of these events occur. Discussed crisis intervention services and means to access. Patient adamantly and convincingly denies current suicidal or homicidal ideation or perceptual disturbance.    Assessment:  Pt was alert and oriented x3. Pt appears open and honest re MH symptoms that have affected life in a negative way. Pt appears motivated to improve MH symptoms and overall quality of life. Pt appears to have  hx of unstable relationships and minimizing unhealthy patterns. Pt appears to have increased stress with instability with housing, food, employment, transportation. Pt appears to be processing her recent breakup. Pt appears to believe hat her 'blackouts' are caused from stress, has been evaluated at the hospital for this. Pt appeared receptive to techniques and encouragements discussed in session.     Patient appears to maintain relative stability as compared to their baseline.  However, patient continues to struggle with   Chief Complaint   Patient presents with    Anxiety    Depression    Manic Behavior    which continues to cause impairment in important  areas of functioning.  A result, they can be reasonably expected to continue to benefit from treatment and would likely be at increased risk for decompensation otherwise.      Mental Status Exam:   Hygiene:   fair  Cooperation:  Cooperative  Eye Contact:  Fair  Psychomotor Behavior:  Hyperactive  Mood: depressed and anxious  Speech:  Rambling  Thought Process:  Linear  Thought Content:  Mood congruent  Suicidal:  None  Homicidal:  None  Hallucinations:  None  Delusion:  None  Memory:  Deficits  Orientation:  Grossly intact  Reliability:  fair  Insight:  Fair  Judgement:  Fair  Impulse Control:  Fair  Physical/Medical Issues:  No        Patient's Support Network Includes:  limited healthy support    Functional Status: Mild impairment     Progress toward goal: Not at goal    Prognosis: Fair with Ongoing Treatment     Plan:     Patient will continue in individual outpatient therapy with focus on improved functioning and coping skills, maintaining stability, and avoiding decompensation and the need for higher level of care.    Patient will adhere to medication regimen as prescribed and report any side effects. Patient will contact this office, call 911 or present to the nearest emergency room should suicidal or homicidal ideations occur. Provide Cognitive Behavioral Therapy and Solution Focused Therapy to improve functioning, maintain stability, and avoid decompensation and the need for higher level of care.     Return in about 4 weeks (around 8/5/2025).      VISIT DIAGNOSIS:    Diagnosis Plan   1. Borderline personality disorder        2. Generalized anxiety disorder        3. Bipolar affective disorder, mixed         13:30 EDT         This document has been electronically signed by Anna Collier LCSW  July 8, 2025      Part of this note may be an electronic transcription/translation of spoken language to printed text using the Dragon Dictation System.

## 2025-07-11 ENCOUNTER — TELEPHONE (OUTPATIENT)
Dept: NEUROLOGY | Facility: CLINIC | Age: 30
End: 2025-07-11

## 2025-07-11 ENCOUNTER — TELEPHONE (OUTPATIENT)
Dept: INTERNAL MEDICINE | Age: 30
End: 2025-07-11

## 2025-07-16 ENCOUNTER — SPECIALTY PHARMACY (OUTPATIENT)
Dept: ONCOLOGY | Facility: HOSPITAL | Age: 30
End: 2025-07-16
Payer: COMMERCIAL

## 2025-07-16 NOTE — PROGRESS NOTES
Specialty Pharmacy Patient Management Program  Refill Outreach     Lady was contacted today regarding refills of their medication(s).    Refill Questions      Flowsheet Row Most Recent Value   Changes to allergies? No   Changes to medications? No   New conditions or infections since last clinic visit No   Unplanned office visit, urgent care, ED, or hospital admission in the last 4 weeks  No   How does patient/caregiver feel medication is working? Good   Financial problems or insurance changes  No   Since the previous refill, were any specialty medication doses or scheduled injections missed or delayed?  No   If yes, please provide the amount n/a   Why were doses missed? n/a   Does this patient require a clinical escalation to a pharmacist? No            Delivery Questions      Flowsheet Row Most Recent Value   Delivery method UPS   Delivery address verified with patient/caregiver? Yes   Delivery address Home   Other address preferred n/a   Number of medications in delivery 2   Medication(s) being filled and delivered Rimegepant Sulfate (NURTEC-ODT), Galcanezumab-gnlm (EMGALITY)   Doses left of specialty medications Nurtec = PRN, Emgality = 0 injections   Copay verified? Yes   Copay amount no copay   Copay form of payment No copayment ($0)   Delivery Date Selection 07/17/25   Signature Required Yes   Do you consent to receive electronic handouts?  Yes                 Follow-up: 30 day(s)     Jc Juarez, Pharmacy Technician  7/16/2025  11:53 EDT

## 2025-07-17 ENCOUNTER — TELEMEDICINE (OUTPATIENT)
Age: 30
End: 2025-07-17
Payer: COMMERCIAL

## 2025-07-17 DIAGNOSIS — F43.23 ADJUSTMENT DISORDER WITH MIXED ANXIETY AND DEPRESSED MOOD: ICD-10-CM

## 2025-07-17 DIAGNOSIS — F31.9 BIPOLAR AND RELATED DISORDER: Primary | ICD-10-CM

## 2025-07-17 DIAGNOSIS — F41.1 GAD (GENERALIZED ANXIETY DISORDER): ICD-10-CM

## 2025-07-17 DIAGNOSIS — F60.3 BORDERLINE PERSONALITY DISORDER: ICD-10-CM

## 2025-07-17 RX ORDER — VILAZODONE HYDROCHLORIDE 40 MG/1
40 TABLET ORAL DAILY
Qty: 30 TABLET | Refills: 0 | Status: SHIPPED | OUTPATIENT
Start: 2025-07-17

## 2025-07-17 RX ORDER — GABAPENTIN 300 MG/1
300 CAPSULE ORAL 2 TIMES DAILY
Qty: 60 CAPSULE | Refills: 0 | Status: SHIPPED | OUTPATIENT
Start: 2025-07-17 | End: 2025-08-16

## 2025-07-17 RX ORDER — LAMOTRIGINE 150 MG/1
300 TABLET ORAL DAILY
Qty: 60 TABLET | Refills: 0 | Status: SHIPPED | OUTPATIENT
Start: 2025-07-17 | End: 2025-08-16

## 2025-07-17 NOTE — PROGRESS NOTES
"    Video Visit Follow Up      Date: 2025  Patient Name: Lady Diamond  : 1995   MRN: [id number]   Time In: 11:15 am      Time Out: 11:30 am     Referring Physician: Flores Carrion MD  Chaperone Statement: Jamal CORREIA served as a chaperone for this visit and was present for the full visit from 11:15 to 11:30. Patient agreeable to chaperone presence during appointment.   Chief Complaint:      ICD-10-CM ICD-9-CM   1. Bipolar and related disorder  F31.9 296.80   2. Borderline personality disorder  F60.3 301.83   3. TERESA (generalized anxiety disorder)  F41.1 300.02   4. Adjustment disorder with mixed anxiety and depressed mood  F43.23 309.28        Mode of Visit: Video  Location of patient: -HOME-  Location of provider: +[facility] [place] [facility]  You have chosen to receive care through a telehealth visit.  The patient has signed the video visit consent form.  The visit included audio and video interaction. No technical issues occurred during this visit.    History of Present Illness: Lady Diamond is a 30 y.o. female who I saw today thru a video visit for medication management.     History of Present Illness  The patient presents via virtual visit for anxiety, blackouts, mood swings, and seizures.    She has been experiencing heightened anxiety due to a recent incident involving her son. She describes feeling \"anxious, on edge, scared,\" and unsure of how to respond. She is currently undergoing therapy with Ricarda Collier for PTSD. She reports feeling anxious today, particularly stressed about her schoolwork, and rates her feelings of hopelessness at 4 out of 10. She does not endorse any suicidal thoughts but admits to feelings of loneliness, which she rates at 7 out of 10. She also reports paranoia, which has increased with the frequency of her blackouts, to the point where she is reluctant to leave her house. She does not report any hallucinations. She is open to trying gabapentin for her anxiety. " She occasionally vapes and drinks alcohol but does not use marijuana. Hydroxyzine has not been effective for her anxiety and makes her feel sleepy.    She has been experiencing daily blackouts, sometimes twice a day, which leave her feeling confused upon waking. These episodes have been occurring since 12/2024, coinciding with the onset of her seizures. She has been informed that she nearly fainted during one of these episodes and had to be caught. She does not recall falling during these episodes and has been told that she usually manages to reach her bed. She once experienced a blackout in an elevator and woke up in her sister's bed, which was disorienting. Her daughter is often present during these episodes. She does not believe these episodes are medication-related as they seem to be triggered by stress. She is currently seeing a therapist and has experienced a blackout during a phone session with her. Her seizures have been well-controlled with medication, but the frequency of her blackouts has increased recently, occurring 2 to 3 times a week and sometimes twice a day. She has been informed that there are no abnormalities in her brain and that her symptoms are likely psychiatric in nature. She has been advised to consult a cardiologist.    Interim History: She reports being very anxious recently. She continues to experience blackouts and mood swings. She finds vilazodone effective for managing her mood symptoms, although she still experiences occasional mood swings. She is also on lamotrigine, which she finds helpful for her mood swings and is considering increasing the dose.    Social History:  - Reports significant stress related to schoolwork  - Daughter is often present during blackouts  - Relies on her sister for support during blackouts    Psychiatric History: She is currently undergoing therapy for PTSD with Ricarda Collier. She has experienced blackouts during therapy sessions. She finds vilazodone  effective for her mood symptoms and is considering increasing her dose of lamotrigine.    Substance Use:   - Occasionally vapes  - Drinks alcohol on rare occasions  - Does not use marijuana    Pertinent Negatives:   - No suicidal thoughts  - No hallucinations    She is also taking lacosamide, prescribed by her primary care physician, and has recently refilled her prescription. She has two neurologists, one outpatient and one inpatient, and is considering another hospital stay due to concerns about her black outs. Her inpatient neurologist is particularly concerned about her blackouts.    SOCIAL HISTORY  The patient uses vaping products. The patient drinks alcohol on very rare occasions. The patient does not use marijuana.        Subjective      Review of Systems:   The following portions of the patient's history were reviewed and updated as appropriate: allergies, current medications, past family history, past medical history, past social history, past surgical history and problem list.     Screening Scores:   PHQ-9 : 11  TERESA-7 : 16    Quality Measures:   Tobacco: Lady Diamond  reports that she has been smoking cigarettes and electronic cigarette. She started smoking about 15 years ago. She has a 15 pack-year smoking history. She has been exposed to tobacco smoke. She has never used smokeless tobacco. I have educated her on the risk of diseases from using tobacco products such as cancer, COPD, heart disease, and she vapes nicotine.   I advised her to quit and she is not willing to quit.  I spent 1 minute counseling the patient.          Depression (PHQ >10): Patient screened  positive for depression based on a PHQ-9 score of 11 on 7/13/2025. Follow up recommendations include: Prescribed antidepressant medication treatment, Suicide Risk Assessment performed, and Continue with medication management     Medications:     Current Outpatient Medications:     vilazodone (VIIBRYD) 40 MG tablet tablet, Take 1 tablet  by mouth Daily., Disp: 30 tablet, Rfl: 0    albuterol sulfate  (90 Base) MCG/ACT inhaler, Inhale 2 puffs Every 4 (Four) Hours As Needed for Wheezing., Disp: 18 g, Rfl: 0    famotidine (Pepcid) 20 MG tablet, Take 1 tablet by mouth 2 (Two) Times a Day. (Patient taking differently: Take 1 tablet by mouth As Needed.), Disp: 60 tablet, Rfl: 2    gabapentin (Neurontin) 300 MG capsule, Take 1 capsule by mouth 2 (Two) Times a Day for 30 days., Disp: 60 capsule, Rfl: 0    galcanezumab-gnlm (EMGALITY) 120 MG/ML auto-injector pen, Inject 1 mL under the skin into the appropriate area as directed Every 28 (Twenty-Eight) Days., Disp: 1 mL, Rfl: 11    lacosamide (VIMPAT) 200 MG tablet, Take 1 tablet by mouth Every 12 (Twelve) Hours., Disp: , Rfl:     lamoTRIgine (LaMICtal) 150 MG tablet, Take 2 tablets by mouth Daily for 30 days., Disp: 60 tablet, Rfl: 0    meclizine (ANTIVERT) 25 MG tablet, Take 1 tablet by mouth 3 (Three) Times a Day As Needed for Dizziness., Disp: 90 tablet, Rfl: 2    mometasone-formoterol (Dulera) 200-5 MCG/ACT inhaler, Inhale 2 puffs 2 (Two) Times a Day., Disp: 8.8 g, Rfl: 11    nicotine (NICODERM CQ) 14 MG/24HR patch, PLACE ONCE PATCH ON THE SKIN AS DIRECTED BY PROVIDER DAILY., Disp: 14 patch, Rfl: 0    ondansetron ODT (ZOFRAN-ODT) 4 MG disintegrating tablet, Place 1 tablet on the tongue Every 8 (Eight) Hours As Needed for Nausea or Vomiting., Disp: 30 tablet, Rfl: 2    polyethylene glycol (MiraLax) 17 GM/SCOOP powder, Take 17 g by mouth Daily. (Patient taking differently: Take 17 g by mouth As Needed.), Disp: 510 g, Rfl: 1    rimegepant sulfate ODT (Nurtec) 75 MG disintegrating tablet, Take 1 tablet by mouth once daily as needed at the onset of headache. MAX: 75 mg/24 hours, & 18 tabs/30 days., Disp: 16 tablet, Rfl: 11    SUMAtriptan (IMITREX) 100 MG tablet, TAKE 1 TABLET BY MOUTH 1 TIME AT ONSET OF HEADACHE AS NEEDED FOR MIGRAINE. MAY REPEAT DOSE 1 TIME IN 2 HOURS IF HEADACHE NOT RELIEVED, Disp: 30  tablet, Rfl: 2    Medication Considerations:  DIONICIO reviewed and appropriate.     Allergies:   Allergies   Allergen Reactions    Lithium Confusion    Doxycycline GI Intolerance     vomit, have hot flashes as well as fatigued    Shellfish-Derived Products Itching     Itchy eyes and throat, nausea.       Objective     Vital Signs:   The patient was seen remotely today via a video visit. Unable to obtain vital signs due to nature of remote visit. Patient stated that their height was 66 inches and their weight was 274 pounds.     Mental Status Exam:   MENTAL STATUS EXAM   General Appearance:  Cleanly groomed and dressed  Eye Contact:  Good eye contact  Attitude:  Cooperative  Motor Activity:  Normal gait, posture  Muscle Strength:  Normal  Speech:  Normal rate, tone, volume  Language:  Spontaneous  Mood and affect:  Anxious  Hopelessness:  4  Loneliness: 7  Thought Process:  Logical, goal-directed and linear  Associations/ Thought Content:  No delusions  Hallucinations:  None  Suicidal Ideations:  Not present  Homicidal Ideation:  Not present  Sensorium:  Alert and clear  Orientation:  Person, place, time and situation  Immediate Recall, Recent, and Remote Memory:  Intact  Attention Span/ Concentration:  Good  Fund of Knowledge:  Appropriate for age and educational level  Intellectual Functioning:  Average range  Insight:  Fair  Judgement:  Fair  Reliability:  Fair  Impulse Control:  Fair        SUICIDE RISK ASSESSMENT/CSSRS:  1. Does patient have thoughts of suicide? denies  2. Does patient have intent for suicide? denies  3. Does patient have a current plan for suicide? denies  4. History of suicide attempts: denies  5. Family history of suicide or attempts: denies  6. History of violent behaviors towards others or property or thoughts of committing suicide: denies  7. History of sexual aggression toward others: denies  8. Access to firearms or weapons: denies    Assessment / Plan      Visit Diagnosis/Orders Placed  This Visit:  Diagnoses and all orders for this visit:    1. Bipolar and related disorder (Primary)  -     vilazodone (VIIBRYD) 40 MG tablet tablet; Take 1 tablet by mouth Daily.  Dispense: 30 tablet; Refill: 0  -     lamoTRIgine (LaMICtal) 150 MG tablet; Take 2 tablets by mouth Daily for 30 days.  Dispense: 60 tablet; Refill: 0    2. Borderline personality disorder  -     vilazodone (VIIBRYD) 40 MG tablet tablet; Take 1 tablet by mouth Daily.  Dispense: 30 tablet; Refill: 0    3. TERESA (generalized anxiety disorder)  -     gabapentin (Neurontin) 300 MG capsule; Take 1 capsule by mouth 2 (Two) Times a Day for 30 days.  Dispense: 60 capsule; Refill: 0    4. Adjustment disorder with mixed anxiety and depressed mood  -     vilazodone (VIIBRYD) 40 MG tablet tablet; Take 1 tablet by mouth Daily.  Dispense: 30 tablet; Refill: 0         Assessment & Plan  Problems:  - Anxiety  - Blackouts  - Mood swings  - Seizures    Content of Therapy:  During the session, the patient discussed her heightened anxiety levels, frequent blackouts, mood swings, and seizure management. She reported experiencing blackouts 2-3 times a week, sometimes twice a day, which she believes are triggered by stress. The patient also shared her concerns about the ineffectiveness of hydroxyzine for anxiety and her experiences with mood swings despite being on vilazodone and lamotrigine. Additionally, she mentioned her seizure management with lacosamide and the importance of maintaining her medication regimen.    Clinical Impression:  The patient's anxiety is significantly elevated, contributing to her frequent blackouts. She appears to be managing her mood swings relatively well with vilazodone and lamotrigine, although an increase in lamotrigine dosage is warranted. Her seizures are currently controlled with lacosamide, and she has an adequate supply of the medication. The patient reports no suicidal ideation but does experience feelings of hopelessness and  loneliness. Her paranoia has increased due to the blackouts, causing her to avoid going outside.    Therapeutic Intervention:  Specific therapeutic interventions included discussing the initiation of gabapentin 300 mg twice daily for anxiety management, increasing the lamotrigine dosage to 300 mg for mood swings, and maintaining the current vilazodone dosage. The patient was advised to follow up with a cardiologist to rule out any cardiac issues related to her blackouts. The potential side effects of gabapentin, including peripheral edema and sedation, were explained.    Plan:  - Initiate gabapentin 300 mg twice daily for anxiety management.  - Increase lamotrigine dosage to 300 mg for mood swings.  - Maintain current vilazodone dosage.  - Follow up with a cardiologist to rule out cardiac issues related to blackouts.  - Consider hospitalization if recommended by inpatient neurologist due to increased blackouts.  - Avoid missing appointments to ensure uninterrupted seizure medication regimen.    Follow-up:  A follow-up appointment is scheduled for 07/31/2025 via video consultation to monitor the patient's response to the new medications and assess the frequency of blackouts.    Notes & Risk Factors:  - Elevated anxiety levels contributing to blackouts.  - Feelings of hopelessness rated at 4/10.  - Feelings of loneliness rated at 7/10.  - Increased paranoia due to blackouts.  - No suicidal ideation reported.  - Protective factors include ongoing therapy for PTSD and support from family members.      Labs Reviewed : labs reviewed  Chart since last visit reviewed : chart reviewed    Functional Status: No impairment    Prognosis: Good with Ongoing Treatment     Impression/Formulation:  Patient appeared alert and oriented. Patient is receptive to assistance with maintaining a stable lifestyle.  Patient presents with history of     ICD-10-CM ICD-9-CM   1. Bipolar and related disorder  F31.9 296.80   2. Borderline  personality disorder  F60.3 301.83   3. TERESA (generalized anxiety disorder)  F41.1 300.02   4. Adjustment disorder with mixed anxiety and depressed mood  F43.23 309.28   .     Treatment Plan:     Patient will continue supportive efforts and medications as indicated. Clinic will obtain release of information for current treatment team for continuity of care as needed. Patient will contact this office, call 911 or present to the nearest emergency room should suicidal or homicidal ideations occur.  Discussed medication options and treatment plan of prescribed medication(s) as well as the risks, benefits, and potential side effects. Patient acknowledged and verbally consented to continue with current treatment plan and was educated on the importance of compliance with treatment and follow-up appointments.       Follow Up:   Return in about 2 weeks (around 7/31/2025) for Recheck, Video visit.    Short-term goals: Patient will adhere to medication regimen and experience continued improvement in symptoms over the next 3 months.   Long-term goals: Patient will adhere to medication treatment plan and report improvement in symptoms over the next 6 months    Patient or patient representative verbalized consent for the use of Ambient Listening during the visit with  Maury Alonso MD for chart documentation. 7/17/2025  11:15 EDT    Maury Alonso MD  [facility]     This is electronically signed by Maury Alonso MD  07/17/2025 11:14 EDT

## 2025-07-18 ENCOUNTER — TELEPHONE (OUTPATIENT)
Age: 30
End: 2025-07-18
Payer: COMMERCIAL

## 2025-07-18 NOTE — TELEPHONE ENCOUNTER
----- Message from Maury Alonso sent at 7/17/2025  9:52 PM EDT -----  Follow up in 2 weeks video connect

## 2025-07-29 ENCOUNTER — OFFICE VISIT (OUTPATIENT)
Dept: INTERNAL MEDICINE | Age: 30
End: 2025-07-29
Payer: COMMERCIAL

## 2025-07-29 ENCOUNTER — LAB (OUTPATIENT)
Dept: INTERNAL MEDICINE | Age: 30
End: 2025-07-29
Payer: COMMERCIAL

## 2025-07-29 ENCOUNTER — TELEPHONE (OUTPATIENT)
Dept: INTERNAL MEDICINE | Age: 30
End: 2025-07-29

## 2025-07-29 VITALS
WEIGHT: 276 LBS | OXYGEN SATURATION: 99 % | HEART RATE: 82 BPM | SYSTOLIC BLOOD PRESSURE: 132 MMHG | DIASTOLIC BLOOD PRESSURE: 86 MMHG | BODY MASS INDEX: 44.36 KG/M2 | HEIGHT: 66 IN

## 2025-07-29 DIAGNOSIS — R11.2 NAUSEA AND VOMITING, UNSPECIFIED VOMITING TYPE: ICD-10-CM

## 2025-07-29 DIAGNOSIS — R23.2 HOT FLASHES: Primary | ICD-10-CM

## 2025-07-29 DIAGNOSIS — Z00.00 HEALTHCARE MAINTENANCE: Primary | ICD-10-CM

## 2025-07-29 PROCEDURE — 83036 HEMOGLOBIN GLYCOSYLATED A1C: CPT | Performed by: INTERNAL MEDICINE

## 2025-07-29 PROCEDURE — 82670 ASSAY OF TOTAL ESTRADIOL: CPT | Performed by: INTERNAL MEDICINE

## 2025-07-29 PROCEDURE — 83002 ASSAY OF GONADOTROPIN (LH): CPT | Performed by: INTERNAL MEDICINE

## 2025-07-29 PROCEDURE — 83001 ASSAY OF GONADOTROPIN (FSH): CPT | Performed by: INTERNAL MEDICINE

## 2025-07-29 PROCEDURE — 84702 CHORIONIC GONADOTROPIN TEST: CPT | Performed by: INTERNAL MEDICINE

## 2025-07-29 PROCEDURE — 99214 OFFICE O/P EST MOD 30 MIN: CPT | Performed by: INTERNAL MEDICINE

## 2025-07-29 PROCEDURE — 85027 COMPLETE CBC AUTOMATED: CPT | Performed by: INTERNAL MEDICINE

## 2025-07-29 PROCEDURE — 84443 ASSAY THYROID STIM HORMONE: CPT | Performed by: INTERNAL MEDICINE

## 2025-07-29 PROCEDURE — 36415 COLL VENOUS BLD VENIPUNCTURE: CPT | Performed by: INTERNAL MEDICINE

## 2025-07-29 NOTE — PROGRESS NOTES
"Subjective   Lady Crystal Diamond is a 30 y.o. female here for hot flashes, n/v over last 2 weeks. Vomiting actually stopped 2 days ago so seems to have resolved. Was occurring every time she ate, didn't feel ill. Having hot flashes at night with sweating, slightly irregular periods. Worried about hormones. Mood has been better, no further \"blackouts.\" Following with neuro and psych.    I have reviewed the patient's relevant medical history and confirmed it is accurate.    I have personally reviewed and performed the ROS. Flores Carrion MD     Objective   /86 (BP Location: Left arm)   Pulse 82   Ht 167.6 cm (66\")   Wt 125 kg (276 lb)   SpO2 99%   BMI 44.55 kg/m²     Physical Exam  Constitutional:       Appearance: She is well-developed.   Pulmonary:      Effort: Pulmonary effort is normal.   Neurological:      General: No focal deficit present.      Mental Status: She is alert.   Psychiatric:         Behavior: Behavior normal.         Thought Content: Thought content normal.         Judgment: Judgment normal.           Current Outpatient Medications:     albuterol sulfate  (90 Base) MCG/ACT inhaler, Inhale 2 puffs Every 4 (Four) Hours As Needed for Wheezing., Disp: 18 g, Rfl: 0    famotidine (Pepcid) 20 MG tablet, Take 1 tablet by mouth 2 (Two) Times a Day. (Patient taking differently: Take 1 tablet by mouth As Needed.), Disp: 60 tablet, Rfl: 2    gabapentin (Neurontin) 300 MG capsule, Take 1 capsule by mouth 2 (Two) Times a Day for 30 days., Disp: 60 capsule, Rfl: 0    galcanezumab-gnlm (EMGALITY) 120 MG/ML auto-injector pen, Inject 1 mL under the skin into the appropriate area as directed Every 28 (Twenty-Eight) Days., Disp: 1 mL, Rfl: 11    lacosamide (VIMPAT) 200 MG tablet, Take 1 tablet by mouth Every 12 (Twelve) Hours., Disp: , Rfl:     lamoTRIgine (LaMICtal) 150 MG tablet, Take 2 tablets by mouth Daily for 30 days., Disp: 60 tablet, Rfl: 0    meclizine (ANTIVERT) 25 MG tablet, " Take 1 tablet by mouth 3 (Three) Times a Day As Needed for Dizziness., Disp: 90 tablet, Rfl: 2    nicotine (NICODERM CQ) 14 MG/24HR patch, PLACE ONCE PATCH ON THE SKIN AS DIRECTED BY PROVIDER DAILY., Disp: 14 patch, Rfl: 0    ondansetron ODT (ZOFRAN-ODT) 4 MG disintegrating tablet, Place 1 tablet on the tongue Every 8 (Eight) Hours As Needed for Nausea or Vomiting., Disp: 30 tablet, Rfl: 2    polyethylene glycol (MiraLax) 17 GM/SCOOP powder, Take 17 g by mouth Daily. (Patient taking differently: Take 17 g by mouth As Needed.), Disp: 510 g, Rfl: 1    rimegepant sulfate ODT (Nurtec) 75 MG disintegrating tablet, Take 1 tablet by mouth once daily as needed at the onset of headache. MAX: 75 mg/24 hours, & 18 tabs/30 days., Disp: 16 tablet, Rfl: 11    SUMAtriptan (IMITREX) 100 MG tablet, TAKE 1 TABLET BY MOUTH 1 TIME AT ONSET OF HEADACHE AS NEEDED FOR MIGRAINE. MAY REPEAT DOSE 1 TIME IN 2 HOURS IF HEADACHE NOT RELIEVED, Disp: 30 tablet, Rfl: 2    vilazodone (VIIBRYD) 40 MG tablet tablet, Take 1 tablet by mouth Daily., Disp: 30 tablet, Rfl: 0    Assessment & Plan   Diagnoses and all orders for this visit:    1. Hot flashes (Primary)  -     CBC (No Diff)  -     Estradiol  -     Follicle Stimulating Hormone  -     Luteinizing Hormone  -     TSH Rfx On Abnormal To Free T4    2. Nausea and vomiting, unspecified vomiting type  -     Hemoglobin A1c  -     HCG, B-subunit, Quantitative             Flores Carrion MD

## 2025-07-30 LAB
DEPRECATED RDW RBC AUTO: 43 FL (ref 37–54)
ERYTHROCYTE [DISTWIDTH] IN BLOOD BY AUTOMATED COUNT: 15.6 % (ref 12.3–15.4)
ESTRADIOL SERPL HS-MCNC: 61.8 PG/ML
FSH SERPL-ACNC: 6.24 MIU/ML
HBA1C MFR BLD: 5.3 % (ref 4.8–5.6)
HCT VFR BLD AUTO: 37.4 % (ref 34–46.6)
HGB BLD-MCNC: 11.8 G/DL (ref 12–15.9)
LH SERPL-ACNC: 4.96 MIU/ML
MCH RBC QN AUTO: 24.5 PG (ref 26.6–33)
MCHC RBC AUTO-ENTMCNC: 31.6 G/DL (ref 31.5–35.7)
MCV RBC AUTO: 77.6 FL (ref 79–97)
PLATELET # BLD AUTO: 332 10*3/MM3 (ref 140–450)
PMV BLD AUTO: 10.1 FL (ref 6–12)
RBC # BLD AUTO: 4.82 10*6/MM3 (ref 3.77–5.28)
TSH SERPL DL<=0.05 MIU/L-ACNC: 2.61 UIU/ML (ref 0.27–4.2)
WBC NRBC COR # BLD AUTO: 8.4 10*3/MM3 (ref 3.4–10.8)

## 2025-07-31 ENCOUNTER — TELEMEDICINE (OUTPATIENT)
Age: 30
End: 2025-07-31
Payer: COMMERCIAL

## 2025-07-31 DIAGNOSIS — F43.23 ADJUSTMENT DISORDER WITH MIXED ANXIETY AND DEPRESSED MOOD: ICD-10-CM

## 2025-07-31 DIAGNOSIS — F31.9 BIPOLAR AND RELATED DISORDER: ICD-10-CM

## 2025-07-31 DIAGNOSIS — F41.1 GAD (GENERALIZED ANXIETY DISORDER): ICD-10-CM

## 2025-07-31 DIAGNOSIS — F60.3 BORDERLINE PERSONALITY DISORDER: ICD-10-CM

## 2025-07-31 LAB — HCG INTACT+B SERPL-ACNC: <1 MIU/ML

## 2025-07-31 RX ORDER — VILAZODONE HYDROCHLORIDE 40 MG/1
40 TABLET ORAL DAILY
Qty: 30 TABLET | Refills: 1 | Status: SHIPPED | OUTPATIENT
Start: 2025-07-31 | End: 2025-09-29

## 2025-07-31 RX ORDER — LAMOTRIGINE 150 MG/1
300 TABLET ORAL DAILY
Qty: 60 TABLET | Refills: 0 | Status: SHIPPED | OUTPATIENT
Start: 2025-07-31 | End: 2025-08-30

## 2025-07-31 RX ORDER — GABAPENTIN 300 MG/1
300 CAPSULE ORAL NIGHTLY
Qty: 30 CAPSULE | Refills: 1 | Status: SHIPPED | OUTPATIENT
Start: 2025-07-31 | End: 2025-09-29

## 2025-07-31 NOTE — PROGRESS NOTES
Video Visit Follow Up      Date: 2025  Patient Name: Lady Diamond  : 1995   MRN: [id number]   Time In: 09:00 am      Time Out: 09:10 am     Chaperone Statement: Jamal BUNCHJoaquina served as a chaperone for this visit and was present for the full visit from 09:00 to 09:10. Patient agreeable to chaperone presence during appointment.   Referring Physician: Flores Carrion MD    Chief Complaint:      ICD-10-CM ICD-9-CM   1. Bipolar and related disorder  F31.9 296.80   2. Borderline personality disorder  F60.3 301.83   3. Adjustment disorder with mixed anxiety and depressed mood  F43.23 309.28   4. TERESA (generalized anxiety disorder)  F41.1 300.02        Mode of Visit: Video  Location of patient: -HOME-  Location of provider: +[facility]+  You have chosen to receive care through a telehealth visit.  The patient has signed the video visit consent form.  The visit included audio and video interaction. No technical issues occurred during this visit.    History of Present Illness: Lady Diamond is a 30 y.o. female who I saw today thru a video visit for follow up visit.     History of Present Illness  The patient presents via virtual visit for evaluation of seizures, depression, and learning disability.    She reports no current suicidal ideation, plans, or intent. She attributes her blackouts to a traumatic incident involving her sister, which led to a pseudo-seizure episode. She experienced a pseudo-seizure shortly after the incident, which occurred a few days before  and her daughter's birthday. Additionally, she had another pseudo-seizure five days before , and her mother overdosed after the New Year, making the previous year particularly challenging.     Currently, she is on gabapentin 300 mg once daily at bedtime, which induces sleepiness but provides some relief. She also takes lamotrigine 300 mg, which has been beneficial. She is scheduled to see her therapist  next week, with whom she has monthly sessions. If she is having a particularly rough time, she sees her therapist twice a month.     Her mood today is positive, and she reports no feelings of hopelessness or loneliness. Her sleep pattern varies depending on her school workload. She reports no issues with impulse control or depression. However, she does experience stress and depression due to difficulties in passing an online class at [employer], exacerbated by her learning disability. She has previously been on academic probation and fears being dropped from school, even though she only has one semester left. She has discussed these concerns with her academic provider and is currently on learning disability leave, which allows her extra time to complete her tasks.     Interim History: She reports doing better overall and finds her current medications helpful. She has been taking gabapentin once daily and lamotrigine 300 mg, which have been beneficial. She has not experienced any significant side effects other than sleepiness from gabapentin.    Social History:  - Online student at [employer]  - Mother of children who keep her occupied    Pertinent Negatives:   - No current suicidal ideation, plans, or intent  - No feelings of hopelessness or loneliness  - No impulse control issues  - No significant depression        Subjective      Review of Systems:   The following portions of the patient's history were reviewed and updated as appropriate: allergies, current medications, past family history, past medical history, past social history, past surgical history and problem list.     Screening Scores:   PHQ-9 : 13  TERESA-7 : 13    Quality Measures:   Tobacco: Lady Diamond  reports that she has been smoking cigarettes and electronic cigarette. She started smoking about 15 years ago. She has a 15 pack-year smoking history. She has been exposed to tobacco smoke. She has never used smokeless  tobacco. I have educated her on the risk of diseases from using tobacco products such as cancer, COPD, heart disease, and vapes nicotine.   I advised her to quit and she is not willing to quit.  I spent 1 minute counseling the patient.          Depression (PHQ >10): Patient screened positive for depression based on a PHQ-9 score of 13 on 7/24/2025. Follow up recommendations include: Prescribed antidepressant medication treatment, Suicide Risk Assessment performed, and Continue with medication management     Medications:     Current Outpatient Medications:     gabapentin (Neurontin) 300 MG capsule, Take 1 capsule by mouth Every Night for 60 days., Disp: 30 capsule, Rfl: 1    lamoTRIgine (LaMICtal) 150 MG tablet, Take 2 tablets by mouth Daily for 30 days., Disp: 60 tablet, Rfl: 0    vilazodone (VIIBRYD) 40 MG tablet tablet, Take 1 tablet by mouth Daily for 60 days., Disp: 30 tablet, Rfl: 1    albuterol sulfate  (90 Base) MCG/ACT inhaler, Inhale 2 puffs Every 4 (Four) Hours As Needed for Wheezing., Disp: 18 g, Rfl: 0    famotidine (Pepcid) 20 MG tablet, Take 1 tablet by mouth 2 (Two) Times a Day. (Patient taking differently: Take 1 tablet by mouth As Needed.), Disp: 60 tablet, Rfl: 2    galcanezumab-gnlm (EMGALITY) 120 MG/ML auto-injector pen, Inject 1 mL under the skin into the appropriate area as directed Every 28 (Twenty-Eight) Days., Disp: 1 mL, Rfl: 11    lacosamide (VIMPAT) 200 MG tablet, Take 1 tablet by mouth Every 12 (Twelve) Hours., Disp: , Rfl:     meclizine (ANTIVERT) 25 MG tablet, Take 1 tablet by mouth 3 (Three) Times a Day As Needed for Dizziness., Disp: 90 tablet, Rfl: 2    nicotine (NICODERM CQ) 14 MG/24HR patch, PLACE ONCE PATCH ON THE SKIN AS DIRECTED BY PROVIDER DAILY., Disp: 14 patch, Rfl: 0    ondansetron ODT (ZOFRAN-ODT) 4 MG disintegrating tablet, Place 1 tablet on the tongue Every 8 (Eight) Hours As Needed for Nausea or Vomiting., Disp: 30 tablet, Rfl: 2    polyethylene glycol (MiraLax)  17 GM/SCOOP powder, Take 17 g by mouth Daily. (Patient taking differently: Take 17 g by mouth As Needed.), Disp: 510 g, Rfl: 1    rimegepant sulfate ODT (Nurtec) 75 MG disintegrating tablet, Take 1 tablet by mouth once daily as needed at the onset of headache. MAX: 75 mg/24 hours, & 18 tabs/30 days., Disp: 16 tablet, Rfl: 11    SUMAtriptan (IMITREX) 100 MG tablet, TAKE 1 TABLET BY MOUTH 1 TIME AT ONSET OF HEADACHE AS NEEDED FOR MIGRAINE. MAY REPEAT DOSE 1 TIME IN 2 HOURS IF HEADACHE NOT RELIEVED, Disp: 30 tablet, Rfl: 2    Medication Considerations:  DIONICIO reviewed and appropriate.     Allergies:   Allergies   Allergen Reactions    Lithium Confusion    Doxycycline GI Intolerance     vomit, have hot flashes as well as fatigued    Shellfish-Derived Products Itching     Itchy eyes and throat, nausea.       Objective     Physical Exam:  Vital Signs:   The patient was seen remotely today via a video visit. Unable to obtain vital signs due to nature of remote visit. Patient stated that their height was 66 inches and their weight was 276 pounds.     Mental Status Exam:   MENTAL STATUS EXAM   General Appearance:  Cleanly groomed and dressed  Eye Contact:  Good eye contact  Attitude:  Cooperative  Motor Activity:  Normal gait, posture  Muscle Strength:  Normal  Speech:  Normal rate, tone, volume  Language:  Spontaneous  Mood and affect:  Euthymic  Hopelessness:  Denies  Loneliness: Denies  Thought Process:  Logical, goal-directed and linear  Associations/ Thought Content:  No delusions  Hallucinations:  None  Suicidal Ideations:  Not present  Homicidal Ideation:  Not present  Sensorium:  Alert and clear  Orientation:  Person, place, time and situation  Immediate Recall, Recent, and Remote Memory:  Intact  Attention Span/ Concentration:  Good  Fund of Knowledge:  Appropriate for age and educational level  Intellectual Functioning:  Above average  Insight:  Fair  Judgement:  Fair  Reliability:  Fair  Impulse Control:  Fair         SUICIDE RISK ASSESSMENT/CSSRS:  1. Does patient have thoughts of suicide? denies  2. Does patient have intent for suicide? denies  3. Does patient have a current plan for suicide? denies  4. History of suicide attempts: denies  5. Family history of suicide or attempts: denies  6. History of violent behaviors towards others or property or thoughts of committing suicide: denies  7. History of sexual aggression toward others: denies  8. Access to firearms or weapons: denies    Assessment / Plan      Visit Diagnosis/Orders Placed This Visit:  Diagnoses and all orders for this visit:    1. Bipolar and related disorder  -     vilazodone (VIIBRYD) 40 MG tablet tablet; Take 1 tablet by mouth Daily for 60 days.  Dispense: 30 tablet; Refill: 1  -     lamoTRIgine (LaMICtal) 150 MG tablet; Take 2 tablets by mouth Daily for 30 days.  Dispense: 60 tablet; Refill: 0    2. Borderline personality disorder  -     vilazodone (VIIBRYD) 40 MG tablet tablet; Take 1 tablet by mouth Daily for 60 days.  Dispense: 30 tablet; Refill: 1    3. Adjustment disorder with mixed anxiety and depressed mood  -     vilazodone (VIIBRYD) 40 MG tablet tablet; Take 1 tablet by mouth Daily for 60 days.  Dispense: 30 tablet; Refill: 1    4. TERESA (generalized anxiety disorder)  -     gabapentin (Neurontin) 300 MG capsule; Take 1 capsule by mouth Every Night for 60 days.  Dispense: 30 capsule; Refill: 1         Assessment & Plan  Problems:  - Seizures  - Depression  - Learning disability    Content of Therapy:  During the session, the patient discussed her experiences with pseudo-seizures, which are managed with gabapentin and lamotrigine. She shared her feelings of stress and depression related to academic challenges due to her learning disability. The patient also mentioned her therapy sessions and the support she receives from her therapist. The discussion included reframing thoughts about her academic struggles and exploring feelings related to her  family incidents and their impact on her mental health.    Clinical Impression:  The patient appears to be managing her pseudo-seizures with the current medication regimen, reporting that gabapentin makes her sleepy but is somewhat helpful. She is experiencing stress and depression primarily due to academic challenges related to her learning disability. Despite these challenges, she reports no suicidal thoughts, hopelessness, loneliness, impulse control issues, or significant depression. Her mood is generally positive, and she feels supported by her children and therapist.    Therapeutic Intervention:  The session included discussing the patient's medication regimen, adjusting gabapentin to 300 mg once daily at bedtime, and continuing lamotrigine at 300 mg. The patient was encouraged to continue her monthly therapy sessions and to seek additional support if needed. The importance of reframing thoughts about academic challenges and exploring feelings related to family incidents was emphasized.    Plan:  - Continue gabapentin 300 mg once daily at bedtime.  - Continue lamotrigine 300 mg.  - Continue monthly therapy sessions with Ricarda, scheduled for 08/21/2025.  - Contact the clinic for immediate attention during the provider's absence in August 2025.  - Work with academic provider and shun to resolve academic challenges related to learning disability.    Follow-up:  - Next appointment scheduled for September 2025, face-to-face.    Notes & Risk Factors:  - No suicidal thoughts, no plan, no intent.  - Protective factors include support from children and therapist.      Labs Reviewed : labs reviewed  Chart since last visit reviewed : chart reviewed    Functional Status: No impairment    Prognosis: Good with Ongoing Treatment     Impression/Formulation:  Patient appeared alert and oriented. Patient is receptive to assistance with maintaining a stable lifestyle.  Patient presents with history of     ICD-10-CM ICD-9-CM    1. Bipolar and related disorder  F31.9 296.80   2. Borderline personality disorder  F60.3 301.83   3. Adjustment disorder with mixed anxiety and depressed mood  F43.23 309.28   4. TERESA (generalized anxiety disorder)  F41.1 300.02   .     Treatment Plan:     Patient will continue supportive efforts and medications as indicated. Clinic will obtain release of information for current treatment team for continuity of care as needed. Patient will contact this office, call 911 or present to the nearest emergency room should suicidal or homicidal ideations occur.  Discussed medication options and treatment plan of prescribed medication(s) as well as the risks, benefits, and potential side effects. Patient acknowledged and verbally consented to continue with current treatment plan and was educated on the importance of compliance with treatment and follow-up appointments.       Follow Up:   Return in about 2 months (around 9/30/2025) for Next scheduled follow up.    Short-term goals: Patient will adhere to medication regimen and experience continued improvement in symptoms over the next 3 months.   Long-term goals: Patient will adhere to medication treatment plan and report improvement in symptoms over the next 6 months    Patient or patient representative verbalized consent for the use of Ambient Listening during the visit with  Maury Alonso MD for chart documentation. 7/31/2025  09:00 EDT    Maury Alonso MD  [facility] [address]     This is electronically signed by Maury Alonso MD  07/31/2025 09:02 EDT

## 2025-08-11 ENCOUNTER — SPECIALTY PHARMACY (OUTPATIENT)
Facility: HOSPITAL | Age: 30
End: 2025-08-11
Payer: COMMERCIAL

## 2025-08-11 RX ORDER — NICOTINE 21 MG/24HR
1 PATCH, TRANSDERMAL 24 HOURS TRANSDERMAL EVERY 24 HOURS
Qty: 28 EACH | Refills: 3 | Status: SHIPPED | OUTPATIENT
Start: 2025-08-11

## 2025-08-19 ENCOUNTER — SPECIALTY PHARMACY (OUTPATIENT)
Facility: HOSPITAL | Age: 30
End: 2025-08-19
Payer: COMMERCIAL

## 2025-08-20 ENCOUNTER — TELEMEDICINE (OUTPATIENT)
Dept: INTERNAL MEDICINE | Age: 30
End: 2025-08-20
Payer: COMMERCIAL

## 2025-08-20 DIAGNOSIS — R11.2 NAUSEA AND VOMITING, UNSPECIFIED VOMITING TYPE: Primary | ICD-10-CM

## 2025-08-20 PROCEDURE — 99214 OFFICE O/P EST MOD 30 MIN: CPT | Performed by: INTERNAL MEDICINE

## 2025-08-20 RX ORDER — PANTOPRAZOLE SODIUM 40 MG/1
40 TABLET, DELAYED RELEASE ORAL DAILY
Qty: 30 TABLET | Refills: 5 | Status: SHIPPED | OUTPATIENT
Start: 2025-08-20

## 2025-08-21 ENCOUNTER — LAB (OUTPATIENT)
Dept: INTERNAL MEDICINE | Age: 30
End: 2025-08-21
Payer: COMMERCIAL

## 2025-08-21 ENCOUNTER — TELEMEDICINE (OUTPATIENT)
Dept: PSYCHIATRY | Facility: CLINIC | Age: 30
End: 2025-08-21
Payer: COMMERCIAL

## 2025-08-21 DIAGNOSIS — R11.2 NAUSEA AND VOMITING, UNSPECIFIED VOMITING TYPE: ICD-10-CM

## 2025-08-21 DIAGNOSIS — F31.60 BIPOLAR AFFECTIVE DISORDER, MIXED: ICD-10-CM

## 2025-08-21 DIAGNOSIS — F41.1 GENERALIZED ANXIETY DISORDER: ICD-10-CM

## 2025-08-21 DIAGNOSIS — F60.3 BORDERLINE PERSONALITY DISORDER: Primary | ICD-10-CM

## 2025-08-21 PROCEDURE — 36415 COLL VENOUS BLD VENIPUNCTURE: CPT | Performed by: INTERNAL MEDICINE

## 2025-08-22 ENCOUNTER — SPECIALTY PHARMACY (OUTPATIENT)
Dept: GENERAL RADIOLOGY | Facility: HOSPITAL | Age: 30
End: 2025-08-22
Payer: COMMERCIAL

## 2025-08-24 LAB — UREA BREATH TEST QL: NEGATIVE

## 2025-08-29 DIAGNOSIS — F31.9 BIPOLAR AND RELATED DISORDER: ICD-10-CM

## 2025-08-29 RX ORDER — LAMOTRIGINE 150 MG/1
300 TABLET ORAL DAILY
Qty: 60 TABLET | Refills: 0 | Status: SHIPPED | OUTPATIENT
Start: 2025-08-29 | End: 2025-09-28

## (undated) DEVICE — MAT PREVALON MOBL TRANSFR AIR WO/PAD 39X80IN

## (undated) DEVICE — SUT GUT CHRM 1 CTX 36IN 905H

## (undated) DEVICE — SUT VIC 2/0 CT1 27IN J339H BX/36

## (undated) DEVICE — PK C/SECT 10

## (undated) DEVICE — SUT PDS 0 CTX 36IN DYED Z370T

## (undated) DEVICE — PROXIMATE RH ROTATING HEAD SKIN STAPLERS (35 WIDE) CONTAINS 35 STAINLESS STEEL STAPLES: Brand: PROXIMATE

## (undated) DEVICE — SOL IRR NACL 0.9PCT BT 1000ML

## (undated) DEVICE — GLV SURG BIOGEL LTX PF 7 1/2

## (undated) DEVICE — TRY SPINE BLCK WHITACRE 25G 3X5IN

## (undated) DEVICE — SUT VIC 3/0 CT1 27IN DYED J338H

## (undated) DEVICE — SOL IRR H2O BTL 1000ML STRL